# Patient Record
Sex: FEMALE | Race: WHITE | Employment: OTHER | URBAN - NONMETROPOLITAN AREA
[De-identification: names, ages, dates, MRNs, and addresses within clinical notes are randomized per-mention and may not be internally consistent; named-entity substitution may affect disease eponyms.]

---

## 2017-07-19 ENCOUNTER — OFFICE VISIT - GICH (OUTPATIENT)
Dept: FAMILY MEDICINE | Facility: OTHER | Age: 82
End: 2017-07-19

## 2017-07-19 ENCOUNTER — HISTORY (OUTPATIENT)
Dept: FAMILY MEDICINE | Facility: OTHER | Age: 82
End: 2017-07-19

## 2017-07-19 DIAGNOSIS — R10.13 EPIGASTRIC PAIN: ICD-10-CM

## 2017-08-08 ENCOUNTER — OFFICE VISIT - GICH (OUTPATIENT)
Dept: INTERNAL MEDICINE | Facility: OTHER | Age: 82
End: 2017-08-08

## 2017-08-08 ENCOUNTER — HISTORY (OUTPATIENT)
Dept: INTERNAL MEDICINE | Facility: OTHER | Age: 82
End: 2017-08-08

## 2017-08-08 DIAGNOSIS — C44.320 SQUAMOUS CELL CARCINOMA OF SKIN OF FACE: ICD-10-CM

## 2017-11-10 ENCOUNTER — AMBULATORY - GICH (OUTPATIENT)
Dept: FAMILY MEDICINE | Facility: OTHER | Age: 82
End: 2017-11-10

## 2017-11-10 DIAGNOSIS — Z23 ENCOUNTER FOR IMMUNIZATION: ICD-10-CM

## 2017-12-28 NOTE — PATIENT INSTRUCTIONS
1. Have you been to the ER, urgent care clinic since your last visit? Hospitalized since your last visit? No    2. Have you seen or consulted any other health care providers outside of the [address] [place] [address] since your last visit? Include any pap smears or colon screening.  No Patient Information     Patient Name MRN Melissa Harper [id number] Female 1935      Patient Instructions by Esteban Crawford MD at 2017  9:45 AM     Author:  Esteban Crawford MD  Service:  (none) Author Type:  Physician     Filed:  2017 10:56 AM  Encounter Date:  2017 Status:  Addendum     :  Esteban Crawford MD (Physician)        Related Notes: Original Note by Esteban Crawford MD (Physician) filed at 2017 10:52 AM            Take omeprazole 20 mg twice daily for about a week to see if you improve.  Avoid naproxen and ibuprofen for at least a week.     If no better in a week, then try Miralax powder. 1/2 capful or 1/2 packet daily for a week to see if that helps with any constipation    If worse, more frequent or prolonged attacks, then return    Acetaminophen is okay to use

## 2017-12-28 NOTE — PROGRESS NOTES
"Patient Information     Patient Name MRN Melissa Harper [id number] Female 1935      Progress Notes by Esteban Crawford MD at 2017  9:45 AM     Author:  Esteban Crawford MD Service:  (none) Author Type:  Physician     Filed:  2017  7:08 AM Encounter Date:  2017 Status:  Signed     :  Esteban Crawford MD (Physician)            SUBJECTIVE:  82 y.o. female presents for epigastric pain for week. Brief pain for a second and then resolves. Happens a few times a day. No fever, nausea, or diarrhea.   Cannot tell if it relates to eating.   Hx of gallstones in . The pain was much worse and then resolved.  Hx of EGD with dilation years ago  Taking naproxen for knee pain intermittently, but stopped when abdominal pain started. No melena.     REVIEW OF SYSTEMS:    Constitutional: Negative      Current Outpatient Prescriptions       Medication  Sig Dispense Refill     magnesium oxide (MAG-) 400 mg tablet 1 twice daily for 3 days then 1 daily 20 tablet 0     Omeprazole 20 mg tablet Take 1 tablet by mouth once daily.  0     potassium chloride (KLOR-CON 10; K-TAB) 10 mEq Controlled-Release tablet Take 1 tablet by mouth once daily with a meal.  0     triamterene-hydrochlorothiazide, 37.5-25 mg, (DYAZIDE) 37.5-25 mg capsule Take 1 capsule by mouth every morning.  0     Allergies as of 2017 - Mihai as Reviewed 2017      Allergen  Reaction Noted     Lovastatin Arthralgia 2016       OBJECTIVE:  /60  Pulse 82  Ht 1.62 m (5' 3.78\")  Wt 61.7 kg (136 lb)  Breastfeeding? No  BMI 23.51 kg/m2    General Appearance: Alert. No acute distress  Gastrointestinal Exam: Soft, nontender, no abnormal masses or organomegaly.  Extremities: No lower extremity edema.      ASSESSMENT/PLAN:    ICD-10-CM   1. Epigastric abdominal pain R10.13     Benign exam. Does not sound gallbladder related by history, although with previous stone, this is possible. Suspect either gastritis from " NSAID use or constipation, although she reports regular BM. Has some colicky-type pain by history.    Try omeprazole 20 mg BID for a week to see if better. Avoid NSAIDs. If better, then treat for about a month. If not better, then try Miralax to see if this helps as per patient instructions.  If worse, then return for lab and likely gallbladder ultrasound   F/U PRN

## 2017-12-28 NOTE — PROGRESS NOTES
"Patient Information     Patient Name MRN Sex Melissa Eugene [id number] Female 1935      Progress Notes by Vida Rojas NP at 2017  7:40 AM     Author:  Vida Rojas NP Service:  (none) Author Type:  PHYS- Nurse Practitioner     Filed:  2017  8:09 AM Encounter Date:  2017 Status:  Signed     :  Vida Rojas NP (PHYS- Nurse Practitioner)            SUBJECTIVE:    Melissa Quinteros is a 82 y.o. female who presents for suture removal    HPI  patient was seen 8 days ago by a skin specialist for a lesion removal. The lesion was over the left temporal region. She had Mohs procedure and he was diagnosed as squamous cell carcinoma. She denies pain, redness, swelling and purulent drainage.  Allergies     Allergen  Reactions     Lovastatin Arthralgia   ,   Current Outpatient Prescriptions on File Prior to Visit       Medication  Sig Dispense Refill     magnesium oxide (MAG-) 400 mg tablet 1 twice daily for 3 days then 1 daily 20 tablet 0     Omeprazole 20 mg tablet Take 1 tablet by mouth once daily.  0     potassium chloride (KLOR-CON 10; K-TAB) 10 mEq Controlled-Release tablet Take 1 tablet by mouth once daily with a meal.  0     triamterene-hydrochlorothiazide, 37.5-25 mg, (DYAZIDE) 37.5-25 mg capsule Take 1 capsule by mouth every morning.  0     No current facility-administered medications on file prior to visit.     and   Past Medical History:     Diagnosis  Date     Essential hypertension 2016     GERD with stricture 2016     Hyperlipidemia      Shingles     X2        REVIEW OF SYSTEMS:  ROS  see history of present illness  OBJECTIVE:  /58  Temp 96.5  F (35.8  C) (Tympanic)  Ht 1.613 m (5' 3.5\")  Wt 63 kg (139 lb)  Breastfeeding? No  BMI 24.24 kg/m2    EXAM:   Physical Exam  pleasant female without acute distress. Affect normal. Alert and oriented ×4. Left temporal region has a well approximated incision with no scabbing, erythema, " swelling, warmth nor drainage. Sutures were removed without difficulty and well tolerated by patient. Triple antibiotic ointment applied and a thin layer.  ASSESSMENT/PLAN:    ICD-10-CM    1. SCC (squamous cell carcinoma), face C44.320         Plan:  Recommend a watch the area for signs and symptoms of infection which are reviewed and discussed with patient and her . Recommend applying triple antibiotic ointment to the area once daily for the next 5 days. May cleanse with soap and water gently. Follow up with any problems

## 2017-12-30 NOTE — NURSING NOTE
Patient Information     Patient Name MRN Melissa Harper [id number] Female 1935      Nursing Note by Lexi Meeks at 2017  9:45 AM     Author:  Lexi Meeks Service:  (none) Author Type:  (none)     Filed:  2017 10:43 AM Encounter Date:  2017 Status:  Signed     :  Lexi Meeks            Melissa Quinteros is a 82 y.o. female presenting with intermittent upper abdominal pain.  Lexi Meeks LPN 2017 10:14 AM

## 2017-12-30 NOTE — NURSING NOTE
Patient Information     Patient Name MRN Sex Melissa Eugene [id number] Female 1935      Nursing Note by Nan Klein LPN at 2017  7:40 AM     Author:  Nan Klein LPN Service:  (none) Author Type:  NURS- Licensed Practical Nurse     Filed:  2017  7:55 AM Encounter Date:  2017 Status:  Signed     :  Nan Klein LPN (NURS- Licensed Practical Nurse)            Melissa Quinteros is a 82 y.o. female here today for suture removal from her forehead.   Declines mychart.  Nan SCRUGGS. CARA Klein.........2017   7:48 AM

## 2018-01-27 VITALS
DIASTOLIC BLOOD PRESSURE: 58 MMHG | BODY MASS INDEX: 23.73 KG/M2 | HEIGHT: 64 IN | SYSTOLIC BLOOD PRESSURE: 104 MMHG | WEIGHT: 139 LBS | TEMPERATURE: 96.5 F

## 2018-01-27 VITALS
WEIGHT: 136 LBS | SYSTOLIC BLOOD PRESSURE: 118 MMHG | HEIGHT: 64 IN | DIASTOLIC BLOOD PRESSURE: 60 MMHG | HEART RATE: 82 BPM | BODY MASS INDEX: 23.22 KG/M2

## 2018-02-26 ENCOUNTER — DOCUMENTATION ONLY (OUTPATIENT)
Dept: FAMILY MEDICINE | Facility: OTHER | Age: 83
End: 2018-02-26

## 2018-02-26 PROBLEM — C44.320 SCC (SQUAMOUS CELL CARCINOMA), FACE: Status: ACTIVE | Noted: 2017-08-08

## 2018-02-26 PROBLEM — E78.5 HYPERLIPIDEMIA: Status: ACTIVE | Noted: 2018-02-26

## 2018-02-26 RX ORDER — POTASSIUM CHLORIDE 750 MG/1
10 TABLET, EXTENDED RELEASE ORAL
COMMUNITY
Start: 2015-11-24 | End: 2018-08-28

## 2018-02-26 RX ORDER — MAGNESIUM OXIDE 400 MG/1
TABLET ORAL
COMMUNITY
Start: 2015-12-19 | End: 2018-06-19

## 2018-02-26 RX ORDER — TRIAMTERENE AND HYDROCHLOROTHIAZIDE 37.5; 25 MG/1; MG/1
1 CAPSULE ORAL EVERY MORNING
COMMUNITY
Start: 2015-05-11 | End: 2018-08-28

## 2018-02-26 RX ORDER — NICOTINE POLACRILEX 4 MG/1
20 GUM, CHEWING ORAL DAILY
COMMUNITY
Start: 2015-11-24 | End: 2018-06-19 | Stop reason: ALTCHOICE

## 2018-06-19 ENCOUNTER — OFFICE VISIT (OUTPATIENT)
Dept: FAMILY MEDICINE | Facility: OTHER | Age: 83
End: 2018-06-19
Attending: NURSE PRACTITIONER
Payer: MEDICARE

## 2018-06-19 VITALS
BODY MASS INDEX: 24.75 KG/M2 | HEIGHT: 64 IN | SYSTOLIC BLOOD PRESSURE: 132 MMHG | DIASTOLIC BLOOD PRESSURE: 70 MMHG | HEART RATE: 72 BPM | WEIGHT: 145 LBS

## 2018-06-19 DIAGNOSIS — K21.9 GASTROESOPHAGEAL REFLUX DISEASE, ESOPHAGITIS PRESENCE NOT SPECIFIED: ICD-10-CM

## 2018-06-19 DIAGNOSIS — I10 ESSENTIAL HYPERTENSION: ICD-10-CM

## 2018-06-19 DIAGNOSIS — Z79.899 USE OF PROTON PUMP INHIBITOR THERAPY: ICD-10-CM

## 2018-06-19 DIAGNOSIS — E61.1 IRON DEFICIENCY: ICD-10-CM

## 2018-06-19 DIAGNOSIS — R79.89 OTHER SPECIFIED ABNORMAL FINDINGS OF BLOOD CHEMISTRY: ICD-10-CM

## 2018-06-19 DIAGNOSIS — L84 CALLUS OF FOOT: ICD-10-CM

## 2018-06-19 DIAGNOSIS — E78.2 MIXED HYPERLIPIDEMIA: ICD-10-CM

## 2018-06-19 DIAGNOSIS — R53.83 OTHER FATIGUE: Primary | ICD-10-CM

## 2018-06-19 LAB
ANION GAP SERPL CALCULATED.3IONS-SCNC: 9 MMOL/L (ref 3–14)
BASOPHILS # BLD AUTO: 0.1 10E9/L (ref 0–0.2)
BASOPHILS NFR BLD AUTO: 1.2 %
BUN SERPL-MCNC: 22 MG/DL (ref 7–25)
CALCIUM SERPL-MCNC: 9.5 MG/DL (ref 8.6–10.3)
CHLORIDE SERPL-SCNC: 102 MMOL/L (ref 98–107)
CHOLEST SERPL-MCNC: 216 MG/DL
CO2 SERPL-SCNC: 27 MMOL/L (ref 21–31)
CREAT SERPL-MCNC: 0.9 MG/DL (ref 0.6–1.2)
DIFFERENTIAL METHOD BLD: NORMAL
EOSINOPHIL # BLD AUTO: 0.2 10E9/L (ref 0–0.7)
EOSINOPHIL NFR BLD AUTO: 2.8 %
ERYTHROCYTE [DISTWIDTH] IN BLOOD BY AUTOMATED COUNT: 12.9 % (ref 10–15)
FERRITIN SERPL-MCNC: 30 NG/ML (ref 23.9–336.2)
GFR SERPL CREATININE-BSD FRML MDRD: 60 ML/MIN/1.7M2
GLUCOSE SERPL-MCNC: 100 MG/DL (ref 70–105)
HCT VFR BLD AUTO: 40.9 % (ref 35–47)
HDLC SERPL-MCNC: 48 MG/DL (ref 23–92)
HGB BLD-MCNC: 13.6 G/DL (ref 11.7–15.7)
IMM GRANULOCYTES # BLD: 0 10E9/L (ref 0–0.4)
IMM GRANULOCYTES NFR BLD: 0.3 %
LDLC SERPL CALC-MCNC: 134 MG/DL
LYMPHOCYTES # BLD AUTO: 2 10E9/L (ref 0.8–5.3)
LYMPHOCYTES NFR BLD AUTO: 29 %
MAGNESIUM SERPL-MCNC: 1.9 MG/DL (ref 1.9–2.7)
MCH RBC QN AUTO: 30.2 PG (ref 26.5–33)
MCHC RBC AUTO-ENTMCNC: 33.3 G/DL (ref 31.5–36.5)
MCV RBC AUTO: 91 FL (ref 78–100)
MONOCYTES # BLD AUTO: 0.6 10E9/L (ref 0–1.3)
MONOCYTES NFR BLD AUTO: 9.3 %
NEUTROPHILS # BLD AUTO: 3.9 10E9/L (ref 1.6–8.3)
NEUTROPHILS NFR BLD AUTO: 57.4 %
NONHDLC SERPL-MCNC: 168 MG/DL
PLATELET # BLD AUTO: 202 10E9/L (ref 150–450)
POTASSIUM SERPL-SCNC: 4 MMOL/L (ref 3.5–5.1)
RBC # BLD AUTO: 4.51 10E12/L (ref 3.8–5.2)
SODIUM SERPL-SCNC: 138 MMOL/L (ref 134–144)
TRIGL SERPL-MCNC: 168 MG/DL
TSH SERPL DL<=0.05 MIU/L-ACNC: 1.23 IU/ML (ref 0.34–5.6)
VIT B12 SERPL-MCNC: 286 PG/ML (ref 180–914)
WBC # BLD AUTO: 6.8 10E9/L (ref 4–11)

## 2018-06-19 PROCEDURE — 84443 ASSAY THYROID STIM HORMONE: CPT | Performed by: NURSE PRACTITIONER

## 2018-06-19 PROCEDURE — 99214 OFFICE O/P EST MOD 30 MIN: CPT | Performed by: NURSE PRACTITIONER

## 2018-06-19 PROCEDURE — 85025 COMPLETE CBC W/AUTO DIFF WBC: CPT | Performed by: NURSE PRACTITIONER

## 2018-06-19 PROCEDURE — 82728 ASSAY OF FERRITIN: CPT | Performed by: NURSE PRACTITIONER

## 2018-06-19 PROCEDURE — 80048 BASIC METABOLIC PNL TOTAL CA: CPT | Performed by: NURSE PRACTITIONER

## 2018-06-19 PROCEDURE — G0463 HOSPITAL OUTPT CLINIC VISIT: HCPCS

## 2018-06-19 PROCEDURE — 83735 ASSAY OF MAGNESIUM: CPT | Performed by: NURSE PRACTITIONER

## 2018-06-19 PROCEDURE — 80061 LIPID PANEL: CPT | Performed by: NURSE PRACTITIONER

## 2018-06-19 PROCEDURE — 82607 VITAMIN B-12: CPT | Performed by: NURSE PRACTITIONER

## 2018-06-19 PROCEDURE — 36415 COLL VENOUS BLD VENIPUNCTURE: CPT | Performed by: NURSE PRACTITIONER

## 2018-06-19 RX ORDER — FAMOTIDINE 20 MG/1
20 TABLET, FILM COATED ORAL DAILY
Qty: 60 TABLET | Refills: 11 | Status: SHIPPED | OUTPATIENT
Start: 2018-06-19 | End: 2018-08-28

## 2018-06-19 RX ORDER — FERROUS SULFATE 325(65) MG
325 TABLET ORAL 2 TIMES DAILY
Qty: 180 TABLET | Refills: 0 | Status: SHIPPED | OUTPATIENT
Start: 2018-06-19 | End: 2018-08-28

## 2018-06-19 RX ORDER — MAGNESIUM OXIDE 400 MG/1
400 TABLET ORAL DAILY
Qty: 7 TABLET | Status: ON HOLD | COMMUNITY
Start: 2018-06-19 | End: 2021-01-01

## 2018-06-19 ASSESSMENT — PAIN SCALES - GENERAL: PAINLEVEL: NO PAIN (0)

## 2018-06-19 ASSESSMENT — ENCOUNTER SYMPTOMS
WOUND: 1
ARTHRALGIAS: 1
FATIGUE: 1

## 2018-06-19 NOTE — PATIENT INSTRUCTIONS
Keep callous area clean and dry--cover with bandaid dressing until healed    You will get call to see podiatrist in [place] for the callouses and nail care    Stop omeprazole and start pepcid 20 mg daily for your reflux/stomach issues  Please let me know if this is not effective--the dose could be increased to 2  X day if needed    I will call if abnormal lab results--other will send a letter    Let me know if you need refills

## 2018-06-19 NOTE — LETTER
June 19, 2018      Melissa Quinteros  [address] [facility] [address]        Dear ,    Please see enclosed lab results from your recent office visit.  Your magnesium, thyroid level kidney function and electrolytes are normal.  Your CBC or blood count does not show any evidence of anemia or infection, however your ferritin level or iron stores are low  This may be related to your use of proton pump inhibitor such as Protonix and omeprazole  May have affected iron absorption--as we discussed try the Pepcid 1-2 times a day every day for your stomach and would recommend trying an oral ferrous sulfate or iron supplement twice daily for 3 months--would recheck iron at that time and follow-up on your progress.  Your vitamin B12 level is within normal limits but at the lower end of normal range--vitamin B12 is over-the-counter and you could take this in a B complex supplement or vitamin B12 500 mcg daily.  Please call me if you have any questions about this.    Results for orders placed or performed in visit on 06/19/18   Magnesium   Result Value Ref Range    Magnesium 1.9 1.9 - 2.7 mg/dL   TSH   Result Value Ref Range    Thyrotropin 1.23 0.34 - 5.60 IU/mL   CBC and Differential   Result Value Ref Range    WBC 6.8 4.0 - 11.0 10e9/L    RBC Count 4.51 3.8 - 5.2 10e12/L    Hemoglobin 13.6 11.7 - 15.7 g/dL    Hematocrit 40.9 35.0 - 47.0 %    MCV 91 78 - 100 fl    MCH 30.2 26.5 - 33.0 pg    MCHC 33.3 31.5 - 36.5 g/dL    RDW 12.9 10.0 - 15.0 %    Platelet Count 202 150 - 450 10e9/L    Diff Method Automated Method     % Neutrophils 57.4 %    % Lymphocytes 29.0 %    % Monocytes 9.3 %    % Eosinophils 2.8 %    % Basophils 1.2 %    % Immature Granulocytes 0.3 %    Absolute Neutrophil 3.9 1.6 - 8.3 10e9/L    Absolute Lymphocytes 2.0 0.8 - 5.3 10e9/L    Absolute Monocytes 0.6 0.0 - 1.3 10e9/L    Absolute Eosinophils 0.2 0.0 - 0.7 10e9/L    Absolute Basophils 0.1 0.0 - 0.2 10e9/L    Abs Immature Granulocytes 0.0 0 -  0.4 10e9/L   Basic metabolic panel   Result Value Ref Range    Sodium 138 134 - 144 mmol/L    Potassium 4.0 3.5 - 5.1 mmol/L    Chloride 102 98 - 107 mmol/L    Carbon Dioxide 27 21 - 31 mmol/L    Anion Gap 9 3 - 14 mmol/L    Glucose 100 70 - 105 mg/dL    Urea Nitrogen 22 7 - 25 mg/dL    Creatinine 0.90 0.60 - 1.20 mg/dL    GFR Estimate 60 (L) >60 mL/min/1.7m2    GFR Estimate If Black 72 >60 mL/min/1.7m2    Calcium 9.5 8.6 - 10.3 mg/dL   Ferritin   Result Value Ref Range    Ferritin 30 23.9 - 336.2 ng/mL   Vitamin B12   Result Value Ref Range    Vitamin B12 286 180 - 914 pg/mL   Lipid Panel   Result Value Ref Range    Cholesterol 216 (H) <200 mg/dL    Triglycerides 168 (H) <150 mg/dL    HDL Cholesterol 48 23 - 92 mg/dL    LDL Cholesterol Calculated 134 (H) <100 mg/dL    Non HDL Cholesterol 168 (H) <130 mg/dL             If you have any questions or concerns, please call the clinic at the number listed above.       Sincerely,        OUMAR Mathias CNP

## 2018-06-19 NOTE — PROGRESS NOTES
SUBJECTIVE:   Melissa Quinteros is a 83 year old female who presents to clinic today for the following health issues:    Here for med review and follow-up labs--reports has noticed a change in her energy level she likes to go and be active all day long, sometimes has to take a nap in the afternoon and then feels recharged for the rest the day  Feels she is deficient in some nutrient or vitamin  Noticed this about 2 years ago after she had her knee surgery--denies any pain, night sweats, fevers associated  She has been taking over-the-counter magnesium tablet daily for her leg cramps history, she reports improved with stretching--is uncertain the medication makes a difference  Has been on a Dyazide and potassium for her hypertension  Taking omeprazole for GERD    Other issue is she noticed a callus on the back of her right foot toe #2 she tried to remove it with a scissors--    Notices some tingling in her feet only when she goes barefoot    She would like copies of her notes sent to her primary in [facility]--Dr Mario Barajas--[phone] (office #)        HPI    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      Current Outpatient Prescriptions   Medication Sig Dispense Refill  "    famotidine (PEPCID) 20 MG tablet Take 1 tablet (20 mg) by mouth daily 60 tablet 11     magnesium oxide (MAG-OX) 400 MG tablet 1 tab OTC daily 7 tablet      potassium chloride (K-TAB,KLOR-CON) 10 MEQ tablet Take 10 mEq by mouth daily with food       triamterene-hydrochlorothiazide (DYAZIDE) 37.5-25 MG per capsule Take 1 capsule by mouth every morning       Allergies   Allergen Reactions     Lovastatin      Other reaction(s): Arthralgia     BP Readings from Last 3 Encounters:   06/19/18 132/70   08/08/17 104/58   07/19/17 118/60    Wt Readings from Last 3 Encounters:   06/19/18 145 lb (65.8 kg)   08/08/17 139 lb (63 kg)   07/19/17 136 lb (61.7 kg)                  Labs reviewed in EPIC    Review of Systems   Constitutional: Positive for fatigue.   Musculoskeletal: Positive for arthralgias.   Skin: Positive for wound.   All other systems reviewed and are negative.       OBJECTIVE:     /70 (BP Location: Right arm, Patient Position: Sitting, Cuff Size: Adult Large)  Pulse 72  Ht 5' 3.5\" (1.613 m)  Wt 145 lb (65.8 kg)  LMP  (Exact Date)  Breastfeeding? No  BMI 25.28 kg/m2  Body mass index is 25.28 kg/(m^2).  Physical Exam   Constitutional: She is oriented to person, place, and time. She appears well-developed and well-nourished.   Cardiovascular: Normal rate.    Pulmonary/Chest: Effort normal.   Musculoskeletal: Normal range of motion. She exhibits no edema or tenderness.   Hammertoe right #2 crossing over towards great toe, there is no ulcer, erythema or evidence of friction injury   Neurological: She is alert and oriented to person, place, and time.   Skin: Skin is warm and dry. There is erythema.   Right foot metatarsal #2, plantar surface about 1/2 cm area of a callus partially removed--clean and dry no surrounding erythema, drainage or edema  Covered with Band-Aid dressing   Psychiatric: She has a normal mood and affect. Her behavior is normal. Judgment and thought content normal.   Nursing note and " vitals reviewed.      Results for orders placed or performed in visit on 06/19/18   Magnesium   Result Value Ref Range    Magnesium 1.9 1.9 - 2.7 mg/dL   TSH   Result Value Ref Range    Thyrotropin 1.23 0.34 - 5.60 IU/mL   CBC and Differential   Result Value Ref Range    WBC 6.8 4.0 - 11.0 10e9/L    RBC Count 4.51 3.8 - 5.2 10e12/L    Hemoglobin 13.6 11.7 - 15.7 g/dL    Hematocrit 40.9 35.0 - 47.0 %    MCV 91 78 - 100 fl    MCH 30.2 26.5 - 33.0 pg    MCHC 33.3 31.5 - 36.5 g/dL    RDW 12.9 10.0 - 15.0 %    Platelet Count 202 150 - 450 10e9/L    Diff Method Automated Method     % Neutrophils 57.4 %    % Lymphocytes 29.0 %    % Monocytes 9.3 %    % Eosinophils 2.8 %    % Basophils 1.2 %    % Immature Granulocytes 0.3 %    Absolute Neutrophil 3.9 1.6 - 8.3 10e9/L    Absolute Lymphocytes 2.0 0.8 - 5.3 10e9/L    Absolute Monocytes 0.6 0.0 - 1.3 10e9/L    Absolute Eosinophils 0.2 0.0 - 0.7 10e9/L    Absolute Basophils 0.1 0.0 - 0.2 10e9/L    Abs Immature Granulocytes 0.0 0 - 0.4 10e9/L   Basic metabolic panel   Result Value Ref Range    Sodium 138 134 - 144 mmol/L    Potassium 4.0 3.5 - 5.1 mmol/L    Chloride 102 98 - 107 mmol/L    Carbon Dioxide 27 21 - 31 mmol/L    Anion Gap 9 3 - 14 mmol/L    Glucose 100 70 - 105 mg/dL    Urea Nitrogen 22 7 - 25 mg/dL    Creatinine 0.90 0.60 - 1.20 mg/dL    GFR Estimate 60 (L) >60 mL/min/1.7m2    GFR Estimate If Black 72 >60 mL/min/1.7m2    Calcium 9.5 8.6 - 10.3 mg/dL   Ferritin   Result Value Ref Range    Ferritin 30 23.9 - 336.2 ng/mL   Vitamin B12   Result Value Ref Range    Vitamin B12 286 180 - 914 pg/mL   Lipid Panel   Result Value Ref Range    Cholesterol 216 (H) <200 mg/dL    Triglycerides 168 (H) <150 mg/dL    HDL Cholesterol 48 23 - 92 mg/dL    LDL Cholesterol Calculated 134 (H) <100 mg/dL    Non HDL Cholesterol 168 (H) <130 mg/dL         ASSESSMENT/PLAN:     Would recommend changing from omeprazole to a daily Pepcid--if this is not tolerated would probably resume proton pump  inhibitor for risk-benefit /quality of life    Referral for podiatry--dressed area with a Band-Aid dressing and directed to keep the area clean and dry  She should be able to see a provider in [place] within the next couple of weeks--she is not interested in any interventions regarding her hammertoe that is crossing over her great toe on her right foot  She Would like nail care    Lab letter sent--low iron stores may be due to chronic proton pump inhibitor therapy--she is open to trying a daily Pepcid 1-2 times a day  We will start an oral ferrous sulfate twice daily if tolerated for 2-3 months and recheck iron stores.  B12 lower end of normal--could take an over-the-counter B12 or B complex        1. Other fatigue    - Magnesium  - TSH  - CBC and Differential  - Ferritin  - Vitamin B12    2. Essential hypertension    - Basic metabolic panel    3. Mixed hyperlipidemia    - Magnesium  - Lipid Panel    4. Other specified abnormal findings of blood chemistry     - Ferritin    5. Callus of foot    - PODIATRY/FOOT & ANKLE SURGERY REFERRAL    6. Gastroesophageal reflux disease, esophagitis presence not specified    - famotidine (PEPCID) 20 MG tablet; Take 1 tablet (20 mg) by mouth daily  Dispense: 60 tablet; Refill: 11    7. Iron deficiency      8. Use of proton pump inhibitor therapy          OUMAR Mathias [facility]      CC[facility] [place]---office# [phone]

## 2018-06-19 NOTE — NURSING NOTE
Patient presents for yearly medication management would like her right foot 2nd toe   Lucero Pruett LPN........................6/19/2018  9:09 AM

## 2018-06-19 NOTE — MR AVS SNAPSHOT
After Visit Summary   6/19/2018    Melissa Quinteros    MRN: [id number]           Patient Information     Date Of Birth          1935        Visit Information        Provider Department      6/19/2018 9:15 AM Sofie Jay APRN CNP [facility]        Today's Diagnoses     Other fatigue    -  1    Essential hypertension        Mixed hyperlipidemia        Other specified abnormal findings of blood chemistry         Callus of foot        Gastroesophageal reflux disease, esophagitis presence not specified          Care Instructions    Keep callous area clean and dry--cover with bandaid dressing until healed    You will get call to see podiatrist in [place] for the callouses and nail care    Stop omeprazole and start pepcid 20 mg daily for your reflux/stomach issues  Please let me know if this is not effective--the dose could be increased to 2  X day if needed    I will call if abnormal lab results--other will send a letter    Let me know if you need refills          Follow-ups after your visit        Additional Services     PODIATRY/FOOT & ANKLE SURGERY REFERRAL       Podiatry--[place] office Dr Colmenares                  Follow-up notes from your care team     Return if symptoms worsen or fail to improve.      Who to contact     If you have questions or need follow up information about today's clinic visit or your schedule please contact [facility] directly at [phone].  Normal or non-critical lab and imaging results will be communicated to you by MyChart, letter or phone within 4 business days after the clinic has received the results. If you do not hear from us within 7 days, please contact the clinic through MyChart or phone. If you have a critical or abnormal lab result, we will notify you by phone as soon as possible.  Submit refill requests through [employer] or call your pharmacy and they will forward the refill request to us. Please allow 3 business days  "for your refill to be completed.          Additional Information About Your Visit        Care EveryWhere ID     This is your Care EveryWhere ID. This could be used by other organizations to access your [facility] medical records  [id number]        Your Vitals Were     Pulse Height Last Period Breastfeeding? BMI (Body Mass Index)       72 5' 3.5\" (1.613 m) (Exact Date) No 25.28 kg/m2        Blood Pressure from Last 3 Encounters:   06/19/18 132/70   08/08/17 104/58   07/19/17 118/60    Weight from Last 3 Encounters:   06/19/18 145 lb (65.8 kg)   08/08/17 139 lb (63 kg)   07/19/17 136 lb (61.7 kg)              We Performed the Following     Basic metabolic panel     CBC and Differential     Ferritin     Lipid Panel     Magnesium     PODIATRY/FOOT & ANKLE SURGERY REFERRAL     TSH     Vitamin B12          Today's Medication Changes          These changes are accurate as of 6/19/18  9:53 AM.  If you have any questions, ask your nurse or doctor.               Start taking these medicines.        Dose/Directions    famotidine 20 MG tablet   Commonly known as:  PEPCID   Used for:  Gastroesophageal reflux disease, esophagitis presence not specified   Started by:  Sofie Jay APRN CNP        Dose:  20 mg   Take 1 tablet (20 mg) by mouth daily   Quantity:  60 tablet   Refills:  11         These medicines have changed or have updated prescriptions.        Dose/Directions    magnesium oxide 400 MG tablet   Commonly known as:  MAG-OX   This may have changed:  See the new instructions.   Changed by:  Sofie Jay APRN CNP        1 tab OTC daily   Quantity:  7 tablet   Refills:  0         Stop taking these medicines if you haven't already. Please contact your care team if you have questions.     RA OMEPRAZOLE 20 MG tablet   Generic drug:  omeprazole   Stopped by:  Sofie Jay APRN CNP                Where to get your medicines      These medications were sent to [facility] [address] [place] [address] " [address] [address]     Phone:  [phone]     famotidine 20 MG tablet                Primary Care Provider Fax #    Physician No Ref-Primary [phone]       No address on file        Equal Access to Services     ELLI SILVA : Ceasar valentino audra Sotrish, wagalindoda luqadaha, qaybta kaalmada adeashley, [place] perryespinoza coronarosalind brunner eliseo rodriguez. So [facility] [phone].    ATENCIÓN: Si habla español, tiene a gonzales disposición servicios gratuitos de asistencia lingüística. Llame al [phone].    We comply with applicable federal civil rights laws and [place] laws. We do not discriminate on the basis of race, color, national origin, age, disability, sex, sexual orientation, or gender identity.            Thank you!     Thank you for choosing [facility]  for your care. Our goal is always to provide you with excellent care. Hearing back from our patients is one way we can continue to improve our services. Please take a few minutes to complete the written survey that you may receive in the mail after your visit with us. Thank you!             Your Updated Medication List - Protect others around you: Learn how to safely use, store and throw away your medicines at www.disposemymeds.org.          This list is accurate as of 6/19/18  9:53 AM.  Always use your most recent med list.                   Brand Name Dispense Instructions for use Diagnosis    famotidine 20 MG tablet    PEPCID    60 tablet    Take 1 tablet (20 mg) by mouth daily    Gastroesophageal reflux disease, esophagitis presence not specified       magnesium oxide 400 MG tablet    MAG-OX    7 tablet    1 tab OTC daily        potassium chloride 10 MEQ tablet    K-TAB,KLOR-CON     Take 10 mEq by mouth daily with food        triamterene-hydrochlorothiazide 37.5-25 MG per capsule    DYAZIDE     Take 1 capsule by mouth every morning

## 2018-08-07 DIAGNOSIS — M17.0 PRIMARY OSTEOARTHRITIS OF BOTH KNEES: Primary | ICD-10-CM

## 2018-08-21 ENCOUNTER — HOSPITAL ENCOUNTER (OUTPATIENT)
Dept: PHYSICAL THERAPY | Facility: OTHER | Age: 83
Setting detail: THERAPIES SERIES
End: 2018-08-21
Attending: PSYCHIATRY & NEUROLOGY
Payer: MEDICARE

## 2018-08-21 PROCEDURE — 40000185 ZZHC STATISTIC PT OUTPT VISIT

## 2018-08-21 PROCEDURE — 97161 PT EVAL LOW COMPLEX 20 MIN: CPT | Mod: GP

## 2018-08-21 PROCEDURE — G8978 MOBILITY CURRENT STATUS: HCPCS | Mod: GP,CK

## 2018-08-21 PROCEDURE — 97110 THERAPEUTIC EXERCISES: CPT | Mod: GP

## 2018-08-21 PROCEDURE — G8979 MOBILITY GOAL STATUS: HCPCS | Mod: GP,CI

## 2018-08-22 NOTE — PROGRESS NOTES
[facility]          OUTPATIENT PHYSICAL THERAPY ORTHOPEDIC EVALUATION  PLAN OF TREATMENT FOR OUTPATIENT REHABILITATION  (COMPLETE FOR INITIAL CLAIMS ONLY)  Patient's Last Name, First Name, M.I.  YOB: 1935  Melissa Quinteors    Provider s Name:  [facility]   Medical Record No.  [id number]   Start of Care Date:  08/21/18   Onset Date:  08/07/18   Type:     _X__PT   ___OT   ___SLP Medical Diagnosis:  Primary osteoarthritis of both knees M17.0     PT Diagnosis:  Impaired mobility, decreased strength and endurance, knee pain   Visits from SOC:  1      _________________________________________________________________________________  Plan of Treatment/Functional Goals:  balance training, gait training, joint mobilization, manual therapy, neuromuscular re-education, ROM, strengthening, stretching     Cryotherapy, Hot packs, Ultrasound     Goals  Goal Identifier: HEP  Goal Description: Patient will demonstrate compliance and independence with her HEP in order to promote return to prior level of function  Target Date: 09/12/18    Goal Identifier: Strength  Goal Description: Patient will be able to stand up from a chair with minimal to no pain in order to improve mobility around her home  Target Date: 09/12/18    Goal Identifier: Mobility  Goal Description: Patient will be able to ambulate for longer than 10 minutes with minimal to no pain in order to improve efficiency and safety with community ambulation  Target Date: 10/02/18    Goal Identifier: Strength  Goal Description: Patient will be able to squat down to the ground in order to  an item off the floor with minimal to no pain in order to improve efficiency with house related tasks  Target Date: 10/02/18    Therapy Frequency:  other (see comments) (1-2 times per week)  Predicted Duration of Therapy Intervention:  6 weeks    Roni Jimenez PT                 I CERTIFY THE NEED FOR THESE SERVICES FURNISHED  UNDER        THIS PLAN OF TREATMENT AND WHILE UNDER MY CARE .             Physician Signature               Date    X_____________________________________________________                             Certification Date From:  08/21/18   Certification Date To:  10/02/18    Referring Provider:  (P) Dr. Chance Soliz    Initial Assessment        See Epic Evaluation Start of Care Date: 08/21/18

## 2018-08-22 NOTE — PROGRESS NOTES
08/21/18 1000   General Information   Type of Visit Initial OP Ortho PT Evaluation   Start of Care Date 08/21/18   Referring Physician Dr. Chance Soliz   Patient/Family Goals Statement To reduce her pain and improve her mobility and endurance   Orders Evaluate and Treat   Orders Comment Physical Therapy Eval and Treat 3x week/ 3 weeks, bilateral primary osteoarthritis of knee   Date of Order 08/07/18   Insurance Type Medicare;[employer]   Medical Diagnosis Primary osteoarthritis of both knees M17.0   Surgical/Medical history reviewed Yes   Precautions/Limitations no known precautions/limitations   General Information Comments Patient is an 83 year old female referred to physical therapy with bilateral knee pain. She states that both knees have been bothering her for quite some time. Had a TKA on the right knee about 2 years ago. She had an injection in the left knee but doesn't know how long it will last. States that she has one place here in town and another place in [place]. She does a lot of walking while she lives in [place] but doesn't like to walk on her gravel road here. She states when she does exercise in her home she feels improvement in her knees. Pain hasn't been very severe. She has difficulty with standing or walking for longer durations.        Present No   Body Part(s)   Body Part(s) Knee   Presentation and Etiology   Pertinent history of current problem (include personal factors and/or comorbidities that impact the POC) Right TKA, no reported comobidities   Impairments A. Pain   Functional Limitations perform activities of daily living;perform desired leisure / sports activities   Symptom Location Bilateral knees   How/Where did it occur With repetition/overuse   Onset date of current episode/exacerbation 08/07/18   Chronicity New   Pain rating (0-10 point scale) Best (/10);Worst (/10)   Best (/10) 1   Worst (/10) 3   Pain quality C. Aching   Frequency of pain/symptoms B.  Intermittent   Pain/symptoms are: Worse during the day   Pain/symptoms exacerbated by B. Walking   Pain/symptoms eased by A. Sitting;C. Rest   Progression of symptoms since onset: Unchanged   Prior Level of Function   Prior Level of Function-Mobility Independent   Prior Level of Function-ADLs Independent   Current Level of Function   Patient role/employment history F. Retired   [facility]   Current equipment-Gait/Locomotion None   Current equipment-ADL None   Fall Risk Screen   Fall screen completed by PT   Have you fallen 2 or more times in the past year? No   Have you fallen and had an injury in the past year? No   Is patient a fall risk? No   Knee Objective Findings   Side (if bilateral, select both right and left) Right;Left   Observation Slight valgus deformity on left leg when compared to right   Integumentary  No significant findings   Posture Protracted shoulders   Gait/Locomotion Slower gait speed, no major deviation   Lachmans Test N/A   Anterior Drawer Test N/A   Varus Stress Test Negative   Valgus Stress Test Negative   Marsha's Test N/A   Palpation Discomfort noted on medial side of knee on left side    Right Knee Extension AROM 8 degrees short of extension   Right Knee Flexion AROM 109 degrees   Right Knee Flexion PROM 113 degrees   Right Knee Flexion Strength 5/5   Right Knee Extension Strength 5/5   Right Hip Abduction Strength 5/5   Right Gastrocnemius Flexibility Min limited   Right Hamstring Flexibility Min limited   Left Knee Extension AROM 6 degrees short of extension   Left Knee Flexion AROM 100 degrees   Left Knee Flexion PROM 108 degrees   Left Knee Flexion Strength 5/5   Left Knee Extension Strength 5/5   Left Hip Abduction Strength 5/5   Left Gastrocnemius Flexibility Min limited    Left Hamstring Flexibility Min limited   Planned Therapy Interventions   Planned Therapy Interventions balance training;gait training;joint mobilization;manual therapy;neuromuscular  "re-education;ROM;strengthening;stretching   Planned Modality Interventions   Planned Modality Interventions Cryotherapy;Hot packs;Ultrasound   Clinical Impression   Criteria for Skilled Therapeutic Interventions Met yes, treatment indicated   PT Diagnosis Impaired mobility, decreased strength and endurance, knee pain   Influenced by the following impairments pain, stiffness, weakness   Functional limitations due to impairments Prolonged walking and standing, standing up from chair, getting out of bath tub   Clinical Presentation Stable/Uncomplicated   Clinical Presentation Rationale Minimal reported comorbidities   Clinical Decision Making (Complexity) Low complexity   Therapy Frequency other (see comments)  (1-2 times per week)   Predicted Duration of Therapy Intervention (days/wks) 6 weeks   Risk & Benefits of therapy have been explained Yes   Patient, Family & other staff in agreement with plan of care Yes   Clinical Impression Comments Patient is an 83 year old female referred to physical therapy with bilateral knee pain. She had her right knee replaced and states that she is \"bone on bone\" on the left. She would like to be able to walk longer distances and have more strength in her legs. She would benefit from physical therapy services in order to improve her mobility, strength and endurance   Education Assessment   Barriers to Learning No barriers   ORTHO GOALS   PT Ortho Eval Goals 1;2;3;4   Ortho Goal 1   Goal Identifier HEP   Goal Description Patient will demonstrate compliance and independence with her HEP in order to promote return to prior level of function   Target Date 09/12/18   Ortho Goal 2   Goal Identifier Strength   Goal Description Patient will be able to stand up from a chair with minimal to no pain in order to improve mobility around her home   Target Date 09/12/18   Ortho Goal 3   Goal Identifier Mobility   Goal Description Patient will be able to ambulate for longer than 10 minutes with " minimal to no pain in order to improve efficiency and safety with community ambulation   Target Date 10/02/18   Ortho Goal 4   Goal Identifier Strength   Goal Description Patient will be able to squat down to the ground in order to  an item off the floor with minimal to no pain in order to improve efficiency with house related tasks   Target Date 10/02/18   Total Evaluation Time   Total Evaluation Time 40   Therapy Certification   Certification date from 08/21/18   Certification date to 10/02/18   Medical Diagnosis Primary osteoarthritis of both knees M17.0

## 2018-08-23 ENCOUNTER — HOSPITAL ENCOUNTER (OUTPATIENT)
Dept: PHYSICAL THERAPY | Facility: OTHER | Age: 83
Setting detail: THERAPIES SERIES
End: 2018-08-23
Attending: PSYCHIATRY & NEUROLOGY
Payer: MEDICARE

## 2018-08-23 PROCEDURE — 97110 THERAPEUTIC EXERCISES: CPT | Mod: GP

## 2018-08-23 PROCEDURE — 40000185 ZZHC STATISTIC PT OUTPT VISIT

## 2018-08-28 ENCOUNTER — OFFICE VISIT (OUTPATIENT)
Dept: INTERNAL MEDICINE | Facility: OTHER | Age: 83
End: 2018-08-28
Attending: INTERNAL MEDICINE
Payer: MEDICARE

## 2018-08-28 ENCOUNTER — HOSPITAL ENCOUNTER (OUTPATIENT)
Dept: GENERAL RADIOLOGY | Facility: OTHER | Age: 83
Discharge: HOME | End: 2018-08-28
Attending: INTERNAL MEDICINE | Admitting: INTERNAL MEDICINE
Payer: MEDICARE

## 2018-08-28 VITALS
WEIGHT: 146 LBS | BODY MASS INDEX: 24.92 KG/M2 | SYSTOLIC BLOOD PRESSURE: 120 MMHG | HEIGHT: 64 IN | DIASTOLIC BLOOD PRESSURE: 64 MMHG

## 2018-08-28 DIAGNOSIS — R53.83 FATIGUE, UNSPECIFIED TYPE: ICD-10-CM

## 2018-08-28 DIAGNOSIS — K21.9 GERD WITH STRICTURE: ICD-10-CM

## 2018-08-28 DIAGNOSIS — R53.83 FATIGUE, UNSPECIFIED TYPE: Primary | ICD-10-CM

## 2018-08-28 DIAGNOSIS — R06.00 DYSPNEA, UNSPECIFIED TYPE: ICD-10-CM

## 2018-08-28 DIAGNOSIS — K22.2 GERD WITH STRICTURE: ICD-10-CM

## 2018-08-28 DIAGNOSIS — R60.0 PERIPHERAL EDEMA: ICD-10-CM

## 2018-08-28 DIAGNOSIS — E78.5 HYPERLIPIDEMIA, UNSPECIFIED HYPERLIPIDEMIA TYPE: ICD-10-CM

## 2018-08-28 PROCEDURE — G0463 HOSPITAL OUTPT CLINIC VISIT: HCPCS

## 2018-08-28 PROCEDURE — G0463 HOSPITAL OUTPT CLINIC VISIT: HCPCS | Mod: 25

## 2018-08-28 PROCEDURE — 99215 OFFICE O/P EST HI 40 MIN: CPT | Mod: 25 | Performed by: INTERNAL MEDICINE

## 2018-08-28 PROCEDURE — 93010 ELECTROCARDIOGRAM REPORT: CPT | Performed by: INTERNAL MEDICINE

## 2018-08-28 PROCEDURE — 71046 X-RAY EXAM CHEST 2 VIEWS: CPT

## 2018-08-28 ASSESSMENT — ENCOUNTER SYMPTOMS
NECK STIFFNESS: 0
DIAPHORESIS: 0
FATIGUE: 0
NUMBNESS: 0
FLANK PAIN: 0
CHILLS: 0
VOMITING: 0
BACK PAIN: 0
NAUSEA: 0
CONFUSION: 0
EYE PAIN: 0
PALPITATIONS: 0
TREMORS: 0
DIFFICULTY URINATING: 0
HEMATURIA: 0
FEVER: 0
WEAKNESS: 0
SHORTNESS OF BREATH: 0
SEIZURES: 0
WHEEZING: 0
RHINORRHEA: 0
SINUS PRESSURE: 0
ABDOMINAL PAIN: 0
SORE THROAT: 0
EYE REDNESS: 0
BLOOD IN STOOL: 0
SINUS PAIN: 0
SLEEP DISTURBANCE: 0
BRUISES/BLEEDS EASILY: 0
ADENOPATHY: 0
CHEST TIGHTNESS: 0
DIARRHEA: 0
FACIAL SWELLING: 0
FREQUENCY: 0
NECK PAIN: 0
HEADACHES: 0
CONSTIPATION: 0
COUGH: 0
DYSURIA: 0
DIZZINESS: 0
AGITATION: 0
HALLUCINATIONS: 0
JOINT SWELLING: 0
TROUBLE SWALLOWING: 0
ABDOMINAL DISTENTION: 0

## 2018-08-28 ASSESSMENT — PAIN SCALES - GENERAL: PAINLEVEL: NO PAIN (0)

## 2018-08-28 NOTE — NURSING NOTE
"Patient comes in to establish care.  Patient not sure of medications she is on.  Katherine Guy LPN ....................8/28/2018   1:48 PM  Chief Complaint   Patient presents with     Consult     establish care       Initial /64 (BP Location: Right arm, Patient Position: Sitting, Cuff Size: Adult Regular)  Ht 5' 3.78\" (1.62 m)  Wt 146 lb (66.2 kg)  BMI 25.23 kg/m2 Estimated body mass index is 25.23 kg/(m^2) as calculated from the following:    Height as of this encounter: 5' 3.78\" (1.62 m).    Weight as of this encounter: 146 lb (66.2 kg).  Medication Reconciliation:     Katherine Guy LPN    "

## 2018-08-28 NOTE — PROGRESS NOTES
Chief Complaint:  This patient is here for a comprehensive review of their multiple medical problems, renewal of medications and update on necessary health maintenance issues.      HPI: She is in today complaining of not feeling well.  She feels tired and worn out in the middle of the day.  She was in earlier this year and had an evaluation done that was negative.  This included extensive laboratory testing, all of which looked fine other than a minimal elevation in her cholesterol.  Specifically, her thyroid and CBC were normal.  She does not have any chest pain or pressure.  She does not have any real shortness of breath or cough.  She just has overall fatigue that hits her in the mid afternoon and is quite oppressive.    I spent some time today reconciling her medications.  She really is just on her omeprazole and over-the-counter supplements.  She used to be on a low intensity statin as well as Dyazide but she is not taking either of those at the present time.    Medications are reconciled.  Past medical history, past surgical history, family history and social histories are all reviewed and updated today.    Past Medical History:   Diagnosis Date     Esophageal obstruction     8/30/2016     Essential (primary) hypertension     8/30/2016     Hyperlipidemia     No Comments Provided     Zoster without complications     X2       Past Surgical History:   Procedure Laterality Date     APPENDECTOMY       ARTHROPLASTY KNEE Right 2017    [place], [place]     COLONOSCOPY      No Comments Provided     ESOPHAGOGASTRODUODENOSCOPY       HYSTERECTOMY TOTAL ABDOMINAL      1982,incidental appendectomy     TONSILLECTOMY, ADENOIDECTOMY, COMBINED      childhood       Current Outpatient Prescriptions   Medication Sig Dispense Refill     cyanocobalamin ([employer] B-12) 500 MCG TABS Take 1 tablet (500 mcg) by mouth daily 150 tablet      magnesium oxide (MAG-OX) 400 MG tablet 1 tab OTC daily 7 tablet      omeprazole (PRILOSEC) 20 MG CR capsule  Take 1 capsule (20 mg) by mouth daily 90 capsule 3       Allergies   Allergen Reactions     Lovastatin      Other reaction(s): Arthralgia       Family History   Problem Relation Age of Onset     Other - See Comments Father      Old age     Lung Cancer Mother      Brain Cancer Brother        Social History     Social History     Marital status:      Spouse name: N/A     Number of children: N/A     Years of education: N/A     Occupational History     Not on file.     Social History Main Topics     Smoking status: Never Smoker     Smokeless tobacco: Never Used     Alcohol use 0.0 oz/week      Comment: Alcoholic Drinks/day: Seldom     Drug use: No      Comment: Drug use: No     Sexual activity: Yes     Partners: Female     Other Topics Concern     Not on file     Social History Narrative    .  Retired .  Enjoys reading, crossword puzzles and sports.  [place] between [place] area, [place]       Review of Systems   Constitutional: Negative for chills, diaphoresis, fatigue and fever.   HENT: Negative for congestion, ear pain, facial swelling, mouth sores, nosebleeds, rhinorrhea, sinus pain, sinus pressure, sore throat and trouble swallowing.    Eyes: Negative for pain, redness and visual disturbance.   Respiratory: Negative for cough, chest tightness, shortness of breath and wheezing.    Cardiovascular: Negative for chest pain, palpitations and leg swelling.   Gastrointestinal: Negative for abdominal distention, abdominal pain, blood in stool, constipation, diarrhea, nausea and vomiting.   Endocrine: Negative for cold intolerance and heat intolerance.   Genitourinary: Negative for difficulty urinating, dysuria, flank pain, frequency, hematuria, pelvic pain, vaginal bleeding, vaginal discharge and vaginal pain.   Musculoskeletal: Negative for back pain, gait problem, joint swelling, neck pain and neck stiffness.   Skin: Negative for rash.   Neurological: Negative for dizziness,  tremors, seizures, syncope, weakness, numbness and headaches.   Hematological: Negative for adenopathy. Does not bruise/bleed easily.   Psychiatric/Behavioral: Negative for agitation, confusion, hallucinations and sleep disturbance.       Physical Exam   Constitutional: She is oriented to person, place, and time. She appears well-developed and well-nourished. No distress.   HENT:   Head: Normocephalic.   Right Ear: External ear normal.   Left Ear: External ear normal.   Mouth/Throat: Oropharynx is clear and moist. No oropharyngeal exudate.   Eyes: Conjunctivae are normal. Pupils are equal, round, and reactive to light.   Neck: Normal range of motion. Neck supple. Normal carotid pulses and no JVD present. Carotid bruit is not present. No tracheal deviation present. No thyromegaly present.   Cardiovascular: Normal rate, regular rhythm and normal heart sounds.  Exam reveals no gallop and no friction rub.    No murmur heard.  Pulmonary/Chest: Effort normal and breath sounds normal. No respiratory distress. She has no wheezes. She has no rales.   Abdominal: Soft. Bowel sounds are normal. She exhibits no distension and no mass. There is no tenderness. There is no rebound.   Musculoskeletal: Normal range of motion. She exhibits edema.   2+ peripheral edema   Lymphadenopathy:     She has no cervical adenopathy.   Neurological: She is alert and oriented to person, place, and time. She has normal reflexes. No cranial nerve deficit. Coordination normal.   Skin: Skin is warm and dry. No rash noted. She is not diaphoretic.   Psychiatric: She has a normal mood and affect. Her behavior is normal.   Nursing note and vitals reviewed.      Assessment:      ICD-10-CM    1. Fatigue, unspecified type R53.83 XR Chest 2 Views     EKG 12-LEAD CLINIC READ     NM Lexiscan stress test   2. Peripheral edema R60.9    3. GERD with stricture K21.9     K22.2    4. Hyperlipidemia, unspecified hyperlipidemia type E78.5    5. Dyspnea, unspecified  type  R06.00 NM Lexiscan stress test        Plan: Her exam today is normal.  EKG unremarkable other than a PVC.  Chest x-ray is normal.  The etiology for her fatigue is not clear but I do think we need to rule out occult coronary disease as a factor.  This was reviewed with the patient and  and they agree.  Stress testing will be done in the form of a Lexiscan.  If this is negative, I do not think that I would do any further investigation and I would probably just attributed to her fatigue to age.  Because she does have some peripheral edema, I encouraged her to limit her sodium intake.  She admits that she is very liberal with her salt usage.

## 2018-08-28 NOTE — MR AVS SNAPSHOT
After Visit Summary   8/28/2018    Melissa Quinteros    MRN: [id number]           Patient Information     Date Of Birth          1935        Visit Information        Provider Department      8/28/2018 1:40 PM Bret Jimenez MD [facility]        Today's Diagnoses     Fatigue, unspecified type    -  1    Peripheral edema        GERD with stricture        Hyperlipidemia, unspecified hyperlipidemia type        Dyspnea, unspecified type            Follow-ups after your visit        Your next 10 appointments already scheduled     Aug 29, 2018  9:00 AM CDT   [facility] Roni [facility]o, PT   [facility]    [address] [address]   [phone]            Aug 31, 2018  9:00 AM CDT   Treatment with Roni Peratalo, PT   [facility] ([facility])    [address] [address]   [phone]            Sep 04, 2018 10:00 AM CDT   Treatment with Roni Peratalo, PT   [facility]    [address] [address]   [phone]            Sep 06, 2018  9:00 AM CDT   Treatment with Roni Peratalo, PT   [facility]    [address] [address]   [phone]              Future tests that were ordered for you today     Open Future Orders        Priority Expected Expires Ordered    NM Lexiscan stress test Routine  8/28/2019 8/28/2018    XR Chest 2 Views Routine 8/28/2018 8/28/2019 8/28/2018            Who to contact     If you have questions or need follow up information about today's clinic visit or your schedule please contact [facility] directly at [phone].  Normal or non-critical lab and imaging results will be communicated to you by MyChart, letter or phone within 4 business  "days after the clinic has received the results. If you do not hear from us within 7 days, please contact the clinic through "[employer] or phone. If you have a critical or abnormal lab result, we will notify you by phone as soon as possible.  Submit refill requests through "[employer] or call your pharmacy and they will forward the refill request to us. Please allow 3 business days for your refill to be completed.          Additional Information About Your Visit        Care EveryWhere ID     This is your Care EveryWhere ID. This could be used by other organizations to access your [place] medical records  [id number]        Your Vitals Were     Height BMI (Body Mass Index)                5' 3.78\" (1.62 m) 25.23 kg/m2           Blood Pressure from Last 3 Encounters:   08/28/18 120/64   06/19/18 132/70   08/08/17 104/58    Weight from Last 3 Encounters:   08/28/18 146 lb (66.2 kg)   06/19/18 145 lb (65.8 kg)   08/08/17 139 lb (63 kg)              We Performed the Following     EKG 12-LEAD CLINIC READ          Today's Medication Changes          These changes are accurate as of 8/28/18  2:29 PM.  If you have any questions, ask your nurse or doctor.               Stop taking these medicines if you haven't already. Please contact your care team if you have questions.     famotidine 20 MG tablet   Commonly known as:  PEPCID   Stopped by:  Bret Jimenez MD           ferrous sulfate 325 (65 Fe) MG tablet   Commonly known as:  IRON   Stopped by:  Bret Jimenez MD           potassium chloride 10 MEQ tablet   Commonly known as:  K-TAB,KLOR-CON   Stopped by:  Bret Jimenez MD           triamterene-hydrochlorothiazide 37.5-25 MG per capsule   Commonly known as:  DYAZIDE   Stopped by:  Bret Jimenez MD                    Primary Care Provider Office Phone # Fax #    Bret Jimenez [phone] [address] [facility] [address]        Equal Access to Services     ELLI SILVA AH: Hadii aad ku " audra Singh, wagalindoda nidhicamille, qaazaleata kanaz salazar, [place] aleksin hayaan cjrosalind emilianati laloreleiespinoza jennifer. So [facility] [phone].    ATENCIÓN: Si habla español, tiene a gonzales disposición servicios gratuitos de asistencia lingüística. Betito al [phone].    We comply with applicable federal civil rights laws and [place] laws. We do not discriminate on the basis of race, color, national origin, age, disability, sex, sexual orientation, or gender identity.            Thank you!     Thank you for choosing [facility]  for your care. Our goal is always to provide you with excellent care. Hearing back from our patients is one way we can continue to improve our services. Please take a few minutes to complete the written survey that you may receive in the mail after your visit with us. Thank you!             Your Updated Medication List - Protect others around you: Learn how to safely use, store and throw away your medicines at www.disposemymeds.org.          This list is accurate as of 8/28/18  2:29 PM.  Always use your most recent med list.                   Brand Name Dispense Instructions for use Diagnosis    CVS B-12 500 MCG Tabs   Generic drug:  cyanocobalamin     150 tablet    Take 1 tablet (500 mcg) by mouth daily        magnesium oxide 400 MG tablet    MAG-OX    7 tablet    1 tab OTC daily        omeprazole 20 MG CR capsule    priLOSEC    90 capsule    Take 1 capsule (20 mg) by mouth daily

## 2018-08-29 ENCOUNTER — HOSPITAL ENCOUNTER (OUTPATIENT)
Dept: PHYSICAL THERAPY | Facility: OTHER | Age: 83
Setting detail: THERAPIES SERIES
End: 2018-08-29
Attending: PSYCHIATRY & NEUROLOGY
Payer: MEDICARE

## 2018-08-29 PROCEDURE — 97110 THERAPEUTIC EXERCISES: CPT | Mod: GP

## 2018-08-29 PROCEDURE — 40000185 ZZHC STATISTIC PT OUTPT VISIT

## 2018-09-04 ENCOUNTER — HOSPITAL ENCOUNTER (OUTPATIENT)
Dept: PHYSICAL THERAPY | Facility: OTHER | Age: 83
Setting detail: THERAPIES SERIES
End: 2018-09-04
Attending: PSYCHIATRY & NEUROLOGY
Payer: MEDICARE

## 2018-09-04 ENCOUNTER — TELEPHONE (OUTPATIENT)
Dept: CARDIAC REHAB | Facility: OTHER | Age: 83
End: 2018-09-04

## 2018-09-04 PROCEDURE — 97110 THERAPEUTIC EXERCISES: CPT | Mod: GP

## 2018-09-04 PROCEDURE — 40000185 ZZHC STATISTIC PT OUTPT VISIT

## 2018-09-04 NOTE — TELEPHONE ENCOUNTER
Called to remind patient of stress test and review instructions. Patient has page of instruction, and they were reviewed, Aware to check in at diagnostics. Patient verbalized understanding not to have caffeine, eat light breakfast before 7 am and that she can take her morning medications.

## 2018-09-05 ENCOUNTER — TELEPHONE (OUTPATIENT)
Dept: INTERNAL MEDICINE | Facility: OTHER | Age: 83
End: 2018-09-05

## 2018-09-05 ENCOUNTER — TELEPHONE (OUTPATIENT)
Dept: CARDIOLOGY | Facility: OTHER | Age: 83
End: 2018-09-05

## 2018-09-05 ENCOUNTER — HOSPITAL ENCOUNTER (OUTPATIENT)
Dept: NUCLEAR MEDICINE | Facility: OTHER | Age: 83
Discharge: HOME | End: 2018-09-05
Attending: INTERNAL MEDICINE | Admitting: INTERNAL MEDICINE
Payer: MEDICARE

## 2018-09-05 ENCOUNTER — HOSPITAL ENCOUNTER (OUTPATIENT)
Dept: NUCLEAR MEDICINE | Facility: OTHER | Age: 83
End: 2018-09-05
Attending: INTERNAL MEDICINE
Payer: MEDICARE

## 2018-09-05 VITALS — HEIGHT: 63 IN | BODY MASS INDEX: 25.87 KG/M2 | WEIGHT: 146 LBS

## 2018-09-05 DIAGNOSIS — K21.9 GASTROESOPHAGEAL REFLUX DISEASE WITHOUT ESOPHAGITIS: Primary | ICD-10-CM

## 2018-09-05 DIAGNOSIS — R06.00 DYSPNEA, UNSPECIFIED TYPE: ICD-10-CM

## 2018-09-05 DIAGNOSIS — R53.83 FATIGUE, UNSPECIFIED TYPE: ICD-10-CM

## 2018-09-05 PROCEDURE — 78452 HT MUSCLE IMAGE SPECT MULT: CPT

## 2018-09-05 PROCEDURE — 25000128 H RX IP 250 OP 636: Performed by: INTERNAL MEDICINE

## 2018-09-05 PROCEDURE — 34300033 ZZH RX 343: Performed by: NURSE PRACTITIONER

## 2018-09-05 PROCEDURE — 34300033 ZZH RX 343: Performed by: INTERNAL MEDICINE

## 2018-09-05 PROCEDURE — 93018 CV STRESS TEST I&R ONLY: CPT | Performed by: INTERNAL MEDICINE

## 2018-09-05 PROCEDURE — 93016 CV STRESS TEST SUPVJ ONLY: CPT | Performed by: INTERNAL MEDICINE

## 2018-09-05 PROCEDURE — A9500 TC99M SESTAMIBI: HCPCS | Performed by: NURSE PRACTITIONER

## 2018-09-05 PROCEDURE — A9500 TC99M SESTAMIBI: HCPCS | Performed by: INTERNAL MEDICINE

## 2018-09-05 PROCEDURE — 93017 CV STRESS TEST TRACING ONLY: CPT

## 2018-09-05 RX ORDER — REGADENOSON 0.08 MG/ML
0.4 INJECTION, SOLUTION INTRAVENOUS ONCE
Status: COMPLETED | OUTPATIENT
Start: 2018-09-05 | End: 2018-09-05

## 2018-09-05 RX ORDER — AMINOPHYLLINE 25 MG/ML
50 INJECTION, SOLUTION INTRAVENOUS
Status: DISCONTINUED | OUTPATIENT
Start: 2018-09-05 | End: 2018-09-06 | Stop reason: HOSPADM

## 2018-09-05 RX ADMIN — KIT FOR THE PREPARATION OF TECHNETIUM TC99M SESTAMIBI 8.53 MCI.: 1 INJECTION, POWDER, LYOPHILIZED, FOR SOLUTION PARENTERAL at 11:20

## 2018-09-05 RX ADMIN — KIT FOR THE PREPARATION OF TECHNETIUM TC99M SESTAMIBI 32.6 MCI.: 1 INJECTION, POWDER, LYOPHILIZED, FOR SOLUTION PARENTERAL at 13:00

## 2018-09-05 RX ADMIN — REGADENOSON 0.4 MG: 0.08 INJECTION, SOLUTION INTRAVENOUS at 13:02

## 2018-09-05 NOTE — TELEPHONE ENCOUNTER
Patient's  stopped in to see about getting a refill on her omeprazole.  She established care with Dr. Jimenez and has not prescribed her with medication before but is hoping he would help.  Please call to discuss and advise.  Brissa Alexander on 9/5/2018 at 2:04 PM

## 2018-09-05 NOTE — PROGRESS NOTES
1110The patient arrived for a Lexiscan Cardiolite stress test.  The procedure, risks, and benefits were discussed with the patient ,and the consent was signed.  A saline lock was started,and the Cardiolite was injected by x-ray.  The patient was taken to the waiting area, to await resting images at 1120*.  1242The patient returned from x-ray and was prepped for the stress test.   Dr. Jimenez arrived, and the patient was administered the Lexiscan per procedure.  The patient tolerated the procedure well.  She was given a snack and taken to x-ray in stable condition for stress images.  The saline lock will be removed by x-ray for proper disposal.  Please see the chart for the complete test results.   \

## 2018-09-06 ENCOUNTER — HOSPITAL ENCOUNTER (OUTPATIENT)
Dept: PHYSICAL THERAPY | Facility: OTHER | Age: 83
Setting detail: THERAPIES SERIES
End: 2018-09-06
Attending: PSYCHIATRY & NEUROLOGY
Payer: MEDICARE

## 2018-09-06 PROCEDURE — 40000185 ZZHC STATISTIC PT OUTPT VISIT

## 2018-09-06 PROCEDURE — 97110 THERAPEUTIC EXERCISES: CPT | Mod: GP

## 2018-09-10 ENCOUNTER — HOSPITAL ENCOUNTER (OUTPATIENT)
Dept: PHYSICAL THERAPY | Facility: OTHER | Age: 83
Setting detail: THERAPIES SERIES
End: 2018-09-10
Attending: PSYCHIATRY & NEUROLOGY
Payer: MEDICARE

## 2018-09-10 PROCEDURE — 40000185 ZZHC STATISTIC PT OUTPT VISIT

## 2018-09-10 PROCEDURE — 97110 THERAPEUTIC EXERCISES: CPT | Mod: GP

## 2018-09-14 ENCOUNTER — HOSPITAL ENCOUNTER (OUTPATIENT)
Dept: PHYSICAL THERAPY | Facility: OTHER | Age: 83
Setting detail: THERAPIES SERIES
End: 2018-09-14
Attending: PSYCHIATRY & NEUROLOGY
Payer: MEDICARE

## 2018-09-14 PROCEDURE — 40000185 ZZHC STATISTIC PT OUTPT VISIT

## 2018-09-14 PROCEDURE — 97110 THERAPEUTIC EXERCISES: CPT | Mod: GP

## 2018-09-17 ENCOUNTER — HOSPITAL ENCOUNTER (OUTPATIENT)
Dept: PHYSICAL THERAPY | Facility: OTHER | Age: 83
Setting detail: THERAPIES SERIES
End: 2018-09-17
Attending: PSYCHIATRY & NEUROLOGY
Payer: MEDICARE

## 2018-09-17 PROCEDURE — 97110 THERAPEUTIC EXERCISES: CPT | Mod: GP

## 2018-09-17 PROCEDURE — 40000185 ZZHC STATISTIC PT OUTPT VISIT

## 2018-09-21 ENCOUNTER — HOSPITAL ENCOUNTER (OUTPATIENT)
Dept: PHYSICAL THERAPY | Facility: OTHER | Age: 83
Setting detail: THERAPIES SERIES
End: 2018-09-21
Attending: PSYCHIATRY & NEUROLOGY
Payer: MEDICARE

## 2018-09-21 PROCEDURE — 40000185 ZZHC STATISTIC PT OUTPT VISIT

## 2018-09-21 PROCEDURE — G8980 MOBILITY D/C STATUS: HCPCS | Mod: GP,CK

## 2018-09-21 PROCEDURE — G8979 MOBILITY GOAL STATUS: HCPCS | Mod: GP,CI

## 2018-09-21 PROCEDURE — 97110 THERAPEUTIC EXERCISES: CPT | Mod: GP

## 2018-09-21 NOTE — PROGRESS NOTES
"Outpatient Physical Therapy Discharge Note     Patient: Melissa Quinteros  : 1935    Beginning/End Dates of Reporting Period:  2018 to 2018    Referring Provider: Dr. Jimenez    Therapy Diagnosis: Impaired mobility, decreased strength and endurance, knee pain     Client Self Report: Patient states that her knees are \"not super\". Her left knee hurts more than the right. Patient is ready for discharge at this time and will transition into the peak performance after today.     Objective Measurements:  Objective Measure: Subjective pain rating  Details: 5/10    Outcome Measures (most recent score):  LEFS: 41/80    Goals:  Goal Identifier HEP   Goal Description Patient will demonstrate compliance and independence with her HEP in order to promote return to prior level of function   Target Date 18   Date Met  18   Progress:     Goal Identifier Strength   Goal Description Patient will be able to stand up from a chair with minimal to no pain in order to improve mobility around her home   Target Date 18   Date Met      Progress:     Goal Identifier Mobility   Goal Description Patient will be able to ambulate for longer than 10 minutes with minimal to no pain in order to improve efficiency and safety with community ambulation   Target Date 10/02/18   Date Met      Progress:     Goal Identifier Strength   Goal Description Patient will be able to squat down to the ground in order to  an item off the floor with minimal to no pain in order to improve efficiency with house related tasks   Target Date 10/02/18   Date Met      Progress:     Progress Toward Goals:   Pain has been patient's limiting factor when it comes to progress towards her goals. Has more discomfort and pain in her left knee when compared to the right.    Plan:  Discharge from therapy.    Discharge:    Reason for Discharge: Patient has not made significant progress towards her goals or with her pain. She is wanting to continue " with her HEP and transition into Peak [facility] program.     Equipment Issued: none    Discharge Plan: Patient to continue home program.

## 2019-06-12 ENCOUNTER — OFFICE VISIT (OUTPATIENT)
Dept: FAMILY MEDICINE | Facility: OTHER | Age: 84
End: 2019-06-12
Attending: NURSE PRACTITIONER
Payer: MEDICARE

## 2019-06-12 ENCOUNTER — HOSPITAL ENCOUNTER (OUTPATIENT)
Dept: GENERAL RADIOLOGY | Facility: OTHER | Age: 84
Discharge: HOME | End: 2019-06-12
Attending: NURSE PRACTITIONER | Admitting: NURSE PRACTITIONER
Payer: MEDICARE

## 2019-06-12 ENCOUNTER — HOSPITAL ENCOUNTER (OUTPATIENT)
Dept: GENERAL RADIOLOGY | Facility: OTHER | Age: 84
End: 2019-06-12
Attending: NURSE PRACTITIONER
Payer: MEDICARE

## 2019-06-12 VITALS
WEIGHT: 138.4 LBS | DIASTOLIC BLOOD PRESSURE: 70 MMHG | SYSTOLIC BLOOD PRESSURE: 127 MMHG | BODY MASS INDEX: 23.63 KG/M2 | TEMPERATURE: 98.4 F | HEIGHT: 64 IN | HEART RATE: 76 BPM | RESPIRATION RATE: 16 BRPM | OXYGEN SATURATION: 98 %

## 2019-06-12 DIAGNOSIS — M54.50 BILATERAL LOW BACK PAIN WITHOUT SCIATICA, UNSPECIFIED CHRONICITY: ICD-10-CM

## 2019-06-12 DIAGNOSIS — Z91.81 HISTORY OF FALL: ICD-10-CM

## 2019-06-12 DIAGNOSIS — R93.7 ABNORMAL X-RAY OF LUMBAR SPINE: ICD-10-CM

## 2019-06-12 DIAGNOSIS — M54.50 BILATERAL LOW BACK PAIN WITHOUT SCIATICA, UNSPECIFIED CHRONICITY: Primary | ICD-10-CM

## 2019-06-12 PROCEDURE — 72220 X-RAY EXAM SACRUM TAILBONE: CPT

## 2019-06-12 PROCEDURE — 72100 X-RAY EXAM L-S SPINE 2/3 VWS: CPT

## 2019-06-12 PROCEDURE — G0463 HOSPITAL OUTPT CLINIC VISIT: HCPCS | Mod: 25

## 2019-06-12 PROCEDURE — 99214 OFFICE O/P EST MOD 30 MIN: CPT | Performed by: NURSE PRACTITIONER

## 2019-06-12 PROCEDURE — G0463 HOSPITAL OUTPT CLINIC VISIT: HCPCS

## 2019-06-12 ASSESSMENT — ENCOUNTER SYMPTOMS
BACK PAIN: 1
ARTHRALGIAS: 1

## 2019-06-12 ASSESSMENT — PAIN SCALES - GENERAL: PAINLEVEL: SEVERE PAIN (7)

## 2019-06-12 ASSESSMENT — MIFFLIN-ST. JEOR: SCORE: 1054.84

## 2019-06-12 NOTE — NURSING NOTE
Patient presents to clinic today for low back pain. Patient is unable to locate the pain exactly. She fell on the steps and landed on her bottom. No pain at the time of the fall but the next morning she was in pain. It happened 2 weeks ago. Taking 2 tylenol and heating pad.     No LMP recorded. Patient has had a hysterectomy.  Medication Reconciliation: complete    Shanna Mohan LPN  6/12/2019 2:20 PM

## 2019-06-12 NOTE — PROGRESS NOTES
"Nursing Notes:   Shanna Mohan LPN  6/12/2019  2:40 PM  Signed  Patient presents to clinic today for low back pain. Patient is unable to locate the pain exactly. She fell on the steps and landed on her bottom. No pain at the time of the fall but the next morning she was in pain. It happened 2 weeks ago. Taking 2 tylenol and heating pad.     No LMP recorded. Patient has had a hysterectomy.  Medication Reconciliation: complete    Shanna Mohan LPN  6/12/2019 2:20 PM    Nursing note reviewed with patient.  Accurracy and completeness verified.   Ms. Quinteros is a 84 year old female who:  Patient presents with:  Musculoskeletal Problem      ICD-10-CM    1. Bilateral low back pain without sciatica, unspecified chronicity M54.5 XR Lumbar Spine 2/3 Views     XR Sacrum and Coccyx 2 Views     MR Lumbar Spine w/o Contrast   2. History of fall Z91.81 XR Lumbar Spine 2/3 Views     XR Sacrum and Coccyx 2 Views     MR Lumbar Spine w/o Contrast   3. Abnormal x-ray of lumbar spine R93.7 MR Lumbar Spine w/o Contrast     HPI     Presents for 2 weeks of back pain after she slipped down one step and sat down hitting her lower back against the step, she denies falling or any other injury  However she is continued to have bilateral low back pain--- she denies any bruises  She denies any lower extremity weakness, changes in bowel or bladder or paresthesias  She is able to walk but it hurts getting up and sitting down in the center of her back and finds herself \"walking like a penguin\" posturing from the pain  At this time she rates the pain at about a 5  No history of previous back injury or surgery  No DEXA scan available for review      Review of Systems   Musculoskeletal: Positive for arthralgias and back pain.   All other systems reviewed and are negative.     All other systems reviewed and negative.     ROSEMARIE:   No flowsheet data found.  PHQ9:  PHQ-9 SCORE 8/30/2016   PHQ-9 Total Score 0       I have personally reviewed the past medical " history, past surgical history, medications, allergies, family and social history as listed below, on 6/12/2019.    Allergies   Allergen Reactions     Lovastatin      Other reaction(s): Arthralgia       Current Outpatient Medications   Medication Sig Dispense Refill     magnesium oxide (MAG-OX) 400 MG tablet 1 tab OTC daily 7 tablet      omeprazole (PRILOSEC) 20 MG CR capsule Take 1 capsule (20 mg) by mouth daily 90 capsule 3        Patient Active Problem List    Diagnosis Date Noted     Hyperlipidemia 02/26/2018     Priority: Medium     SCC (squamous cell carcinoma), face 08/08/2017     Priority: Medium     Chronic nasal congestion 08/30/2016     Priority: Medium     Essential hypertension 08/30/2016     Priority: Medium     GERD with stricture 08/30/2016     Priority: Medium     Disorder of bursae and tendons in shoulder region 01/12/2011     Priority: Medium     Past Medical History:   Diagnosis Date     Esophageal obstruction     8/30/2016     Essential (primary) hypertension     8/30/2016     Hyperlipidemia     No Comments Provided     Zoster without complications     X2     Past Surgical History:   Procedure Laterality Date     APPENDECTOMY       ARTHROPLASTY KNEE Right 2017    [facility]     COLONOSCOPY      No Comments Provided     ESOPHAGOGASTRODUODENOSCOPY       HYSTERECTOMY TOTAL ABDOMINAL      1982,incidental appendectomy     TONSILLECTOMY, ADENOIDECTOMY, COMBINED      childhood     Social History     Socioeconomic History     Marital status:      Spouse name: None     Number of children: None     Years of education: None     Highest education level: None   Occupational History     None   Social Needs     Financial resource strain: None     Food insecurity:     Worry: None     Inability: None     Transportation needs:     Medical: None     Non-medical: None   Tobacco Use     Smoking status: Never Smoker     Smokeless tobacco: Never Used   Substance and Sexual Activity     Alcohol use: Yes      "Alcohol/week: 0.0 oz     Comment: Alcoholic Drinks/day: Seldom     Drug use: No     Comment: Drug use: No     Sexual activity: Yes     Partners: Female   Lifestyle     Physical activity:     Days per week: None     Minutes per session: None     Stress: None   Relationships     Social connections:     Talks on phone: None     Gets together: None     Attends Alevism service: None     Active member of club or organization: None     Attends meetings of clubs or organizations: None     Relationship status: None     Intimate partner violence:     Fear of current or ex partner: None     Emotionally abused: None     Physically abused: None     Forced sexual activity: None   Other Topics Concern     None   Social History Narrative    .  Retired .  Enjoys reading, crossword puzzles and sports.  [place] time between [place] area, [place]     Family History   Problem Relation Age of Onset     Other - See Comments Father         Old age     Lung Cancer Mother      Brain Cancer Brother        EXAM:   Vitals:    06/12/19 1416   BP: 127/70   BP Location: Right arm   Patient Position: Sitting   Cuff Size: Adult Regular   Pulse: 76   Resp: 16   Temp: 98.4  F (36.9  C)   TempSrc: Tympanic   SpO2: 98%   Weight: 62.8 kg (138 lb 6.4 oz)   Height: 1.613 m (5' 3.5\")       Current Pain Score: Severe Pain (7)     BP Readings from Last 3 Encounters:   06/12/19 127/70   08/28/18 120/64   06/19/18 132/70      Wt Readings from Last 3 Encounters:   06/12/19 62.8 kg (138 lb 6.4 oz)   09/05/18 66.2 kg (146 lb)   08/28/18 66.2 kg (146 lb)      Estimated body mass index is 24.13 kg/m  as calculated from the following:    Height as of this encounter: 1.613 m (5' 3.5\").    Weight as of this encounter: 62.8 kg (138 lb 6.4 oz).     Physical Exam   Constitutional: She is oriented to person, place, and time. She appears well-developed and well-nourished.   Cardiovascular: Normal rate.   Pulmonary/Chest: Effort normal. "   Musculoskeletal: Normal range of motion. She exhibits tenderness. She exhibits no edema or deformity.   Unable to reproduce palpable bilateral lower lumbar pain, no edema, erythema  Normal heel toe gait  Negative leg raise   Neurological: She is alert and oriented to person, place, and time. She displays normal reflexes.   Skin: Skin is warm and dry.   Psychiatric: She has a normal mood and affect. Her behavior is normal. Judgment and thought content normal.   Nursing note and vitals reviewed.       INVESTIGATIONS:      ASSESSMENT AND PLAN:  Problem List Items Addressed This Visit     None      Visit Diagnoses     Bilateral low back pain without sciatica, unspecified chronicity    -  Primary    Relevant Orders    XR Lumbar Spine 2/3 Views (Completed)    XR Sacrum and Coccyx 2 Views (Completed)    MR Lumbar Spine w/o Contrast    History of fall        Relevant Orders    XR Lumbar Spine 2/3 Views (Completed)    XR Sacrum and Coccyx 2 Views (Completed)    MR Lumbar Spine w/o Contrast    Abnormal x-ray of lumbar spine        Relevant Orders    MR Lumbar Spine w/o Contrast        Reviewed x-rays with  Radiologist--retro-and anterior listhesis L3-S1  Severe degenerative arthritis--- concerns for possible fracture uncertain if acute or chronic degenerative changes  Recommend lumbar MRI to completely assess and evaluate--x-ray shows osteopenic changes vulnerable to stress/compression fracture  No DEXA scan available for review    Patient and  agreeable-order sent to [facility]--  Awaiting insurance approval--- hopefully complete within the next 24 to 48 hours    Recommended to continue ice, Tylenol and reinjury prevention    Reassuring no lower extremity weakness, paresthesias        -- Expected clinical course discussed    -- Medications and their side effects discussed    There are no Patient Instructions on file for this visit.  Sofie Jay CNP  [facility]     Portions of this note were dictated  using speech recognition software. The note has been proofread but errors in the text may have been overlooked. Please contact me if there are any concerns regarding the accuracy of the dictation.

## 2019-06-13 ENCOUNTER — HOSPITAL ENCOUNTER (OUTPATIENT)
Dept: MRI IMAGING | Facility: OTHER | Age: 84
Discharge: HOME | End: 2019-06-13
Attending: NURSE PRACTITIONER | Admitting: NURSE PRACTITIONER
Payer: MEDICARE

## 2019-06-13 DIAGNOSIS — M54.50 BILATERAL LOW BACK PAIN WITHOUT SCIATICA, UNSPECIFIED CHRONICITY: ICD-10-CM

## 2019-06-13 DIAGNOSIS — Z91.81 HISTORY OF FALL: ICD-10-CM

## 2019-06-13 DIAGNOSIS — R93.7 ABNORMAL X-RAY OF LUMBAR SPINE: ICD-10-CM

## 2019-06-13 PROCEDURE — 72148 MRI LUMBAR SPINE W/O DYE: CPT

## 2019-10-09 ENCOUNTER — OFFICE VISIT (OUTPATIENT)
Dept: INTERNAL MEDICINE | Facility: OTHER | Age: 84
End: 2019-10-09
Attending: INTERNAL MEDICINE
Payer: MEDICARE

## 2019-10-09 VITALS
TEMPERATURE: 97.1 F | HEART RATE: 76 BPM | DIASTOLIC BLOOD PRESSURE: 76 MMHG | RESPIRATION RATE: 18 BRPM | BODY MASS INDEX: 22.95 KG/M2 | WEIGHT: 131.6 LBS | SYSTOLIC BLOOD PRESSURE: 112 MMHG

## 2019-10-09 DIAGNOSIS — M20.11 HALLUX VALGUS, ACQUIRED, RIGHT: ICD-10-CM

## 2019-10-09 PROCEDURE — 99213 OFFICE O/P EST LOW 20 MIN: CPT | Performed by: INTERNAL MEDICINE

## 2019-10-09 PROCEDURE — G0463 HOSPITAL OUTPT CLINIC VISIT: HCPCS

## 2019-10-09 ASSESSMENT — ENCOUNTER SYMPTOMS
ALLERGIC/IMMUNOLOGIC NEGATIVE: 1
ENDOCRINE NEGATIVE: 1
EYES NEGATIVE: 1
CONSTITUTIONAL NEGATIVE: 1

## 2019-10-09 ASSESSMENT — PATIENT HEALTH QUESTIONNAIRE - PHQ9: SUM OF ALL RESPONSES TO PHQ QUESTIONS 1-9: 0

## 2019-10-09 NOTE — PROGRESS NOTES
Chief Complaint: Foot complaints.    HPI: She is in today to have her foot looked at.  She has noticed that her second toe is now overlapping her great toe on the right foot.  She is willing to have surgery on this but she is wondering what kind of surgery would be done and she is worried that the surgery would include amputation.  She is in today to have this looked at and discussed further.    Past Medical History:   Diagnosis Date     Esophageal obstruction     8/30/2016     Essential (primary) hypertension     8/30/2016     Hyperlipidemia     No Comments Provided     Zoster without complications     X2       Past Surgical History:   Procedure Laterality Date     APPENDECTOMY       ARTHROPLASTY KNEE Right 2017    [place]     COLONOSCOPY      No Comments Provided     ESOPHAGOGASTRODUODENOSCOPY       HYSTERECTOMY TOTAL ABDOMINAL      1982,incidental appendectomy     TONSILLECTOMY, ADENOIDECTOMY, COMBINED      childhood       Allergies   Allergen Reactions     Lovastatin      Other reaction(s): Arthralgia       Current Outpatient Medications   Medication Sig Dispense Refill     magnesium oxide (MAG-OX) 400 MG tablet 1 tab OTC daily 7 tablet      omeprazole (PRILOSEC) 20 MG CR capsule Take 1 capsule (20 mg) by mouth daily 90 capsule 3       Review of Systems   Constitutional: Negative.    Eyes: Negative.    Endocrine: Negative.    Allergic/Immunologic: Negative.        Physical Exam  Vitals signs and nursing note reviewed.   Constitutional:       General: She is not in acute distress.     Appearance: Normal appearance. She is not ill-appearing, toxic-appearing or diaphoretic.   Musculoskeletal:      Comments: Her right second toe was indeed overlapping her great toe.  There was a significant bunion deformity of the right great toe.   Neurological:      Mental Status: She is alert.         Assessment:        ICD-10-CM    1. Hallux valgus, acquired, right M20.11        Plan: The situation was reviewed with the  patient.  She could definitely have surgery on this to corrected the deformity.  Reassured that amputation would not be part of this.  She is going to likely pursue this in [place] where they will be headed for the winter.   is considering having back surgery in [place] this winter, he was in today and we discussed this as well.  Injections have not been helping him so this will be evaluated further.

## 2019-10-09 NOTE — NURSING NOTE
"The patient is here today to be seen for a crooked toe on her right foot,.  Yumi Leon LPN on 10/9/2019 at 10:51 AM  Chief Complaint   Patient presents with     Toe Pain       Initial There were no vitals taken for this visit. Estimated body mass index is 24.13 kg/m  as calculated from the following:    Height as of 6/12/19: 1.613 m (5' 3.5\").    Weight as of 6/12/19: 62.8 kg (138 lb 6.4 oz).  Medication Reconciliation: complete    Yumi Leon LPN    "

## 2020-01-01 ENCOUNTER — TRANSFERRED RECORDS (OUTPATIENT)
Dept: HEALTH INFORMATION MANAGEMENT | Facility: OTHER | Age: 85
End: 2020-01-01

## 2020-01-01 LAB
ALT SERPL-CCNC: 8 U/L (ref 6–29)
AST SERPL-CCNC: 15 U/L (ref 10–35)
CREAT SERPL-MCNC: 0.9 MG/DL (ref 0.6–0.88)
GFR SERPL CREATININE-BSD FRML MDRD: 58 ML/MIN/1.73M2
GLUCOSE SERPL-MCNC: 95 MG/DL (ref 65–99)
HBA1C MFR BLD: 5.6 % (ref 0–5.7)
POTASSIUM SERPL-SCNC: 4.1 MMOL/L (ref 3.5–5.3)
TSH SERPL-ACNC: 0.94 MIU/L (ref 0.4–4.5)

## 2021-01-01 ENCOUNTER — HOSPITAL ENCOUNTER (EMERGENCY)
Facility: OTHER | Age: 86
Discharge: ANOTHER HOSPITAL | End: 2021-07-19
Attending: EMERGENCY MEDICINE | Admitting: EMERGENCY MEDICINE
Payer: MEDICARE

## 2021-01-01 ENCOUNTER — APPOINTMENT (OUTPATIENT)
Dept: GENERAL RADIOLOGY | Facility: OTHER | Age: 86
End: 2021-01-01
Attending: PHYSICIAN ASSISTANT
Payer: MEDICARE

## 2021-01-01 ENCOUNTER — APPOINTMENT (OUTPATIENT)
Dept: OCCUPATIONAL THERAPY | Facility: CLINIC | Age: 86
DRG: 640 | End: 2021-01-01
Attending: INTERNAL MEDICINE
Payer: MEDICARE

## 2021-01-01 ENCOUNTER — APPOINTMENT (OUTPATIENT)
Dept: SPEECH THERAPY | Facility: CLINIC | Age: 86
DRG: 640 | End: 2021-01-01
Attending: INTERNAL MEDICINE
Payer: MEDICARE

## 2021-01-01 ENCOUNTER — APPOINTMENT (OUTPATIENT)
Dept: INTERVENTIONAL RADIOLOGY/VASCULAR | Facility: CLINIC | Age: 86
DRG: 640 | End: 2021-01-01
Attending: PHYSICIAN ASSISTANT
Payer: MEDICARE

## 2021-01-01 ENCOUNTER — HOSPITAL ENCOUNTER (INPATIENT)
Facility: CLINIC | Age: 86
LOS: 9 days | Discharge: HOSPICE | DRG: 640 | End: 2021-07-28
Attending: INTERNAL MEDICINE | Admitting: INTERNAL MEDICINE
Payer: MEDICARE

## 2021-01-01 ENCOUNTER — ANESTHESIA EVENT (OUTPATIENT)
Dept: SURGERY | Facility: OTHER | Age: 86
DRG: 329 | End: 2021-01-01
Payer: MEDICARE

## 2021-01-01 ENCOUNTER — APPOINTMENT (OUTPATIENT)
Dept: SPEECH THERAPY | Facility: OTHER | Age: 86
DRG: 329 | End: 2021-01-01
Attending: SURGERY
Payer: MEDICARE

## 2021-01-01 ENCOUNTER — APPOINTMENT (OUTPATIENT)
Dept: CT IMAGING | Facility: OTHER | Age: 86
End: 2021-01-01
Attending: PHYSICIAN ASSISTANT
Payer: MEDICARE

## 2021-01-01 ENCOUNTER — APPOINTMENT (OUTPATIENT)
Dept: PHYSICAL THERAPY | Facility: OTHER | Age: 86
DRG: 329 | End: 2021-01-01
Attending: SURGERY
Payer: MEDICARE

## 2021-01-01 ENCOUNTER — APPOINTMENT (OUTPATIENT)
Dept: INTERVENTIONAL RADIOLOGY/VASCULAR | Facility: CLINIC | Age: 86
DRG: 640 | End: 2021-01-01
Attending: COLON & RECTAL SURGERY
Payer: MEDICARE

## 2021-01-01 ENCOUNTER — ANESTHESIA (OUTPATIENT)
Dept: SURGERY | Facility: OTHER | Age: 86
DRG: 329 | End: 2021-01-01
Payer: MEDICARE

## 2021-01-01 ENCOUNTER — APPOINTMENT (OUTPATIENT)
Dept: GENERAL RADIOLOGY | Facility: OTHER | Age: 86
DRG: 329 | End: 2021-01-01
Attending: INTERNAL MEDICINE
Payer: MEDICARE

## 2021-01-01 ENCOUNTER — APPOINTMENT (OUTPATIENT)
Dept: ULTRASOUND IMAGING | Facility: OTHER | Age: 86
DRG: 329 | End: 2021-01-01
Attending: SURGERY
Payer: MEDICARE

## 2021-01-01 ENCOUNTER — APPOINTMENT (OUTPATIENT)
Dept: CT IMAGING | Facility: CLINIC | Age: 86
DRG: 640 | End: 2021-01-01
Attending: COLON & RECTAL SURGERY
Payer: MEDICARE

## 2021-01-01 ENCOUNTER — HOSPITAL ENCOUNTER (EMERGENCY)
Facility: OTHER | Age: 86
Discharge: HOME | End: 2021-06-01
Attending: PHYSICIAN ASSISTANT | Admitting: PHYSICIAN ASSISTANT
Payer: MEDICARE

## 2021-01-01 ENCOUNTER — APPOINTMENT (OUTPATIENT)
Dept: OCCUPATIONAL THERAPY | Facility: OTHER | Age: 86
DRG: 329 | End: 2021-01-01
Attending: SURGERY
Payer: MEDICARE

## 2021-01-01 ENCOUNTER — MEDICAL CORRESPONDENCE (OUTPATIENT)
Dept: HEALTH INFORMATION MANAGEMENT | Facility: OTHER | Age: 86
End: 2021-01-01

## 2021-01-01 ENCOUNTER — PATIENT OUTREACH (OUTPATIENT)
Dept: CARE COORDINATION | Facility: CLINIC | Age: 86
End: 2021-01-01

## 2021-01-01 ENCOUNTER — APPOINTMENT (OUTPATIENT)
Dept: CT IMAGING | Facility: OTHER | Age: 86
DRG: 371 | End: 2021-01-01
Attending: STUDENT IN AN ORGANIZED HEALTH CARE EDUCATION/TRAINING PROGRAM
Payer: MEDICARE

## 2021-01-01 ENCOUNTER — APPOINTMENT (OUTPATIENT)
Dept: CARDIOLOGY | Facility: OTHER | Age: 86
DRG: 329 | End: 2021-01-01
Attending: INTERNAL MEDICINE
Payer: MEDICARE

## 2021-01-01 ENCOUNTER — HOSPITAL ENCOUNTER (INPATIENT)
Facility: OTHER | Age: 86
LOS: 8 days | Discharge: HOSPICE | DRG: 371 | End: 2021-07-12
Attending: STUDENT IN AN ORGANIZED HEALTH CARE EDUCATION/TRAINING PROGRAM | Admitting: SURGERY
Payer: MEDICARE

## 2021-01-01 ENCOUNTER — ANESTHESIA (OUTPATIENT)
Dept: SURGERY | Facility: OTHER | Age: 86
End: 2021-01-01
Payer: MEDICARE

## 2021-01-01 ENCOUNTER — TELEPHONE (OUTPATIENT)
Dept: SURGERY | Facility: OTHER | Age: 86
End: 2021-01-01

## 2021-01-01 ENCOUNTER — OFFICE VISIT (OUTPATIENT)
Dept: FAMILY MEDICINE | Facility: OTHER | Age: 86
End: 2021-01-01
Attending: PHYSICIAN ASSISTANT
Payer: MEDICARE

## 2021-01-01 ENCOUNTER — APPOINTMENT (OUTPATIENT)
Dept: GENERAL RADIOLOGY | Facility: OTHER | Age: 86
DRG: 329 | End: 2021-01-01
Attending: SURGERY
Payer: MEDICARE

## 2021-01-01 ENCOUNTER — TELEPHONE (OUTPATIENT)
Dept: INTERNAL MEDICINE | Facility: OTHER | Age: 86
End: 2021-01-01

## 2021-01-01 ENCOUNTER — HOSPITAL ENCOUNTER (OUTPATIENT)
Dept: CT IMAGING | Facility: OTHER | Age: 86
End: 2021-06-08
Attending: SURGERY
Payer: MEDICARE

## 2021-01-01 ENCOUNTER — HOSPITAL ENCOUNTER (OUTPATIENT)
Facility: OTHER | Age: 86
Discharge: HOME | End: 2021-06-03
Attending: SURGERY | Admitting: SURGERY
Payer: MEDICARE

## 2021-01-01 ENCOUNTER — TELEPHONE (OUTPATIENT)
Dept: FAMILY MEDICINE | Facility: OTHER | Age: 86
End: 2021-01-01

## 2021-01-01 ENCOUNTER — IMMUNIZATION (OUTPATIENT)
Dept: FAMILY MEDICINE | Facility: OTHER | Age: 86
End: 2021-01-01
Attending: FAMILY MEDICINE
Payer: MEDICARE

## 2021-01-01 ENCOUNTER — HOSPITAL ENCOUNTER (EMERGENCY)
Facility: OTHER | Age: 86
Discharge: HOME | End: 2021-05-28
Attending: PHYSICIAN ASSISTANT | Admitting: PHYSICIAN ASSISTANT
Payer: MEDICARE

## 2021-01-01 ENCOUNTER — HOSPITAL ENCOUNTER (INPATIENT)
Facility: OTHER | Age: 86
LOS: 18 days | Discharge: NURSING, REHAB OR LONG-TERM CARE | DRG: 329 | End: 2021-07-02
Attending: SURGERY | Admitting: SURGERY
Payer: MEDICARE

## 2021-01-01 ENCOUNTER — DOCUMENTATION ONLY (OUTPATIENT)
Dept: OTHER | Facility: CLINIC | Age: 86
End: 2021-01-01

## 2021-01-01 ENCOUNTER — APPOINTMENT (OUTPATIENT)
Dept: CT IMAGING | Facility: OTHER | Age: 86
DRG: 329 | End: 2021-01-01
Attending: SURGERY
Payer: MEDICARE

## 2021-01-01 ENCOUNTER — APPOINTMENT (OUTPATIENT)
Dept: CT IMAGING | Facility: OTHER | Age: 86
End: 2021-01-01
Attending: SURGERY
Payer: MEDICARE

## 2021-01-01 ENCOUNTER — APPOINTMENT (OUTPATIENT)
Dept: PHYSICAL THERAPY | Facility: CLINIC | Age: 86
DRG: 640 | End: 2021-01-01
Attending: INTERNAL MEDICINE
Payer: MEDICARE

## 2021-01-01 ENCOUNTER — ALLIED HEALTH/NURSE VISIT (OUTPATIENT)
Dept: FAMILY MEDICINE | Facility: OTHER | Age: 86
End: 2021-01-01
Attending: SURGERY
Payer: MEDICARE

## 2021-01-01 ENCOUNTER — OFFICE VISIT (OUTPATIENT)
Dept: FAMILY MEDICINE | Facility: OTHER | Age: 86
End: 2021-01-01
Attending: INTERNAL MEDICINE
Payer: MEDICARE

## 2021-01-01 ENCOUNTER — ANESTHESIA EVENT (OUTPATIENT)
Dept: SURGERY | Facility: OTHER | Age: 86
End: 2021-01-01
Payer: MEDICARE

## 2021-01-01 ENCOUNTER — TRANSFERRED RECORDS (OUTPATIENT)
Dept: HEALTH INFORMATION MANAGEMENT | Facility: OTHER | Age: 86
End: 2021-01-01

## 2021-01-01 ENCOUNTER — APPOINTMENT (OUTPATIENT)
Dept: ULTRASOUND IMAGING | Facility: OTHER | Age: 86
End: 2021-01-01
Attending: PHYSICIAN ASSISTANT
Payer: MEDICARE

## 2021-01-01 VITALS
RESPIRATION RATE: 18 BRPM | TEMPERATURE: 97.6 F | DIASTOLIC BLOOD PRESSURE: 54 MMHG | WEIGHT: 136.1 LBS | SYSTOLIC BLOOD PRESSURE: 115 MMHG | HEART RATE: 81 BPM | OXYGEN SATURATION: 93 % | HEIGHT: 64 IN | BODY MASS INDEX: 23.23 KG/M2

## 2021-01-01 VITALS
BODY MASS INDEX: 20.22 KG/M2 | HEIGHT: 64 IN | SYSTOLIC BLOOD PRESSURE: 106 MMHG | WEIGHT: 118.4 LBS | OXYGEN SATURATION: 96 % | RESPIRATION RATE: 18 BRPM | DIASTOLIC BLOOD PRESSURE: 70 MMHG | HEART RATE: 52 BPM | TEMPERATURE: 98.5 F

## 2021-01-01 VITALS
WEIGHT: 97.66 LBS | BODY MASS INDEX: 16.76 KG/M2 | SYSTOLIC BLOOD PRESSURE: 99 MMHG | TEMPERATURE: 97.4 F | RESPIRATION RATE: 16 BRPM | OXYGEN SATURATION: 97 % | HEART RATE: 97 BPM | DIASTOLIC BLOOD PRESSURE: 57 MMHG

## 2021-01-01 VITALS
BODY MASS INDEX: 21.85 KG/M2 | OXYGEN SATURATION: 99 % | SYSTOLIC BLOOD PRESSURE: 103 MMHG | RESPIRATION RATE: 11 BRPM | TEMPERATURE: 96.9 F | DIASTOLIC BLOOD PRESSURE: 50 MMHG | HEART RATE: 79 BPM | HEIGHT: 64 IN | WEIGHT: 128 LBS

## 2021-01-01 VITALS
OXYGEN SATURATION: 95 % | SYSTOLIC BLOOD PRESSURE: 150 MMHG | RESPIRATION RATE: 16 BRPM | TEMPERATURE: 95.5 F | WEIGHT: 126 LBS | HEIGHT: 64 IN | BODY MASS INDEX: 21.51 KG/M2 | HEART RATE: 66 BPM | DIASTOLIC BLOOD PRESSURE: 71 MMHG

## 2021-01-01 VITALS
TEMPERATURE: 98.8 F | RESPIRATION RATE: 18 BRPM | HEART RATE: 69 BPM | BODY MASS INDEX: 21.63 KG/M2 | WEIGHT: 126 LBS | SYSTOLIC BLOOD PRESSURE: 160 MMHG | OXYGEN SATURATION: 100 % | DIASTOLIC BLOOD PRESSURE: 79 MMHG

## 2021-01-01 VITALS
RESPIRATION RATE: 12 BRPM | WEIGHT: 122.6 LBS | OXYGEN SATURATION: 97 % | DIASTOLIC BLOOD PRESSURE: 54 MMHG | HEIGHT: 64 IN | TEMPERATURE: 97.2 F | HEART RATE: 68 BPM | SYSTOLIC BLOOD PRESSURE: 118 MMHG | BODY MASS INDEX: 20.93 KG/M2

## 2021-01-01 VITALS
DIASTOLIC BLOOD PRESSURE: 73 MMHG | RESPIRATION RATE: 16 BRPM | BODY MASS INDEX: 21.51 KG/M2 | SYSTOLIC BLOOD PRESSURE: 144 MMHG | WEIGHT: 126 LBS | HEART RATE: 72 BPM | OXYGEN SATURATION: 98 % | HEIGHT: 64 IN | TEMPERATURE: 97.2 F

## 2021-01-01 VITALS
DIASTOLIC BLOOD PRESSURE: 50 MMHG | SYSTOLIC BLOOD PRESSURE: 108 MMHG | OXYGEN SATURATION: 96 % | BODY MASS INDEX: 23.18 KG/M2 | TEMPERATURE: 97.9 F | RESPIRATION RATE: 17 BRPM | WEIGHT: 132.94 LBS | HEART RATE: 96 BPM

## 2021-01-01 DIAGNOSIS — C18.4 MALIGNANT NEOPLASM OF TRANSVERSE COLON (H): Primary | ICD-10-CM

## 2021-01-01 DIAGNOSIS — E87.6 HYPOKALEMIA: ICD-10-CM

## 2021-01-01 DIAGNOSIS — Z90.49 ACQUIRED ABSENCE OF LARGE INTESTINE: ICD-10-CM

## 2021-01-01 DIAGNOSIS — Z79.899 ENCOUNTER FOR LONG-TERM (CURRENT) USE OF MEDICATIONS: ICD-10-CM

## 2021-01-01 DIAGNOSIS — L76.82 POSTOPERATIVE COMPLICATION OF SKIN INVOLVING DRAINAGE FROM SURGICAL WOUND: ICD-10-CM

## 2021-01-01 DIAGNOSIS — E78.5 HYPERLIPIDEMIA, UNSPECIFIED HYPERLIPIDEMIA TYPE: ICD-10-CM

## 2021-01-01 DIAGNOSIS — C18.4 MALIGNANT NEOPLASM OF TRANSVERSE COLON (H): ICD-10-CM

## 2021-01-01 DIAGNOSIS — T81.43XA POSTOPERATIVE INTRA-ABDOMINAL ABSCESS (H): Primary | ICD-10-CM

## 2021-01-01 DIAGNOSIS — Z12.11 SPECIAL SCREENING FOR MALIGNANT NEOPLASMS, COLON: ICD-10-CM

## 2021-01-01 DIAGNOSIS — R62.7 FAILURE TO THRIVE IN ADULT: ICD-10-CM

## 2021-01-01 DIAGNOSIS — I10 ESSENTIAL HYPERTENSION, MALIGNANT: ICD-10-CM

## 2021-01-01 DIAGNOSIS — Z20.822 COVID-19 RULED OUT: ICD-10-CM

## 2021-01-01 DIAGNOSIS — R10.9 LEFT SIDED ABDOMINAL PAIN: ICD-10-CM

## 2021-01-01 DIAGNOSIS — T14.8XXA WOUND DRAINAGE: ICD-10-CM

## 2021-01-01 DIAGNOSIS — L20.89 OTHER ATOPIC DERMATITIS: Primary | ICD-10-CM

## 2021-01-01 DIAGNOSIS — R41.0 CONFUSION: ICD-10-CM

## 2021-01-01 DIAGNOSIS — K65.1 POSTOPERATIVE INTRA-ABDOMINAL ABSCESS (H): Primary | ICD-10-CM

## 2021-01-01 DIAGNOSIS — Z11.52 ENCOUNTER FOR SCREENING LABORATORY TESTING FOR COVID-19 VIRUS: ICD-10-CM

## 2021-01-01 DIAGNOSIS — T81.31XA POSTOPERATIVE WOUND DEHISCENCE, INITIAL ENCOUNTER: ICD-10-CM

## 2021-01-01 DIAGNOSIS — R11.2 NAUSEA AND VOMITING: ICD-10-CM

## 2021-01-01 DIAGNOSIS — R10.84 ABDOMINAL PAIN, GENERALIZED: ICD-10-CM

## 2021-01-01 DIAGNOSIS — Z98.890 POST-OPERATIVE STATE: Primary | ICD-10-CM

## 2021-01-01 DIAGNOSIS — Z20.822 COVID-19 RULED OUT: Primary | ICD-10-CM

## 2021-01-01 DIAGNOSIS — L29.9 PRURITUS: Primary | ICD-10-CM

## 2021-01-01 DIAGNOSIS — Z79.891 ADMISSION FOR LONG-TERM OPIATE ANALGESIC USE: ICD-10-CM

## 2021-01-01 LAB
ABO + RH BLD: NORMAL
ABO + RH BLD: NORMAL
ALBUMIN SERPL-MCNC: 1.5 G/DL (ref 3.4–5)
ALBUMIN SERPL-MCNC: 1.9 G/DL (ref 3.4–5)
ALBUMIN SERPL-MCNC: 2 G/DL (ref 3.5–5.7)
ALBUMIN SERPL-MCNC: 2.3 G/DL (ref 3.5–5.7)
ALBUMIN SERPL-MCNC: 2.7 G/DL (ref 3.5–5.7)
ALBUMIN SERPL-MCNC: 3.2 G/DL (ref 3.5–5.7)
ALBUMIN SERPL-MCNC: 3.5 G/DL (ref 3.5–5.7)
ALBUMIN SERPL-MCNC: 3.7 G/DL (ref 3.5–5.7)
ALBUMIN SERPL-MCNC: <1.5 G/DL (ref 3.5–5.7)
ALBUMIN UR-MCNC: 10 MG/DL
ALBUMIN UR-MCNC: 70 MG/DL
ALBUMIN UR-MCNC: NEGATIVE MG/DL
ALBUMIN UR-MCNC: NEGATIVE MG/DL
ALP SERPL-CCNC: 134 U/L (ref 34–104)
ALP SERPL-CCNC: 150 U/L (ref 34–104)
ALP SERPL-CCNC: 183 U/L (ref 40–150)
ALP SERPL-CCNC: 186 U/L (ref 34–104)
ALP SERPL-CCNC: 191 U/L (ref 40–150)
ALP SERPL-CCNC: 197 U/L (ref 34–104)
ALP SERPL-CCNC: 217 U/L (ref 34–104)
ALP SERPL-CCNC: 230 U/L (ref 34–104)
ALP SERPL-CCNC: 70 U/L (ref 34–104)
ALP SERPL-CCNC: 84 U/L (ref 34–104)
ALP SERPL-CCNC: 94 U/L (ref 34–104)
ALT SERPL W P-5'-P-CCNC: 11 U/L (ref 7–52)
ALT SERPL W P-5'-P-CCNC: 12 U/L (ref 7–52)
ALT SERPL W P-5'-P-CCNC: 12 U/L (ref 7–52)
ALT SERPL W P-5'-P-CCNC: 13 U/L (ref 0–50)
ALT SERPL W P-5'-P-CCNC: 13 U/L (ref 7–52)
ALT SERPL W P-5'-P-CCNC: 15 U/L (ref 0–50)
ALT SERPL W P-5'-P-CCNC: 15 U/L (ref 7–52)
ALT SERPL W P-5'-P-CCNC: 16 U/L (ref 7–52)
ALT SERPL W P-5'-P-CCNC: 7 U/L (ref 7–52)
ALT SERPL W P-5'-P-CCNC: 7 U/L (ref 7–52)
ALT SERPL W P-5'-P-CCNC: 8 U/L (ref 7–52)
ANION GAP SERPL CALCULATED.3IONS-SCNC: 1 MMOL/L (ref 3–14)
ANION GAP SERPL CALCULATED.3IONS-SCNC: 10 MMOL/L (ref 3–14)
ANION GAP SERPL CALCULATED.3IONS-SCNC: 10 MMOL/L (ref 3–14)
ANION GAP SERPL CALCULATED.3IONS-SCNC: 11 MMOL/L (ref 3–14)
ANION GAP SERPL CALCULATED.3IONS-SCNC: 12 MMOL/L (ref 3–14)
ANION GAP SERPL CALCULATED.3IONS-SCNC: 13 MMOL/L (ref 3–14)
ANION GAP SERPL CALCULATED.3IONS-SCNC: 3 MMOL/L (ref 3–14)
ANION GAP SERPL CALCULATED.3IONS-SCNC: 6 MMOL/L (ref 3–14)
ANION GAP SERPL CALCULATED.3IONS-SCNC: 8 MMOL/L (ref 3–14)
ANION GAP SERPL CALCULATED.3IONS-SCNC: 9 MMOL/L (ref 3–14)
APPEARANCE UR: ABNORMAL
APPEARANCE UR: ABNORMAL
APPEARANCE UR: CLEAR
APPEARANCE UR: CLEAR
AST SERPL W P-5'-P-CCNC: 14 U/L (ref 13–39)
AST SERPL W P-5'-P-CCNC: 14 U/L (ref 13–39)
AST SERPL W P-5'-P-CCNC: 15 U/L (ref 13–39)
AST SERPL W P-5'-P-CCNC: 17 U/L (ref 13–39)
AST SERPL W P-5'-P-CCNC: 18 U/L (ref 0–45)
AST SERPL W P-5'-P-CCNC: 18 U/L (ref 13–39)
AST SERPL W P-5'-P-CCNC: 19 U/L (ref 0–45)
AST SERPL W P-5'-P-CCNC: 25 U/L (ref 13–39)
AST SERPL W P-5'-P-CCNC: 33 U/L (ref 13–39)
AST SERPL W P-5'-P-CCNC: 37 U/L (ref 13–39)
AST SERPL W P-5'-P-CCNC: 37 U/L (ref 13–39)
ATRIAL RATE - MUSE: 143 BPM
ATRIAL RATE - MUSE: 150 BPM
BACTERIA #/AREA URNS HPF: ABNORMAL /HPF
BACTERIA SPEC CULT: ABNORMAL
BACTERIA SPEC CULT: NORMAL
BACTERIA UR CULT: ABNORMAL
BASOPHILS # BLD AUTO: 0 10E9/L (ref 0–0.2)
BASOPHILS # BLD AUTO: 0 10E9/L (ref 0–0.2)
BASOPHILS # BLD AUTO: 0.1 10E3/UL (ref 0–0.2)
BASOPHILS # BLD AUTO: 0.1 10E9/L (ref 0–0.2)
BASOPHILS NFR BLD AUTO: 0.2 %
BASOPHILS NFR BLD AUTO: 0.3 %
BASOPHILS NFR BLD AUTO: 0.7 %
BASOPHILS NFR BLD AUTO: 1 %
BASOPHILS NFR BLD AUTO: 1 %
BASOPHILS NFR BLD AUTO: 1.2 %
BILIRUB DIRECT SERPL-MCNC: 0.2 MG/DL (ref 0–0.2)
BILIRUB DIRECT SERPL-MCNC: 0.8 MG/DL (ref 0–0.2)
BILIRUB SERPL-MCNC: 0.6 MG/DL (ref 0.2–1.3)
BILIRUB SERPL-MCNC: 0.6 MG/DL (ref 0.3–1)
BILIRUB SERPL-MCNC: 0.7 MG/DL (ref 0.2–1.3)
BILIRUB SERPL-MCNC: 0.7 MG/DL (ref 0.3–1)
BILIRUB SERPL-MCNC: 0.8 MG/DL (ref 0.3–1)
BILIRUB SERPL-MCNC: 0.8 MG/DL (ref 0.3–1)
BILIRUB SERPL-MCNC: 0.9 MG/DL (ref 0.3–1)
BILIRUB SERPL-MCNC: 1.1 MG/DL (ref 0.3–1)
BILIRUB SERPL-MCNC: 2 MG/DL (ref 0.3–1)
BILIRUB UR QL STRIP: NEGATIVE
BLD GP AB SCN SERPL QL: NORMAL
BLD PROD TYP BPU: NORMAL
BLD UNIT ID BPU: 0
BLD UNIT ID BPU: 0
BLOOD BANK CMNT PATIENT-IMP: NORMAL
BLOOD PRODUCT CODE: NORMAL
BLOOD PRODUCT CODE: NORMAL
BPU ID: NORMAL
BPU ID: NORMAL
BUN SERPL-MCNC: 10 MG/DL (ref 7–30)
BUN SERPL-MCNC: 12 MG/DL (ref 7–25)
BUN SERPL-MCNC: 12 MG/DL (ref 7–30)
BUN SERPL-MCNC: 13 MG/DL (ref 7–25)
BUN SERPL-MCNC: 14 MG/DL (ref 7–25)
BUN SERPL-MCNC: 14 MG/DL (ref 7–30)
BUN SERPL-MCNC: 15 MG/DL (ref 7–25)
BUN SERPL-MCNC: 16 MG/DL (ref 7–25)
BUN SERPL-MCNC: 17 MG/DL (ref 7–25)
BUN SERPL-MCNC: 17 MG/DL (ref 7–25)
BUN SERPL-MCNC: 18 MG/DL (ref 7–30)
BUN SERPL-MCNC: 19 MG/DL (ref 7–25)
BUN SERPL-MCNC: 20 MG/DL (ref 7–30)
BUN SERPL-MCNC: 22 MG/DL (ref 7–25)
BUN SERPL-MCNC: 23 MG/DL (ref 7–25)
BUN SERPL-MCNC: 23 MG/DL (ref 7–25)
BUN SERPL-MCNC: 25 MG/DL (ref 7–25)
BUN SERPL-MCNC: 27 MG/DL (ref 7–25)
BUN SERPL-MCNC: 37 MG/DL (ref 7–25)
BUN SERPL-MCNC: 42 MG/DL (ref 7–25)
BUN SERPL-MCNC: 45 MG/DL (ref 7–25)
BUN SERPL-MCNC: 47 MG/DL (ref 7–25)
BUN SERPL-MCNC: 47 MG/DL (ref 7–25)
BUN SERPL-MCNC: 48 MG/DL (ref 7–25)
BUN SERPL-MCNC: 49 MG/DL (ref 7–25)
BUN SERPL-MCNC: 53 MG/DL (ref 7–25)
BUN SERPL-MCNC: 54 MG/DL (ref 7–25)
BUN SERPL-MCNC: 56 MG/DL (ref 7–25)
BUN SERPL-MCNC: 57 MG/DL (ref 7–25)
BUN SERPL-MCNC: 59 MG/DL (ref 7–25)
BUN SERPL-MCNC: 61 MG/DL (ref 7–25)
BUN SERPL-MCNC: 62 MG/DL (ref 7–25)
BUN SERPL-MCNC: 8 MG/DL (ref 7–30)
BUN SERPL-MCNC: 9 MG/DL (ref 7–30)
CALCIUM SERPL-MCNC: 6.7 MG/DL (ref 8.6–10.3)
CALCIUM SERPL-MCNC: 6.9 MG/DL (ref 8.6–10.3)
CALCIUM SERPL-MCNC: 7.3 MG/DL (ref 8.6–10.3)
CALCIUM SERPL-MCNC: 7.4 MG/DL (ref 8.6–10.3)
CALCIUM SERPL-MCNC: 7.5 MG/DL (ref 8.6–10.3)
CALCIUM SERPL-MCNC: 7.6 MG/DL (ref 8.6–10.3)
CALCIUM SERPL-MCNC: 7.7 MG/DL (ref 8.6–10.3)
CALCIUM SERPL-MCNC: 7.8 MG/DL (ref 8.6–10.3)
CALCIUM SERPL-MCNC: 7.9 MG/DL (ref 8.5–10.1)
CALCIUM SERPL-MCNC: 7.9 MG/DL (ref 8.6–10.3)
CALCIUM SERPL-MCNC: 8 MG/DL (ref 8.6–10.3)
CALCIUM SERPL-MCNC: 8 MG/DL (ref 8.6–10.3)
CALCIUM SERPL-MCNC: 8.1 MG/DL (ref 8.6–10.3)
CALCIUM SERPL-MCNC: 8.1 MG/DL (ref 8.6–10.3)
CALCIUM SERPL-MCNC: 8.2 MG/DL (ref 8.6–10.3)
CALCIUM SERPL-MCNC: 8.2 MG/DL (ref 8.6–10.3)
CALCIUM SERPL-MCNC: 8.3 MG/DL (ref 8.5–10.1)
CALCIUM SERPL-MCNC: 8.3 MG/DL (ref 8.5–10.1)
CALCIUM SERPL-MCNC: 8.4 MG/DL (ref 8.5–10.1)
CALCIUM SERPL-MCNC: 8.4 MG/DL (ref 8.6–10.3)
CALCIUM SERPL-MCNC: 8.5 MG/DL (ref 8.5–10.1)
CALCIUM SERPL-MCNC: 8.5 MG/DL (ref 8.6–10.3)
CALCIUM SERPL-MCNC: 8.6 MG/DL (ref 8.6–10.3)
CALCIUM SERPL-MCNC: 8.7 MG/DL (ref 8.5–10.1)
CALCIUM SERPL-MCNC: 8.7 MG/DL (ref 8.6–10.3)
CALCIUM SERPL-MCNC: 8.8 MG/DL (ref 8.5–10.1)
CALCIUM SERPL-MCNC: 8.9 MG/DL (ref 8.6–10.3)
CALCIUM SERPL-MCNC: 9.4 MG/DL (ref 8.6–10.3)
CEA SERPL-MCNC: 241.6 NG/ML
CHLORIDE BLD-SCNC: 103 MMOL/L (ref 94–109)
CHLORIDE BLD-SCNC: 103 MMOL/L (ref 94–109)
CHLORIDE BLD-SCNC: 104 MMOL/L (ref 94–109)
CHLORIDE BLD-SCNC: 105 MMOL/L (ref 94–109)
CHLORIDE BLD-SCNC: 105 MMOL/L (ref 94–109)
CHLORIDE BLD-SCNC: 107 MMOL/L (ref 94–109)
CHLORIDE BLD-SCNC: 95 MMOL/L (ref 98–107)
CHLORIDE BLD-SCNC: 98 MMOL/L (ref 94–109)
CHLORIDE SERPL-SCNC: 101 MMOL/L (ref 98–107)
CHLORIDE SERPL-SCNC: 103 MMOL/L (ref 98–107)
CHLORIDE SERPL-SCNC: 104 MMOL/L (ref 98–107)
CHLORIDE SERPL-SCNC: 106 MMOL/L (ref 98–107)
CHLORIDE SERPL-SCNC: 108 MMOL/L (ref 98–107)
CHLORIDE SERPL-SCNC: 109 MMOL/L (ref 98–107)
CHLORIDE SERPL-SCNC: 110 MMOL/L (ref 98–107)
CHLORIDE SERPL-SCNC: 111 MMOL/L (ref 98–107)
CHLORIDE SERPL-SCNC: 111 MMOL/L (ref 98–107)
CHLORIDE SERPL-SCNC: 112 MMOL/L (ref 98–107)
CHLORIDE SERPL-SCNC: 114 MMOL/L (ref 98–107)
CHLORIDE SERPL-SCNC: 116 MMOL/L (ref 98–107)
CHLORIDE SERPL-SCNC: 116 MMOL/L (ref 98–107)
CHLORIDE SERPL-SCNC: 117 MMOL/L (ref 98–107)
CHLORIDE SERPL-SCNC: 118 MMOL/L (ref 98–107)
CHLORIDE SERPL-SCNC: 119 MMOL/L (ref 98–107)
CHLORIDE SERPL-SCNC: 99 MMOL/L (ref 98–107)
CO2 SERPL-SCNC: 13 MMOL/L (ref 21–31)
CO2 SERPL-SCNC: 14 MMOL/L (ref 21–31)
CO2 SERPL-SCNC: 14 MMOL/L (ref 21–31)
CO2 SERPL-SCNC: 15 MMOL/L (ref 21–31)
CO2 SERPL-SCNC: 16 MMOL/L (ref 21–31)
CO2 SERPL-SCNC: 17 MMOL/L (ref 21–31)
CO2 SERPL-SCNC: 18 MMOL/L (ref 21–31)
CO2 SERPL-SCNC: 19 MMOL/L (ref 21–31)
CO2 SERPL-SCNC: 20 MMOL/L (ref 21–31)
CO2 SERPL-SCNC: 21 MMOL/L (ref 21–31)
CO2 SERPL-SCNC: 22 MMOL/L (ref 21–31)
CO2 SERPL-SCNC: 22 MMOL/L (ref 21–31)
CO2 SERPL-SCNC: 24 MMOL/L (ref 21–31)
CO2 SERPL-SCNC: 26 MMOL/L (ref 21–31)
CO2 SERPL-SCNC: 26 MMOL/L (ref 21–31)
CO2 SERPL-SCNC: 27 MMOL/L (ref 20–32)
CO2 SERPL-SCNC: 29 MMOL/L (ref 20–32)
CO2 SERPL-SCNC: 29 MMOL/L (ref 21–31)
CO2 SERPL-SCNC: 30 MMOL/L (ref 20–32)
CO2 SERPL-SCNC: 30 MMOL/L (ref 20–32)
CO2 SERPL-SCNC: 31 MMOL/L (ref 20–32)
CO2 SERPL-SCNC: 36 MMOL/L (ref 21–31)
COLOR UR AUTO: ABNORMAL
COLOR UR AUTO: YELLOW
CREAT SERPL-MCNC: 0.55 MG/DL (ref 0.6–1.2)
CREAT SERPL-MCNC: 0.55 MG/DL (ref 0.6–1.2)
CREAT SERPL-MCNC: 0.6 MG/DL (ref 0.6–1.2)
CREAT SERPL-MCNC: 0.61 MG/DL (ref 0.52–1.04)
CREAT SERPL-MCNC: 0.61 MG/DL (ref 0.52–1.04)
CREAT SERPL-MCNC: 0.62 MG/DL (ref 0.6–1.2)
CREAT SERPL-MCNC: 0.64 MG/DL (ref 0.52–1.04)
CREAT SERPL-MCNC: 0.64 MG/DL (ref 0.6–1.2)
CREAT SERPL-MCNC: 0.64 MG/DL (ref 0.6–1.2)
CREAT SERPL-MCNC: 0.65 MG/DL (ref 0.6–1.2)
CREAT SERPL-MCNC: 0.66 MG/DL (ref 0.52–1.04)
CREAT SERPL-MCNC: 0.66 MG/DL (ref 0.52–1.04)
CREAT SERPL-MCNC: 0.71 MG/DL (ref 0.52–1.04)
CREAT SERPL-MCNC: 0.71 MG/DL (ref 0.6–1.2)
CREAT SERPL-MCNC: 0.72 MG/DL (ref 0.52–1.04)
CREAT SERPL-MCNC: 0.76 MG/DL (ref 0.6–1.2)
CREAT SERPL-MCNC: 0.8 MG/DL (ref 0.6–1.2)
CREAT SERPL-MCNC: 0.8 MG/DL (ref 0.6–1.2)
CREAT SERPL-MCNC: 0.86 MG/DL (ref 0.6–1.2)
CREAT SERPL-MCNC: 0.88 MG/DL (ref 0.6–1.2)
CREAT SERPL-MCNC: 0.94 MG/DL (ref 0.6–1.2)
CREAT SERPL-MCNC: 0.94 MG/DL (ref 0.6–1.2)
CREAT SERPL-MCNC: 1.01 MG/DL (ref 0.6–1.2)
CREAT SERPL-MCNC: 1.03 MG/DL (ref 0.6–1.2)
CREAT SERPL-MCNC: 1.17 MG/DL (ref 0.6–1.2)
CREAT SERPL-MCNC: 1.25 MG/DL (ref 0.6–1.2)
CREAT SERPL-MCNC: 1.26 MG/DL (ref 0.6–1.2)
CREAT SERPL-MCNC: 1.31 MG/DL (ref 0.6–1.2)
CREAT SERPL-MCNC: 1.37 MG/DL (ref 0.6–1.2)
CREAT SERPL-MCNC: 1.38 MG/DL (ref 0.6–1.2)
CREAT SERPL-MCNC: 1.48 MG/DL (ref 0.6–1.2)
CREAT SERPL-MCNC: 1.52 MG/DL (ref 0.6–1.2)
CREAT SERPL-MCNC: 1.77 MG/DL (ref 0.6–1.2)
CREAT SERPL-MCNC: 2.15 MG/DL (ref 0.6–1.2)
CREAT SERPL-MCNC: 2.54 MG/DL (ref 0.6–1.2)
CREAT SERPL-MCNC: 2.64 MG/DL (ref 0.6–1.2)
CREAT SERPL-MCNC: 2.66 MG/DL (ref 0.6–1.2)
DIASTOLIC BLOOD PRESSURE - MUSE: NORMAL MMHG
DIASTOLIC BLOOD PRESSURE - MUSE: NORMAL MMHG
DIFFERENTIAL METHOD BLD: ABNORMAL
DIFFERENTIAL METHOD BLD: NORMAL
DIFFERENTIAL METHOD BLD: NORMAL
EOSINOPHIL # BLD AUTO: 0 10E3/UL (ref 0–0.7)
EOSINOPHIL # BLD AUTO: 0 10E9/L (ref 0–0.7)
EOSINOPHIL # BLD AUTO: 0.1 10E9/L (ref 0–0.7)
EOSINOPHIL NFR BLD AUTO: 0 %
EOSINOPHIL NFR BLD AUTO: 0.7 %
EOSINOPHIL NFR BLD AUTO: 1 %
EOSINOPHIL NFR BLD AUTO: 1.2 %
EOSINOPHIL NFR BLD AUTO: 1.6 %
EOSINOPHIL NFR BLD AUTO: 1.8 %
ERYTHROCYTE [DISTWIDTH] IN BLOOD BY AUTOMATED COUNT: 13.9 % (ref 10–15)
ERYTHROCYTE [DISTWIDTH] IN BLOOD BY AUTOMATED COUNT: 13.9 % (ref 10–15)
ERYTHROCYTE [DISTWIDTH] IN BLOOD BY AUTOMATED COUNT: 14 % (ref 10–15)
ERYTHROCYTE [DISTWIDTH] IN BLOOD BY AUTOMATED COUNT: 14.1 % (ref 10–15)
ERYTHROCYTE [DISTWIDTH] IN BLOOD BY AUTOMATED COUNT: 14.6 % (ref 10–15)
ERYTHROCYTE [DISTWIDTH] IN BLOOD BY AUTOMATED COUNT: 15.5 % (ref 10–15)
ERYTHROCYTE [DISTWIDTH] IN BLOOD BY AUTOMATED COUNT: 15.6 % (ref 10–15)
ERYTHROCYTE [DISTWIDTH] IN BLOOD BY AUTOMATED COUNT: 15.9 % (ref 10–15)
ERYTHROCYTE [DISTWIDTH] IN BLOOD BY AUTOMATED COUNT: 16 % (ref 10–15)
ERYTHROCYTE [DISTWIDTH] IN BLOOD BY AUTOMATED COUNT: 16.1 % (ref 10–15)
ERYTHROCYTE [DISTWIDTH] IN BLOOD BY AUTOMATED COUNT: 16.3 % (ref 10–15)
ERYTHROCYTE [DISTWIDTH] IN BLOOD BY AUTOMATED COUNT: 16.4 % (ref 10–15)
ERYTHROCYTE [DISTWIDTH] IN BLOOD BY AUTOMATED COUNT: 16.4 % (ref 10–15)
ERYTHROCYTE [DISTWIDTH] IN BLOOD BY AUTOMATED COUNT: 16.6 % (ref 10–15)
ERYTHROCYTE [DISTWIDTH] IN BLOOD BY AUTOMATED COUNT: 17.3 % (ref 10–15)
ERYTHROCYTE [DISTWIDTH] IN BLOOD BY AUTOMATED COUNT: 17.6 % (ref 10–15)
ERYTHROCYTE [DISTWIDTH] IN BLOOD BY AUTOMATED COUNT: 18 % (ref 10–15)
ERYTHROCYTE [DISTWIDTH] IN BLOOD BY AUTOMATED COUNT: 18.3 % (ref 10–15)
ERYTHROCYTE [DISTWIDTH] IN BLOOD BY AUTOMATED COUNT: 18.7 % (ref 10–15)
FASTING STATUS PATIENT QL REPORTED: NORMAL
GFR SERPL CREATININE-BSD FRML MDRD: 17 ML/MIN/{1.73_M2}
GFR SERPL CREATININE-BSD FRML MDRD: 17 ML/MIN/{1.73_M2}
GFR SERPL CREATININE-BSD FRML MDRD: 18 ML/MIN/{1.73_M2}
GFR SERPL CREATININE-BSD FRML MDRD: 22 ML/MIN/{1.73_M2}
GFR SERPL CREATININE-BSD FRML MDRD: 27 ML/MIN/{1.73_M2}
GFR SERPL CREATININE-BSD FRML MDRD: 32 ML/MIN/{1.73_M2}
GFR SERPL CREATININE-BSD FRML MDRD: 33 ML/MIN/{1.73_M2}
GFR SERPL CREATININE-BSD FRML MDRD: 36 ML/MIN/{1.73_M2}
GFR SERPL CREATININE-BSD FRML MDRD: 37 ML/MIN/{1.73_M2}
GFR SERPL CREATININE-BSD FRML MDRD: 39 ML/MIN/{1.73_M2}
GFR SERPL CREATININE-BSD FRML MDRD: 40 ML/MIN/{1.73_M2}
GFR SERPL CREATININE-BSD FRML MDRD: 41 ML/MIN/{1.73_M2}
GFR SERPL CREATININE-BSD FRML MDRD: 44 ML/MIN/{1.73_M2}
GFR SERPL CREATININE-BSD FRML MDRD: 51 ML/MIN/{1.73_M2}
GFR SERPL CREATININE-BSD FRML MDRD: 52 ML/MIN/{1.73_M2}
GFR SERPL CREATININE-BSD FRML MDRD: 56 ML/MIN/{1.73_M2}
GFR SERPL CREATININE-BSD FRML MDRD: 56 ML/MIN/{1.73_M2}
GFR SERPL CREATININE-BSD FRML MDRD: 61 ML/MIN/{1.73_M2}
GFR SERPL CREATININE-BSD FRML MDRD: 63 ML/MIN/{1.73_M2}
GFR SERPL CREATININE-BSD FRML MDRD: 68 ML/MIN/{1.73_M2}
GFR SERPL CREATININE-BSD FRML MDRD: 68 ML/MIN/{1.73_M2}
GFR SERPL CREATININE-BSD FRML MDRD: 72 ML/MIN/{1.73_M2}
GFR SERPL CREATININE-BSD FRML MDRD: 76 ML/MIN/1.73M2
GFR SERPL CREATININE-BSD FRML MDRD: 77 ML/MIN/1.73M2
GFR SERPL CREATININE-BSD FRML MDRD: 78 ML/MIN/{1.73_M2}
GFR SERPL CREATININE-BSD FRML MDRD: 80 ML/MIN/1.73M2
GFR SERPL CREATININE-BSD FRML MDRD: 80 ML/MIN/1.73M2
GFR SERPL CREATININE-BSD FRML MDRD: 81 ML/MIN/1.73M2
GFR SERPL CREATININE-BSD FRML MDRD: 82 ML/MIN/1.73M2
GFR SERPL CREATININE-BSD FRML MDRD: 82 ML/MIN/1.73M2
GFR SERPL CREATININE-BSD FRML MDRD: 83 ML/MIN/1.73M2
GFR SERPL CREATININE-BSD FRML MDRD: 86 ML/MIN/{1.73_M2}
GFR SERPL CREATININE-BSD FRML MDRD: 88 ML/MIN/{1.73_M2}
GFR SERPL CREATININE-BSD FRML MDRD: 88 ML/MIN/{1.73_M2}
GFR SERPL CREATININE-BSD FRML MDRD: >90 ML/MIN/{1.73_M2}
GLUCOSE BLD-MCNC: 101 MG/DL (ref 70–99)
GLUCOSE BLD-MCNC: 102 MG/DL (ref 70–99)
GLUCOSE BLD-MCNC: 107 MG/DL (ref 70–99)
GLUCOSE BLD-MCNC: 132 MG/DL (ref 70–99)
GLUCOSE BLD-MCNC: 140 MG/DL (ref 70–99)
GLUCOSE BLD-MCNC: 88 MG/DL (ref 70–99)
GLUCOSE BLD-MCNC: 88 MG/DL (ref 70–99)
GLUCOSE BLD-MCNC: 89 MG/DL (ref 70–105)
GLUCOSE BLDC GLUCOMTR-MCNC: 133 MG/DL (ref 70–99)
GLUCOSE BLDC GLUCOMTR-MCNC: 67 MG/DL (ref 70–99)
GLUCOSE BLDC GLUCOMTR-MCNC: 81 MG/DL (ref 70–99)
GLUCOSE BLDC GLUCOMTR-MCNC: 88 MG/DL (ref 70–99)
GLUCOSE BLDC GLUCOMTR-MCNC: 99 MG/DL (ref 70–99)
GLUCOSE SERPL-MCNC: 103 MG/DL (ref 70–105)
GLUCOSE SERPL-MCNC: 107 MG/DL (ref 70–105)
GLUCOSE SERPL-MCNC: 110 MG/DL (ref 70–105)
GLUCOSE SERPL-MCNC: 110 MG/DL (ref 70–105)
GLUCOSE SERPL-MCNC: 111 MG/DL (ref 70–105)
GLUCOSE SERPL-MCNC: 113 MG/DL (ref 70–105)
GLUCOSE SERPL-MCNC: 114 MG/DL (ref 70–105)
GLUCOSE SERPL-MCNC: 115 MG/DL (ref 70–105)
GLUCOSE SERPL-MCNC: 125 MG/DL (ref 70–105)
GLUCOSE SERPL-MCNC: 137 MG/DL (ref 70–105)
GLUCOSE SERPL-MCNC: 156 MG/DL (ref 70–105)
GLUCOSE SERPL-MCNC: 176 MG/DL (ref 70–105)
GLUCOSE SERPL-MCNC: 201 MG/DL (ref 70–105)
GLUCOSE SERPL-MCNC: 206 MG/DL (ref 70–105)
GLUCOSE SERPL-MCNC: 50 MG/DL (ref 70–105)
GLUCOSE SERPL-MCNC: 60 MG/DL (ref 70–105)
GLUCOSE SERPL-MCNC: 64 MG/DL (ref 70–105)
GLUCOSE SERPL-MCNC: 68 MG/DL (ref 70–105)
GLUCOSE SERPL-MCNC: 68 MG/DL (ref 70–105)
GLUCOSE SERPL-MCNC: 73 MG/DL (ref 70–105)
GLUCOSE SERPL-MCNC: 77 MG/DL (ref 70–105)
GLUCOSE SERPL-MCNC: 78 MG/DL (ref 70–105)
GLUCOSE SERPL-MCNC: 82 MG/DL (ref 70–105)
GLUCOSE SERPL-MCNC: 87 MG/DL (ref 70–105)
GLUCOSE SERPL-MCNC: 89 MG/DL (ref 70–105)
GLUCOSE SERPL-MCNC: 92 MG/DL (ref 70–105)
GLUCOSE SERPL-MCNC: 98 MG/DL (ref 70–105)
GLUCOSE SERPL-MCNC: 99 MG/DL (ref 70–105)
GLUCOSE UR STRIP-MCNC: NEGATIVE MG/DL
GRAM STN SPEC: ABNORMAL
HBA1C MFR BLD: 4.9 % (ref 0–5.6)
HCO3 BLD-SCNC: 15 MMOL/L (ref 21–28)
HCO3 BLD-SCNC: 15 MMOL/L (ref 21–28)
HCO3 BLD-SCNC: 17 MMOL/L (ref 21–28)
HCT VFR BLD AUTO: 21.7 % (ref 35–47)
HCT VFR BLD AUTO: 23.1 % (ref 35–47)
HCT VFR BLD AUTO: 23.6 % (ref 35–47)
HCT VFR BLD AUTO: 23.8 % (ref 35–47)
HCT VFR BLD AUTO: 24.3 % (ref 35–47)
HCT VFR BLD AUTO: 25.1 % (ref 35–47)
HCT VFR BLD AUTO: 25.1 % (ref 35–47)
HCT VFR BLD AUTO: 25.4 % (ref 35–47)
HCT VFR BLD AUTO: 26.5 % (ref 35–47)
HCT VFR BLD AUTO: 26.5 % (ref 35–47)
HCT VFR BLD AUTO: 27 % (ref 35–47)
HCT VFR BLD AUTO: 27.2 % (ref 35–47)
HCT VFR BLD AUTO: 27.2 % (ref 35–47)
HCT VFR BLD AUTO: 27.3 % (ref 35–47)
HCT VFR BLD AUTO: 27.6 % (ref 35–47)
HCT VFR BLD AUTO: 27.9 % (ref 35–47)
HCT VFR BLD AUTO: 28.2 % (ref 35–47)
HCT VFR BLD AUTO: 28.6 % (ref 35–47)
HCT VFR BLD AUTO: 29.4 % (ref 35–47)
HCT VFR BLD AUTO: 29.9 % (ref 35–47)
HCT VFR BLD AUTO: 32.4 % (ref 35–47)
HCT VFR BLD AUTO: 32.7 % (ref 35–47)
HCT VFR BLD AUTO: 34.2 % (ref 35–47)
HCT VFR BLD AUTO: 35.6 % (ref 35–47)
HCT VFR BLD AUTO: 36.3 % (ref 35–47)
HCT VFR BLD AUTO: 37.8 % (ref 35–47)
HCT VFR BLD AUTO: 38.2 % (ref 35–47)
HCT VFR BLD AUTO: 41 % (ref 35–47)
HGB BLD-MCNC: 10.7 G/DL (ref 11.7–15.7)
HGB BLD-MCNC: 10.9 G/DL (ref 11.7–15.7)
HGB BLD-MCNC: 11 G/DL (ref 11.7–15.7)
HGB BLD-MCNC: 11.7 G/DL (ref 11.7–15.7)
HGB BLD-MCNC: 12.1 G/DL (ref 11.7–15.7)
HGB BLD-MCNC: 12.5 G/DL (ref 11.7–15.7)
HGB BLD-MCNC: 12.7 G/DL (ref 11.7–15.7)
HGB BLD-MCNC: 13.5 G/DL (ref 11.7–15.7)
HGB BLD-MCNC: 6.9 G/DL (ref 11.7–15.7)
HGB BLD-MCNC: 7.7 G/DL (ref 11.7–15.7)
HGB BLD-MCNC: 7.8 G/DL (ref 11.7–15.7)
HGB BLD-MCNC: 7.9 G/DL (ref 11.7–15.7)
HGB BLD-MCNC: 8.1 G/DL (ref 11.7–15.7)
HGB BLD-MCNC: 8.2 G/DL (ref 11.7–15.7)
HGB BLD-MCNC: 8.2 G/DL (ref 11.7–15.7)
HGB BLD-MCNC: 8.3 G/DL (ref 11.7–15.7)
HGB BLD-MCNC: 8.5 G/DL (ref 11.7–15.7)
HGB BLD-MCNC: 8.5 G/DL (ref 11.7–15.7)
HGB BLD-MCNC: 8.7 G/DL (ref 11.7–15.7)
HGB BLD-MCNC: 8.8 G/DL (ref 11.7–15.7)
HGB BLD-MCNC: 8.9 G/DL (ref 11.7–15.7)
HGB BLD-MCNC: 8.9 G/DL (ref 11.7–15.7)
HGB BLD-MCNC: 9.3 G/DL (ref 11.7–15.7)
HGB BLD-MCNC: 9.4 G/DL (ref 11.7–15.7)
HGB BLD-MCNC: 9.9 G/DL (ref 11.7–15.7)
HGB UR QL STRIP: ABNORMAL
HGB UR QL STRIP: ABNORMAL
HGB UR QL STRIP: NEGATIVE
HGB UR QL STRIP: NEGATIVE
HOLD SPECIMEN: NORMAL
HYALINE CASTS: 1 /LPF
IMM GRANULOCYTES # BLD: 0 10E3/UL
IMM GRANULOCYTES # BLD: 0 10E9/L (ref 0–0.4)
IMM GRANULOCYTES # BLD: 0.1 10E9/L (ref 0–0.4)
IMM GRANULOCYTES # BLD: 0.1 10E9/L (ref 0–0.4)
IMM GRANULOCYTES NFR BLD: 0.2 %
IMM GRANULOCYTES NFR BLD: 0.3 %
IMM GRANULOCYTES NFR BLD: 0.6 %
IMM GRANULOCYTES NFR BLD: 0.7 %
IMM GRANULOCYTES NFR BLD: 0.8 %
IMM GRANULOCYTES NFR BLD: 1 %
INR PPP: 1.06 (ref 0.86–1.14)
INR PPP: 1.25 (ref 0.86–1.14)
INR PPP: 1.29 (ref 0.86–1.14)
INR PPP: 1.33 (ref 0.85–1.15)
INR PPP: 1.61 (ref 0.86–1.14)
INR PPP: 2.02 (ref 0.86–1.14)
INR PPP: 2.39 (ref 0.86–1.14)
INR PPP: 2.53 (ref 0.86–1.14)
INR PPP: 2.77 (ref 0.86–1.14)
INTERPRETATION ECG - MUSE: NORMAL
IRON SATN MFR SERPL: ABNORMAL % (ref 20–55)
IRON SERPL-MCNC: <10 UG/DL (ref 50–212)
KETONES UR STRIP-MCNC: 10 MG/DL
KETONES UR STRIP-MCNC: 5 MG/DL
KETONES UR STRIP-MCNC: NEGATIVE MG/DL
KETONES UR STRIP-MCNC: NEGATIVE MG/DL
LABORATORY COMMENT REPORT: NORMAL
LACTATE BLD-SCNC: 0.7 MMOL/L (ref 0.7–2)
LACTATE BLD-SCNC: 0.9 MMOL/L (ref 0.7–2)
LACTATE BLD-SCNC: 0.9 MMOL/L (ref 0.7–2)
LACTATE BLD-SCNC: 1.3 MMOL/L (ref 0.7–2)
LACTATE SERPL-SCNC: 1.5 MMOL/L (ref 0.7–2)
LEUKOCYTE ESTERASE UR QL STRIP: ABNORMAL
LEUKOCYTE ESTERASE UR QL STRIP: ABNORMAL
LEUKOCYTE ESTERASE UR QL STRIP: NEGATIVE
LEUKOCYTE ESTERASE UR QL STRIP: NEGATIVE
LIPASE SERPL-CCNC: 6 U/L (ref 11–82)
LIPASE SERPL-CCNC: 7 U/L (ref 11–82)
LYMPHOCYTES # BLD AUTO: 0.4 10E9/L (ref 0.8–5.3)
LYMPHOCYTES # BLD AUTO: 1.2 10E9/L (ref 0.8–5.3)
LYMPHOCYTES # BLD AUTO: 1.3 10E9/L (ref 0.8–5.3)
LYMPHOCYTES # BLD AUTO: 2.2 10E3/UL (ref 0.8–5.3)
LYMPHOCYTES NFR BLD AUTO: 16.6 %
LYMPHOCYTES NFR BLD AUTO: 17.4 %
LYMPHOCYTES NFR BLD AUTO: 19.4 %
LYMPHOCYTES NFR BLD AUTO: 22.9 %
LYMPHOCYTES NFR BLD AUTO: 25 %
LYMPHOCYTES NFR BLD AUTO: 3 %
MAGNESIUM SERPL-MCNC: 1.4 MG/DL (ref 1.9–2.7)
MAGNESIUM SERPL-MCNC: 1.4 MG/DL (ref 1.9–2.7)
MAGNESIUM SERPL-MCNC: 1.5 MG/DL (ref 1.6–2.3)
MAGNESIUM SERPL-MCNC: 1.5 MG/DL (ref 1.6–2.3)
MAGNESIUM SERPL-MCNC: 1.5 MG/DL (ref 1.9–2.7)
MAGNESIUM SERPL-MCNC: 1.6 MG/DL (ref 1.9–2.7)
MAGNESIUM SERPL-MCNC: 1.6 MG/DL (ref 1.9–2.7)
MAGNESIUM SERPL-MCNC: 1.7 MG/DL (ref 1.6–2.3)
MAGNESIUM SERPL-MCNC: 1.7 MG/DL (ref 1.6–2.3)
MAGNESIUM SERPL-MCNC: 1.7 MG/DL (ref 1.9–2.7)
MAGNESIUM SERPL-MCNC: 1.7 MG/DL (ref 1.9–2.7)
MAGNESIUM SERPL-MCNC: 1.8 MG/DL (ref 1.6–2.3)
MAGNESIUM SERPL-MCNC: 1.8 MG/DL (ref 1.6–2.3)
MAGNESIUM SERPL-MCNC: 1.8 MG/DL (ref 1.9–2.7)
MAGNESIUM SERPL-MCNC: 2 MG/DL (ref 1.6–2.3)
MAGNESIUM SERPL-MCNC: 2.1 MG/DL (ref 1.9–2.7)
MAGNESIUM SERPL-MCNC: 2.2 MG/DL (ref 1.6–2.3)
MAGNESIUM SERPL-MCNC: 2.2 MG/DL (ref 1.9–2.7)
MAGNESIUM SERPL-MCNC: 2.3 MG/DL (ref 1.9–2.7)
MAGNESIUM SERPL-MCNC: 2.3 MG/DL (ref 1.9–2.7)
MAGNESIUM SERPL-MCNC: 2.4 MG/DL (ref 1.9–2.7)
MAGNESIUM SERPL-MCNC: 2.5 MG/DL (ref 1.9–2.7)
MAGNESIUM SERPL-MCNC: 3.1 MG/DL (ref 1.9–2.7)
MCH RBC QN AUTO: 27.3 PG (ref 26.5–33)
MCH RBC QN AUTO: 27.4 PG (ref 26.5–33)
MCH RBC QN AUTO: 27.5 PG (ref 26.5–33)
MCH RBC QN AUTO: 27.5 PG (ref 26.5–33)
MCH RBC QN AUTO: 27.7 PG (ref 26.5–33)
MCH RBC QN AUTO: 27.8 PG (ref 26.5–33)
MCH RBC QN AUTO: 27.9 PG (ref 26.5–33)
MCH RBC QN AUTO: 27.9 PG (ref 26.5–33)
MCH RBC QN AUTO: 28 PG (ref 26.5–33)
MCH RBC QN AUTO: 28.1 PG (ref 26.5–33)
MCH RBC QN AUTO: 28.1 PG (ref 26.5–33)
MCH RBC QN AUTO: 28.2 PG (ref 26.5–33)
MCH RBC QN AUTO: 28.2 PG (ref 26.5–33)
MCH RBC QN AUTO: 28.3 PG (ref 26.5–33)
MCH RBC QN AUTO: 28.4 PG (ref 26.5–33)
MCH RBC QN AUTO: 28.5 PG (ref 26.5–33)
MCH RBC QN AUTO: 28.5 PG (ref 26.5–33)
MCH RBC QN AUTO: 28.7 PG (ref 26.5–33)
MCH RBC QN AUTO: 28.8 PG (ref 26.5–33)
MCHC RBC AUTO-ENTMCNC: 31.2 G/DL (ref 31.5–36.5)
MCHC RBC AUTO-ENTMCNC: 31.3 G/DL (ref 31.5–36.5)
MCHC RBC AUTO-ENTMCNC: 31.3 G/DL (ref 31.5–36.5)
MCHC RBC AUTO-ENTMCNC: 31.5 G/DL (ref 31.5–36.5)
MCHC RBC AUTO-ENTMCNC: 31.7 G/DL (ref 31.5–36.5)
MCHC RBC AUTO-ENTMCNC: 31.8 G/DL (ref 31.5–36.5)
MCHC RBC AUTO-ENTMCNC: 31.9 G/DL (ref 31.5–36.5)
MCHC RBC AUTO-ENTMCNC: 31.9 G/DL (ref 31.5–36.5)
MCHC RBC AUTO-ENTMCNC: 32 G/DL (ref 31.5–36.5)
MCHC RBC AUTO-ENTMCNC: 32.2 G/DL (ref 31.5–36.5)
MCHC RBC AUTO-ENTMCNC: 32.2 G/DL (ref 31.5–36.5)
MCHC RBC AUTO-ENTMCNC: 32.3 G/DL (ref 31.5–36.5)
MCHC RBC AUTO-ENTMCNC: 32.3 G/DL (ref 31.5–36.5)
MCHC RBC AUTO-ENTMCNC: 32.4 G/DL (ref 31.5–36.5)
MCHC RBC AUTO-ENTMCNC: 32.5 G/DL (ref 31.5–36.5)
MCHC RBC AUTO-ENTMCNC: 32.7 G/DL (ref 31.5–36.5)
MCHC RBC AUTO-ENTMCNC: 32.8 G/DL (ref 31.5–36.5)
MCHC RBC AUTO-ENTMCNC: 32.9 G/DL (ref 31.5–36.5)
MCHC RBC AUTO-ENTMCNC: 32.9 G/DL (ref 31.5–36.5)
MCHC RBC AUTO-ENTMCNC: 33 G/DL (ref 31.5–36.5)
MCHC RBC AUTO-ENTMCNC: 33.1 G/DL (ref 31.5–36.5)
MCHC RBC AUTO-ENTMCNC: 33.1 G/DL (ref 31.5–36.5)
MCHC RBC AUTO-ENTMCNC: 33.2 G/DL (ref 31.5–36.5)
MCHC RBC AUTO-ENTMCNC: 33.2 G/DL (ref 31.5–36.5)
MCHC RBC AUTO-ENTMCNC: 33.3 G/DL (ref 31.5–36.5)
MCHC RBC AUTO-ENTMCNC: 33.3 G/DL (ref 31.5–36.5)
MCHC RBC AUTO-ENTMCNC: 33.5 G/DL (ref 31.5–36.5)
MCHC RBC AUTO-ENTMCNC: 34.2 G/DL (ref 31.5–36.5)
MCV RBC AUTO: 83 FL (ref 78–100)
MCV RBC AUTO: 84 FL (ref 78–100)
MCV RBC AUTO: 84 FL (ref 78–100)
MCV RBC AUTO: 85 FL (ref 78–100)
MCV RBC AUTO: 85 FL (ref 78–100)
MCV RBC AUTO: 86 FL (ref 78–100)
MCV RBC AUTO: 87 FL (ref 78–100)
MCV RBC AUTO: 88 FL (ref 78–100)
MCV RBC AUTO: 89 FL (ref 78–100)
MCV RBC AUTO: 89 FL (ref 78–100)
MONOCYTES # BLD AUTO: 0.5 10E9/L (ref 0–1.3)
MONOCYTES # BLD AUTO: 0.6 10E9/L (ref 0–1.3)
MONOCYTES # BLD AUTO: 0.7 10E3/UL (ref 0–1.3)
MONOCYTES # BLD AUTO: 0.8 10E9/L (ref 0–1.3)
MONOCYTES # BLD AUTO: 0.9 10E9/L (ref 0–1.3)
MONOCYTES # BLD AUTO: 0.9 10E9/L (ref 0–1.3)
MONOCYTES NFR BLD AUTO: 12.4 %
MONOCYTES NFR BLD AUTO: 13.4 %
MONOCYTES NFR BLD AUTO: 5.8 %
MONOCYTES NFR BLD AUTO: 8 %
MONOCYTES NFR BLD AUTO: 8.7 %
MONOCYTES NFR BLD AUTO: 9 %
MUCOUS THREADS #/AREA URNS LPF: PRESENT /LPF
MUCOUS THREADS #/AREA URNS LPF: PRESENT /LPF
NEUTROPHILS # BLD AUTO: 11.6 10E9/L (ref 1.6–8.3)
NEUTROPHILS # BLD AUTO: 3.8 10E9/L (ref 1.6–8.3)
NEUTROPHILS # BLD AUTO: 4.5 10E9/L (ref 1.6–8.3)
NEUTROPHILS # BLD AUTO: 5 10E9/L (ref 1.6–8.3)
NEUTROPHILS # BLD AUTO: 5.2 10E9/L (ref 1.6–8.3)
NEUTROPHILS # BLD AUTO: 5.7 10E3/UL (ref 1.6–8.3)
NEUTROPHILS NFR BLD AUTO: 64 %
NEUTROPHILS NFR BLD AUTO: 65.5 %
NEUTROPHILS NFR BLD AUTO: 65.6 %
NEUTROPHILS NFR BLD AUTO: 67.8 %
NEUTROPHILS NFR BLD AUTO: 71 %
NEUTROPHILS NFR BLD AUTO: 90.2 %
NITRATE UR QL: NEGATIVE
NITRATE UR QL: POSITIVE
NON-SQ EPI CELLS #/AREA URNS LPF: ABNORMAL /LPF
NRBC # BLD AUTO: 0 10E3/UL
NRBC BLD AUTO-RTO: 0 /100
NT-PROBNP SERPL-MCNC: 403 PG/ML (ref 0–100)
NUM BPU REQUESTED: 2
O2/TOTAL GAS SETTING VFR VENT: 0 %
O2/TOTAL GAS SETTING VFR VENT: 0 %
O2/TOTAL GAS SETTING VFR VENT: 24 %
OXYHGB MFR BLD: 91 % (ref 92–100)
OXYHGB MFR BLD: 93 % (ref 92–100)
OXYHGB MFR BLD: 94 % (ref 92–100)
P AXIS - MUSE: 253 DEGREES
P AXIS - MUSE: NORMAL DEGREES
PCO2 BLD: 26 MM HG (ref 35–45)
PCO2 BLD: 30 MM HG (ref 35–45)
PCO2 BLD: 32 MM HG (ref 35–45)
PH BLD: 7.29 PH (ref 7.35–7.45)
PH BLD: 7.35 PH (ref 7.35–7.45)
PH BLD: 7.36 PH (ref 7.35–7.45)
PH UR STRIP: 5 PH (ref 5–7)
PH UR STRIP: 5.5 PH (ref 5–9)
PH UR STRIP: 6 PH (ref 5–7)
PH UR STRIP: 6.5 [PH] (ref 5–9)
PHOSPHATE SERPL-MCNC: 1.7 MG/DL (ref 2.5–4.5)
PHOSPHATE SERPL-MCNC: 1.8 MG/DL (ref 2.5–4.5)
PHOSPHATE SERPL-MCNC: 1.8 MG/DL (ref 2.5–4.5)
PHOSPHATE SERPL-MCNC: 1.9 MG/DL (ref 2.5–4.5)
PHOSPHATE SERPL-MCNC: 2.2 MG/DL (ref 2.5–4.5)
PHOSPHATE SERPL-MCNC: 2.4 MG/DL (ref 2.5–4.5)
PHOSPHATE SERPL-MCNC: 2.7 MG/DL (ref 2.5–5)
PHOSPHATE SERPL-MCNC: 2.9 MG/DL (ref 2.5–5)
PHOSPHATE SERPL-MCNC: 3.3 MG/DL (ref 2.5–5)
PHOSPHATE SERPL-MCNC: 3.6 MG/DL (ref 2.5–5)
PLATELET # BLD AUTO: 156 10E9/L (ref 150–450)
PLATELET # BLD AUTO: 169 10E9/L (ref 150–450)
PLATELET # BLD AUTO: 175 10E9/L (ref 150–450)
PLATELET # BLD AUTO: 182 10E9/L (ref 150–450)
PLATELET # BLD AUTO: 186 10E9/L (ref 150–450)
PLATELET # BLD AUTO: 187 10E9/L (ref 150–450)
PLATELET # BLD AUTO: 191 10E9/L (ref 150–450)
PLATELET # BLD AUTO: 193 10E9/L (ref 150–450)
PLATELET # BLD AUTO: 196 10E9/L (ref 150–450)
PLATELET # BLD AUTO: 200 10E9/L (ref 150–450)
PLATELET # BLD AUTO: 204 10E9/L (ref 150–450)
PLATELET # BLD AUTO: 213 10E9/L (ref 150–450)
PLATELET # BLD AUTO: 213 10E9/L (ref 150–450)
PLATELET # BLD AUTO: 217 10E9/L (ref 150–450)
PLATELET # BLD AUTO: 218 10E9/L (ref 150–450)
PLATELET # BLD AUTO: 221 10E9/L (ref 150–450)
PLATELET # BLD AUTO: 232 10E9/L (ref 150–450)
PLATELET # BLD AUTO: 254 10E9/L (ref 150–450)
PLATELET # BLD AUTO: 259 10E3/UL (ref 150–450)
PLATELET # BLD AUTO: 265 10E3/UL (ref 150–450)
PLATELET # BLD AUTO: 289 10E9/L (ref 150–450)
PLATELET # BLD AUTO: 294 10E3/UL (ref 150–450)
PLATELET # BLD AUTO: 299 10E3/UL (ref 150–450)
PLATELET # BLD AUTO: 318 10E9/L (ref 150–450)
PLATELET # BLD AUTO: 322 10E9/L (ref 150–450)
PLATELET # BLD AUTO: 328 10E9/L (ref 150–450)
PLATELET # BLD AUTO: 328 10E9/L (ref 150–450)
PLATELET # BLD AUTO: 335 10E9/L (ref 150–450)
PLATELET # BLD AUTO: 346 10E3/UL (ref 150–450)
PLATELET # BLD AUTO: 375 10E9/L (ref 150–450)
PLATELET # BLD AUTO: 414 10E3/UL (ref 150–450)
PO2 BLD: 63 MM HG (ref 80–105)
PO2 BLD: 69 MM HG (ref 80–105)
PO2 BLD: 72 MM HG (ref 80–105)
POTASSIUM BLD-SCNC: 2.8 MMOL/L (ref 3.5–5.1)
POTASSIUM BLD-SCNC: 3 MMOL/L (ref 3.4–5.3)
POTASSIUM BLD-SCNC: 3.2 MMOL/L (ref 3.4–5.3)
POTASSIUM BLD-SCNC: 3.2 MMOL/L (ref 3.4–5.3)
POTASSIUM BLD-SCNC: 3.3 MMOL/L (ref 3.4–5.3)
POTASSIUM BLD-SCNC: 3.4 MMOL/L (ref 3.4–5.3)
POTASSIUM BLD-SCNC: 3.5 MMOL/L (ref 3.4–5.3)
POTASSIUM BLD-SCNC: 3.5 MMOL/L (ref 3.5–5.1)
POTASSIUM BLD-SCNC: 3.7 MMOL/L (ref 3.4–5.3)
POTASSIUM BLD-SCNC: 3.8 MMOL/L (ref 3.4–5.3)
POTASSIUM SERPL-SCNC: 2.9 MMOL/L (ref 3.5–5.1)
POTASSIUM SERPL-SCNC: 3.1 MMOL/L (ref 3.5–5.1)
POTASSIUM SERPL-SCNC: 3.2 MMOL/L (ref 3.5–5.1)
POTASSIUM SERPL-SCNC: 3.3 MMOL/L (ref 3.5–5.1)
POTASSIUM SERPL-SCNC: 3.3 MMOL/L (ref 3.5–5.1)
POTASSIUM SERPL-SCNC: 3.4 MMOL/L (ref 3.5–5.1)
POTASSIUM SERPL-SCNC: 3.5 MMOL/L (ref 3.5–5.1)
POTASSIUM SERPL-SCNC: 3.6 MMOL/L (ref 3.5–5.1)
POTASSIUM SERPL-SCNC: 3.6 MMOL/L (ref 3.5–5.1)
POTASSIUM SERPL-SCNC: 3.7 MMOL/L (ref 3.5–5.1)
POTASSIUM SERPL-SCNC: 3.8 MMOL/L (ref 3.5–5.1)
POTASSIUM SERPL-SCNC: 4 MMOL/L (ref 3.5–5.1)
POTASSIUM SERPL-SCNC: 4.1 MMOL/L (ref 3.5–5.1)
POTASSIUM SERPL-SCNC: 4.2 MMOL/L (ref 3.5–5.1)
POTASSIUM SERPL-SCNC: 4.2 MMOL/L (ref 3.5–5.1)
POTASSIUM SERPL-SCNC: 4.3 MMOL/L (ref 3.5–5.1)
POTASSIUM SERPL-SCNC: 4.4 MMOL/L (ref 3.5–5.1)
POTASSIUM SERPL-SCNC: 4.5 MMOL/L (ref 3.5–5.1)
POTASSIUM SERPL-SCNC: 4.5 MMOL/L (ref 3.5–5.1)
POTASSIUM SERPL-SCNC: 4.6 MMOL/L (ref 3.5–5.1)
POTASSIUM SERPL-SCNC: 4.9 MMOL/L (ref 3.5–5.1)
POTASSIUM SERPL-SCNC: NORMAL MMOL/L (ref 3.4–5.3)
PR INTERVAL - MUSE: 86 MS
PR INTERVAL - MUSE: NORMAL MS
PREALB SERPL IA-MCNC: 11 MG/DL (ref 17–34)
PREALB SERPL IA-MCNC: 5 MG/DL (ref 17–34)
PROT SERPL-MCNC: 3.6 G/DL (ref 6.4–8.9)
PROT SERPL-MCNC: 3.6 G/DL (ref 6.4–8.9)
PROT SERPL-MCNC: 3.8 G/DL (ref 6.4–8.9)
PROT SERPL-MCNC: 3.9 G/DL (ref 6.4–8.9)
PROT SERPL-MCNC: 4 G/DL (ref 6.8–8.8)
PROT SERPL-MCNC: 4.6 G/DL (ref 6.8–8.8)
PROT SERPL-MCNC: 4.8 G/DL (ref 6.4–8.9)
PROT SERPL-MCNC: 5.1 G/DL (ref 6.4–8.9)
PROT SERPL-MCNC: 5.4 G/DL (ref 6.4–8.9)
PROT SERPL-MCNC: 5.4 G/DL (ref 6.4–8.9)
PROT SERPL-MCNC: 5.9 G/DL (ref 6.4–8.9)
QRS DURATION - MUSE: 74 MS
QRS DURATION - MUSE: 88 MS
QT - MUSE: 282 MS
QT - MUSE: 312 MS
QTC - MUSE: 445 MS
QTC - MUSE: 481 MS
R AXIS - MUSE: 42 DEGREES
R AXIS - MUSE: 68 DEGREES
RBC # BLD AUTO: 2.45 10E12/L (ref 3.8–5.2)
RBC # BLD AUTO: 2.75 10E12/L (ref 3.8–5.2)
RBC # BLD AUTO: 2.77 10E12/L (ref 3.8–5.2)
RBC # BLD AUTO: 2.79 10E12/L (ref 3.8–5.2)
RBC # BLD AUTO: 2.79 10E12/L (ref 3.8–5.2)
RBC # BLD AUTO: 2.88 10E12/L (ref 3.8–5.2)
RBC # BLD AUTO: 2.92 10E12/L (ref 3.8–5.2)
RBC # BLD AUTO: 2.93 10E12/L (ref 3.8–5.2)
RBC # BLD AUTO: 3.02 10E6/UL (ref 3.8–5.2)
RBC # BLD AUTO: 3.1 10E12/L (ref 3.8–5.2)
RBC # BLD AUTO: 3.11 10E12/L (ref 3.8–5.2)
RBC # BLD AUTO: 3.11 10E6/UL (ref 3.8–5.2)
RBC # BLD AUTO: 3.12 10E12/L (ref 3.8–5.2)
RBC # BLD AUTO: 3.15 10E6/UL (ref 3.8–5.2)
RBC # BLD AUTO: 3.17 10E12/L (ref 3.8–5.2)
RBC # BLD AUTO: 3.19 10E12/L (ref 3.8–5.2)
RBC # BLD AUTO: 3.2 10E6/UL (ref 3.8–5.2)
RBC # BLD AUTO: 3.35 10E12/L (ref 3.8–5.2)
RBC # BLD AUTO: 3.39 10E6/UL (ref 3.8–5.2)
RBC # BLD AUTO: 3.45 10E12/L (ref 3.8–5.2)
RBC # BLD AUTO: 3.79 10E12/L (ref 3.8–5.2)
RBC # BLD AUTO: 3.82 10E12/L (ref 3.8–5.2)
RBC # BLD AUTO: 3.93 10E6/UL (ref 3.8–5.2)
RBC # BLD AUTO: 4.1 10E12/L (ref 3.8–5.2)
RBC # BLD AUTO: 4.22 10E12/L (ref 3.8–5.2)
RBC # BLD AUTO: 4.34 10E12/L (ref 3.8–5.2)
RBC # BLD AUTO: 4.54 10E12/L (ref 3.8–5.2)
RBC # BLD AUTO: 4.71 10E12/L (ref 3.8–5.2)
RBC #/AREA URNS AUTO: 1 /HPF (ref 0–2)
RBC #/AREA URNS AUTO: 1 /HPF (ref 0–2)
RBC #/AREA URNS AUTO: ABNORMAL /HPF
RBC URINE: >182 /HPF
SARS-COV-2 RNA RESP QL NAA+PROBE: NEGATIVE
SARS-COV-2 RNA RESP QL NAA+PROBE: NORMAL
SODIUM SERPL-SCNC: 128 MMOL/L (ref 134–144)
SODIUM SERPL-SCNC: 131 MMOL/L (ref 134–144)
SODIUM SERPL-SCNC: 132 MMOL/L (ref 134–144)
SODIUM SERPL-SCNC: 135 MMOL/L (ref 133–144)
SODIUM SERPL-SCNC: 135 MMOL/L (ref 134–144)
SODIUM SERPL-SCNC: 136 MMOL/L (ref 133–144)
SODIUM SERPL-SCNC: 136 MMOL/L (ref 134–144)
SODIUM SERPL-SCNC: 137 MMOL/L (ref 133–144)
SODIUM SERPL-SCNC: 137 MMOL/L (ref 134–144)
SODIUM SERPL-SCNC: 138 MMOL/L (ref 134–144)
SODIUM SERPL-SCNC: 139 MMOL/L (ref 134–144)
SODIUM SERPL-SCNC: 140 MMOL/L (ref 133–144)
SODIUM SERPL-SCNC: 140 MMOL/L (ref 133–144)
SODIUM SERPL-SCNC: 140 MMOL/L (ref 134–144)
SODIUM SERPL-SCNC: 141 MMOL/L (ref 134–144)
SODIUM SERPL-SCNC: 142 MMOL/L (ref 134–144)
SODIUM SERPL-SCNC: 142 MMOL/L (ref 134–144)
SODIUM SERPL-SCNC: 143 MMOL/L (ref 134–144)
SODIUM SERPL-SCNC: 144 MMOL/L (ref 134–144)
SODIUM SERPL-SCNC: 144 MMOL/L (ref 134–144)
SODIUM SERPL-SCNC: 145 MMOL/L (ref 134–144)
SODIUM SERPL-SCNC: 146 MMOL/L (ref 134–144)
SODIUM SERPL-SCNC: 147 MMOL/L (ref 134–144)
SODIUM SERPL-SCNC: 147 MMOL/L (ref 134–144)
SODIUM SERPL-SCNC: 148 MMOL/L (ref 134–144)
SODIUM SERPL-SCNC: 148 MMOL/L (ref 134–144)
SOURCE: ABNORMAL
SP GR UR STRIP: 1.01 (ref 1–1.03)
SP GR UR STRIP: 1.02 (ref 1–1.03)
SPECIMEN EXP DATE BLD: NORMAL
SPECIMEN SOURCE: ABNORMAL
SPECIMEN SOURCE: NORMAL
SQUAMOUS #/AREA URNS AUTO: 17 /HPF (ref 0–1)
SYSTOLIC BLOOD PRESSURE - MUSE: NORMAL MMHG
SYSTOLIC BLOOD PRESSURE - MUSE: NORMAL MMHG
T AXIS - MUSE: -80 DEGREES
T AXIS - MUSE: 238 DEGREES
TIBC SERPL-MCNC: 189 UG/DL (ref 245–400)
TRANSFUSION STATUS PATIENT QL: NORMAL
TRIGL SERPL-MCNC: 70 MG/DL
TROPONIN I SERPL-MCNC: 33.1 PG/ML
TROPONIN I SERPL-MCNC: 4.4 PG/ML
TROPONIN I SERPL-MCNC: 4.4 PG/ML
TROPONIN I SERPL-MCNC: 42.5 PG/ML
UIBC (UNSATURATED): ABNORMAL MG/DL
UROBILINOGEN UR STRIP-ACNC: 0.2 EU/DL (ref 0.2–1)
UROBILINOGEN UR STRIP-MCNC: NORMAL MG/DL
UROBILINOGEN UR STRIP-MCNC: NORMAL MG/DL (ref 0–2)
UROBILINOGEN UR STRIP-MCNC: NORMAL MG/DL (ref 0–2)
VENTRICULAR RATE- MUSE: 143 BPM
VENTRICULAR RATE- MUSE: 150 BPM
WBC # BLD AUTO: 10.4 10E9/L (ref 4–11)
WBC # BLD AUTO: 10.5 10E9/L (ref 4–11)
WBC # BLD AUTO: 10.5 10E9/L (ref 4–11)
WBC # BLD AUTO: 11.5 10E9/L (ref 4–11)
WBC # BLD AUTO: 11.9 10E9/L (ref 4–11)
WBC # BLD AUTO: 12.9 10E9/L (ref 4–11)
WBC # BLD AUTO: 5.8 10E9/L (ref 4–11)
WBC # BLD AUTO: 6.1 10E3/UL (ref 4–11)
WBC # BLD AUTO: 6.2 10E9/L (ref 4–11)
WBC # BLD AUTO: 6.5 10E9/L (ref 4–11)
WBC # BLD AUTO: 6.9 10E9/L (ref 4–11)
WBC # BLD AUTO: 7.1 10E9/L (ref 4–11)
WBC # BLD AUTO: 7.3 10E9/L (ref 4–11)
WBC # BLD AUTO: 7.5 10E3/UL (ref 4–11)
WBC # BLD AUTO: 7.5 10E9/L (ref 4–11)
WBC # BLD AUTO: 7.6 10E9/L (ref 4–11)
WBC # BLD AUTO: 7.7 10E9/L (ref 4–11)
WBC # BLD AUTO: 7.9 10E3/UL (ref 4–11)
WBC # BLD AUTO: 8.1 10E9/L (ref 4–11)
WBC # BLD AUTO: 8.3 10E3/UL (ref 4–11)
WBC # BLD AUTO: 8.7 10E3/UL (ref 4–11)
WBC # BLD AUTO: 8.8 10E9/L (ref 4–11)
WBC # BLD AUTO: 8.9 10E9/L (ref 4–11)
WBC # BLD AUTO: 9.2 10E9/L (ref 4–11)
WBC # BLD AUTO: 9.3 10E3/UL (ref 4–11)
WBC # BLD AUTO: 9.3 10E9/L (ref 4–11)
WBC #/AREA URNS AUTO: 1 /HPF (ref 0–5)
WBC #/AREA URNS AUTO: 7 /HPF (ref 0–5)
WBC #/AREA URNS AUTO: ABNORMAL /HPF
WBC URINE: 14 /HPF

## 2021-01-01 PROCEDURE — 36415 COLL VENOUS BLD VENIPUNCTURE: CPT | Performed by: INTERNAL MEDICINE

## 2021-01-01 PROCEDURE — 120N000001 HC R&B MED SURG/OB

## 2021-01-01 PROCEDURE — 80048 BASIC METABOLIC PNL TOTAL CA: CPT | Performed by: FAMILY MEDICINE

## 2021-01-01 PROCEDURE — 258N000003 HC RX IP 258 OP 636: Performed by: INTERNAL MEDICINE

## 2021-01-01 PROCEDURE — 85610 PROTHROMBIN TIME: CPT | Performed by: FAMILY MEDICINE

## 2021-01-01 PROCEDURE — 99231 SBSQ HOSP IP/OBS SF/LOW 25: CPT | Performed by: INTERNAL MEDICINE

## 2021-01-01 PROCEDURE — 94640 AIRWAY INHALATION TREATMENT: CPT

## 2021-01-01 PROCEDURE — 92610 EVALUATE SWALLOWING FUNCTION: CPT | Mod: GN | Performed by: SPEECH-LANGUAGE PATHOLOGIST

## 2021-01-01 PROCEDURE — 250N000009 HC RX 250: Performed by: SURGERY

## 2021-01-01 PROCEDURE — 92610 EVALUATE SWALLOWING FUNCTION: CPT | Mod: GN

## 2021-01-01 PROCEDURE — 250N000013 HC RX MED GY IP 250 OP 250 PS 637: Performed by: SURGERY

## 2021-01-01 PROCEDURE — 250N000009 HC RX 250: Performed by: INTERNAL MEDICINE

## 2021-01-01 PROCEDURE — 258N000003 HC RX IP 258 OP 636: Performed by: FAMILY MEDICINE

## 2021-01-01 PROCEDURE — 250N000011 HC RX IP 250 OP 636: Performed by: INTERNAL MEDICINE

## 2021-01-01 PROCEDURE — 258N000003 HC RX IP 258 OP 636: Performed by: SURGERY

## 2021-01-01 PROCEDURE — 99231 SBSQ HOSP IP/OBS SF/LOW 25: CPT | Performed by: SURGERY

## 2021-01-01 PROCEDURE — U0003 INFECTIOUS AGENT DETECTION BY NUCLEIC ACID (DNA OR RNA); SEVERE ACUTE RESPIRATORY SYNDROME CORONAVIRUS 2 (SARS-COV-2) (CORONAVIRUS DISEASE [COVID-19]), AMPLIFIED PROBE TECHNIQUE, MAKING USE OF HIGH THROUGHPUT TECHNOLOGIES AS DESCRIBED BY CMS-2020-01-R: HCPCS | Performed by: SURGERY

## 2021-01-01 PROCEDURE — C1751 CATH, INF, PER/CENT/MIDLINE: HCPCS

## 2021-01-01 PROCEDURE — 0D2DX0Z CHANGE DRAINAGE DEVICE IN LOWER INTESTINAL TRACT, EXTERNAL APPROACH: ICD-10-PCS | Performed by: SURGERY

## 2021-01-01 PROCEDURE — 93306 TTE W/DOPPLER COMPLETE: CPT

## 2021-01-01 PROCEDURE — 85610 PROTHROMBIN TIME: CPT | Performed by: INTERNAL MEDICINE

## 2021-01-01 PROCEDURE — 250N000009 HC RX 250: Performed by: NURSE ANESTHETIST, CERTIFIED REGISTERED

## 2021-01-01 PROCEDURE — 87086 URINE CULTURE/COLONY COUNT: CPT | Performed by: EMERGENCY MEDICINE

## 2021-01-01 PROCEDURE — 258N000003 HC RX IP 258 OP 636: Performed by: NURSE ANESTHETIST, CERTIFIED REGISTERED

## 2021-01-01 PROCEDURE — 84134 ASSAY OF PREALBUMIN: CPT | Performed by: FAMILY MEDICINE

## 2021-01-01 PROCEDURE — 250N000011 HC RX IP 250 OP 636: Performed by: SURGERY

## 2021-01-01 PROCEDURE — 250N000009 HC RX 250: Performed by: FAMILY MEDICINE

## 2021-01-01 PROCEDURE — 36415 COLL VENOUS BLD VENIPUNCTURE: CPT | Performed by: FAMILY MEDICINE

## 2021-01-01 PROCEDURE — 99223 1ST HOSP IP/OBS HIGH 75: CPT | Performed by: INTERNAL MEDICINE

## 2021-01-01 PROCEDURE — 83735 ASSAY OF MAGNESIUM: CPT | Performed by: FAMILY MEDICINE

## 2021-01-01 PROCEDURE — 999N000157 HC STATISTIC RCP TIME EA 10 MIN

## 2021-01-01 PROCEDURE — 258N000003 HC RX IP 258 OP 636: Performed by: STUDENT IN AN ORGANIZED HEALTH CARE EDUCATION/TRAINING PROGRAM

## 2021-01-01 PROCEDURE — 83735 ASSAY OF MAGNESIUM: CPT | Performed by: INTERNAL MEDICINE

## 2021-01-01 PROCEDURE — 36415 COLL VENOUS BLD VENIPUNCTURE: CPT | Performed by: STUDENT IN AN ORGANIZED HEALTH CARE EDUCATION/TRAINING PROGRAM

## 2021-01-01 PROCEDURE — 250N000013 HC RX MED GY IP 250 OP 250 PS 637: Performed by: INTERNAL MEDICINE

## 2021-01-01 PROCEDURE — G0463 HOSPITAL OUTPT CLINIC VISIT: HCPCS

## 2021-01-01 PROCEDURE — 91300 PR COVID VAC PFIZER DIL RECON 30 MCG/0.3 ML IM: CPT

## 2021-01-01 PROCEDURE — 99233 SBSQ HOSP IP/OBS HIGH 50: CPT | Performed by: INTERNAL MEDICINE

## 2021-01-01 PROCEDURE — 83550 IRON BINDING TEST: CPT | Performed by: INTERNAL MEDICINE

## 2021-01-01 PROCEDURE — 94640 AIRWAY INHALATION TREATMENT: CPT | Mod: 76

## 2021-01-01 PROCEDURE — 96367 TX/PROPH/DG ADDL SEQ IV INF: CPT | Performed by: EMERGENCY MEDICINE

## 2021-01-01 PROCEDURE — 99223 1ST HOSP IP/OBS HIGH 75: CPT | Performed by: REGISTERED NURSE

## 2021-01-01 PROCEDURE — 81001 URINALYSIS AUTO W/SCOPE: CPT | Performed by: EMERGENCY MEDICINE

## 2021-01-01 PROCEDURE — 99285 EMERGENCY DEPT VISIT HI MDM: CPT | Mod: 25 | Performed by: PHYSICIAN ASSISTANT

## 2021-01-01 PROCEDURE — 83735 ASSAY OF MAGNESIUM: CPT | Performed by: SURGERY

## 2021-01-01 PROCEDURE — 85027 COMPLETE CBC AUTOMATED: CPT | Performed by: FAMILY MEDICINE

## 2021-01-01 PROCEDURE — 84450 TRANSFERASE (AST) (SGOT): CPT | Performed by: INTERNAL MEDICINE

## 2021-01-01 PROCEDURE — 44140 PARTIAL REMOVAL OF COLON: CPT | Mod: 78 | Performed by: SURGERY

## 2021-01-01 PROCEDURE — 86900 BLOOD TYPING SEROLOGIC ABO: CPT | Performed by: SURGERY

## 2021-01-01 PROCEDURE — 96366 THER/PROPH/DIAG IV INF ADDON: CPT | Performed by: STUDENT IN AN ORGANIZED HEALTH CARE EDUCATION/TRAINING PROGRAM

## 2021-01-01 PROCEDURE — 74177 CT ABD & PELVIS W/CONTRAST: CPT | Mod: TC

## 2021-01-01 PROCEDURE — 36415 COLL VENOUS BLD VENIPUNCTURE: CPT | Performed by: SURGERY

## 2021-01-01 PROCEDURE — 87076 CULTURE ANAEROBE IDENT EACH: CPT | Performed by: STUDENT IN AN ORGANIZED HEALTH CARE EDUCATION/TRAINING PROGRAM

## 2021-01-01 PROCEDURE — 84132 ASSAY OF SERUM POTASSIUM: CPT | Performed by: SURGERY

## 2021-01-01 PROCEDURE — 99100 ANES PT EXTEME AGE<1 YR&>70: CPT | Performed by: NURSE ANESTHETIST, CERTIFIED REGISTERED

## 2021-01-01 PROCEDURE — 85027 COMPLETE CBC AUTOMATED: CPT | Performed by: INTERNAL MEDICINE

## 2021-01-01 PROCEDURE — 96367 TX/PROPH/DG ADDL SEQ IV INF: CPT | Performed by: STUDENT IN AN ORGANIZED HEALTH CARE EDUCATION/TRAINING PROGRAM

## 2021-01-01 PROCEDURE — 81001 URINALYSIS AUTO W/SCOPE: CPT | Performed by: INTERNAL MEDICINE

## 2021-01-01 PROCEDURE — 99222 1ST HOSP IP/OBS MODERATE 55: CPT | Mod: 24 | Performed by: SURGERY

## 2021-01-01 PROCEDURE — 85027 COMPLETE CBC AUTOMATED: CPT | Performed by: SURGERY

## 2021-01-01 PROCEDURE — 99024 POSTOP FOLLOW-UP VISIT: CPT | Performed by: SURGERY

## 2021-01-01 PROCEDURE — 84100 ASSAY OF PHOSPHORUS: CPT | Performed by: INTERNAL MEDICINE

## 2021-01-01 PROCEDURE — 250N000009 HC RX 250: Performed by: RADIOLOGY

## 2021-01-01 PROCEDURE — 99203 OFFICE O/P NEW LOW 30 MIN: CPT | Performed by: PHYSICIAN ASSISTANT

## 2021-01-01 PROCEDURE — 85049 AUTOMATED PLATELET COUNT: CPT | Performed by: SURGERY

## 2021-01-01 PROCEDURE — 99232 SBSQ HOSP IP/OBS MODERATE 35: CPT | Performed by: FAMILY MEDICINE

## 2021-01-01 PROCEDURE — 85025 COMPLETE CBC W/AUTO DIFF WBC: CPT | Performed by: FAMILY MEDICINE

## 2021-01-01 PROCEDURE — 45380 COLONOSCOPY AND BIOPSY: CPT | Performed by: SURGERY

## 2021-01-01 PROCEDURE — 83605 ASSAY OF LACTIC ACID: CPT | Performed by: EMERGENCY MEDICINE

## 2021-01-01 PROCEDURE — 250N000011 HC RX IP 250 OP 636: Performed by: NURSE ANESTHETIST, CERTIFIED REGISTERED

## 2021-01-01 PROCEDURE — 97530 THERAPEUTIC ACTIVITIES: CPT | Mod: GO | Performed by: OCCUPATIONAL THERAPIST

## 2021-01-01 PROCEDURE — 710N000010 HC RECOVERY PHASE 1, LEVEL 2, PER MIN: Performed by: SURGERY

## 2021-01-01 PROCEDURE — 0D1B0Z4 BYPASS ILEUM TO CUTANEOUS, OPEN APPROACH: ICD-10-PCS | Performed by: SURGERY

## 2021-01-01 PROCEDURE — 88342 IMHCHEM/IMCYTCHM 1ST ANTB: CPT

## 2021-01-01 PROCEDURE — 80048 BASIC METABOLIC PNL TOTAL CA: CPT | Performed by: INTERNAL MEDICINE

## 2021-01-01 PROCEDURE — 999N000128 HC STATISTIC PERIPHERAL IV START W/O US GUIDANCE

## 2021-01-01 PROCEDURE — 0JQ83ZZ REPAIR ABDOMEN SUBCUTANEOUS TISSUE AND FASCIA, PERCUTANEOUS APPROACH: ICD-10-PCS | Performed by: SURGERY

## 2021-01-01 PROCEDURE — 85025 COMPLETE CBC W/AUTO DIFF WBC: CPT | Performed by: EMERGENCY MEDICINE

## 2021-01-01 PROCEDURE — U0005 INFEC AGEN DETEC AMPLI PROBE: HCPCS | Performed by: STUDENT IN AN ORGANIZED HEALTH CARE EDUCATION/TRAINING PROGRAM

## 2021-01-01 PROCEDURE — 250N000013 HC RX MED GY IP 250 OP 250 PS 637: Performed by: FAMILY MEDICINE

## 2021-01-01 PROCEDURE — 97602 WOUND(S) CARE NON-SELECTIVE: CPT

## 2021-01-01 PROCEDURE — C9803 HOPD COVID-19 SPEC COLLECT: HCPCS | Performed by: STUDENT IN AN ORGANIZED HEALTH CARE EDUCATION/TRAINING PROGRAM

## 2021-01-01 PROCEDURE — 96365 THER/PROPH/DIAG IV INF INIT: CPT | Performed by: EMERGENCY MEDICINE

## 2021-01-01 PROCEDURE — 80053 COMPREHEN METABOLIC PANEL: CPT | Performed by: INTERNAL MEDICINE

## 2021-01-01 PROCEDURE — 96368 THER/DIAG CONCURRENT INF: CPT | Performed by: EMERGENCY MEDICINE

## 2021-01-01 PROCEDURE — 44203 LAP RESECT S/INTESTINE ADDL: CPT | Performed by: SURGERY

## 2021-01-01 PROCEDURE — 85025 COMPLETE CBC W/AUTO DIFF WBC: CPT | Performed by: PHYSICIAN ASSISTANT

## 2021-01-01 PROCEDURE — 30233N1 TRANSFUSION OF NONAUTOLOGOUS RED BLOOD CELLS INTO PERIPHERAL VEIN, PERCUTANEOUS APPROACH: ICD-10-PCS | Performed by: INTERNAL MEDICINE

## 2021-01-01 PROCEDURE — 80048 BASIC METABOLIC PNL TOTAL CA: CPT | Performed by: SURGERY

## 2021-01-01 PROCEDURE — 0DB84ZZ EXCISION OF SMALL INTESTINE, PERCUTANEOUS ENDOSCOPIC APPROACH: ICD-10-PCS | Performed by: SURGERY

## 2021-01-01 PROCEDURE — 36415 COLL VENOUS BLD VENIPUNCTURE: CPT | Performed by: EMERGENCY MEDICINE

## 2021-01-01 PROCEDURE — 88307 TISSUE EXAM BY PATHOLOGIST: CPT

## 2021-01-01 PROCEDURE — 99285 EMERGENCY DEPT VISIT HI MDM: CPT | Mod: 25 | Performed by: STUDENT IN AN ORGANIZED HEALTH CARE EDUCATION/TRAINING PROGRAM

## 2021-01-01 PROCEDURE — 36600 WITHDRAWAL OF ARTERIAL BLOOD: CPT | Performed by: INTERNAL MEDICINE

## 2021-01-01 PROCEDURE — U0003 INFECTIOUS AGENT DETECTION BY NUCLEIC ACID (DNA OR RNA); SEVERE ACUTE RESPIRATORY SYNDROME CORONAVIRUS 2 (SARS-COV-2) (CORONAVIRUS DISEASE [COVID-19]), AMPLIFIED PROBE TECHNIQUE, MAKING USE OF HIGH THROUGHPUT TECHNOLOGIES AS DESCRIBED BY CMS-2020-01-R: HCPCS | Mod: ZL | Performed by: SURGERY

## 2021-01-01 PROCEDURE — 99232 SBSQ HOSP IP/OBS MODERATE 35: CPT | Performed by: INTERNAL MEDICINE

## 2021-01-01 PROCEDURE — 83605 ASSAY OF LACTIC ACID: CPT | Performed by: SURGERY

## 2021-01-01 PROCEDURE — 74176 CT ABD & PELVIS W/O CONTRAST: CPT | Mod: MG

## 2021-01-01 PROCEDURE — 250N000011 HC RX IP 250 OP 636: Performed by: PHYSICIAN ASSISTANT

## 2021-01-01 PROCEDURE — 93005 ELECTROCARDIOGRAM TRACING: CPT

## 2021-01-01 PROCEDURE — 45385 COLONOSCOPY W/LESION REMOVAL: CPT | Performed by: NURSE ANESTHETIST, CERTIFIED REGISTERED

## 2021-01-01 PROCEDURE — 200N000001 HC R&B ICU

## 2021-01-01 PROCEDURE — 85018 HEMOGLOBIN: CPT | Performed by: FAMILY MEDICINE

## 2021-01-01 PROCEDURE — 84100 ASSAY OF PHOSPHORUS: CPT | Performed by: SURGERY

## 2021-01-01 PROCEDURE — 80053 COMPREHEN METABOLIC PANEL: CPT | Performed by: PHYSICIAN ASSISTANT

## 2021-01-01 PROCEDURE — 84132 ASSAY OF SERUM POTASSIUM: CPT | Performed by: INTERNAL MEDICINE

## 2021-01-01 PROCEDURE — 84100 ASSAY OF PHOSPHORUS: CPT | Performed by: FAMILY MEDICINE

## 2021-01-01 PROCEDURE — U0005 INFEC AGEN DETEC AMPLI PROBE: HCPCS | Performed by: SURGERY

## 2021-01-01 PROCEDURE — 0002A PR COVID VAC PFIZER DIL RECON 30 MCG/0.3 ML IM: CPT

## 2021-01-01 PROCEDURE — 258N000003 HC RX IP 258 OP 636: Performed by: PHYSICIAN ASSISTANT

## 2021-01-01 PROCEDURE — 44204 LAPARO PARTIAL COLECTOMY: CPT | Performed by: NURSE ANESTHETIST, CERTIFIED REGISTERED

## 2021-01-01 PROCEDURE — 97163 PT EVAL HIGH COMPLEX 45 MIN: CPT | Mod: GP | Performed by: PHYSICAL THERAPIST

## 2021-01-01 PROCEDURE — 84484 ASSAY OF TROPONIN QUANT: CPT | Performed by: PHYSICIAN ASSISTANT

## 2021-01-01 PROCEDURE — C9113 INJ PANTOPRAZOLE SODIUM, VIA: HCPCS | Performed by: INTERNAL MEDICINE

## 2021-01-01 PROCEDURE — 83880 ASSAY OF NATRIURETIC PEPTIDE: CPT | Performed by: INTERNAL MEDICINE

## 2021-01-01 PROCEDURE — 99213 OFFICE O/P EST LOW 20 MIN: CPT | Performed by: NURSE PRACTITIONER

## 2021-01-01 PROCEDURE — 87070 CULTURE OTHR SPECIMN AEROBIC: CPT | Performed by: INTERNAL MEDICINE

## 2021-01-01 PROCEDURE — 81288 MLH1 GENE: CPT

## 2021-01-01 PROCEDURE — 88309 TISSUE EXAM BY PATHOLOGIST: CPT

## 2021-01-01 PROCEDURE — 97166 OT EVAL MOD COMPLEX 45 MIN: CPT | Mod: GO | Performed by: OCCUPATIONAL THERAPIST

## 2021-01-01 PROCEDURE — 250N000011 HC RX IP 250 OP 636: Performed by: FAMILY MEDICINE

## 2021-01-01 PROCEDURE — 84155 ASSAY OF PROTEIN SERUM: CPT | Performed by: INTERNAL MEDICINE

## 2021-01-01 PROCEDURE — 85610 PROTHROMBIN TIME: CPT | Performed by: STUDENT IN AN ORGANIZED HEALTH CARE EDUCATION/TRAINING PROGRAM

## 2021-01-01 PROCEDURE — 370N000017 HC ANESTHESIA TECHNICAL FEE, PER MIN: Performed by: SURGERY

## 2021-01-01 PROCEDURE — 250N000026 HC DESFLURANE, PER MIN: Performed by: SURGERY

## 2021-01-01 PROCEDURE — C1729 CATH, DRAINAGE: HCPCS

## 2021-01-01 PROCEDURE — 83690 ASSAY OF LIPASE: CPT | Performed by: PHYSICIAN ASSISTANT

## 2021-01-01 PROCEDURE — 97530 THERAPEUTIC ACTIVITIES: CPT | Mod: GO

## 2021-01-01 PROCEDURE — 360N000077 HC SURGERY LEVEL 4, PER MIN: Performed by: SURGERY

## 2021-01-01 PROCEDURE — 272N000001 HC OR GENERAL SUPPLY STERILE: Performed by: SURGERY

## 2021-01-01 PROCEDURE — 84132 ASSAY OF SERUM POTASSIUM: CPT | Performed by: FAMILY MEDICINE

## 2021-01-01 PROCEDURE — 97530 THERAPEUTIC ACTIVITIES: CPT | Mod: GP

## 2021-01-01 PROCEDURE — C9803 HOPD COVID-19 SPEC COLLECT: HCPCS | Performed by: PHYSICIAN ASSISTANT

## 2021-01-01 PROCEDURE — U0003 INFECTIOUS AGENT DETECTION BY NUCLEIC ACID (DNA OR RNA); SEVERE ACUTE RESPIRATORY SYNDROME CORONAVIRUS 2 (SARS-COV-2) (CORONAVIRUS DISEASE [COVID-19]), AMPLIFIED PROBE TECHNIQUE, MAKING USE OF HIGH THROUGHPUT TECHNOLOGIES AS DESCRIBED BY CMS-2020-01-R: HCPCS | Performed by: STUDENT IN AN ORGANIZED HEALTH CARE EDUCATION/TRAINING PROGRAM

## 2021-01-01 PROCEDURE — 44140 PARTIAL REMOVAL OF COLON: CPT | Performed by: NURSE ANESTHETIST, CERTIFIED REGISTERED

## 2021-01-01 PROCEDURE — 250N000011 HC RX IP 250 OP 636: Performed by: STUDENT IN AN ORGANIZED HEALTH CARE EDUCATION/TRAINING PROGRAM

## 2021-01-01 PROCEDURE — 0W9G30Z DRAINAGE OF PERITONEAL CAVITY WITH DRAINAGE DEVICE, PERCUTANEOUS APPROACH: ICD-10-PCS | Performed by: RADIOLOGY

## 2021-01-01 PROCEDURE — 45385 COLONOSCOPY W/LESION REMOVAL: CPT | Performed by: SURGERY

## 2021-01-01 PROCEDURE — 0DBL8ZZ EXCISION OF TRANSVERSE COLON, VIA NATURAL OR ARTIFICIAL OPENING ENDOSCOPIC: ICD-10-PCS | Performed by: SURGERY

## 2021-01-01 PROCEDURE — 97116 GAIT TRAINING THERAPY: CPT | Mod: GP

## 2021-01-01 PROCEDURE — 83036 HEMOGLOBIN GLYCOSYLATED A1C: CPT | Performed by: INTERNAL MEDICINE

## 2021-01-01 PROCEDURE — 87070 CULTURE OTHR SPECIMN AEROBIC: CPT | Performed by: RADIOLOGY

## 2021-01-01 PROCEDURE — 86920 COMPATIBILITY TEST SPIN: CPT | Performed by: SURGERY

## 2021-01-01 PROCEDURE — 0DBA4ZZ EXCISION OF JEJUNUM, PERCUTANEOUS ENDOSCOPIC APPROACH: ICD-10-PCS | Performed by: SURGERY

## 2021-01-01 PROCEDURE — 258N000003 HC RX IP 258 OP 636: Performed by: COLON & RECTAL SURGERY

## 2021-01-01 PROCEDURE — 99285 EMERGENCY DEPT VISIT HI MDM: CPT | Performed by: EMERGENCY MEDICINE

## 2021-01-01 PROCEDURE — 93005 ELECTROCARDIOGRAM TRACING: CPT | Performed by: PHYSICIAN ASSISTANT

## 2021-01-01 PROCEDURE — 84295 ASSAY OF SERUM SODIUM: CPT | Performed by: FAMILY MEDICINE

## 2021-01-01 PROCEDURE — 360N000076 HC SURGERY LEVEL 3, PER MIN: Performed by: SURGERY

## 2021-01-01 PROCEDURE — 87186 SC STD MICRODIL/AGAR DIL: CPT | Performed by: EMERGENCY MEDICINE

## 2021-01-01 PROCEDURE — 97535 SELF CARE MNGMENT TRAINING: CPT | Mod: GO

## 2021-01-01 PROCEDURE — 99207 PR CDG-MDM COMPONENT: MEETS MODERATE - DOWN CODED: CPT | Performed by: INTERNAL MEDICINE

## 2021-01-01 PROCEDURE — 85049 AUTOMATED PLATELET COUNT: CPT | Performed by: FAMILY MEDICINE

## 2021-01-01 PROCEDURE — 83605 ASSAY OF LACTIC ACID: CPT | Performed by: PHYSICIAN ASSISTANT

## 2021-01-01 PROCEDURE — C9113 INJ PANTOPRAZOLE SODIUM, VIA: HCPCS | Performed by: SURGERY

## 2021-01-01 PROCEDURE — 93005 ELECTROCARDIOGRAM TRACING: CPT | Performed by: EMERGENCY MEDICINE

## 2021-01-01 PROCEDURE — 80053 COMPREHEN METABOLIC PANEL: CPT | Performed by: SURGERY

## 2021-01-01 PROCEDURE — U0003 INFECTIOUS AGENT DETECTION BY NUCLEIC ACID (DNA OR RNA); SEVERE ACUTE RESPIRATORY SYNDROME CORONAVIRUS 2 (SARS-COV-2) (CORONAVIRUS DISEASE [COVID-19]), AMPLIFIED PROBE TECHNIQUE, MAKING USE OF HIGH THROUGHPUT TECHNOLOGIES AS DESCRIBED BY CMS-2020-01-R: HCPCS | Performed by: EMERGENCY MEDICINE

## 2021-01-01 PROCEDURE — 44204 LAPARO PARTIAL COLECTOMY: CPT | Performed by: SURGERY

## 2021-01-01 PROCEDURE — 92526 ORAL FUNCTION THERAPY: CPT | Mod: GN

## 2021-01-01 PROCEDURE — 99231 SBSQ HOSP IP/OBS SF/LOW 25: CPT | Performed by: FAMILY MEDICINE

## 2021-01-01 PROCEDURE — 97535 SELF CARE MNGMENT TRAINING: CPT | Mod: GO | Performed by: OCCUPATIONAL THERAPIST

## 2021-01-01 PROCEDURE — 44202 LAP ENTERECTOMY: CPT | Performed by: SURGERY

## 2021-01-01 PROCEDURE — 82805 BLOOD GASES W/O2 SATURATION: CPT | Performed by: INTERNAL MEDICINE

## 2021-01-01 PROCEDURE — 258N000001 HC RX 258: Performed by: INTERNAL MEDICINE

## 2021-01-01 PROCEDURE — 49440 PLACE GASTROSTOMY TUBE PERC: CPT

## 2021-01-01 PROCEDURE — 99207 PR NO CHARGE FOLLOW UP PS: CPT | Performed by: SURGERY

## 2021-01-01 PROCEDURE — U0003 INFECTIOUS AGENT DETECTION BY NUCLEIC ACID (DNA OR RNA); SEVERE ACUTE RESPIRATORY SYNDROME CORONAVIRUS 2 (SARS-COV-2) (CORONAVIRUS DISEASE [COVID-19]), AMPLIFIED PROBE TECHNIQUE, MAKING USE OF HIGH THROUGHPUT TECHNOLOGIES AS DESCRIBED BY CMS-2020-01-R: HCPCS | Performed by: PHYSICIAN ASSISTANT

## 2021-01-01 PROCEDURE — 74019 RADEX ABDOMEN 2 VIEWS: CPT

## 2021-01-01 PROCEDURE — 87086 URINE CULTURE/COLONY COUNT: CPT | Performed by: PHYSICIAN ASSISTANT

## 2021-01-01 PROCEDURE — 36573 INSJ PICC RS&I 5 YR+: CPT

## 2021-01-01 PROCEDURE — 71260 CT THORAX DX C+: CPT | Mod: MG

## 2021-01-01 PROCEDURE — 36569 INSJ PICC 5 YR+ W/O IMAGING: CPT | Mod: 52

## 2021-01-01 PROCEDURE — 92526 ORAL FUNCTION THERAPY: CPT | Mod: GN | Performed by: SPEECH-LANGUAGE PATHOLOGIST

## 2021-01-01 PROCEDURE — 85025 COMPLETE CBC W/AUTO DIFF WBC: CPT | Performed by: STUDENT IN AN ORGANIZED HEALTH CARE EDUCATION/TRAINING PROGRAM

## 2021-01-01 PROCEDURE — 99207 PR MOONLIGHTING INDICATOR: CPT | Performed by: INTERNAL MEDICINE

## 2021-01-01 PROCEDURE — 999N000141 HC STATISTIC PRE-PROCEDURE NURSING ASSESSMENT: Performed by: SURGERY

## 2021-01-01 PROCEDURE — 272N000002 HC OR SUPPLY OTHER OPNP: Performed by: SURGERY

## 2021-01-01 PROCEDURE — 258N000003 HC RX IP 258 OP 636: Performed by: EMERGENCY MEDICINE

## 2021-01-01 PROCEDURE — P9016 RBC LEUKOCYTES REDUCED: HCPCS | Performed by: SURGERY

## 2021-01-01 PROCEDURE — 83605 ASSAY OF LACTIC ACID: CPT | Performed by: INTERNAL MEDICINE

## 2021-01-01 PROCEDURE — 13160 SEC CLSR SURG WND/DEHSN XTN: CPT | Mod: 58 | Performed by: SURGERY

## 2021-01-01 PROCEDURE — 97165 OT EVAL LOW COMPLEX 30 MIN: CPT | Mod: GO

## 2021-01-01 PROCEDURE — U0005 INFEC AGEN DETEC AMPLI PROBE: HCPCS | Mod: ZL | Performed by: SURGERY

## 2021-01-01 PROCEDURE — 97161 PT EVAL LOW COMPLEX 20 MIN: CPT | Mod: GP

## 2021-01-01 PROCEDURE — 81001 URINALYSIS AUTO W/SCOPE: CPT | Performed by: SURGERY

## 2021-01-01 PROCEDURE — G0463 HOSPITAL OUTPT CLINIC VISIT: HCPCS | Mod: 25

## 2021-01-01 PROCEDURE — 88305 TISSUE EXAM BY PATHOLOGIST: CPT

## 2021-01-01 PROCEDURE — 80053 COMPREHEN METABOLIC PANEL: CPT | Performed by: EMERGENCY MEDICINE

## 2021-01-01 PROCEDURE — 99239 HOSP IP/OBS DSCHRG MGMT >30: CPT | Performed by: INTERNAL MEDICINE

## 2021-01-01 PROCEDURE — 86901 BLOOD TYPING SEROLOGIC RH(D): CPT | Performed by: SURGERY

## 2021-01-01 PROCEDURE — 82378 CARCINOEMBRYONIC ANTIGEN: CPT | Performed by: SURGERY

## 2021-01-01 PROCEDURE — 80053 COMPREHEN METABOLIC PANEL: CPT | Performed by: STUDENT IN AN ORGANIZED HEALTH CARE EDUCATION/TRAINING PROGRAM

## 2021-01-01 PROCEDURE — 80053 COMPREHEN METABOLIC PANEL: CPT | Performed by: FAMILY MEDICINE

## 2021-01-01 PROCEDURE — 93010 ELECTROCARDIOGRAM REPORT: CPT | Performed by: INTERNAL MEDICINE

## 2021-01-01 PROCEDURE — 71045 X-RAY EXAM CHEST 1 VIEW: CPT

## 2021-01-01 PROCEDURE — 250N000025 HC SEVOFLURANE, PER MIN: Performed by: SURGERY

## 2021-01-01 PROCEDURE — 250N000013 HC RX MED GY IP 250 OP 250 PS 637: Performed by: EMERGENCY MEDICINE

## 2021-01-01 PROCEDURE — 96375 TX/PRO/DX INJ NEW DRUG ADDON: CPT | Performed by: STUDENT IN AN ORGANIZED HEALTH CARE EDUCATION/TRAINING PROGRAM

## 2021-01-01 PROCEDURE — U0005 INFEC AGEN DETEC AMPLI PROBE: HCPCS | Performed by: PHYSICIAN ASSISTANT

## 2021-01-01 PROCEDURE — 44213 LAP MOBIL SPLENIC FL ADD-ON: CPT | Performed by: SURGERY

## 2021-01-01 PROCEDURE — 88341 IMHCHEM/IMCYTCHM EA ADD ANTB: CPT

## 2021-01-01 PROCEDURE — 84484 ASSAY OF TROPONIN QUANT: CPT | Performed by: INTERNAL MEDICINE

## 2021-01-01 PROCEDURE — 999N000063 XR ABDOMEN PORT 1 VIEWS

## 2021-01-01 PROCEDURE — 87040 BLOOD CULTURE FOR BACTERIA: CPT | Performed by: STUDENT IN AN ORGANIZED HEALTH CARE EDUCATION/TRAINING PROGRAM

## 2021-01-01 PROCEDURE — 76705 ECHO EXAM OF ABDOMEN: CPT

## 2021-01-01 PROCEDURE — 13160 SEC CLSR SURG WND/DEHSN XTN: CPT | Performed by: NURSE ANESTHETIST, CERTIFIED REGISTERED

## 2021-01-01 PROCEDURE — 83735 ASSAY OF MAGNESIUM: CPT | Performed by: EMERGENCY MEDICINE

## 2021-01-01 PROCEDURE — 250N000011 HC RX IP 250 OP 636: Performed by: EMERGENCY MEDICINE

## 2021-01-01 PROCEDURE — 81001 URINALYSIS AUTO W/SCOPE: CPT | Performed by: PHYSICIAN ASSISTANT

## 2021-01-01 PROCEDURE — 258N000001 HC RX 258: Performed by: FAMILY MEDICINE

## 2021-01-01 PROCEDURE — C9803 HOPD COVID-19 SPEC COLLECT: HCPCS | Performed by: EMERGENCY MEDICINE

## 2021-01-01 PROCEDURE — 99239 HOSP IP/OBS DSCHRG MGMT >30: CPT | Performed by: FAMILY MEDICINE

## 2021-01-01 PROCEDURE — 80076 HEPATIC FUNCTION PANEL: CPT | Performed by: INTERNAL MEDICINE

## 2021-01-01 PROCEDURE — 0DTK4ZZ RESECTION OF ASCENDING COLON, PERCUTANEOUS ENDOSCOPIC APPROACH: ICD-10-PCS | Performed by: SURGERY

## 2021-01-01 PROCEDURE — 82248 BILIRUBIN DIRECT: CPT | Performed by: INTERNAL MEDICINE

## 2021-01-01 PROCEDURE — 0DB80ZZ EXCISION OF SMALL INTESTINE, OPEN APPROACH: ICD-10-PCS | Performed by: SURGERY

## 2021-01-01 PROCEDURE — 74177 CT ABD & PELVIS W/CONTRAST: CPT

## 2021-01-01 PROCEDURE — 99207 PR CDG-CUT & PASTE-POTENTIAL IMPACT ON LEVEL: CPT | Performed by: INTERNAL MEDICINE

## 2021-01-01 PROCEDURE — 96365 THER/PROPH/DIAG IV INF INIT: CPT | Mod: XU | Performed by: STUDENT IN AN ORGANIZED HEALTH CARE EDUCATION/TRAINING PROGRAM

## 2021-01-01 PROCEDURE — G1004 CDSM NDSC: HCPCS

## 2021-01-01 PROCEDURE — 99140 ANES COMP EMERGENCY COND: CPT | Performed by: NURSE ANESTHETIST, CERTIFIED REGISTERED

## 2021-01-01 PROCEDURE — 83540 ASSAY OF IRON: CPT | Performed by: INTERNAL MEDICINE

## 2021-01-01 PROCEDURE — 97165 OT EVAL LOW COMPLEX 30 MIN: CPT | Mod: GO | Performed by: OCCUPATIONAL THERAPIST

## 2021-01-01 PROCEDURE — 0DHA7UZ INSERTION OF FEEDING DEVICE INTO JEJUNUM, VIA NATURAL OR ARTIFICIAL OPENING: ICD-10-PCS | Performed by: RADIOLOGY

## 2021-01-01 PROCEDURE — 96374 THER/PROPH/DIAG INJ IV PUSH: CPT | Performed by: PHYSICIAN ASSISTANT

## 2021-01-01 PROCEDURE — 250N000011 HC RX IP 250 OP 636: Performed by: RADIOLOGY

## 2021-01-01 PROCEDURE — 250N000011 HC RX IP 250 OP 636

## 2021-01-01 PROCEDURE — 99285 EMERGENCY DEPT VISIT HI MDM: CPT | Mod: 25 | Performed by: EMERGENCY MEDICINE

## 2021-01-01 PROCEDURE — 97530 THERAPEUTIC ACTIVITIES: CPT | Mod: GP | Performed by: PHYSICAL THERAPIST

## 2021-01-01 PROCEDURE — 36415 COLL VENOUS BLD VENIPUNCTURE: CPT | Performed by: PHYSICIAN ASSISTANT

## 2021-01-01 PROCEDURE — 999N000040 HC STATISTIC CONSULT NO CHARGE VASC ACCESS

## 2021-01-01 PROCEDURE — 99222 1ST HOSP IP/OBS MODERATE 55: CPT | Performed by: FAMILY MEDICINE

## 2021-01-01 PROCEDURE — 99283 EMERGENCY DEPT VISIT LOW MDM: CPT | Performed by: PHYSICIAN ASSISTANT

## 2021-01-01 PROCEDURE — 87205 SMEAR GRAM STAIN: CPT | Performed by: RADIOLOGY

## 2021-01-01 PROCEDURE — 76770 US EXAM ABDO BACK WALL COMP: CPT

## 2021-01-01 PROCEDURE — 81210 BRAF GENE: CPT

## 2021-01-01 PROCEDURE — 93306 TTE W/DOPPLER COMPLETE: CPT | Mod: 26 | Performed by: INTERNAL MEDICINE

## 2021-01-01 PROCEDURE — 272N000452 HC KIT SHRLOCK 5FR POWER PICC TRIPLE LUMEN

## 2021-01-01 PROCEDURE — 85610 PROTHROMBIN TIME: CPT | Performed by: SURGERY

## 2021-01-01 PROCEDURE — 272N000192 HC ACCESSORY CR2

## 2021-01-01 PROCEDURE — 82565 ASSAY OF CREATININE: CPT | Performed by: SURGERY

## 2021-01-01 PROCEDURE — 0DN84ZZ RELEASE SMALL INTESTINE, PERCUTANEOUS ENDOSCOPIC APPROACH: ICD-10-PCS | Performed by: SURGERY

## 2021-01-01 PROCEDURE — 0DBL4ZZ EXCISION OF TRANSVERSE COLON, PERCUTANEOUS ENDOSCOPIC APPROACH: ICD-10-PCS | Performed by: SURGERY

## 2021-01-01 PROCEDURE — 84478 ASSAY OF TRIGLYCERIDES: CPT | Performed by: INTERNAL MEDICINE

## 2021-01-01 PROCEDURE — 99284 EMERGENCY DEPT VISIT MOD MDM: CPT | Performed by: PHYSICIAN ASSISTANT

## 2021-01-01 PROCEDURE — 87077 CULTURE AEROBIC IDENTIFY: CPT | Performed by: INTERNAL MEDICINE

## 2021-01-01 PROCEDURE — 74018 RADEX ABDOMEN 1 VIEW: CPT

## 2021-01-01 PROCEDURE — C9803 HOPD COVID-19 SPEC COLLECT: HCPCS

## 2021-01-01 PROCEDURE — 86850 RBC ANTIBODY SCREEN: CPT | Performed by: SURGERY

## 2021-01-01 PROCEDURE — 99285 EMERGENCY DEPT VISIT HI MDM: CPT | Performed by: STUDENT IN AN ORGANIZED HEALTH CARE EDUCATION/TRAINING PROGRAM

## 2021-01-01 PROCEDURE — 02H633Z INSERTION OF INFUSION DEVICE INTO RIGHT ATRIUM, PERCUTANEOUS APPROACH: ICD-10-PCS | Performed by: RADIOLOGY

## 2021-01-01 PROCEDURE — 99223 1ST HOSP IP/OBS HIGH 75: CPT | Mod: AI | Performed by: INTERNAL MEDICINE

## 2021-01-01 RX ORDER — POTASSIUM CHLORIDE 7.45 MG/ML
10 INJECTION INTRAVENOUS
Status: COMPLETED | OUTPATIENT
Start: 2021-01-01 | End: 2021-01-01

## 2021-01-01 RX ORDER — GABAPENTIN 100 MG/1
100 CAPSULE ORAL AT BEDTIME
Status: DISCONTINUED | OUTPATIENT
Start: 2021-01-01 | End: 2021-01-01 | Stop reason: HOSPADM

## 2021-01-01 RX ORDER — IBUPROFEN 600 MG/1
600 TABLET, FILM COATED ORAL EVERY 6 HOURS PRN
Status: DISCONTINUED | OUTPATIENT
Start: 2021-01-01 | End: 2021-01-01 | Stop reason: HOSPADM

## 2021-01-01 RX ORDER — ALBUTEROL SULFATE 0.83 MG/ML
2.5 SOLUTION RESPIRATORY (INHALATION)
Status: DISCONTINUED | OUTPATIENT
Start: 2021-01-01 | End: 2021-01-01

## 2021-01-01 RX ORDER — PROCHLORPERAZINE 25 MG
12.5 SUPPOSITORY, RECTAL RECTAL EVERY 12 HOURS PRN
Status: DISCONTINUED | OUTPATIENT
Start: 2021-01-01 | End: 2021-01-01 | Stop reason: HOSPADM

## 2021-01-01 RX ORDER — METRONIDAZOLE 500 MG/1
TABLET ORAL
Qty: 3 TABLET | Refills: 0 | Status: SHIPPED | OUTPATIENT
Start: 2021-01-01 | End: 2021-01-01

## 2021-01-01 RX ORDER — PROCHLORPERAZINE MALEATE 5 MG
5 TABLET ORAL EVERY 6 HOURS PRN
Status: DISCONTINUED | OUTPATIENT
Start: 2021-01-01 | End: 2021-01-01 | Stop reason: HOSPADM

## 2021-01-01 RX ORDER — OXYCODONE HYDROCHLORIDE 5 MG/1
5 TABLET ORAL EVERY 4 HOURS PRN
Status: DISCONTINUED | OUTPATIENT
Start: 2021-01-01 | End: 2021-01-01

## 2021-01-01 RX ORDER — CEFAZOLIN SODIUM 2 G/100ML
2 INJECTION, SOLUTION INTRAVENOUS SEE ADMIN INSTRUCTIONS
Status: CANCELLED | OUTPATIENT
Start: 2021-01-01

## 2021-01-01 RX ORDER — NALOXONE HYDROCHLORIDE 0.4 MG/ML
0.4 INJECTION, SOLUTION INTRAMUSCULAR; INTRAVENOUS; SUBCUTANEOUS
Status: DISCONTINUED | OUTPATIENT
Start: 2021-01-01 | End: 2021-01-01

## 2021-01-01 RX ORDER — ACETAMINOPHEN 325 MG/1
650 TABLET ORAL EVERY 4 HOURS PRN
Qty: 90 TABLET | Refills: 3 | Status: SHIPPED | OUTPATIENT
Start: 2021-01-01 | End: 2021-01-01

## 2021-01-01 RX ORDER — POTASSIUM CHLORIDE 1.5 G/1.58G
20 POWDER, FOR SOLUTION ORAL ONCE
Status: COMPLETED | OUTPATIENT
Start: 2021-01-01 | End: 2021-01-01

## 2021-01-01 RX ORDER — DOXEPIN HYDROCHLORIDE 50 MG/G
CREAM TOPICAL 3 TIMES DAILY
Status: DISCONTINUED | OUTPATIENT
Start: 2021-01-01 | End: 2021-01-01 | Stop reason: CLARIF

## 2021-01-01 RX ORDER — ONDANSETRON 4 MG/1
4 TABLET, ORALLY DISINTEGRATING ORAL EVERY 6 HOURS PRN
Status: DISCONTINUED | OUTPATIENT
Start: 2021-01-01 | End: 2021-01-01 | Stop reason: HOSPADM

## 2021-01-01 RX ORDER — ONDANSETRON 2 MG/ML
4 INJECTION INTRAMUSCULAR; INTRAVENOUS EVERY 6 HOURS PRN
Status: DISCONTINUED | OUTPATIENT
Start: 2021-01-01 | End: 2021-01-01

## 2021-01-01 RX ORDER — ONDANSETRON 2 MG/ML
4 INJECTION INTRAMUSCULAR; INTRAVENOUS EVERY 6 HOURS PRN
Status: CANCELLED | OUTPATIENT
Start: 2021-01-01

## 2021-01-01 RX ORDER — ONDANSETRON 2 MG/ML
4 INJECTION INTRAMUSCULAR; INTRAVENOUS EVERY 6 HOURS PRN
Status: DISCONTINUED | OUTPATIENT
Start: 2021-01-01 | End: 2021-01-01 | Stop reason: HOSPADM

## 2021-01-01 RX ORDER — LIDOCAINE 40 MG/G
CREAM TOPICAL
Status: DISCONTINUED | OUTPATIENT
Start: 2021-01-01 | End: 2021-01-01 | Stop reason: HOSPADM

## 2021-01-01 RX ORDER — METOPROLOL TARTRATE 1 MG/ML
5 INJECTION, SOLUTION INTRAVENOUS EVERY 5 MIN PRN
Status: DISCONTINUED | OUTPATIENT
Start: 2021-01-01 | End: 2021-01-01 | Stop reason: HOSPADM

## 2021-01-01 RX ORDER — SODIUM CHLORIDE 9 MG/ML
INJECTION, SOLUTION INTRAVENOUS CONTINUOUS
Status: DISCONTINUED | OUTPATIENT
Start: 2021-01-01 | End: 2021-01-01 | Stop reason: HOSPADM

## 2021-01-01 RX ORDER — FENTANYL CITRATE 50 UG/ML
25-50 INJECTION, SOLUTION INTRAMUSCULAR; INTRAVENOUS
Status: DISCONTINUED | OUTPATIENT
Start: 2021-01-01 | End: 2021-01-01 | Stop reason: ALTCHOICE

## 2021-01-01 RX ORDER — CEFAZOLIN SODIUM 2 G/100ML
2 INJECTION, SOLUTION INTRAVENOUS
Status: CANCELLED | OUTPATIENT
Start: 2021-01-01

## 2021-01-01 RX ORDER — ALBUTEROL SULFATE 0.83 MG/ML
2.5 SOLUTION RESPIRATORY (INHALATION) EVERY 4 HOURS PRN
Status: DISCONTINUED | OUTPATIENT
Start: 2021-01-01 | End: 2021-01-01 | Stop reason: HOSPADM

## 2021-01-01 RX ORDER — LORAZEPAM 0.5 MG/1
0.5 TABLET ORAL EVERY 4 HOURS PRN
Status: DISCONTINUED | OUTPATIENT
Start: 2021-01-01 | End: 2021-01-01

## 2021-01-01 RX ORDER — POTASSIUM CHLORIDE 1500 MG/1
20 TABLET, EXTENDED RELEASE ORAL ONCE
Status: COMPLETED | OUTPATIENT
Start: 2021-01-01 | End: 2021-01-01

## 2021-01-01 RX ORDER — HALOPERIDOL 2 MG/ML
.5-1 SOLUTION ORAL EVERY 6 HOURS PRN
Qty: 10 ML | Refills: 11 | Status: SHIPPED | OUTPATIENT
Start: 2021-01-01

## 2021-01-01 RX ORDER — HEPARIN SODIUM 5000 [USP'U]/.5ML
5000 INJECTION, SOLUTION INTRAVENOUS; SUBCUTANEOUS EVERY 12 HOURS
Status: DISCONTINUED | OUTPATIENT
Start: 2021-01-01 | End: 2021-01-01 | Stop reason: HOSPADM

## 2021-01-01 RX ORDER — NALOXONE HYDROCHLORIDE 0.4 MG/ML
0.4 INJECTION, SOLUTION INTRAMUSCULAR; INTRAVENOUS; SUBCUTANEOUS
Status: DISCONTINUED | OUTPATIENT
Start: 2021-01-01 | End: 2021-01-01 | Stop reason: CLARIF

## 2021-01-01 RX ORDER — HYDROMORPHONE HYDROCHLORIDE 1 MG/ML
0.2 INJECTION, SOLUTION INTRAMUSCULAR; INTRAVENOUS; SUBCUTANEOUS
Status: DISCONTINUED | OUTPATIENT
Start: 2021-01-01 | End: 2021-01-01

## 2021-01-01 RX ORDER — SODIUM CHLORIDE, SODIUM LACTATE, POTASSIUM CHLORIDE, CALCIUM CHLORIDE 600; 310; 30; 20 MG/100ML; MG/100ML; MG/100ML; MG/100ML
INJECTION, SOLUTION INTRAVENOUS CONTINUOUS
Status: DISCONTINUED | OUTPATIENT
Start: 2021-01-01 | End: 2021-01-01 | Stop reason: ALTCHOICE

## 2021-01-01 RX ORDER — CIPROFLOXACIN 2 MG/ML
400 INJECTION, SOLUTION INTRAVENOUS EVERY 12 HOURS
Status: DISCONTINUED | OUTPATIENT
Start: 2021-01-01 | End: 2021-01-01 | Stop reason: HOSPADM

## 2021-01-01 RX ORDER — BUPIVACAINE HYDROCHLORIDE 2.5 MG/ML
INJECTION, SOLUTION INFILTRATION; PERINEURAL PRN
Status: DISCONTINUED | OUTPATIENT
Start: 2021-01-01 | End: 2021-01-01 | Stop reason: HOSPADM

## 2021-01-01 RX ORDER — ACETAMINOPHEN 325 MG/1
650 TABLET ORAL EVERY 4 HOURS PRN
Status: DISCONTINUED | OUTPATIENT
Start: 2021-01-01 | End: 2021-01-01

## 2021-01-01 RX ORDER — ONDANSETRON 2 MG/ML
4 INJECTION INTRAMUSCULAR; INTRAVENOUS EVERY 30 MIN PRN
Status: DISCONTINUED | OUTPATIENT
Start: 2021-01-01 | End: 2021-01-01 | Stop reason: HOSPADM

## 2021-01-01 RX ORDER — DOXEPIN HYDROCHLORIDE 50 MG/G
CREAM TOPICAL 3 TIMES DAILY
Qty: 45 G | Refills: 1 | Status: SHIPPED | OUTPATIENT
Start: 2021-01-01

## 2021-01-01 RX ORDER — SODIUM CHLORIDE 9 MG/ML
INJECTION, SOLUTION INTRAVENOUS CONTINUOUS PRN
Status: DISCONTINUED | OUTPATIENT
Start: 2021-01-01 | End: 2021-01-01

## 2021-01-01 RX ORDER — IOPAMIDOL 755 MG/ML
100 INJECTION, SOLUTION INTRAVASCULAR ONCE
Status: COMPLETED | OUTPATIENT
Start: 2021-01-01 | End: 2021-01-01

## 2021-01-01 RX ORDER — FENTANYL CITRATE 50 UG/ML
INJECTION, SOLUTION INTRAMUSCULAR; INTRAVENOUS PRN
Status: DISCONTINUED | OUTPATIENT
Start: 2021-01-01 | End: 2021-01-01

## 2021-01-01 RX ORDER — DEXTROSE MONOHYDRATE 25 G/50ML
25-50 INJECTION, SOLUTION INTRAVENOUS
Status: DISCONTINUED | OUTPATIENT
Start: 2021-01-01 | End: 2021-01-01

## 2021-01-01 RX ORDER — TRIAMCINOLONE ACETONIDE 1 MG/G
OINTMENT TOPICAL 2 TIMES DAILY PRN
COMMUNITY

## 2021-01-01 RX ORDER — NALOXONE HYDROCHLORIDE 0.4 MG/ML
0.4 INJECTION, SOLUTION INTRAMUSCULAR; INTRAVENOUS; SUBCUTANEOUS
Status: DISCONTINUED | OUTPATIENT
Start: 2021-01-01 | End: 2021-01-01 | Stop reason: HOSPADM

## 2021-01-01 RX ORDER — CIPROFLOXACIN 500 MG/5ML
500 KIT ORAL EVERY 12 HOURS
Qty: 40 ML | Refills: 0 | Status: SHIPPED | OUTPATIENT
Start: 2021-01-01 | End: 2021-01-01

## 2021-01-01 RX ORDER — IOPAMIDOL 755 MG/ML
72 INJECTION, SOLUTION INTRAVASCULAR ONCE
Status: COMPLETED | OUTPATIENT
Start: 2021-01-01 | End: 2021-01-01

## 2021-01-01 RX ORDER — NALOXONE HYDROCHLORIDE 0.4 MG/ML
0.2 INJECTION, SOLUTION INTRAMUSCULAR; INTRAVENOUS; SUBCUTANEOUS
Status: DISCONTINUED | OUTPATIENT
Start: 2021-01-01 | End: 2021-01-01 | Stop reason: HOSPADM

## 2021-01-01 RX ORDER — FLUMAZENIL 0.1 MG/ML
0.2 INJECTION, SOLUTION INTRAVENOUS
Status: DISCONTINUED | OUTPATIENT
Start: 2021-01-01 | End: 2021-01-01 | Stop reason: HOSPADM

## 2021-01-01 RX ORDER — ONDANSETRON 2 MG/ML
INJECTION INTRAMUSCULAR; INTRAVENOUS PRN
Status: DISCONTINUED | OUTPATIENT
Start: 2021-01-01 | End: 2021-01-01

## 2021-01-01 RX ORDER — ONDANSETRON 4 MG/1
4 TABLET, ORALLY DISINTEGRATING ORAL EVERY 6 HOURS PRN
Status: DISCONTINUED | OUTPATIENT
Start: 2021-01-01 | End: 2021-01-01

## 2021-01-01 RX ORDER — DEXTROSE MONOHYDRATE 50 MG/ML
INJECTION, SOLUTION INTRAVENOUS CONTINUOUS
Status: DISCONTINUED | OUTPATIENT
Start: 2021-01-01 | End: 2021-01-01

## 2021-01-01 RX ORDER — IOPAMIDOL 755 MG/ML
62 INJECTION, SOLUTION INTRAVASCULAR ONCE
Status: COMPLETED | OUTPATIENT
Start: 2021-01-01 | End: 2021-01-01

## 2021-01-01 RX ORDER — LANOLIN ALCOHOL/MO/W.PET/CERES
3 CREAM (GRAM) TOPICAL
Status: DISCONTINUED | OUTPATIENT
Start: 2021-01-01 | End: 2021-01-01 | Stop reason: HOSPADM

## 2021-01-01 RX ORDER — CEFAZOLIN SODIUM 2 G/100ML
2 INJECTION, SOLUTION INTRAVENOUS SEE ADMIN INSTRUCTIONS
Status: DISCONTINUED | OUTPATIENT
Start: 2021-01-01 | End: 2021-01-01 | Stop reason: HOSPADM

## 2021-01-01 RX ORDER — LIDOCAINE HYDROCHLORIDE 20 MG/ML
INJECTION, SOLUTION INFILTRATION; PERINEURAL PRN
Status: DISCONTINUED | OUTPATIENT
Start: 2021-01-01 | End: 2021-01-01

## 2021-01-01 RX ORDER — ACETAMINOPHEN 650 MG/1
650 SUPPOSITORY RECTAL EVERY 6 HOURS PRN
Status: CANCELLED | OUTPATIENT
Start: 2021-01-01

## 2021-01-01 RX ORDER — PROPOFOL 10 MG/ML
INJECTION, EMULSION INTRAVENOUS PRN
Status: DISCONTINUED | OUTPATIENT
Start: 2021-01-01 | End: 2021-01-01

## 2021-01-01 RX ORDER — LORAZEPAM 1 MG/1
1 TABLET ORAL
Status: DISCONTINUED | OUTPATIENT
Start: 2021-01-01 | End: 2021-01-01 | Stop reason: HOSPADM

## 2021-01-01 RX ORDER — ONDANSETRON 2 MG/ML
4 INJECTION INTRAMUSCULAR; INTRAVENOUS ONCE
Status: COMPLETED | OUTPATIENT
Start: 2021-01-01 | End: 2021-01-01

## 2021-01-01 RX ORDER — LORAZEPAM 2 MG/ML
1 INJECTION INTRAMUSCULAR EVERY 6 HOURS PRN
Status: DISCONTINUED | OUTPATIENT
Start: 2021-01-01 | End: 2021-01-01 | Stop reason: DRUGHIGH

## 2021-01-01 RX ORDER — MAGNESIUM SULFATE HEPTAHYDRATE 40 MG/ML
2 INJECTION, SOLUTION INTRAVENOUS ONCE
Status: COMPLETED | OUTPATIENT
Start: 2021-01-01 | End: 2021-01-01

## 2021-01-01 RX ORDER — HYDROMORPHONE HCL IN WATER/PF 6 MG/30 ML
0.2 PATIENT CONTROLLED ANALGESIA SYRINGE INTRAVENOUS
Status: DISCONTINUED | OUTPATIENT
Start: 2021-01-01 | End: 2021-01-01 | Stop reason: HOSPADM

## 2021-01-01 RX ORDER — PANTOPRAZOLE SODIUM 40 MG/1
40 TABLET, DELAYED RELEASE ORAL
Status: DISCONTINUED | OUTPATIENT
Start: 2021-01-01 | End: 2021-01-01 | Stop reason: HOSPADM

## 2021-01-01 RX ORDER — HYDROMORPHONE HYDROCHLORIDE 1 MG/ML
0.2 INJECTION, SOLUTION INTRAMUSCULAR; INTRAVENOUS; SUBCUTANEOUS
Status: CANCELLED | OUTPATIENT
Start: 2021-01-01

## 2021-01-01 RX ORDER — IBUPROFEN 600 MG/1
600 TABLET, FILM COATED ORAL EVERY 6 HOURS PRN
Qty: 30 TABLET | Refills: 1 | Status: ON HOLD | OUTPATIENT
Start: 2021-01-01 | End: 2021-01-01

## 2021-01-01 RX ORDER — ACETAMINOPHEN 650 MG/1
650 SUPPOSITORY RECTAL EVERY 4 HOURS PRN
Qty: 4 SUPPOSITORY | Refills: 11 | Status: SHIPPED | OUTPATIENT
Start: 2021-01-01

## 2021-01-01 RX ORDER — FENTANYL CITRATE 50 UG/ML
25-50 INJECTION, SOLUTION INTRAMUSCULAR; INTRAVENOUS
Status: DISCONTINUED | OUTPATIENT
Start: 2021-01-01 | End: 2021-01-01 | Stop reason: HOSPADM

## 2021-01-01 RX ORDER — LIDOCAINE 50 MG/G
1 PATCH TOPICAL EVERY 24 HOURS
Status: DISCONTINUED | OUTPATIENT
Start: 2021-01-01 | End: 2021-01-01 | Stop reason: HOSPADM

## 2021-01-01 RX ORDER — ACETAMINOPHEN 325 MG/1
975 TABLET ORAL ONCE
Status: CANCELLED | OUTPATIENT
Start: 2021-01-01 | End: 2021-01-01

## 2021-01-01 RX ORDER — OLANZAPINE 5 MG/1
5 TABLET, ORALLY DISINTEGRATING ORAL
Status: DISCONTINUED | OUTPATIENT
Start: 2021-01-01 | End: 2021-01-01 | Stop reason: HOSPADM

## 2021-01-01 RX ORDER — ONDANSETRON 4 MG/1
4 TABLET, ORALLY DISINTEGRATING ORAL EVERY 8 HOURS PRN
Qty: 10 TABLET | Refills: 0 | Status: SHIPPED | OUTPATIENT
Start: 2021-01-01 | End: 2021-01-01

## 2021-01-01 RX ORDER — POLYETHYLENE GLYCOL 3350 17 G/17G
17 POWDER, FOR SOLUTION ORAL DAILY PRN
Status: CANCELLED | OUTPATIENT
Start: 2021-01-01

## 2021-01-01 RX ORDER — LORAZEPAM 2 MG/ML
1 INJECTION INTRAMUSCULAR
Status: DISCONTINUED | OUTPATIENT
Start: 2021-01-01 | End: 2021-01-01 | Stop reason: HOSPADM

## 2021-01-01 RX ORDER — POTASSIUM CHLORIDE 1.5 G/1.58G
40 POWDER, FOR SOLUTION ORAL 2 TIMES DAILY
Status: DISCONTINUED | OUTPATIENT
Start: 2021-01-01 | End: 2021-01-01 | Stop reason: CLARIF

## 2021-01-01 RX ORDER — OXYCODONE AND ACETAMINOPHEN 5; 325 MG/1; MG/1
1-2 TABLET ORAL EVERY 4 HOURS PRN
Status: DISCONTINUED | OUTPATIENT
Start: 2021-01-01 | End: 2021-01-01 | Stop reason: ALTCHOICE

## 2021-01-01 RX ORDER — METRONIDAZOLE 500 MG/1
TABLET ORAL
Qty: 3 TABLET | Refills: 0 | Status: ON HOLD | OUTPATIENT
Start: 2021-01-01 | End: 2021-01-01

## 2021-01-01 RX ORDER — PROCHLORPERAZINE MALEATE 5 MG
5 TABLET ORAL EVERY 6 HOURS PRN
Status: DISCONTINUED | OUTPATIENT
Start: 2021-01-01 | End: 2021-01-01

## 2021-01-01 RX ORDER — ONDANSETRON 4 MG/1
4 TABLET, ORALLY DISINTEGRATING ORAL EVERY 6 HOURS PRN
Status: CANCELLED | OUTPATIENT
Start: 2021-01-01

## 2021-01-01 RX ORDER — DEXTROSE MONOHYDRATE 100 MG/ML
INJECTION, SOLUTION INTRAVENOUS CONTINUOUS PRN
Status: DISCONTINUED | OUTPATIENT
Start: 2021-01-01 | End: 2021-01-01

## 2021-01-01 RX ORDER — MAGNESIUM HYDROXIDE 1200 MG/15ML
LIQUID ORAL PRN
Status: DISCONTINUED | OUTPATIENT
Start: 2021-01-01 | End: 2021-01-01 | Stop reason: HOSPADM

## 2021-01-01 RX ORDER — SODIUM CHLORIDE, SODIUM LACTATE, POTASSIUM CHLORIDE, CALCIUM CHLORIDE 600; 310; 30; 20 MG/100ML; MG/100ML; MG/100ML; MG/100ML
INJECTION, SOLUTION INTRAVENOUS CONTINUOUS
Status: DISCONTINUED | OUTPATIENT
Start: 2021-01-01 | End: 2021-01-01 | Stop reason: HOSPADM

## 2021-01-01 RX ORDER — MORPHINE SULFATE 2 MG/ML
1-2 INJECTION, SOLUTION INTRAMUSCULAR; INTRAVENOUS
Status: DISCONTINUED | OUTPATIENT
Start: 2021-01-01 | End: 2021-01-01 | Stop reason: HOSPADM

## 2021-01-01 RX ORDER — FENTANYL CITRATE 50 UG/ML
25-50 INJECTION, SOLUTION INTRAMUSCULAR; INTRAVENOUS EVERY 5 MIN PRN
Status: DISCONTINUED | OUTPATIENT
Start: 2021-01-01 | End: 2021-01-01 | Stop reason: CLARIF

## 2021-01-01 RX ORDER — SODIUM CHLORIDE 9 MG/ML
INJECTION, SOLUTION INTRAVENOUS CONTINUOUS
Status: DISCONTINUED | OUTPATIENT
Start: 2021-01-01 | End: 2021-01-01

## 2021-01-01 RX ORDER — CIPROFLOXACIN 2 MG/ML
400 INJECTION, SOLUTION INTRAVENOUS EVERY 12 HOURS
Status: DISCONTINUED | OUTPATIENT
Start: 2021-01-01 | End: 2021-01-01

## 2021-01-01 RX ORDER — KETAMINE HYDROCHLORIDE 10 MG/ML
INJECTION INTRAMUSCULAR; INTRAVENOUS PRN
Status: DISCONTINUED | OUTPATIENT
Start: 2021-01-01 | End: 2021-01-01

## 2021-01-01 RX ORDER — LIDOCAINE 40 MG/G
CREAM TOPICAL
Status: DISCONTINUED | OUTPATIENT
Start: 2021-01-01 | End: 2021-01-01

## 2021-01-01 RX ORDER — MAGNESIUM OXIDE 400 MG/1
400 TABLET ORAL 2 TIMES DAILY
Status: DISCONTINUED | OUTPATIENT
Start: 2021-01-01 | End: 2021-01-01

## 2021-01-01 RX ORDER — ONDANSETRON 2 MG/ML
4 INJECTION INTRAMUSCULAR; INTRAVENOUS
Status: DISCONTINUED | OUTPATIENT
Start: 2021-01-01 | End: 2021-01-01 | Stop reason: HOSPADM

## 2021-01-01 RX ORDER — POTASSIUM CHLORIDE 1.5 G/1.58G
20 POWDER, FOR SOLUTION ORAL ONCE
Status: DISCONTINUED | OUTPATIENT
Start: 2021-01-01 | End: 2021-01-01 | Stop reason: DRUGHIGH

## 2021-01-01 RX ORDER — POTASSIUM CHLORIDE 7.45 MG/ML
10 INJECTION INTRAVENOUS ONCE
Status: COMPLETED | OUTPATIENT
Start: 2021-01-01 | End: 2021-01-01

## 2021-01-01 RX ORDER — HYDROMORPHONE HYDROCHLORIDE 1 MG/ML
0.5 INJECTION, SOLUTION INTRAMUSCULAR; INTRAVENOUS; SUBCUTANEOUS
Status: DISCONTINUED | OUTPATIENT
Start: 2021-01-01 | End: 2021-01-01 | Stop reason: HOSPADM

## 2021-01-01 RX ORDER — ACETAMINOPHEN 650 MG/1
650 SUPPOSITORY RECTAL EVERY 6 HOURS PRN
Status: DISCONTINUED | OUTPATIENT
Start: 2021-01-01 | End: 2021-01-01 | Stop reason: HOSPADM

## 2021-01-01 RX ORDER — GLIPIZIDE 10 MG/1
2 TABLET ORAL EVERY 8 HOURS PRN
Status: DISCONTINUED | OUTPATIENT
Start: 2021-01-01 | End: 2021-01-01 | Stop reason: HOSPADM

## 2021-01-01 RX ORDER — POTASSIUM CHLORIDE 7.45 MG/ML
10 INJECTION INTRAVENOUS
Status: DISCONTINUED | OUTPATIENT
Start: 2021-01-01 | End: 2021-01-01

## 2021-01-01 RX ORDER — MEPERIDINE HYDROCHLORIDE 50 MG/ML
12.5 INJECTION INTRAMUSCULAR; INTRAVENOUS; SUBCUTANEOUS
Status: DISCONTINUED | OUTPATIENT
Start: 2021-01-01 | End: 2021-01-01 | Stop reason: HOSPADM

## 2021-01-01 RX ORDER — PROPOFOL 10 MG/ML
INJECTION, EMULSION INTRAVENOUS CONTINUOUS PRN
Status: DISCONTINUED | OUTPATIENT
Start: 2021-01-01 | End: 2021-01-01

## 2021-01-01 RX ORDER — ACETAMINOPHEN 325 MG/1
650 TABLET ORAL EVERY 8 HOURS
Status: DISCONTINUED | OUTPATIENT
Start: 2021-01-01 | End: 2021-01-01 | Stop reason: HOSPADM

## 2021-01-01 RX ORDER — METRONIDAZOLE 500 MG/1
500 TABLET ORAL 2 TIMES DAILY
Status: DISCONTINUED | OUTPATIENT
Start: 2021-01-01 | End: 2021-01-01 | Stop reason: ALTCHOICE

## 2021-01-01 RX ORDER — POTASSIUM CHLORIDE 20MEQ/15ML
40 LIQUID (ML) ORAL ONCE
Status: COMPLETED | OUTPATIENT
Start: 2021-01-01 | End: 2021-01-01

## 2021-01-01 RX ORDER — FUROSEMIDE 10 MG/ML
20 INJECTION INTRAMUSCULAR; INTRAVENOUS ONCE
Status: COMPLETED | OUTPATIENT
Start: 2021-01-01 | End: 2021-01-01

## 2021-01-01 RX ORDER — POTASSIUM CHLORIDE 1500 MG/1
40 TABLET, EXTENDED RELEASE ORAL ONCE
Status: COMPLETED | OUTPATIENT
Start: 2021-01-01 | End: 2021-01-01

## 2021-01-01 RX ORDER — HEPARIN SODIUM 5000 [USP'U]/.5ML
5000 INJECTION, SOLUTION INTRAVENOUS; SUBCUTANEOUS EVERY 12 HOURS
Status: DISCONTINUED | OUTPATIENT
Start: 2021-01-01 | End: 2021-01-01

## 2021-01-01 RX ORDER — HYDROMORPHONE HYDROCHLORIDE 1 MG/ML
0.5 INJECTION, SOLUTION INTRAMUSCULAR; INTRAVENOUS; SUBCUTANEOUS ONCE
Status: DISCONTINUED | OUTPATIENT
Start: 2021-01-01 | End: 2021-01-01 | Stop reason: ALTCHOICE

## 2021-01-01 RX ORDER — POTASSIUM CHLORIDE 20MEQ/15ML
20 LIQUID (ML) ORAL ONCE
Status: COMPLETED | OUTPATIENT
Start: 2021-01-01 | End: 2021-01-01

## 2021-01-01 RX ORDER — HYDROMORPHONE HYDROCHLORIDE 1 MG/ML
1-2 SOLUTION ORAL
Qty: 90 ML | Refills: 0 | Status: SHIPPED | OUTPATIENT
Start: 2021-01-01

## 2021-01-01 RX ORDER — PIPERACILLIN SODIUM, TAZOBACTAM SODIUM 2; .25 G/10ML; G/10ML
2.25 INJECTION, POWDER, LYOPHILIZED, FOR SOLUTION INTRAVENOUS EVERY 8 HOURS
Status: DISCONTINUED | OUTPATIENT
Start: 2021-01-01 | End: 2021-01-01

## 2021-01-01 RX ORDER — HALOPERIDOL 5 MG/ML
.5-1 INJECTION INTRAMUSCULAR
Status: DISCONTINUED | OUTPATIENT
Start: 2021-01-01 | End: 2021-01-01 | Stop reason: HOSPADM

## 2021-01-01 RX ORDER — HYDROMORPHONE HYDROCHLORIDE 1 MG/ML
.3-.5 INJECTION, SOLUTION INTRAMUSCULAR; INTRAVENOUS; SUBCUTANEOUS EVERY 10 MIN PRN
Status: DISCONTINUED | OUTPATIENT
Start: 2021-01-01 | End: 2021-01-01

## 2021-01-01 RX ORDER — MAGNESIUM OXIDE 400 MG/1
400 TABLET ORAL 2 TIMES DAILY
Status: DISCONTINUED | OUTPATIENT
Start: 2021-01-01 | End: 2021-01-01 | Stop reason: CLARIF

## 2021-01-01 RX ORDER — PHENYLEPHRINE HYDROCHLORIDE 10 MG/ML
INJECTION INTRAVENOUS PRN
Status: DISCONTINUED | OUTPATIENT
Start: 2021-01-01 | End: 2021-01-01

## 2021-01-01 RX ORDER — DEXTROSE MONOHYDRATE, SODIUM CHLORIDE, AND POTASSIUM CHLORIDE 50; 1.49; 4.5 G/1000ML; G/1000ML; G/1000ML
INJECTION, SOLUTION INTRAVENOUS CONTINUOUS
Status: DISCONTINUED | OUTPATIENT
Start: 2021-01-01 | End: 2021-01-01 | Stop reason: HOSPADM

## 2021-01-01 RX ORDER — MORPHINE SULFATE 20 MG/ML
5-10 SOLUTION ORAL
Status: DISCONTINUED | OUTPATIENT
Start: 2021-01-01 | End: 2021-01-01 | Stop reason: HOSPADM

## 2021-01-01 RX ORDER — QUETIAPINE FUMARATE 25 MG/1
12.5 TABLET, FILM COATED ORAL AT BEDTIME
Status: ON HOLD | DISCHARGE
Start: 2021-01-01 | End: 2021-01-01

## 2021-01-01 RX ORDER — OXYCODONE HYDROCHLORIDE 5 MG/1
5 TABLET ORAL EVERY 4 HOURS PRN
Status: ON HOLD | COMMUNITY
End: 2021-01-01

## 2021-01-01 RX ORDER — IBUPROFEN 600 MG/1
600 TABLET, FILM COATED ORAL EVERY 6 HOURS PRN
Qty: 90 TABLET | Refills: 3 | Status: ON HOLD | OUTPATIENT
Start: 2021-01-01 | End: 2021-01-01

## 2021-01-01 RX ORDER — MINERAL OIL/HYDROPHIL PETROLAT
OINTMENT (GRAM) TOPICAL EVERY 8 HOURS PRN
Status: DISCONTINUED | OUTPATIENT
Start: 2021-01-01 | End: 2021-01-01 | Stop reason: HOSPADM

## 2021-01-01 RX ORDER — NALOXONE HYDROCHLORIDE 0.4 MG/ML
0.2 INJECTION, SOLUTION INTRAMUSCULAR; INTRAVENOUS; SUBCUTANEOUS
Status: ACTIVE | OUTPATIENT
Start: 2021-01-01 | End: 2021-01-01

## 2021-01-01 RX ORDER — POTASSIUM CHLORIDE 1.5 G/1.58G
40 POWDER, FOR SOLUTION ORAL ONCE
Status: COMPLETED | OUTPATIENT
Start: 2021-01-01 | End: 2021-01-01

## 2021-01-01 RX ORDER — PROCHLORPERAZINE 25 MG
12.5 SUPPOSITORY, RECTAL RECTAL EVERY 12 HOURS PRN
Status: CANCELLED | OUTPATIENT
Start: 2021-01-01

## 2021-01-01 RX ORDER — MAGNESIUM OXIDE 400 MG/1
400 TABLET ORAL 3 TIMES DAILY
Status: DISPENSED | OUTPATIENT
Start: 2021-01-01 | End: 2021-01-01

## 2021-01-01 RX ORDER — OLANZAPINE 10 MG/2ML
2.5 INJECTION, POWDER, FOR SOLUTION INTRAMUSCULAR DAILY PRN
Status: DISCONTINUED | OUTPATIENT
Start: 2021-01-01 | End: 2021-01-01 | Stop reason: HOSPADM

## 2021-01-01 RX ORDER — OXYCODONE HYDROCHLORIDE 5 MG/1
5 TABLET ORAL EVERY 4 HOURS PRN
Qty: 15 TABLET | Refills: 0 | Status: SHIPPED | OUTPATIENT
Start: 2021-01-01 | End: 2021-01-01

## 2021-01-01 RX ORDER — AMOXICILLIN 250 MG
2 CAPSULE ORAL 2 TIMES DAILY PRN
Status: DISCONTINUED | OUTPATIENT
Start: 2021-01-01 | End: 2021-01-01 | Stop reason: HOSPADM

## 2021-01-01 RX ORDER — OXYCODONE HYDROCHLORIDE 5 MG/1
5 TABLET ORAL EVERY 4 HOURS PRN
Status: DISCONTINUED | OUTPATIENT
Start: 2021-01-01 | End: 2021-01-01 | Stop reason: HOSPADM

## 2021-01-01 RX ORDER — NALOXONE HYDROCHLORIDE 0.4 MG/ML
0.4 INJECTION, SOLUTION INTRAMUSCULAR; INTRAVENOUS; SUBCUTANEOUS
Status: ACTIVE | OUTPATIENT
Start: 2021-01-01 | End: 2021-01-01

## 2021-01-01 RX ORDER — ACETAMINOPHEN 10 MG/ML
INJECTION, SOLUTION INTRAVENOUS PRN
Status: DISCONTINUED | OUTPATIENT
Start: 2021-01-01 | End: 2021-01-01

## 2021-01-01 RX ORDER — MEPERIDINE HYDROCHLORIDE 50 MG/ML
12.5 INJECTION INTRAMUSCULAR; INTRAVENOUS; SUBCUTANEOUS
Status: DISCONTINUED | OUTPATIENT
Start: 2021-01-01 | End: 2021-01-01 | Stop reason: CLARIF

## 2021-01-01 RX ORDER — SODIUM CHLORIDE, SODIUM LACTATE, POTASSIUM CHLORIDE, CALCIUM CHLORIDE 600; 310; 30; 20 MG/100ML; MG/100ML; MG/100ML; MG/100ML
INJECTION, SOLUTION INTRAVENOUS CONTINUOUS
Status: DISCONTINUED | OUTPATIENT
Start: 2021-01-01 | End: 2021-01-01

## 2021-01-01 RX ORDER — DEXAMETHASONE SODIUM PHOSPHATE 4 MG/ML
INJECTION, SOLUTION INTRA-ARTICULAR; INTRALESIONAL; INTRAMUSCULAR; INTRAVENOUS; SOFT TISSUE PRN
Status: DISCONTINUED | OUTPATIENT
Start: 2021-01-01 | End: 2021-01-01

## 2021-01-01 RX ORDER — IBUPROFEN 600 MG/1
600 TABLET, FILM COATED ORAL EVERY 6 HOURS
Status: DISCONTINUED | OUTPATIENT
Start: 2021-01-01 | End: 2021-01-01

## 2021-01-01 RX ORDER — PANTOPRAZOLE SODIUM 40 MG/1
40 TABLET, DELAYED RELEASE ORAL
Status: DISCONTINUED | OUTPATIENT
Start: 2021-01-01 | End: 2021-01-01

## 2021-01-01 RX ORDER — SODIUM CHLORIDE AND POTASSIUM CHLORIDE 150; 900 MG/100ML; MG/100ML
INJECTION, SOLUTION INTRAVENOUS CONTINUOUS
Status: DISCONTINUED | OUTPATIENT
Start: 2021-01-01 | End: 2021-01-01

## 2021-01-01 RX ORDER — POTASSIUM CHLORIDE 7.45 MG/ML
10 INJECTION INTRAVENOUS
Status: DISPENSED | OUTPATIENT
Start: 2021-01-01 | End: 2021-01-01

## 2021-01-01 RX ORDER — ACETAMINOPHEN 650 MG/1
650 SUPPOSITORY RECTAL EVERY 8 HOURS
Status: DISCONTINUED | OUTPATIENT
Start: 2021-01-01 | End: 2021-01-01

## 2021-01-01 RX ORDER — CIPROFLOXACIN 500 MG/5ML
500 KIT ORAL EVERY 12 HOURS SCHEDULED
Status: DISCONTINUED | OUTPATIENT
Start: 2021-01-01 | End: 2021-01-01 | Stop reason: HOSPADM

## 2021-01-01 RX ORDER — FUROSEMIDE 10 MG/ML
20 INJECTION INTRAMUSCULAR; INTRAVENOUS EVERY 8 HOURS
Status: DISCONTINUED | OUTPATIENT
Start: 2021-01-01 | End: 2021-01-01

## 2021-01-01 RX ORDER — NEOMYCIN SULFATE 500 MG/1
TABLET ORAL
Qty: 6 TABLET | Refills: 0 | Status: SHIPPED | OUTPATIENT
Start: 2021-01-01 | End: 2021-01-01

## 2021-01-01 RX ORDER — CLOBETASOL PROPIONATE 0.5 MG/G
CREAM TOPICAL 2 TIMES DAILY
Qty: 45 G | Refills: 1 | Status: SHIPPED | OUTPATIENT
Start: 2021-01-01

## 2021-01-01 RX ORDER — NEOMYCIN SULFATE 500 MG/1
TABLET ORAL
Qty: 6 TABLET | Refills: 0 | Status: ON HOLD | OUTPATIENT
Start: 2021-01-01 | End: 2021-01-01

## 2021-01-01 RX ORDER — ONDANSETRON 4 MG/1
4 TABLET, ORALLY DISINTEGRATING ORAL EVERY 30 MIN PRN
Status: DISCONTINUED | OUTPATIENT
Start: 2021-01-01 | End: 2021-01-01

## 2021-01-01 RX ORDER — ACETAMINOPHEN 325 MG/1
650 TABLET ORAL EVERY 6 HOURS PRN
Status: DISCONTINUED | OUTPATIENT
Start: 2021-01-01 | End: 2021-01-01 | Stop reason: HOSPADM

## 2021-01-01 RX ORDER — CEFAZOLIN SODIUM 2 G/100ML
2 INJECTION, SOLUTION INTRAVENOUS
Status: COMPLETED | OUTPATIENT
Start: 2021-01-01 | End: 2021-01-01

## 2021-01-01 RX ORDER — POLYETHYLENE GLYCOL 3350, SODIUM CHLORIDE, SODIUM BICARBONATE, POTASSIUM CHLORIDE 420; 11.2; 5.72; 1.48 G/4L; G/4L; G/4L; G/4L
4000 POWDER, FOR SOLUTION ORAL ONCE
Qty: 4000 ML | Refills: 0 | Status: SHIPPED | OUTPATIENT
Start: 2021-01-01 | End: 2021-01-01

## 2021-01-01 RX ORDER — ACETAMINOPHEN 325 MG/1
975 TABLET ORAL EVERY 8 HOURS
Status: COMPLETED | OUTPATIENT
Start: 2021-01-01 | End: 2021-01-01

## 2021-01-01 RX ORDER — GLYCOPYRROLATE 0.2 MG/ML
.2-.4 INJECTION, SOLUTION INTRAMUSCULAR; INTRAVENOUS EVERY 4 HOURS PRN
Status: DISCONTINUED | OUTPATIENT
Start: 2021-01-01 | End: 2021-01-01 | Stop reason: HOSPADM

## 2021-01-01 RX ORDER — KETOROLAC TROMETHAMINE 30 MG/ML
INJECTION, SOLUTION INTRAMUSCULAR; INTRAVENOUS PRN
Status: DISCONTINUED | OUTPATIENT
Start: 2021-01-01 | End: 2021-01-01

## 2021-01-01 RX ORDER — DIPHENOXYLATE HCL/ATROPINE 2.5-.025MG
1 TABLET ORAL 4 TIMES DAILY PRN
Status: DISCONTINUED | OUTPATIENT
Start: 2021-01-01 | End: 2021-01-01 | Stop reason: HOSPADM

## 2021-01-01 RX ORDER — DEXTROSE MONOHYDRATE, SODIUM CHLORIDE, AND POTASSIUM CHLORIDE 50; 1.49; 4.5 G/1000ML; G/1000ML; G/1000ML
INJECTION, SOLUTION INTRAVENOUS CONTINUOUS
Status: DISCONTINUED | OUTPATIENT
Start: 2021-01-01 | End: 2021-01-01

## 2021-01-01 RX ORDER — LORAZEPAM 2 MG/ML
.25-.5 CONCENTRATE ORAL EVERY 4 HOURS PRN
Qty: 30 ML | Refills: 5 | Status: SHIPPED | OUTPATIENT
Start: 2021-01-01

## 2021-01-01 RX ORDER — PIPERACILLIN SODIUM, TAZOBACTAM SODIUM 2; .25 G/10ML; G/10ML
2.25 INJECTION, POWDER, LYOPHILIZED, FOR SOLUTION INTRAVENOUS EVERY 6 HOURS
Status: DISCONTINUED | OUTPATIENT
Start: 2021-01-01 | End: 2021-01-01

## 2021-01-01 RX ORDER — FLUMAZENIL 0.1 MG/ML
0.2 INJECTION, SOLUTION INTRAVENOUS
Status: DISCONTINUED | OUTPATIENT
Start: 2021-01-01 | End: 2021-01-01 | Stop reason: CLARIF

## 2021-01-01 RX ORDER — NALOXONE HYDROCHLORIDE 0.4 MG/ML
0.2 INJECTION, SOLUTION INTRAMUSCULAR; INTRAVENOUS; SUBCUTANEOUS
Status: DISCONTINUED | OUTPATIENT
Start: 2021-01-01 | End: 2021-01-01

## 2021-01-01 RX ORDER — HYDROMORPHONE HYDROCHLORIDE 1 MG/ML
.3-.5 INJECTION, SOLUTION INTRAMUSCULAR; INTRAVENOUS; SUBCUTANEOUS EVERY 5 MIN PRN
Status: DISCONTINUED | OUTPATIENT
Start: 2021-01-01 | End: 2021-01-01 | Stop reason: HOSPADM

## 2021-01-01 RX ORDER — GLYCINE 1.5 G/100ML
SOLUTION IRRIGATION PRN
Status: DISCONTINUED | OUTPATIENT
Start: 2021-01-01 | End: 2021-01-01 | Stop reason: HOSPADM

## 2021-01-01 RX ORDER — PROCHLORPERAZINE MALEATE 5 MG
5 TABLET ORAL EVERY 6 HOURS PRN
Status: CANCELLED | OUTPATIENT
Start: 2021-01-01

## 2021-01-01 RX ORDER — KETOROLAC TROMETHAMINE 15 MG/ML
15 INJECTION, SOLUTION INTRAMUSCULAR; INTRAVENOUS EVERY 6 HOURS
Status: COMPLETED | OUTPATIENT
Start: 2021-01-01 | End: 2021-01-01

## 2021-01-01 RX ORDER — HYDROCODONE BITARTRATE AND ACETAMINOPHEN 5; 325 MG/1; MG/1
1-2 TABLET ORAL EVERY 4 HOURS PRN
Status: DISCONTINUED | OUTPATIENT
Start: 2021-01-01 | End: 2021-01-01 | Stop reason: ALTCHOICE

## 2021-01-01 RX ORDER — AMOXICILLIN 250 MG
1 CAPSULE ORAL 2 TIMES DAILY PRN
Status: CANCELLED | OUTPATIENT
Start: 2021-01-01

## 2021-01-01 RX ORDER — NALOXONE HYDROCHLORIDE 0.4 MG/ML
.1-.4 INJECTION, SOLUTION INTRAMUSCULAR; INTRAVENOUS; SUBCUTANEOUS
Status: DISCONTINUED | OUTPATIENT
Start: 2021-01-01 | End: 2021-01-01 | Stop reason: HOSPADM

## 2021-01-01 RX ORDER — FENTANYL CITRATE 50 UG/ML
50 INJECTION, SOLUTION INTRAMUSCULAR; INTRAVENOUS ONCE
Status: COMPLETED | OUTPATIENT
Start: 2021-01-01 | End: 2021-01-01

## 2021-01-01 RX ORDER — ONDANSETRON 2 MG/ML
4 INJECTION INTRAMUSCULAR; INTRAVENOUS EVERY 30 MIN PRN
Status: DISCONTINUED | OUTPATIENT
Start: 2021-01-01 | End: 2021-01-01

## 2021-01-01 RX ORDER — NALOXONE HYDROCHLORIDE 0.4 MG/ML
0.2 INJECTION, SOLUTION INTRAMUSCULAR; INTRAVENOUS; SUBCUTANEOUS
Status: DISCONTINUED | OUTPATIENT
Start: 2021-01-01 | End: 2021-01-01 | Stop reason: CLARIF

## 2021-01-01 RX ORDER — AMOXICILLIN 250 MG
2 CAPSULE ORAL 2 TIMES DAILY PRN
Status: CANCELLED | OUTPATIENT
Start: 2021-01-01

## 2021-01-01 RX ORDER — ACETAMINOPHEN 325 MG/1
975 TABLET ORAL ONCE
Status: COMPLETED | OUTPATIENT
Start: 2021-01-01 | End: 2021-01-01

## 2021-01-01 RX ORDER — HYDROMORPHONE HYDROCHLORIDE 1 MG/ML
.5-1 INJECTION, SOLUTION INTRAMUSCULAR; INTRAVENOUS; SUBCUTANEOUS
Status: DISCONTINUED | OUTPATIENT
Start: 2021-01-01 | End: 2021-01-01

## 2021-01-01 RX ORDER — ONDANSETRON 4 MG/1
4 TABLET, ORALLY DISINTEGRATING ORAL EVERY 30 MIN PRN
Status: DISCONTINUED | OUTPATIENT
Start: 2021-01-01 | End: 2021-01-01 | Stop reason: HOSPADM

## 2021-01-01 RX ORDER — BISACODYL 5 MG/1
TABLET, DELAYED RELEASE ORAL
Qty: 2 TABLET | Refills: 0 | Status: SHIPPED | OUTPATIENT
Start: 2021-01-01 | End: 2021-01-01

## 2021-01-01 RX ORDER — MORPHINE SULFATE 2 MG/ML
2 INJECTION, SOLUTION INTRAMUSCULAR; INTRAVENOUS ONCE
Status: COMPLETED | OUTPATIENT
Start: 2021-01-01 | End: 2021-01-01

## 2021-01-01 RX ORDER — ACETAMINOPHEN 325 MG/1
650 TABLET ORAL EVERY 6 HOURS PRN
Status: CANCELLED | OUTPATIENT
Start: 2021-01-01

## 2021-01-01 RX ORDER — QUETIAPINE FUMARATE 25 MG/1
25 TABLET, FILM COATED ORAL AT BEDTIME
Status: DISCONTINUED | OUTPATIENT
Start: 2021-01-01 | End: 2021-01-01

## 2021-01-01 RX ORDER — AMOXICILLIN 250 MG
1 CAPSULE ORAL 2 TIMES DAILY PRN
Status: DISCONTINUED | OUTPATIENT
Start: 2021-01-01 | End: 2021-01-01 | Stop reason: HOSPADM

## 2021-01-01 RX ORDER — FENTANYL CITRATE 50 UG/ML
25-50 INJECTION, SOLUTION INTRAMUSCULAR; INTRAVENOUS EVERY 5 MIN PRN
Status: DISCONTINUED | OUTPATIENT
Start: 2021-01-01 | End: 2021-01-01 | Stop reason: HOSPADM

## 2021-01-01 RX ORDER — ATROPINE SULFATE 10 MG/ML
1-2 SOLUTION/ DROPS OPHTHALMIC
Status: DISCONTINUED | OUTPATIENT
Start: 2021-01-01 | End: 2021-01-01 | Stop reason: HOSPADM

## 2021-01-01 RX ORDER — ACETAMINOPHEN 325 MG/1
650 TABLET ORAL EVERY 4 HOURS PRN
Qty: 90 TABLET | Refills: 3 | Status: SHIPPED | OUTPATIENT
Start: 2021-01-01

## 2021-01-01 RX ORDER — BUPIVACAINE HYDROCHLORIDE 5 MG/ML
INJECTION, SOLUTION PERINEURAL PRN
Status: DISCONTINUED | OUTPATIENT
Start: 2021-01-01 | End: 2021-01-01 | Stop reason: HOSPADM

## 2021-01-01 RX ORDER — POTASSIUM CHLORIDE 20MEQ/15ML
20 LIQUID (ML) ORAL ONCE
Status: DISCONTINUED | OUTPATIENT
Start: 2021-01-01 | End: 2021-01-01

## 2021-01-01 RX ORDER — CIPROFLOXACIN 500 MG/1
500 TABLET, FILM COATED ORAL EVERY 12 HOURS SCHEDULED
Status: DISCONTINUED | OUTPATIENT
Start: 2021-01-01 | End: 2021-01-01 | Stop reason: ALTCHOICE

## 2021-01-01 RX ORDER — BISACODYL 5 MG/1
TABLET, DELAYED RELEASE ORAL
Qty: 2 TABLET | Refills: 0 | Status: ON HOLD | OUTPATIENT
Start: 2021-01-01 | End: 2021-01-01

## 2021-01-01 RX ORDER — LIDOCAINE HYDROCHLORIDE 10 MG/ML
1-30 INJECTION, SOLUTION INFILTRATION; PERINEURAL
Status: COMPLETED | OUTPATIENT
Start: 2021-01-01 | End: 2021-01-01

## 2021-01-01 RX ORDER — BISACODYL 10 MG
10 SUPPOSITORY, RECTAL RECTAL
Status: DISCONTINUED | OUTPATIENT
Start: 2021-01-01 | End: 2021-01-01 | Stop reason: HOSPADM

## 2021-01-01 RX ORDER — IOPAMIDOL 755 MG/ML
77 INJECTION, SOLUTION INTRAVASCULAR ONCE
Status: COMPLETED | OUTPATIENT
Start: 2021-01-01 | End: 2021-01-01

## 2021-01-01 RX ORDER — HYDROMORPHONE HYDROCHLORIDE 1 MG/ML
0.2 INJECTION, SOLUTION INTRAMUSCULAR; INTRAVENOUS; SUBCUTANEOUS
Status: DISCONTINUED | OUTPATIENT
Start: 2021-01-01 | End: 2021-01-01 | Stop reason: ALTCHOICE

## 2021-01-01 RX ORDER — OLANZAPINE 10 MG/2ML
5 INJECTION, POWDER, FOR SOLUTION INTRAMUSCULAR ONCE
Status: COMPLETED | OUTPATIENT
Start: 2021-01-01 | End: 2021-01-01

## 2021-01-01 RX ORDER — NICOTINE POLACRILEX 4 MG
15-30 LOZENGE BUCCAL
Status: DISCONTINUED | OUTPATIENT
Start: 2021-01-01 | End: 2021-01-01

## 2021-01-01 RX ORDER — CLOBETASOL PROPIONATE 0.5 MG/G
CREAM TOPICAL 2 TIMES DAILY
Status: DISCONTINUED | OUTPATIENT
Start: 2021-01-01 | End: 2021-01-01 | Stop reason: HOSPADM

## 2021-01-01 RX ORDER — PROCHLORPERAZINE 25 MG
12.5 SUPPOSITORY, RECTAL RECTAL EVERY 12 HOURS PRN
Status: DISCONTINUED | OUTPATIENT
Start: 2021-01-01 | End: 2021-01-01

## 2021-01-01 RX ORDER — PANTOPRAZOLE SODIUM 40 MG/1
40 TABLET, DELAYED RELEASE ORAL DAILY
Status: DISCONTINUED | OUTPATIENT
Start: 2021-01-01 | End: 2021-01-01 | Stop reason: HOSPADM

## 2021-01-01 RX ORDER — ACETAMINOPHEN 325 MG/1
650 TABLET ORAL EVERY 4 HOURS PRN
Status: CANCELLED | OUTPATIENT
Start: 2021-01-01

## 2021-01-01 RX ORDER — ONDANSETRON 2 MG/ML
4 INJECTION INTRAMUSCULAR; INTRAVENOUS ONCE
Status: CANCELLED | OUTPATIENT
Start: 2021-01-01 | End: 2021-01-01

## 2021-01-01 RX ORDER — ACETAMINOPHEN 325 MG/1
650 TABLET ORAL EVERY 4 HOURS PRN
Status: DISCONTINUED | OUTPATIENT
Start: 2021-01-01 | End: 2021-01-01 | Stop reason: HOSPADM

## 2021-01-01 RX ORDER — OXYCODONE HYDROCHLORIDE 5 MG/1
2.5-5 TABLET ORAL EVERY 4 HOURS PRN
Qty: 15 TABLET | Refills: 0 | Status: ON HOLD | OUTPATIENT
Start: 2021-01-01 | End: 2021-01-01

## 2021-01-01 RX ADMIN — SODIUM CHLORIDE 500 ML: 9 INJECTION, SOLUTION INTRAVENOUS at 11:21

## 2021-01-01 RX ADMIN — ACETAMINOPHEN 650 MG: 325 TABLET, FILM COATED ORAL at 13:42

## 2021-01-01 RX ADMIN — MAGNESIUM OXIDE TAB 400 MG (241.3 MG ELEMENTAL MG) 400 MG: 400 (241.3 MG) TAB at 06:34

## 2021-01-01 RX ADMIN — CLOBETASOL PROPIONATE: 0.5 CREAM TOPICAL at 14:20

## 2021-01-01 RX ADMIN — ACETAMINOPHEN 650 MG: 325 TABLET, FILM COATED ORAL at 21:18

## 2021-01-01 RX ADMIN — SODIUM CHLORIDE 500 ML: 9 INJECTION, SOLUTION INTRAVENOUS at 08:20

## 2021-01-01 RX ADMIN — PANTOPRAZOLE SODIUM 40 MG: 40 TABLET, DELAYED RELEASE ORAL at 07:54

## 2021-01-01 RX ADMIN — POTASSIUM CHLORIDE 10 MEQ: 7.46 INJECTION, SOLUTION INTRAVENOUS at 14:01

## 2021-01-01 RX ADMIN — HYDROMORPHONE HYDROCHLORIDE 0.5 MG: 1 INJECTION, SOLUTION INTRAMUSCULAR; INTRAVENOUS; SUBCUTANEOUS at 15:21

## 2021-01-01 RX ADMIN — MAGNESIUM SULFATE HEPTAHYDRATE 2 G: 40 INJECTION, SOLUTION INTRAVENOUS at 10:51

## 2021-01-01 RX ADMIN — OXYCODONE HYDROCHLORIDE 2.5 MG: 5 TABLET ORAL at 21:43

## 2021-01-01 RX ADMIN — IBUPROFEN 600 MG: 600 TABLET ORAL at 21:27

## 2021-01-01 RX ADMIN — HYDROMORPHONE HYDROCHLORIDE 0.5 MG: 1 INJECTION, SOLUTION INTRAMUSCULAR; INTRAVENOUS; SUBCUTANEOUS at 08:57

## 2021-01-01 RX ADMIN — HYDROMORPHONE HYDROCHLORIDE 0.5 MG: 1 INJECTION, SOLUTION INTRAMUSCULAR; INTRAVENOUS; SUBCUTANEOUS at 08:38

## 2021-01-01 RX ADMIN — ACETAMINOPHEN 975 MG: 325 TABLET, FILM COATED ORAL at 11:27

## 2021-01-01 RX ADMIN — HEPARIN SODIUM 5000 UNITS: 10000 INJECTION, SOLUTION INTRAVENOUS; SUBCUTANEOUS at 22:44

## 2021-01-01 RX ADMIN — SODIUM CHLORIDE: 9 INJECTION, SOLUTION INTRAVENOUS at 19:59

## 2021-01-01 RX ADMIN — MELATONIN TAB 3 MG 3 MG: 3 TAB at 21:22

## 2021-01-01 RX ADMIN — FUROSEMIDE 20 MG: 10 INJECTION, SOLUTION INTRAMUSCULAR; INTRAVENOUS at 00:49

## 2021-01-01 RX ADMIN — IBUPROFEN 600 MG: 600 TABLET ORAL at 04:35

## 2021-01-01 RX ADMIN — CIPROFLOXACIN 400 MG: 2 INJECTION, SOLUTION INTRAVENOUS at 16:44

## 2021-01-01 RX ADMIN — LIDOCAINE HYDROCHLORIDE 10 ML: 20 SOLUTION ORAL; TOPICAL at 12:51

## 2021-01-01 RX ADMIN — SODIUM CHLORIDE, POTASSIUM CHLORIDE, SODIUM LACTATE AND CALCIUM CHLORIDE: 600; 310; 30; 20 INJECTION, SOLUTION INTRAVENOUS at 01:19

## 2021-01-01 RX ADMIN — PANTOPRAZOLE SODIUM 40 MG: 40 TABLET, DELAYED RELEASE ORAL at 07:41

## 2021-01-01 RX ADMIN — METRONIDAZOLE 500 MG: 500 INJECTION, SOLUTION INTRAVENOUS at 22:30

## 2021-01-01 RX ADMIN — ACETAMINOPHEN 650 MG: 325 TABLET, FILM COATED ORAL at 15:48

## 2021-01-01 RX ADMIN — TRAMADOL HYDROCHLORIDE 75 MG: 50 TABLET ORAL at 22:52

## 2021-01-01 RX ADMIN — POTASSIUM CHLORIDE 10 MEQ: 7.46 INJECTION, SOLUTION INTRAVENOUS at 09:44

## 2021-01-01 RX ADMIN — METRONIDAZOLE 500 MG: 500 INJECTION, SOLUTION INTRAVENOUS at 04:11

## 2021-01-01 RX ADMIN — ACETAMINOPHEN 650 MG: 325 TABLET, FILM COATED ORAL at 05:53

## 2021-01-01 RX ADMIN — IBUPROFEN 600 MG: 600 TABLET ORAL at 22:26

## 2021-01-01 RX ADMIN — POTASSIUM CHLORIDE 20 MEQ: 1500 TABLET, EXTENDED RELEASE ORAL at 05:53

## 2021-01-01 RX ADMIN — ONDANSETRON 4 MG: 2 INJECTION INTRAMUSCULAR; INTRAVENOUS at 08:39

## 2021-01-01 RX ADMIN — OXYCODONE HYDROCHLORIDE 5 MG: 5 TABLET ORAL at 07:28

## 2021-01-01 RX ADMIN — SODIUM CHLORIDE 1000 ML: 9 INJECTION, SOLUTION INTRAVENOUS at 01:15

## 2021-01-01 RX ADMIN — ACETAMINOPHEN 650 MG: 325 TABLET, FILM COATED ORAL at 08:33

## 2021-01-01 RX ADMIN — HYDROMORPHONE HYDROCHLORIDE 0.5 MG: 1 INJECTION, SOLUTION INTRAMUSCULAR; INTRAVENOUS; SUBCUTANEOUS at 01:25

## 2021-01-01 RX ADMIN — METRONIDAZOLE 500 MG: 500 TABLET ORAL at 08:48

## 2021-01-01 RX ADMIN — PIPERACILLIN SODIUM AND TAZOBACTAM SODIUM 2.25 G: 2; .25 INJECTION, POWDER, LYOPHILIZED, FOR SOLUTION INTRAVENOUS at 10:33

## 2021-01-01 RX ADMIN — ALBUTEROL SULFATE 2.5 MG: 2.5 SOLUTION RESPIRATORY (INHALATION) at 18:32

## 2021-01-01 RX ADMIN — CIPROFLOXACIN 400 MG: 2 INJECTION, SOLUTION INTRAVENOUS at 05:38

## 2021-01-01 RX ADMIN — LIDOCAINE HYDROCHLORIDE 10 ML: 20 SOLUTION ORAL; TOPICAL at 21:50

## 2021-01-01 RX ADMIN — IBUPROFEN 600 MG: 600 TABLET ORAL at 09:21

## 2021-01-01 RX ADMIN — ACETAMINOPHEN 650 MG: 325 TABLET, FILM COATED ORAL at 18:22

## 2021-01-01 RX ADMIN — GLUCAGON HYDROCHLORIDE 1 MG: KIT at 10:20

## 2021-01-01 RX ADMIN — OXYCODONE HYDROCHLORIDE 5 MG: 5 TABLET ORAL at 12:31

## 2021-01-01 RX ADMIN — HYDROMORPHONE HYDROCHLORIDE 0.5 MG: 1 INJECTION, SOLUTION INTRAMUSCULAR; INTRAVENOUS; SUBCUTANEOUS at 18:54

## 2021-01-01 RX ADMIN — PANTOPRAZOLE SODIUM 40 MG: 40 TABLET, DELAYED RELEASE ORAL at 08:20

## 2021-01-01 RX ADMIN — OXYCODONE HYDROCHLORIDE 5 MG: 5 TABLET ORAL at 17:10

## 2021-01-01 RX ADMIN — SODIUM CHLORIDE: 0.9 INJECTION, SOLUTION INTRAVENOUS at 13:21

## 2021-01-01 RX ADMIN — HYDROCODONE BITARTRATE AND ACETAMINOPHEN 1 TABLET: 5; 325 TABLET ORAL at 23:41

## 2021-01-01 RX ADMIN — FUROSEMIDE 20 MG: 10 INJECTION, SOLUTION INTRAMUSCULAR; INTRAVENOUS at 03:26

## 2021-01-01 RX ADMIN — HYDROMORPHONE HYDROCHLORIDE 0.5 MG: 1 INJECTION, SOLUTION INTRAMUSCULAR; INTRAVENOUS; SUBCUTANEOUS at 23:46

## 2021-01-01 RX ADMIN — OLANZAPINE 5 MG: 10 INJECTION, POWDER, LYOPHILIZED, FOR SOLUTION INTRAMUSCULAR at 13:12

## 2021-01-01 RX ADMIN — METRONIDAZOLE 500 MG: 500 INJECTION, SOLUTION INTRAVENOUS at 17:43

## 2021-01-01 RX ADMIN — SODIUM CHLORIDE 58 ML: 9 INJECTION, SOLUTION INTRAVENOUS at 15:13

## 2021-01-01 RX ADMIN — METRONIDAZOLE 500 MG: 500 INJECTION, SOLUTION INTRAVENOUS at 11:21

## 2021-01-01 RX ADMIN — ONDANSETRON 4 MG: 4 TABLET, ORALLY DISINTEGRATING ORAL at 03:57

## 2021-01-01 RX ADMIN — MAGNESIUM SULFATE HEPTAHYDRATE 2 G: 40 INJECTION, SOLUTION INTRAVENOUS at 07:26

## 2021-01-01 RX ADMIN — OXYCODONE HYDROCHLORIDE 2.5 MG: 5 TABLET ORAL at 11:27

## 2021-01-01 RX ADMIN — IBUPROFEN 600 MG: 600 TABLET ORAL at 21:33

## 2021-01-01 RX ADMIN — METRONIDAZOLE 500 MG: 500 INJECTION, SOLUTION INTRAVENOUS at 04:25

## 2021-01-01 RX ADMIN — POTASSIUM CHLORIDE, DEXTROSE MONOHYDRATE AND SODIUM CHLORIDE: 150; 5; 450 INJECTION, SOLUTION INTRAVENOUS at 09:55

## 2021-01-01 RX ADMIN — ONDANSETRON 4 MG: 4 TABLET, ORALLY DISINTEGRATING ORAL at 17:01

## 2021-01-01 RX ADMIN — ENOXAPARIN SODIUM 30 MG: 30 INJECTION SUBCUTANEOUS at 11:09

## 2021-01-01 RX ADMIN — ALBUTEROL SULFATE 2.5 MG: 2.5 SOLUTION RESPIRATORY (INHALATION) at 10:18

## 2021-01-01 RX ADMIN — SODIUM CHLORIDE, POTASSIUM CHLORIDE, SODIUM LACTATE AND CALCIUM CHLORIDE: 600; 310; 30; 20 INJECTION, SOLUTION INTRAVENOUS at 05:27

## 2021-01-01 RX ADMIN — HYDROMORPHONE HYDROCHLORIDE 0.5 MG: 1 INJECTION, SOLUTION INTRAMUSCULAR; INTRAVENOUS; SUBCUTANEOUS at 05:07

## 2021-01-01 RX ADMIN — POTASSIUM CHLORIDE 20 MEQ: 1.5 POWDER, FOR SOLUTION ORAL at 21:22

## 2021-01-01 RX ADMIN — LIDOCAINE 5% 1 PATCH: 700 PATCH TOPICAL at 21:42

## 2021-01-01 RX ADMIN — MELATONIN TAB 3 MG 3 MG: 3 TAB at 21:18

## 2021-01-01 RX ADMIN — CIPROFLOXACIN 400 MG: 2 INJECTION, SOLUTION INTRAVENOUS at 05:52

## 2021-01-01 RX ADMIN — MAGNESIUM OXIDE TAB 400 MG (241.3 MG ELEMENTAL MG) 400 MG: 400 (241.3 MG) TAB at 13:42

## 2021-01-01 RX ADMIN — CIPROFLOXACIN 400 MG: 2 INJECTION, SOLUTION INTRAVENOUS at 11:43

## 2021-01-01 RX ADMIN — CIPROFLOXACIN 400 MG: 2 INJECTION, SOLUTION INTRAVENOUS at 01:25

## 2021-01-01 RX ADMIN — CIPROFLOXACIN 400 MG: 2 INJECTION, SOLUTION INTRAVENOUS at 00:03

## 2021-01-01 RX ADMIN — LIDOCAINE 5% 1 PATCH: 700 PATCH TOPICAL at 21:18

## 2021-01-01 RX ADMIN — FENTANYL CITRATE 25 MCG: 50 INJECTION, SOLUTION INTRAMUSCULAR; INTRAVENOUS at 12:31

## 2021-01-01 RX ADMIN — MAGNESIUM SULFATE HEPTAHYDRATE 2 G: 40 INJECTION, SOLUTION INTRAVENOUS at 09:46

## 2021-01-01 RX ADMIN — POTASSIUM CHLORIDE AND SODIUM CHLORIDE: 900; 150 INJECTION, SOLUTION INTRAVENOUS at 23:01

## 2021-01-01 RX ADMIN — ACETAMINOPHEN 650 MG: 325 TABLET, FILM COATED ORAL at 09:54

## 2021-01-01 RX ADMIN — HYDROMORPHONE HYDROCHLORIDE 0.5 MG: 1 INJECTION, SOLUTION INTRAMUSCULAR; INTRAVENOUS; SUBCUTANEOUS at 08:22

## 2021-01-01 RX ADMIN — OXYCODONE HYDROCHLORIDE 5 MG: 5 TABLET ORAL at 21:03

## 2021-01-01 RX ADMIN — IBUPROFEN 600 MG: 600 TABLET ORAL at 03:05

## 2021-01-01 RX ADMIN — ACETAMINOPHEN 650 MG: 325 TABLET, FILM COATED ORAL at 09:50

## 2021-01-01 RX ADMIN — PANTOPRAZOLE SODIUM 40 MG: 40 INJECTION, POWDER, FOR SOLUTION INTRAVENOUS at 08:58

## 2021-01-01 RX ADMIN — IBUPROFEN 600 MG: 600 TABLET ORAL at 22:08

## 2021-01-01 RX ADMIN — ACETAMINOPHEN 650 MG: 325 TABLET, FILM COATED ORAL at 19:26

## 2021-01-01 RX ADMIN — FUROSEMIDE 20 MG: 10 INJECTION, SOLUTION INTRAMUSCULAR; INTRAVENOUS at 08:40

## 2021-01-01 RX ADMIN — METRONIDAZOLE 500 MG: 500 TABLET ORAL at 20:34

## 2021-01-01 RX ADMIN — ACETAMINOPHEN 975 MG: 325 TABLET, FILM COATED ORAL at 20:03

## 2021-01-01 RX ADMIN — HYDROMORPHONE HYDROCHLORIDE 0.5 MG: 1 INJECTION, SOLUTION INTRAMUSCULAR; INTRAVENOUS; SUBCUTANEOUS at 13:12

## 2021-01-01 RX ADMIN — CIPROFLOXACIN 400 MG: 2 INJECTION, SOLUTION INTRAVENOUS at 10:15

## 2021-01-01 RX ADMIN — SODIUM CHLORIDE: 9 INJECTION, SOLUTION INTRAVENOUS at 06:17

## 2021-01-01 RX ADMIN — ACETAMINOPHEN 650 MG: 325 TABLET, FILM COATED ORAL at 21:14

## 2021-01-01 RX ADMIN — ALBUTEROL SULFATE 2.5 MG: 2.5 SOLUTION RESPIRATORY (INHALATION) at 14:39

## 2021-01-01 RX ADMIN — PANTOPRAZOLE SODIUM 40 MG: 40 INJECTION, POWDER, FOR SOLUTION INTRAVENOUS at 16:48

## 2021-01-01 RX ADMIN — CIPROFLOXACIN 400 MG: 2 INJECTION, SOLUTION INTRAVENOUS at 21:32

## 2021-01-01 RX ADMIN — SODIUM CHLORIDE, POTASSIUM CHLORIDE, SODIUM LACTATE AND CALCIUM CHLORIDE: 600; 310; 30; 20 INJECTION, SOLUTION INTRAVENOUS at 02:08

## 2021-01-01 RX ADMIN — PANTOPRAZOLE SODIUM 40 MG: 40 TABLET, DELAYED RELEASE ORAL at 07:28

## 2021-01-01 RX ADMIN — POTASSIUM CHLORIDE 10 MEQ: 7.46 INJECTION, SOLUTION INTRAVENOUS at 11:23

## 2021-01-01 RX ADMIN — SODIUM CHLORIDE 500 ML: 9 INJECTION, SOLUTION INTRAVENOUS at 17:57

## 2021-01-01 RX ADMIN — GABAPENTIN 100 MG: 100 CAPSULE ORAL at 21:26

## 2021-01-01 RX ADMIN — HYDROMORPHONE HYDROCHLORIDE 0.5 MG: 1 INJECTION, SOLUTION INTRAMUSCULAR; INTRAVENOUS; SUBCUTANEOUS at 14:01

## 2021-01-01 RX ADMIN — PANTOPRAZOLE SODIUM 40 MG: 40 INJECTION, POWDER, FOR SOLUTION INTRAVENOUS at 08:13

## 2021-01-01 RX ADMIN — HEPARIN SODIUM 5000 UNITS: 10000 INJECTION, SOLUTION INTRAVENOUS; SUBCUTANEOUS at 22:39

## 2021-01-01 RX ADMIN — PANTOPRAZOLE SODIUM 40 MG: 40 INJECTION, POWDER, FOR SOLUTION INTRAVENOUS at 08:32

## 2021-01-01 RX ADMIN — HEPARIN SODIUM 5000 UNITS: 10000 INJECTION, SOLUTION INTRAVENOUS; SUBCUTANEOUS at 11:05

## 2021-01-01 RX ADMIN — LIDOCAINE 5% 1 PATCH: 700 PATCH TOPICAL at 21:23

## 2021-01-01 RX ADMIN — CIPROFLOXACIN 400 MG: 2 INJECTION, SOLUTION INTRAVENOUS at 22:57

## 2021-01-01 RX ADMIN — PROPOFOL 50 MG: 10 INJECTION, EMULSION INTRAVENOUS at 15:39

## 2021-01-01 RX ADMIN — AMOXICILLIN AND CLAVULANATE POTASSIUM 1 TABLET: 875; 125 TABLET, FILM COATED ORAL at 07:54

## 2021-01-01 RX ADMIN — KETOROLAC TROMETHAMINE 15 MG: 30 INJECTION, SOLUTION INTRAMUSCULAR at 14:50

## 2021-01-01 RX ADMIN — CIPROFLOXACIN 400 MG: 2 INJECTION, SOLUTION INTRAVENOUS at 18:54

## 2021-01-01 RX ADMIN — CIPROFLOXACIN 400 MG: 2 INJECTION, SOLUTION INTRAVENOUS at 22:33

## 2021-01-01 RX ADMIN — SODIUM CHLORIDE, POTASSIUM CHLORIDE, SODIUM LACTATE AND CALCIUM CHLORIDE: 600; 310; 30; 20 INJECTION, SOLUTION INTRAVENOUS at 12:17

## 2021-01-01 RX ADMIN — PIPERACILLIN AND TAZOBACTAM 2.25 G: 2; .25 INJECTION, POWDER, LYOPHILIZED, FOR SOLUTION INTRAVENOUS at 19:23

## 2021-01-01 RX ADMIN — IBUPROFEN 600 MG: 600 TABLET ORAL at 04:49

## 2021-01-01 RX ADMIN — FENTANYL CITRATE 25 MCG: 50 INJECTION, SOLUTION INTRAMUSCULAR; INTRAVENOUS at 11:28

## 2021-01-01 RX ADMIN — ENOXAPARIN SODIUM 30 MG: 30 INJECTION SUBCUTANEOUS at 09:21

## 2021-01-01 RX ADMIN — POTASSIUM CHLORIDE 10 MEQ: 7.46 INJECTION, SOLUTION INTRAVENOUS at 15:21

## 2021-01-01 RX ADMIN — PANTOPRAZOLE SODIUM 40 MG: 40 TABLET, DELAYED RELEASE ORAL at 09:33

## 2021-01-01 RX ADMIN — OXYCODONE HYDROCHLORIDE 2.5 MG: 5 TABLET ORAL at 01:20

## 2021-01-01 RX ADMIN — SODIUM CHLORIDE: 9 INJECTION, SOLUTION INTRAVENOUS at 16:31

## 2021-01-01 RX ADMIN — CIPROFLOXACIN 500 MG: KIT at 08:48

## 2021-01-01 RX ADMIN — LIDOCAINE HYDROCHLORIDE 10 ML: 20 SOLUTION ORAL; TOPICAL at 08:58

## 2021-01-01 RX ADMIN — ACETAMINOPHEN 650 MG: 325 TABLET, FILM COATED ORAL at 23:27

## 2021-01-01 RX ADMIN — HEPARIN SODIUM 5000 UNITS: 10000 INJECTION, SOLUTION INTRAVENOUS; SUBCUTANEOUS at 10:22

## 2021-01-01 RX ADMIN — CIPROFLOXACIN 400 MG: 2 INJECTION, SOLUTION INTRAVENOUS at 17:51

## 2021-01-01 RX ADMIN — DEXTROSE MONOHYDRATE: 50 INJECTION, SOLUTION INTRAVENOUS at 02:53

## 2021-01-01 RX ADMIN — ALBUTEROL SULFATE 2.5 MG: 2.5 SOLUTION RESPIRATORY (INHALATION) at 06:14

## 2021-01-01 RX ADMIN — IOPAMIDOL 77 ML: 755 INJECTION, SOLUTION INTRAVENOUS at 10:37

## 2021-01-01 RX ADMIN — ACETAMINOPHEN 650 MG: 325 TABLET, FILM COATED ORAL at 21:43

## 2021-01-01 RX ADMIN — CLOBETASOL PROPIONATE: 0.5 CREAM TOPICAL at 21:17

## 2021-01-01 RX ADMIN — HYDROMORPHONE HYDROCHLORIDE 0.5 MG: 1 INJECTION, SOLUTION INTRAMUSCULAR; INTRAVENOUS; SUBCUTANEOUS at 17:13

## 2021-01-01 RX ADMIN — MAGNESIUM OXIDE TAB 400 MG (241.3 MG ELEMENTAL MG) 400 MG: 400 (241.3 MG) TAB at 21:26

## 2021-01-01 RX ADMIN — Medication 25 MG: at 14:21

## 2021-01-01 RX ADMIN — PROCHLORPERAZINE EDISYLATE 5 MG: 5 INJECTION INTRAMUSCULAR; INTRAVENOUS at 20:51

## 2021-01-01 RX ADMIN — METRONIDAZOLE 500 MG: 500 INJECTION, SOLUTION INTRAVENOUS at 11:40

## 2021-01-01 RX ADMIN — LORAZEPAM 1 MG: 2 INJECTION, SOLUTION INTRAMUSCULAR; INTRAVENOUS at 22:06

## 2021-01-01 RX ADMIN — ENOXAPARIN SODIUM 30 MG: 30 INJECTION SUBCUTANEOUS at 10:10

## 2021-01-01 RX ADMIN — METRONIDAZOLE 500 MG: 500 INJECTION, SOLUTION INTRAVENOUS at 11:01

## 2021-01-01 RX ADMIN — HYDROMORPHONE HYDROCHLORIDE 0.5 MG: 1 INJECTION, SOLUTION INTRAMUSCULAR; INTRAVENOUS; SUBCUTANEOUS at 10:08

## 2021-01-01 RX ADMIN — POTASSIUM CHLORIDE 40 MEQ: 1500 TABLET, EXTENDED RELEASE ORAL at 01:56

## 2021-01-01 RX ADMIN — QUETIAPINE FUMARATE 25 MG: 25 TABLET ORAL at 22:44

## 2021-01-01 RX ADMIN — HYDROMORPHONE HYDROCHLORIDE 0.5 MG: 1 INJECTION, SOLUTION INTRAMUSCULAR; INTRAVENOUS; SUBCUTANEOUS at 16:29

## 2021-01-01 RX ADMIN — LIDOCAINE HYDROCHLORIDE 40 MG: 20 INJECTION, SOLUTION INFILTRATION; PERINEURAL at 09:14

## 2021-01-01 RX ADMIN — SODIUM CHLORIDE: 9 INJECTION, SOLUTION INTRAVENOUS at 21:55

## 2021-01-01 RX ADMIN — DIATRIZOATE MEGLUMINE AND DIATRIZOATE SODIUM 75 ML: 660; 100 SOLUTION ORAL; RECTAL at 19:00

## 2021-01-01 RX ADMIN — HYDROMORPHONE HYDROCHLORIDE 0.5 MG: 1 INJECTION, SOLUTION INTRAMUSCULAR; INTRAVENOUS; SUBCUTANEOUS at 15:55

## 2021-01-01 RX ADMIN — SODIUM CHLORIDE: 9 INJECTION, SOLUTION INTRAVENOUS at 04:36

## 2021-01-01 RX ADMIN — ACETAMINOPHEN 975 MG: 325 TABLET, FILM COATED ORAL at 11:52

## 2021-01-01 RX ADMIN — PIPERACILLIN AND TAZOBACTAM 2.25 G: 2; .25 INJECTION, POWDER, LYOPHILIZED, FOR SOLUTION INTRAVENOUS at 09:53

## 2021-01-01 RX ADMIN — IBUPROFEN 600 MG: 600 TABLET ORAL at 04:02

## 2021-01-01 RX ADMIN — METRONIDAZOLE 500 MG: 500 INJECTION, SOLUTION INTRAVENOUS at 16:23

## 2021-01-01 RX ADMIN — METRONIDAZOLE 500 MG: 500 TABLET ORAL at 20:16

## 2021-01-01 RX ADMIN — HYDROMORPHONE HYDROCHLORIDE 0.2 MG: 1 INJECTION, SOLUTION INTRAMUSCULAR; INTRAVENOUS; SUBCUTANEOUS at 06:29

## 2021-01-01 RX ADMIN — ACETAMINOPHEN 650 MG: 325 TABLET, FILM COATED ORAL at 16:05

## 2021-01-01 RX ADMIN — HYDROMORPHONE HYDROCHLORIDE 0.5 MG: 1 INJECTION, SOLUTION INTRAMUSCULAR; INTRAVENOUS; SUBCUTANEOUS at 17:58

## 2021-01-01 RX ADMIN — SODIUM CHLORIDE, POTASSIUM CHLORIDE, SODIUM LACTATE AND CALCIUM CHLORIDE: 600; 310; 30; 20 INJECTION, SOLUTION INTRAVENOUS at 14:56

## 2021-01-01 RX ADMIN — ONDANSETRON 4 MG: 2 INJECTION INTRAMUSCULAR; INTRAVENOUS at 11:43

## 2021-01-01 RX ADMIN — HYDROMORPHONE HYDROCHLORIDE 0.5 MG: 1 INJECTION, SOLUTION INTRAMUSCULAR; INTRAVENOUS; SUBCUTANEOUS at 03:48

## 2021-01-01 RX ADMIN — ROCURONIUM BROMIDE 30 MG: 10 INJECTION INTRAVENOUS at 14:29

## 2021-01-01 RX ADMIN — GABAPENTIN 100 MG: 100 CAPSULE ORAL at 21:17

## 2021-01-01 RX ADMIN — CIPROFLOXACIN 400 MG: 2 INJECTION, SOLUTION INTRAVENOUS at 05:09

## 2021-01-01 RX ADMIN — GABAPENTIN 100 MG: 100 CAPSULE ORAL at 21:22

## 2021-01-01 RX ADMIN — METRONIDAZOLE 500 MG: 500 INJECTION, SOLUTION INTRAVENOUS at 04:39

## 2021-01-01 RX ADMIN — ONDANSETRON 4 MG: 2 INJECTION INTRAMUSCULAR; INTRAVENOUS at 00:50

## 2021-01-01 RX ADMIN — ENOXAPARIN SODIUM 30 MG: 30 INJECTION SUBCUTANEOUS at 21:26

## 2021-01-01 RX ADMIN — ONDANSETRON 4 MG: 2 INJECTION INTRAMUSCULAR; INTRAVENOUS at 12:11

## 2021-01-01 RX ADMIN — METRONIDAZOLE 500 MG: 500 INJECTION, SOLUTION INTRAVENOUS at 14:38

## 2021-01-01 RX ADMIN — FENTANYL CITRATE 25 MCG: 50 INJECTION, SOLUTION INTRAMUSCULAR; INTRAVENOUS at 12:02

## 2021-01-01 RX ADMIN — LIDOCAINE 5% 1 PATCH: 700 PATCH TOPICAL at 22:05

## 2021-01-01 RX ADMIN — DEXTROSE MONOHYDRATE: 50 INJECTION, SOLUTION INTRAVENOUS at 13:40

## 2021-01-01 RX ADMIN — ONDANSETRON 4 MG: 2 INJECTION INTRAMUSCULAR; INTRAVENOUS at 09:43

## 2021-01-01 RX ADMIN — SODIUM CHLORIDE, POTASSIUM CHLORIDE, SODIUM LACTATE AND CALCIUM CHLORIDE: 600; 310; 30; 20 INJECTION, SOLUTION INTRAVENOUS at 12:57

## 2021-01-01 RX ADMIN — POTASSIUM CHLORIDE 10 MEQ: 7.46 INJECTION, SOLUTION INTRAVENOUS at 00:50

## 2021-01-01 RX ADMIN — HYDROMORPHONE HYDROCHLORIDE 0.5 MG: 1 INJECTION, SOLUTION INTRAMUSCULAR; INTRAVENOUS; SUBCUTANEOUS at 04:30

## 2021-01-01 RX ADMIN — ACETAMINOPHEN 975 MG: 325 TABLET, FILM COATED ORAL at 04:19

## 2021-01-01 RX ADMIN — ACETAMINOPHEN 650 MG: 325 TABLET, FILM COATED ORAL at 10:15

## 2021-01-01 RX ADMIN — CLOBETASOL PROPIONATE: 0.5 CREAM TOPICAL at 09:09

## 2021-01-01 RX ADMIN — SODIUM CHLORIDE: 9 INJECTION, SOLUTION INTRAVENOUS at 10:44

## 2021-01-01 RX ADMIN — ONDANSETRON 4 MG: 2 INJECTION INTRAMUSCULAR; INTRAVENOUS at 09:19

## 2021-01-01 RX ADMIN — LIDOCAINE 5% 1 PATCH: 700 PATCH TOPICAL at 22:19

## 2021-01-01 RX ADMIN — AMOXICILLIN AND CLAVULANATE POTASSIUM 1 TABLET: 875; 125 TABLET, FILM COATED ORAL at 19:30

## 2021-01-01 RX ADMIN — METRONIDAZOLE 500 MG: 500 INJECTION, SOLUTION INTRAVENOUS at 22:53

## 2021-01-01 RX ADMIN — HYDROCODONE BITARTRATE AND ACETAMINOPHEN 1 TABLET: 5; 325 TABLET ORAL at 18:32

## 2021-01-01 RX ADMIN — POTASSIUM CHLORIDE 20 MEQ: 1500 TABLET, EXTENDED RELEASE ORAL at 23:41

## 2021-01-01 RX ADMIN — HEPARIN SODIUM 5000 UNITS: 10000 INJECTION, SOLUTION INTRAVENOUS; SUBCUTANEOUS at 21:55

## 2021-01-01 RX ADMIN — PANTOPRAZOLE SODIUM 40 MG: 40 TABLET, DELAYED RELEASE ORAL at 08:53

## 2021-01-01 RX ADMIN — IBUPROFEN 600 MG: 600 TABLET ORAL at 16:00

## 2021-01-01 RX ADMIN — Medication 5 MG: at 12:06

## 2021-01-01 RX ADMIN — OLANZAPINE 5 MG: 5 TABLET, ORALLY DISINTEGRATING ORAL at 02:20

## 2021-01-01 RX ADMIN — ACETAMINOPHEN 650 MG: 325 TABLET, FILM COATED ORAL at 01:40

## 2021-01-01 RX ADMIN — METRONIDAZOLE 500 MG: 500 INJECTION, SOLUTION INTRAVENOUS at 04:02

## 2021-01-01 RX ADMIN — SODIUM CHLORIDE: 9 INJECTION, SOLUTION INTRAVENOUS at 17:15

## 2021-01-01 RX ADMIN — PIPERACILLIN SODIUM AND TAZOBACTAM SODIUM 2.25 G: 2; .25 INJECTION, POWDER, LYOPHILIZED, FOR SOLUTION INTRAVENOUS at 02:07

## 2021-01-01 RX ADMIN — HYDROMORPHONE HYDROCHLORIDE 0.2 MG: 1 INJECTION, SOLUTION INTRAMUSCULAR; INTRAVENOUS; SUBCUTANEOUS at 12:41

## 2021-01-01 RX ADMIN — MAGNESIUM SULFATE HEPTAHYDRATE 2 G: 40 INJECTION, SOLUTION INTRAVENOUS at 09:55

## 2021-01-01 RX ADMIN — POTASSIUM CHLORIDE 20 MEQ: 1500 TABLET, EXTENDED RELEASE ORAL at 20:03

## 2021-01-01 RX ADMIN — LIDOCAINE 5% 1 PATCH: 700 PATCH TOPICAL at 21:55

## 2021-01-01 RX ADMIN — SODIUM CHLORIDE: 9 INJECTION, SOLUTION INTRAVENOUS at 19:56

## 2021-01-01 RX ADMIN — ACETAMINOPHEN 650 MG: 325 TABLET, FILM COATED ORAL at 05:02

## 2021-01-01 RX ADMIN — SODIUM CHLORIDE: 0.9 INJECTION, SOLUTION INTRAVENOUS at 11:28

## 2021-01-01 RX ADMIN — POTASSIUM CHLORIDE AND SODIUM CHLORIDE: 900; 150 INJECTION, SOLUTION INTRAVENOUS at 21:45

## 2021-01-01 RX ADMIN — POTASSIUM CHLORIDE AND SODIUM CHLORIDE: 900; 150 INJECTION, SOLUTION INTRAVENOUS at 13:02

## 2021-01-01 RX ADMIN — OXYCODONE HYDROCHLORIDE 5 MG: 5 TABLET ORAL at 10:47

## 2021-01-01 RX ADMIN — OXYCODONE HYDROCHLORIDE 5 MG: 5 TABLET ORAL at 08:04

## 2021-01-01 RX ADMIN — OXYCODONE HYDROCHLORIDE 5 MG: 5 TABLET ORAL at 05:26

## 2021-01-01 RX ADMIN — PIPERACILLIN AND TAZOBACTAM 2.25 G: 2; .25 INJECTION, POWDER, LYOPHILIZED, FOR SOLUTION INTRAVENOUS at 14:00

## 2021-01-01 RX ADMIN — PANTOPRAZOLE SODIUM 40 MG: 40 TABLET, DELAYED RELEASE ORAL at 12:10

## 2021-01-01 RX ADMIN — POTASSIUM CHLORIDE 20 MEQ: 1.5 POWDER, FOR SOLUTION ORAL at 17:33

## 2021-01-01 RX ADMIN — ACETAMINOPHEN 1000 MG: 10 INJECTION, SOLUTION INTRAVENOUS at 11:45

## 2021-01-01 RX ADMIN — ACETAMINOPHEN 650 MG: 325 TABLET, FILM COATED ORAL at 22:23

## 2021-01-01 RX ADMIN — SODIUM CHLORIDE, POTASSIUM CHLORIDE, SODIUM LACTATE AND CALCIUM CHLORIDE: 600; 310; 30; 20 INJECTION, SOLUTION INTRAVENOUS at 18:00

## 2021-01-01 RX ADMIN — GABAPENTIN 100 MG: 100 CAPSULE ORAL at 21:18

## 2021-01-01 RX ADMIN — ACETAMINOPHEN 650 MG: 325 TABLET, FILM COATED ORAL at 21:20

## 2021-01-01 RX ADMIN — HYDROMORPHONE HYDROCHLORIDE 0.5 MG: 1 INJECTION, SOLUTION INTRAMUSCULAR; INTRAVENOUS; SUBCUTANEOUS at 10:44

## 2021-01-01 RX ADMIN — PANTOPRAZOLE SODIUM 40 MG: 40 TABLET, DELAYED RELEASE ORAL at 06:45

## 2021-01-01 RX ADMIN — LORAZEPAM 0.5 MG: 0.5 TABLET ORAL at 18:22

## 2021-01-01 RX ADMIN — ENOXAPARIN SODIUM 40 MG: 100 INJECTION SUBCUTANEOUS at 10:50

## 2021-01-01 RX ADMIN — PIPERACILLIN AND TAZOBACTAM 2.25 G: 2; .25 INJECTION, POWDER, LYOPHILIZED, FOR SOLUTION INTRAVENOUS at 19:56

## 2021-01-01 RX ADMIN — GABAPENTIN 100 MG: 100 CAPSULE ORAL at 21:34

## 2021-01-01 RX ADMIN — HEPARIN SODIUM 5000 UNITS: 10000 INJECTION, SOLUTION INTRAVENOUS; SUBCUTANEOUS at 09:50

## 2021-01-01 RX ADMIN — HEPARIN SODIUM 5000 UNITS: 10000 INJECTION, SOLUTION INTRAVENOUS; SUBCUTANEOUS at 09:18

## 2021-01-01 RX ADMIN — MELATONIN TAB 3 MG 3 MG: 3 TAB at 21:34

## 2021-01-01 RX ADMIN — METRONIDAZOLE 500 MG: 500 INJECTION, SOLUTION INTRAVENOUS at 12:18

## 2021-01-01 RX ADMIN — SODIUM CHLORIDE: 9 INJECTION, SOLUTION INTRAVENOUS at 20:47

## 2021-01-01 RX ADMIN — FENTANYL CITRATE 50 MCG: 50 INJECTION, SOLUTION INTRAMUSCULAR; INTRAVENOUS at 20:10

## 2021-01-01 RX ADMIN — POTASSIUM CHLORIDE 20 MEQ: 1500 TABLET, EXTENDED RELEASE ORAL at 11:02

## 2021-01-01 RX ADMIN — OXYCODONE HYDROCHLORIDE 5 MG: 5 TABLET ORAL at 22:23

## 2021-01-01 RX ADMIN — PROPOFOL 60 MG: 10 INJECTION, EMULSION INTRAVENOUS at 11:28

## 2021-01-01 RX ADMIN — LIDOCAINE 5% 1 PATCH: 700 PATCH TOPICAL at 22:39

## 2021-01-01 RX ADMIN — POTASSIUM CHLORIDE 10 MEQ: 7.46 INJECTION, SOLUTION INTRAVENOUS at 10:52

## 2021-01-01 RX ADMIN — OXYCODONE HYDROCHLORIDE 5 MG: 5 TABLET ORAL at 13:15

## 2021-01-01 RX ADMIN — POTASSIUM CHLORIDE 10 MEQ: 7.46 INJECTION, SOLUTION INTRAVENOUS at 12:44

## 2021-01-01 RX ADMIN — QUETIAPINE 12.5 MG: 25 TABLET, FILM COATED ORAL at 02:45

## 2021-01-01 RX ADMIN — SODIUM CHLORIDE 500 ML: 9 INJECTION, SOLUTION INTRAVENOUS at 11:59

## 2021-01-01 RX ADMIN — METRONIDAZOLE 500 MG: 500 INJECTION, SOLUTION INTRAVENOUS at 23:11

## 2021-01-01 RX ADMIN — IBUPROFEN 600 MG: 600 TABLET ORAL at 10:54

## 2021-01-01 RX ADMIN — HEPARIN SODIUM 5000 UNITS: 10000 INJECTION, SOLUTION INTRAVENOUS; SUBCUTANEOUS at 22:42

## 2021-01-01 RX ADMIN — CEFAZOLIN SODIUM 2 G: 2 INJECTION, SOLUTION INTRAVENOUS at 12:18

## 2021-01-01 RX ADMIN — IBUPROFEN 600 MG: 600 TABLET ORAL at 09:57

## 2021-01-01 RX ADMIN — SODIUM CHLORIDE, POTASSIUM CHLORIDE, SODIUM LACTATE AND CALCIUM CHLORIDE: 600; 310; 30; 20 INJECTION, SOLUTION INTRAVENOUS at 14:00

## 2021-01-01 RX ADMIN — OXYCODONE HYDROCHLORIDE 5 MG: 5 TABLET ORAL at 03:14

## 2021-01-01 RX ADMIN — SODIUM CHLORIDE: 9 INJECTION, SOLUTION INTRAVENOUS at 13:11

## 2021-01-01 RX ADMIN — SODIUM CHLORIDE: 9 INJECTION, SOLUTION INTRAVENOUS at 08:18

## 2021-01-01 RX ADMIN — SODIUM CHLORIDE: 9 INJECTION, SOLUTION INTRAVENOUS at 12:51

## 2021-01-01 RX ADMIN — LIDOCAINE 5% 1 PATCH: 700 PATCH TOPICAL at 21:27

## 2021-01-01 RX ADMIN — ALBUTEROL SULFATE 2.5 MG: 2.5 SOLUTION RESPIRATORY (INHALATION) at 19:31

## 2021-01-01 RX ADMIN — OLANZAPINE 5 MG: 5 TABLET, ORALLY DISINTEGRATING ORAL at 03:01

## 2021-01-01 RX ADMIN — METRONIDAZOLE 500 MG: 500 INJECTION, SOLUTION INTRAVENOUS at 10:33

## 2021-01-01 RX ADMIN — ONDANSETRON 4 MG: 4 TABLET, ORALLY DISINTEGRATING ORAL at 07:47

## 2021-01-01 RX ADMIN — SODIUM CHLORIDE, POTASSIUM CHLORIDE, SODIUM LACTATE AND CALCIUM CHLORIDE: 600; 310; 30; 20 INJECTION, SOLUTION INTRAVENOUS at 16:08

## 2021-01-01 RX ADMIN — ACETAMINOPHEN 975 MG: 325 TABLET, FILM COATED ORAL at 02:28

## 2021-01-01 RX ADMIN — LORAZEPAM 1 MG: 1 TABLET ORAL at 04:15

## 2021-01-01 RX ADMIN — CIPROFLOXACIN 500 MG: 500 TABLET, FILM COATED ORAL at 20:56

## 2021-01-01 RX ADMIN — QUETIAPINE FUMARATE 12.5 MG: 25 TABLET ORAL at 21:54

## 2021-01-01 RX ADMIN — PANTOPRAZOLE SODIUM 40 MG: 40 TABLET, DELAYED RELEASE ORAL at 07:42

## 2021-01-01 RX ADMIN — POTASSIUM CHLORIDE 10 MEQ: 20 SOLUTION ORAL at 16:00

## 2021-01-01 RX ADMIN — PANTOPRAZOLE SODIUM 40 MG: 40 TABLET, DELAYED RELEASE ORAL at 10:58

## 2021-01-01 RX ADMIN — TRAMADOL HYDROCHLORIDE 75 MG: 50 TABLET ORAL at 15:40

## 2021-01-01 RX ADMIN — OXYCODONE HYDROCHLORIDE 5 MG: 5 TABLET ORAL at 19:41

## 2021-01-01 RX ADMIN — AMOXICILLIN AND CLAVULANATE POTASSIUM 1 TABLET: 875; 125 TABLET, FILM COATED ORAL at 21:26

## 2021-01-01 RX ADMIN — SODIUM CHLORIDE: 9 INJECTION, SOLUTION INTRAVENOUS at 23:48

## 2021-01-01 RX ADMIN — DEXTROSE MONOHYDRATE 25 ML: 500 INJECTION PARENTERAL at 03:57

## 2021-01-01 RX ADMIN — POTASSIUM CHLORIDE 20 MEQ: 1.5 SOLUTION ORAL at 05:52

## 2021-01-01 RX ADMIN — GABAPENTIN 100 MG: 100 CAPSULE ORAL at 22:44

## 2021-01-01 RX ADMIN — SUGAMMADEX 70 MG: 100 INJECTION, SOLUTION INTRAVENOUS at 13:40

## 2021-01-01 RX ADMIN — ACETAMINOPHEN 975 MG: 325 TABLET, FILM COATED ORAL at 20:09

## 2021-01-01 RX ADMIN — AMOXICILLIN AND CLAVULANATE POTASSIUM 1 TABLET: 875; 125 TABLET, FILM COATED ORAL at 20:24

## 2021-01-01 RX ADMIN — PANTOPRAZOLE SODIUM 40 MG: 40 TABLET, DELAYED RELEASE ORAL at 10:18

## 2021-01-01 RX ADMIN — ACETAMINOPHEN 650 MG: 650 SUPPOSITORY RECTAL at 13:42

## 2021-01-01 RX ADMIN — MAGNESIUM SULFATE HEPTAHYDRATE 2 G: 40 INJECTION, SOLUTION INTRAVENOUS at 16:12

## 2021-01-01 RX ADMIN — Medication 10 MG: at 11:55

## 2021-01-01 RX ADMIN — CIPROFLOXACIN 400 MG: 2 INJECTION, SOLUTION INTRAVENOUS at 22:06

## 2021-01-01 RX ADMIN — QUETIAPINE FUMARATE 12.5 MG: 25 TABLET ORAL at 21:14

## 2021-01-01 RX ADMIN — METRONIDAZOLE 500 MG: 500 INJECTION, SOLUTION INTRAVENOUS at 21:51

## 2021-01-01 RX ADMIN — ALBUTEROL SULFATE 2.5 MG: 2.5 SOLUTION RESPIRATORY (INHALATION) at 06:34

## 2021-01-01 RX ADMIN — SODIUM CHLORIDE, POTASSIUM CHLORIDE, SODIUM LACTATE AND CALCIUM CHLORIDE: 600; 310; 30; 20 INJECTION, SOLUTION INTRAVENOUS at 20:10

## 2021-01-01 RX ADMIN — PROPOFOL 40 MG: 10 INJECTION, EMULSION INTRAVENOUS at 09:19

## 2021-01-01 RX ADMIN — PIPERACILLIN AND TAZOBACTAM 2.25 G: 2; .25 INJECTION, POWDER, LYOPHILIZED, FOR SOLUTION INTRAVENOUS at 01:03

## 2021-01-01 RX ADMIN — HYDROMORPHONE HYDROCHLORIDE 0.2 MG: 1 INJECTION, SOLUTION INTRAMUSCULAR; INTRAVENOUS; SUBCUTANEOUS at 08:37

## 2021-01-01 RX ADMIN — POTASSIUM CHLORIDE AND SODIUM CHLORIDE: 900; 150 INJECTION, SOLUTION INTRAVENOUS at 04:05

## 2021-01-01 RX ADMIN — SODIUM PHOSPHATE, MONOBASIC, MONOHYDRATE 9 MMOL: 276; 142 INJECTION, SOLUTION INTRAVENOUS at 16:35

## 2021-01-01 RX ADMIN — MIDAZOLAM HYDROCHLORIDE 0.5 MG: 1 INJECTION, SOLUTION INTRAMUSCULAR; INTRAVENOUS at 10:05

## 2021-01-01 RX ADMIN — PANTOPRAZOLE SODIUM 40 MG: 40 TABLET, DELAYED RELEASE ORAL at 11:45

## 2021-01-01 RX ADMIN — AMOXICILLIN AND CLAVULANATE POTASSIUM 1 TABLET: 875; 125 TABLET, FILM COATED ORAL at 12:09

## 2021-01-01 RX ADMIN — SODIUM CHLORIDE: 9 INJECTION, SOLUTION INTRAVENOUS at 09:33

## 2021-01-01 RX ADMIN — IBUPROFEN 600 MG: 600 TABLET ORAL at 17:40

## 2021-01-01 RX ADMIN — ACETAMINOPHEN 650 MG: 325 TABLET, FILM COATED ORAL at 21:26

## 2021-01-01 RX ADMIN — ENOXAPARIN SODIUM 30 MG: 30 INJECTION SUBCUTANEOUS at 09:50

## 2021-01-01 RX ADMIN — SODIUM CHLORIDE: 9 INJECTION, SOLUTION INTRAVENOUS at 19:15

## 2021-01-01 RX ADMIN — METRONIDAZOLE 500 MG: 500 INJECTION, SOLUTION INTRAVENOUS at 16:45

## 2021-01-01 RX ADMIN — SODIUM PHOSPHATE, MONOBASIC, MONOHYDRATE 9 MMOL: 276; 142 INJECTION, SOLUTION INTRAVENOUS at 11:10

## 2021-01-01 RX ADMIN — ACETAMINOPHEN 650 MG: 325 TABLET, FILM COATED ORAL at 11:27

## 2021-01-01 RX ADMIN — SODIUM CHLORIDE: 9 INJECTION, SOLUTION INTRAVENOUS at 23:23

## 2021-01-01 RX ADMIN — OXYCODONE HYDROCHLORIDE 5 MG: 5 TABLET ORAL at 01:28

## 2021-01-01 RX ADMIN — ONDANSETRON 4 MG: 2 INJECTION INTRAMUSCULAR; INTRAVENOUS at 18:21

## 2021-01-01 RX ADMIN — ROCURONIUM BROMIDE 30 MG: 10 INJECTION INTRAVENOUS at 11:28

## 2021-01-01 RX ADMIN — Medication 5 MG: at 08:51

## 2021-01-01 RX ADMIN — ACETAMINOPHEN 975 MG: 325 TABLET, FILM COATED ORAL at 20:05

## 2021-01-01 RX ADMIN — METRONIDAZOLE 500 MG: 500 INJECTION, SOLUTION INTRAVENOUS at 10:15

## 2021-01-01 RX ADMIN — ROCURONIUM BROMIDE 5 MG: 10 INJECTION INTRAVENOUS at 15:24

## 2021-01-01 RX ADMIN — ALBUTEROL SULFATE 2.5 MG: 2.5 SOLUTION RESPIRATORY (INHALATION) at 12:09

## 2021-01-01 RX ADMIN — PANTOPRAZOLE SODIUM 40 MG: 40 TABLET, DELAYED RELEASE ORAL at 08:34

## 2021-01-01 RX ADMIN — CIPROFLOXACIN 500 MG: 500 TABLET, FILM COATED ORAL at 08:54

## 2021-01-01 RX ADMIN — OLANZAPINE 5 MG: 5 TABLET, ORALLY DISINTEGRATING ORAL at 23:08

## 2021-01-01 RX ADMIN — PROPOFOL 125 MCG/KG/MIN: 10 INJECTION, EMULSION INTRAVENOUS at 15:39

## 2021-01-01 RX ADMIN — PIPERACILLIN SODIUM AND TAZOBACTAM SODIUM 2.25 G: 2; .25 INJECTION, POWDER, LYOPHILIZED, FOR SOLUTION INTRAVENOUS at 18:43

## 2021-01-01 RX ADMIN — FENTANYL CITRATE 50 MCG: 50 INJECTION, SOLUTION INTRAMUSCULAR; INTRAVENOUS at 15:31

## 2021-01-01 RX ADMIN — METRONIDAZOLE 500 MG: 500 INJECTION, SOLUTION INTRAVENOUS at 16:17

## 2021-01-01 RX ADMIN — CIPROFLOXACIN 400 MG: 2 INJECTION, SOLUTION INTRAVENOUS at 12:25

## 2021-01-01 RX ADMIN — ACETAMINOPHEN 975 MG: 325 TABLET, FILM COATED ORAL at 12:01

## 2021-01-01 RX ADMIN — METRONIDAZOLE 500 MG: 500 INJECTION, SOLUTION INTRAVENOUS at 21:34

## 2021-01-01 RX ADMIN — ACETAMINOPHEN 975 MG: 325 TABLET, FILM COATED ORAL at 04:49

## 2021-01-01 RX ADMIN — CIPROFLOXACIN 400 MG: 2 INJECTION, SOLUTION INTRAVENOUS at 05:35

## 2021-01-01 RX ADMIN — MAGNESIUM OXIDE TAB 400 MG (241.3 MG ELEMENTAL MG) 400 MG: 400 (241.3 MG) TAB at 10:47

## 2021-01-01 RX ADMIN — ACETAMINOPHEN 650 MG: 325 TABLET, FILM COATED ORAL at 20:46

## 2021-01-01 RX ADMIN — LORAZEPAM 1 MG: 2 INJECTION, SOLUTION INTRAMUSCULAR; INTRAVENOUS at 21:54

## 2021-01-01 RX ADMIN — SODIUM CHLORIDE 1000 ML: 9 INJECTION, SOLUTION INTRAVENOUS at 19:43

## 2021-01-01 RX ADMIN — CIPROFLOXACIN 400 MG: 2 INJECTION, SOLUTION INTRAVENOUS at 23:20

## 2021-01-01 RX ADMIN — LIDOCAINE HYDROCHLORIDE 40 MG: 20 INJECTION, SOLUTION INFILTRATION; PERINEURAL at 14:29

## 2021-01-01 RX ADMIN — PIPERACILLIN SODIUM AND TAZOBACTAM SODIUM 2.25 G: 2; .25 INJECTION, POWDER, LYOPHILIZED, FOR SOLUTION INTRAVENOUS at 02:02

## 2021-01-01 RX ADMIN — PANTOPRAZOLE SODIUM 40 MG: 40 TABLET, DELAYED RELEASE ORAL at 10:15

## 2021-01-01 RX ADMIN — POTASSIUM CHLORIDE, DEXTROSE MONOHYDRATE AND SODIUM CHLORIDE: 150; 5; 450 INJECTION, SOLUTION INTRAVENOUS at 01:58

## 2021-01-01 RX ADMIN — LIDOCAINE 5% 1 PATCH: 700 PATCH TOPICAL at 15:51

## 2021-01-01 RX ADMIN — IOPAMIDOL 72 ML: 755 INJECTION, SOLUTION INTRAVENOUS at 12:14

## 2021-01-01 RX ADMIN — METRONIDAZOLE 500 MG: 500 INJECTION, SOLUTION INTRAVENOUS at 22:52

## 2021-01-01 RX ADMIN — MAGNESIUM SULFATE HEPTAHYDRATE 2 G: 40 INJECTION, SOLUTION INTRAVENOUS at 08:45

## 2021-01-01 RX ADMIN — PANTOPRAZOLE SODIUM 40 MG: 40 TABLET, DELAYED RELEASE ORAL at 07:47

## 2021-01-01 RX ADMIN — MIDAZOLAM 1 MG: 1 INJECTION INTRAMUSCULAR; INTRAVENOUS at 14:21

## 2021-01-01 RX ADMIN — ACETAMINOPHEN 650 MG: 325 TABLET, FILM COATED ORAL at 21:31

## 2021-01-01 RX ADMIN — ACETAMINOPHEN 975 MG: 325 TABLET, FILM COATED ORAL at 11:56

## 2021-01-01 RX ADMIN — PANTOPRAZOLE SODIUM 40 MG: 40 TABLET, DELAYED RELEASE ORAL at 08:33

## 2021-01-01 RX ADMIN — CIPROFLOXACIN 500 MG: 500 TABLET, FILM COATED ORAL at 20:15

## 2021-01-01 RX ADMIN — DEXTROSE MONOHYDRATE: 50 INJECTION, SOLUTION INTRAVENOUS at 00:14

## 2021-01-01 RX ADMIN — HYDROMORPHONE HYDROCHLORIDE 0.2 MG: 1 INJECTION, SOLUTION INTRAMUSCULAR; INTRAVENOUS; SUBCUTANEOUS at 16:25

## 2021-01-01 RX ADMIN — POTASSIUM CHLORIDE 10 MEQ: 7.46 INJECTION, SOLUTION INTRAVENOUS at 18:01

## 2021-01-01 RX ADMIN — PROPOFOL 100 MG: 10 INJECTION, EMULSION INTRAVENOUS at 14:29

## 2021-01-01 RX ADMIN — SODIUM CHLORIDE, POTASSIUM CHLORIDE, SODIUM LACTATE AND CALCIUM CHLORIDE: 600; 310; 30; 20 INJECTION, SOLUTION INTRAVENOUS at 19:16

## 2021-01-01 RX ADMIN — METRONIDAZOLE 500 MG: 500 TABLET ORAL at 20:56

## 2021-01-01 RX ADMIN — PIPERACILLIN AND TAZOBACTAM 2.25 G: 2; .25 INJECTION, POWDER, LYOPHILIZED, FOR SOLUTION INTRAVENOUS at 14:39

## 2021-01-01 RX ADMIN — CLOBETASOL PROPIONATE: 0.5 CREAM TOPICAL at 11:22

## 2021-01-01 RX ADMIN — OXYCODONE HYDROCHLORIDE 2.5 MG: 5 TABLET ORAL at 22:18

## 2021-01-01 RX ADMIN — LIDOCAINE 5% 1 PATCH: 700 PATCH TOPICAL at 22:45

## 2021-01-01 RX ADMIN — POTASSIUM CHLORIDE AND SODIUM CHLORIDE: 900; 150 INJECTION, SOLUTION INTRAVENOUS at 19:44

## 2021-01-01 RX ADMIN — SODIUM CHLORIDE, POTASSIUM CHLORIDE, SODIUM LACTATE AND CALCIUM CHLORIDE: 600; 310; 30; 20 INJECTION, SOLUTION INTRAVENOUS at 18:32

## 2021-01-01 RX ADMIN — CIPROFLOXACIN 500 MG: KIT at 20:34

## 2021-01-01 RX ADMIN — PANTOPRAZOLE SODIUM 40 MG: 40 TABLET, DELAYED RELEASE ORAL at 08:03

## 2021-01-01 RX ADMIN — POTASSIUM CHLORIDE 40 MEQ: 1.5 POWDER, FOR SOLUTION ORAL at 13:24

## 2021-01-01 RX ADMIN — FENTANYL CITRATE 50 MCG: 50 INJECTION, SOLUTION INTRAMUSCULAR; INTRAVENOUS at 13:30

## 2021-01-01 RX ADMIN — ACETAMINOPHEN 650 MG: 325 TABLET, FILM COATED ORAL at 15:04

## 2021-01-01 RX ADMIN — Medication 10 MG: at 11:28

## 2021-01-01 RX ADMIN — DEXTROSE MONOHYDRATE 250 ML: 100 INJECTION, SOLUTION INTRAVENOUS at 05:53

## 2021-01-01 RX ADMIN — LIDOCAINE 5% 1 PATCH: 700 PATCH TOPICAL at 22:23

## 2021-01-01 RX ADMIN — PANTOPRAZOLE SODIUM 40 MG: 40 TABLET, DELAYED RELEASE ORAL at 06:47

## 2021-01-01 RX ADMIN — ACETAMINOPHEN 650 MG: 325 TABLET, FILM COATED ORAL at 06:35

## 2021-01-01 RX ADMIN — PANTOPRAZOLE SODIUM 40 MG: 40 TABLET, DELAYED RELEASE ORAL at 08:21

## 2021-01-01 RX ADMIN — SODIUM CHLORIDE: 9 INJECTION, SOLUTION INTRAVENOUS at 03:26

## 2021-01-01 RX ADMIN — IBUPROFEN 600 MG: 600 TABLET ORAL at 16:15

## 2021-01-01 RX ADMIN — HEPARIN SODIUM 5000 UNITS: 10000 INJECTION, SOLUTION INTRAVENOUS; SUBCUTANEOUS at 21:44

## 2021-01-01 RX ADMIN — IOPAMIDOL 62 ML: 755 INJECTION, SOLUTION INTRAVENOUS at 15:12

## 2021-01-01 RX ADMIN — IBUPROFEN 600 MG: 600 TABLET ORAL at 16:11

## 2021-01-01 RX ADMIN — ALBUTEROL SULFATE 2.5 MG: 2.5 SOLUTION RESPIRATORY (INHALATION) at 15:20

## 2021-01-01 RX ADMIN — GABAPENTIN 100 MG: 100 CAPSULE ORAL at 22:26

## 2021-01-01 RX ADMIN — PANTOPRAZOLE SODIUM 40 MG: 40 INJECTION, POWDER, FOR SOLUTION INTRAVENOUS at 08:55

## 2021-01-01 RX ADMIN — SODIUM CHLORIDE, POTASSIUM CHLORIDE, SODIUM LACTATE AND CALCIUM CHLORIDE: 600; 310; 30; 20 INJECTION, SOLUTION INTRAVENOUS at 15:43

## 2021-01-01 RX ADMIN — PIPERACILLIN AND TAZOBACTAM 2.25 G: 2; .25 INJECTION, POWDER, LYOPHILIZED, FOR SOLUTION INTRAVENOUS at 13:51

## 2021-01-01 RX ADMIN — GABAPENTIN 100 MG: 100 CAPSULE ORAL at 21:27

## 2021-01-01 RX ADMIN — POTASSIUM CHLORIDE AND SODIUM CHLORIDE: 900; 150 INJECTION, SOLUTION INTRAVENOUS at 20:38

## 2021-01-01 RX ADMIN — LIDOCAINE HYDROCHLORIDE 10 ML: 20 SOLUTION ORAL; TOPICAL at 07:51

## 2021-01-01 RX ADMIN — AMOXICILLIN AND CLAVULANATE POTASSIUM 1 TABLET: 875; 125 TABLET, FILM COATED ORAL at 08:33

## 2021-01-01 RX ADMIN — SODIUM PHOSPHATE, MONOBASIC, MONOHYDRATE 9 MMOL: 276; 142 INJECTION, SOLUTION INTRAVENOUS at 15:09

## 2021-01-01 RX ADMIN — PIPERACILLIN AND TAZOBACTAM 2.25 G: 2; .25 INJECTION, POWDER, LYOPHILIZED, FOR SOLUTION INTRAVENOUS at 08:14

## 2021-01-01 RX ADMIN — METRONIDAZOLE 500 MG: 500 INJECTION, SOLUTION INTRAVENOUS at 21:19

## 2021-01-01 RX ADMIN — HYDROMORPHONE HYDROCHLORIDE 0.5 MG: 1 INJECTION, SOLUTION INTRAMUSCULAR; INTRAVENOUS; SUBCUTANEOUS at 08:52

## 2021-01-01 RX ADMIN — IOPAMIDOL 75 ML: 755 INJECTION, SOLUTION INTRAVENOUS at 17:39

## 2021-01-01 RX ADMIN — CIPROFLOXACIN 400 MG: 2 INJECTION, SOLUTION INTRAVENOUS at 05:33

## 2021-01-01 RX ADMIN — OLANZAPINE 2.5 MG: 10 INJECTION, POWDER, LYOPHILIZED, FOR SOLUTION INTRAMUSCULAR at 09:44

## 2021-01-01 RX ADMIN — ACETAMINOPHEN 650 MG: 325 TABLET, FILM COATED ORAL at 14:06

## 2021-01-01 RX ADMIN — SODIUM CHLORIDE: 9 INJECTION, SOLUTION INTRAVENOUS at 11:32

## 2021-01-01 RX ADMIN — SODIUM CHLORIDE 1000 ML: 9 INJECTION, SOLUTION INTRAVENOUS at 11:43

## 2021-01-01 RX ADMIN — PANTOPRAZOLE SODIUM 40 MG: 40 TABLET, DELAYED RELEASE ORAL at 11:01

## 2021-01-01 RX ADMIN — POTASSIUM CHLORIDE 10 MEQ: 7.46 INJECTION, SOLUTION INTRAVENOUS at 12:58

## 2021-01-01 RX ADMIN — PROPOFOL 100 MCG/KG/MIN: 10 INJECTION, EMULSION INTRAVENOUS at 09:14

## 2021-01-01 RX ADMIN — IBUPROFEN 600 MG: 600 TABLET ORAL at 10:10

## 2021-01-01 RX ADMIN — POTASSIUM CHLORIDE 10 MEQ: 7.46 INJECTION, SOLUTION INTRAVENOUS at 20:16

## 2021-01-01 RX ADMIN — FENTANYL CITRATE 25 MCG: 50 INJECTION, SOLUTION INTRAMUSCULAR; INTRAVENOUS at 10:04

## 2021-01-01 RX ADMIN — HYDROMORPHONE HYDROCHLORIDE 0.5 MG: 1 INJECTION, SOLUTION INTRAMUSCULAR; INTRAVENOUS; SUBCUTANEOUS at 20:22

## 2021-01-01 RX ADMIN — CIPROFLOXACIN 400 MG: 2 INJECTION, SOLUTION INTRAVENOUS at 18:50

## 2021-01-01 RX ADMIN — METRONIDAZOLE 500 MG: 500 INJECTION, SOLUTION INTRAVENOUS at 21:39

## 2021-01-01 RX ADMIN — PROCHLORPERAZINE MALEATE 5 MG: 5 TABLET ORAL at 08:28

## 2021-01-01 RX ADMIN — TAZOBACTAM SODIUM AND PIPERACILLIN SODIUM 4.5 G: 500; 4 INJECTION, SOLUTION INTRAVENOUS at 02:14

## 2021-01-01 RX ADMIN — DEXTROSE MONOHYDRATE 25 ML: 500 INJECTION PARENTERAL at 05:03

## 2021-01-01 RX ADMIN — CLOBETASOL PROPIONATE: 0.5 CREAM TOPICAL at 11:01

## 2021-01-01 RX ADMIN — PIPERACILLIN AND TAZOBACTAM 2.25 G: 2; .25 INJECTION, POWDER, LYOPHILIZED, FOR SOLUTION INTRAVENOUS at 20:22

## 2021-01-01 RX ADMIN — CLOBETASOL PROPIONATE: 0.5 CREAM TOPICAL at 10:28

## 2021-01-01 RX ADMIN — GABAPENTIN 100 MG: 100 CAPSULE ORAL at 22:39

## 2021-01-01 RX ADMIN — PROPOFOL 50 MG: 10 INJECTION, EMULSION INTRAVENOUS at 09:14

## 2021-01-01 RX ADMIN — PANTOPRAZOLE SODIUM 40 MG: 40 INJECTION, POWDER, FOR SOLUTION INTRAVENOUS at 08:49

## 2021-01-01 RX ADMIN — POTASSIUM CHLORIDE 10 MEQ: 7.46 INJECTION, SOLUTION INTRAVENOUS at 16:18

## 2021-01-01 RX ADMIN — PHENYLEPHRINE HYDROCHLORIDE 100 MCG: 10 INJECTION INTRAVENOUS at 11:36

## 2021-01-01 RX ADMIN — MAGNESIUM SULFATE HEPTAHYDRATE 2 G: 40 INJECTION, SOLUTION INTRAVENOUS at 15:40

## 2021-01-01 RX ADMIN — HYDROMORPHONE HYDROCHLORIDE 1 MG: 1 INJECTION, SOLUTION INTRAMUSCULAR; INTRAVENOUS; SUBCUTANEOUS at 15:17

## 2021-01-01 RX ADMIN — MORPHINE SULFATE 2 MG: 2 INJECTION, SOLUTION INTRAMUSCULAR; INTRAVENOUS at 18:15

## 2021-01-01 RX ADMIN — GABAPENTIN 100 MG: 100 CAPSULE ORAL at 22:08

## 2021-01-01 RX ADMIN — POTASSIUM CHLORIDE 10 MEQ: 7.46 INJECTION, SOLUTION INTRAVENOUS at 21:25

## 2021-01-01 RX ADMIN — CLOBETASOL PROPIONATE: 0.5 CREAM TOPICAL at 11:43

## 2021-01-01 RX ADMIN — OXYCODONE HYDROCHLORIDE 5 MG: 5 TABLET ORAL at 15:58

## 2021-01-01 RX ADMIN — PANTOPRAZOLE SODIUM 40 MG: 40 INJECTION, POWDER, FOR SOLUTION INTRAVENOUS at 09:44

## 2021-01-01 RX ADMIN — SODIUM CHLORIDE 8 MCG: 900 INJECTION INTRAVENOUS at 15:32

## 2021-01-01 RX ADMIN — TAZOBACTAM SODIUM AND PIPERACILLIN SODIUM 4.5 G: 500; 4 INJECTION, SOLUTION INTRAVENOUS at 20:11

## 2021-01-01 RX ADMIN — METRONIDAZOLE 500 MG: 500 INJECTION, SOLUTION INTRAVENOUS at 16:00

## 2021-01-01 RX ADMIN — METRONIDAZOLE 500 MG: 500 INJECTION, SOLUTION INTRAVENOUS at 09:56

## 2021-01-01 RX ADMIN — POTASSIUM CHLORIDE, DEXTROSE MONOHYDRATE AND SODIUM CHLORIDE: 150; 5; 450 INJECTION, SOLUTION INTRAVENOUS at 13:27

## 2021-01-01 RX ADMIN — FENTANYL CITRATE 25 MCG: 50 INJECTION, SOLUTION INTRAMUSCULAR; INTRAVENOUS at 09:19

## 2021-01-01 RX ADMIN — LORAZEPAM 0.5 MG: 0.5 TABLET ORAL at 04:39

## 2021-01-01 RX ADMIN — OXYCODONE HYDROCHLORIDE 5 MG: 5 TABLET ORAL at 02:19

## 2021-01-01 RX ADMIN — HYDROMORPHONE HYDROCHLORIDE 1 MG: 1 INJECTION, SOLUTION INTRAMUSCULAR; INTRAVENOUS; SUBCUTANEOUS at 13:38

## 2021-01-01 RX ADMIN — HEPARIN SODIUM 5000 UNITS: 10000 INJECTION, SOLUTION INTRAVENOUS; SUBCUTANEOUS at 09:34

## 2021-01-01 RX ADMIN — LIDOCAINE HYDROCHLORIDE 10 ML: 10 INJECTION, SOLUTION INFILTRATION; PERINEURAL at 10:44

## 2021-01-01 RX ADMIN — DEXAMETHASONE SODIUM PHOSPHATE 4 MG: 4 INJECTION, SOLUTION INTRA-ARTICULAR; INTRALESIONAL; INTRAMUSCULAR; INTRAVENOUS; SOFT TISSUE at 11:28

## 2021-01-01 RX ADMIN — ACETAMINOPHEN 650 MG: 325 TABLET, FILM COATED ORAL at 20:47

## 2021-01-01 RX ADMIN — SODIUM CHLORIDE: 9 INJECTION, SOLUTION INTRAVENOUS at 16:01

## 2021-01-01 RX ADMIN — HYDROMORPHONE HYDROCHLORIDE 0.2 MG: 1 INJECTION, SOLUTION INTRAMUSCULAR; INTRAVENOUS; SUBCUTANEOUS at 12:49

## 2021-01-01 RX ADMIN — AMOXICILLIN AND CLAVULANATE POTASSIUM 1 TABLET: 875; 125 TABLET, FILM COATED ORAL at 07:48

## 2021-01-01 RX ADMIN — LORAZEPAM 1 MG: 2 INJECTION, SOLUTION INTRAMUSCULAR; INTRAVENOUS at 12:54

## 2021-01-01 RX ADMIN — OXYCODONE HYDROCHLORIDE 2.5 MG: 5 TABLET ORAL at 10:20

## 2021-01-01 RX ADMIN — DEXTROSE MONOHYDRATE: 50 INJECTION, SOLUTION INTRAVENOUS at 11:33

## 2021-01-01 RX ADMIN — LIDOCAINE 5% 1 PATCH: 700 PATCH TOPICAL at 21:49

## 2021-01-01 RX ADMIN — CIPROFLOXACIN 400 MG: 2 INJECTION, SOLUTION INTRAVENOUS at 14:23

## 2021-01-01 RX ADMIN — HYDROMORPHONE HYDROCHLORIDE 0.5 MG: 1 INJECTION, SOLUTION INTRAMUSCULAR; INTRAVENOUS; SUBCUTANEOUS at 04:06

## 2021-01-01 RX ADMIN — CIPROFLOXACIN 400 MG: 2 INJECTION, SOLUTION INTRAVENOUS at 11:03

## 2021-01-01 RX ADMIN — MAGNESIUM SULFATE HEPTAHYDRATE 2 G: 40 INJECTION, SOLUTION INTRAVENOUS at 00:50

## 2021-01-01 RX ADMIN — HYDROMORPHONE HYDROCHLORIDE 0.5 MG: 1 INJECTION, SOLUTION INTRAMUSCULAR; INTRAVENOUS; SUBCUTANEOUS at 21:26

## 2021-01-01 RX ADMIN — OXYCODONE HYDROCHLORIDE 5 MG: 5 TABLET ORAL at 22:39

## 2021-01-01 RX ADMIN — PIPERACILLIN AND TAZOBACTAM 2.25 G: 2; .25 INJECTION, POWDER, LYOPHILIZED, FOR SOLUTION INTRAVENOUS at 02:07

## 2021-01-01 RX ADMIN — PANTOPRAZOLE SODIUM 40 MG: 40 INJECTION, POWDER, FOR SOLUTION INTRAVENOUS at 08:15

## 2021-01-01 RX ADMIN — ACETAMINOPHEN 650 MG: 325 TABLET, FILM COATED ORAL at 14:40

## 2021-01-01 RX ADMIN — QUETIAPINE FUMARATE 12.5 MG: 25 TABLET ORAL at 22:05

## 2021-01-01 RX ADMIN — ACETAMINOPHEN 650 MG: 325 TABLET, FILM COATED ORAL at 22:39

## 2021-01-01 RX ADMIN — METRONIDAZOLE 500 MG: 500 INJECTION, SOLUTION INTRAVENOUS at 17:48

## 2021-01-01 RX ADMIN — LIDOCAINE HYDROCHLORIDE 5 ML: 10 INJECTION, SOLUTION INFILTRATION; PERINEURAL at 18:55

## 2021-01-01 RX ADMIN — CIPROFLOXACIN 500 MG: 500 TABLET, FILM COATED ORAL at 08:53

## 2021-01-01 RX ADMIN — LIDOCAINE HYDROCHLORIDE 10 ML: 20 SOLUTION ORAL; TOPICAL at 17:16

## 2021-01-01 RX ADMIN — CIPROFLOXACIN 400 MG: 2 INJECTION, SOLUTION INTRAVENOUS at 05:14

## 2021-01-01 RX ADMIN — FENTANYL CITRATE 25 MCG: 50 INJECTION, SOLUTION INTRAMUSCULAR; INTRAVENOUS at 11:45

## 2021-01-01 RX ADMIN — POTASSIUM CHLORIDE 40 MEQ: 1.5 SOLUTION ORAL at 22:34

## 2021-01-01 RX ADMIN — DEXTROSE MONOHYDRATE: 50 INJECTION, SOLUTION INTRAVENOUS at 13:20

## 2021-01-01 RX ADMIN — KETOROLAC TROMETHAMINE 15 MG: 15 INJECTION, SOLUTION INTRAMUSCULAR; INTRAVENOUS at 10:51

## 2021-01-01 RX ADMIN — ACETAMINOPHEN 650 MG: 325 TABLET, FILM COATED ORAL at 03:20

## 2021-01-01 RX ADMIN — METRONIDAZOLE 500 MG: 500 TABLET ORAL at 08:53

## 2021-01-01 RX ADMIN — CIPROFLOXACIN 400 MG: 2 INJECTION, SOLUTION INTRAVENOUS at 12:22

## 2021-01-01 RX ADMIN — HEPARIN SODIUM 5000 UNITS: 10000 INJECTION, SOLUTION INTRAVENOUS; SUBCUTANEOUS at 10:33

## 2021-01-01 RX ADMIN — PANTOPRAZOLE SODIUM 40 MG: 40 TABLET, DELAYED RELEASE ORAL at 07:09

## 2021-01-01 RX ADMIN — QUETIAPINE FUMARATE 12.5 MG: 25 TABLET ORAL at 21:37

## 2021-01-01 RX ADMIN — PANTOPRAZOLE SODIUM 40 MG: 40 TABLET, DELAYED RELEASE ORAL at 10:00

## 2021-01-01 RX ADMIN — CIPROFLOXACIN 400 MG: 2 INJECTION, SOLUTION INTRAVENOUS at 16:49

## 2021-01-01 RX ADMIN — SODIUM CHLORIDE: 9 INJECTION, SOLUTION INTRAVENOUS at 00:52

## 2021-01-01 RX ADMIN — CIPROFLOXACIN 400 MG: 2 INJECTION, SOLUTION INTRAVENOUS at 17:30

## 2021-01-01 RX ADMIN — PIPERACILLIN SODIUM AND TAZOBACTAM SODIUM 2.25 G: 2; .25 INJECTION, POWDER, LYOPHILIZED, FOR SOLUTION INTRAVENOUS at 10:52

## 2021-01-01 RX ADMIN — IOPAMIDOL 100 ML: 755 INJECTION, SOLUTION INTRAVENOUS at 14:46

## 2021-01-01 RX ADMIN — MAGNESIUM SULFATE HEPTAHYDRATE 2 G: 40 INJECTION, SOLUTION INTRAVENOUS at 06:22

## 2021-01-01 RX ADMIN — DEXAMETHASONE SODIUM PHOSPHATE 4 MG: 4 INJECTION, SOLUTION INTRA-ARTICULAR; INTRALESIONAL; INTRAMUSCULAR; INTRAVENOUS; SOFT TISSUE at 14:50

## 2021-01-01 RX ADMIN — GABAPENTIN 100 MG: 100 CAPSULE ORAL at 21:03

## 2021-01-01 RX ADMIN — SODIUM CHLORIDE 500 ML: 9 INJECTION, SOLUTION INTRAVENOUS at 09:25

## 2021-01-01 RX ADMIN — ALBUTEROL SULFATE 2.5 MG: 2.5 SOLUTION RESPIRATORY (INHALATION) at 22:23

## 2021-01-01 RX ADMIN — SODIUM CHLORIDE, POTASSIUM CHLORIDE, SODIUM LACTATE AND CALCIUM CHLORIDE 500 ML: 600; 310; 30; 20 INJECTION, SOLUTION INTRAVENOUS at 20:39

## 2021-01-01 RX ADMIN — PIPERACILLIN AND TAZOBACTAM 2.25 G: 2; .25 INJECTION, POWDER, LYOPHILIZED, FOR SOLUTION INTRAVENOUS at 08:22

## 2021-01-01 RX ADMIN — LIDOCAINE HYDROCHLORIDE ANHYDROUS 3 ML: 10 INJECTION, SOLUTION INFILTRATION at 10:38

## 2021-01-01 RX ADMIN — HYDROMORPHONE HYDROCHLORIDE 0.5 MG: 1 INJECTION, SOLUTION INTRAMUSCULAR; INTRAVENOUS; SUBCUTANEOUS at 07:51

## 2021-01-01 RX ADMIN — ONDANSETRON 4 MG: 2 INJECTION INTRAMUSCULAR; INTRAVENOUS at 09:39

## 2021-01-01 RX ADMIN — KETOROLAC TROMETHAMINE 15 MG: 15 INJECTION, SOLUTION INTRAMUSCULAR; INTRAVENOUS at 02:30

## 2021-01-01 RX ADMIN — CLOBETASOL PROPIONATE: 0.5 CREAM TOPICAL at 11:45

## 2021-01-01 RX ADMIN — METRONIDAZOLE 500 MG: 500 INJECTION, SOLUTION INTRAVENOUS at 09:09

## 2021-01-01 RX ADMIN — ONDANSETRON 4 MG: 2 INJECTION INTRAMUSCULAR; INTRAVENOUS at 06:14

## 2021-01-01 RX ADMIN — LIDOCAINE HYDROCHLORIDE 20 MG: 20 INJECTION, SOLUTION INFILTRATION; PERINEURAL at 11:28

## 2021-01-01 RX ADMIN — PIPERACILLIN SODIUM AND TAZOBACTAM SODIUM 2.25 G: 2; .25 INJECTION, POWDER, LYOPHILIZED, FOR SOLUTION INTRAVENOUS at 18:18

## 2021-01-01 RX ADMIN — METRONIDAZOLE 500 MG: 500 TABLET ORAL at 08:54

## 2021-01-01 RX ADMIN — FENTANYL CITRATE 25 MCG: 50 INJECTION, SOLUTION INTRAMUSCULAR; INTRAVENOUS at 12:27

## 2021-01-01 RX ADMIN — IBUPROFEN 600 MG: 600 TABLET ORAL at 04:19

## 2021-01-01 RX ADMIN — ENOXAPARIN SODIUM 30 MG: 30 INJECTION SUBCUTANEOUS at 10:54

## 2021-01-01 RX ADMIN — CIPROFLOXACIN 400 MG: 2 INJECTION, SOLUTION INTRAVENOUS at 23:41

## 2021-01-01 RX ADMIN — ENOXAPARIN SODIUM 30 MG: 30 INJECTION SUBCUTANEOUS at 09:57

## 2021-01-01 RX ADMIN — LIDOCAINE HYDROCHLORIDE 0.5 ML: 10 INJECTION, SOLUTION INFILTRATION; PERINEURAL at 16:11

## 2021-01-01 RX ADMIN — FUROSEMIDE 20 MG: 10 INJECTION, SOLUTION INTRAMUSCULAR; INTRAVENOUS at 17:06

## 2021-01-01 RX ADMIN — POTASSIUM CHLORIDE 20 MEQ: 1.5 POWDER, FOR SOLUTION ORAL at 15:53

## 2021-01-01 RX ADMIN — ROCURONIUM BROMIDE 10 MG: 10 INJECTION INTRAVENOUS at 16:38

## 2021-01-01 RX ADMIN — HYDROMORPHONE HYDROCHLORIDE 0.5 MG: 1 INJECTION, SOLUTION INTRAMUSCULAR; INTRAVENOUS; SUBCUTANEOUS at 12:25

## 2021-01-01 RX ADMIN — CIPROFLOXACIN 400 MG: 2 INJECTION, SOLUTION INTRAVENOUS at 14:13

## 2021-01-01 RX ADMIN — LORAZEPAM 1 MG: 2 INJECTION, SOLUTION INTRAMUSCULAR; INTRAVENOUS at 10:18

## 2021-01-01 RX ADMIN — ONDANSETRON 4 MG: 2 INJECTION INTRAMUSCULAR; INTRAVENOUS at 12:12

## 2021-01-01 RX ADMIN — SUGAMMADEX 200 MG: 100 INJECTION, SOLUTION INTRAVENOUS at 17:21

## 2021-01-01 RX ADMIN — SODIUM PHOSPHATE, MONOBASIC, MONOHYDRATE 9 MMOL: 276; 142 INJECTION, SOLUTION INTRAVENOUS at 14:41

## 2021-01-01 RX ADMIN — ALBUTEROL SULFATE 2.5 MG: 2.5 SOLUTION RESPIRATORY (INHALATION) at 23:12

## 2021-01-01 RX ADMIN — AMOXICILLIN AND CLAVULANATE POTASSIUM 1 TABLET: 875; 125 TABLET, FILM COATED ORAL at 20:51

## 2021-01-01 RX ADMIN — HYDROMORPHONE HYDROCHLORIDE 0.5 MG: 1 INJECTION, SOLUTION INTRAMUSCULAR; INTRAVENOUS; SUBCUTANEOUS at 05:56

## 2021-01-01 RX ADMIN — HYDROMORPHONE HYDROCHLORIDE 0.5 MG: 1 INJECTION, SOLUTION INTRAMUSCULAR; INTRAVENOUS; SUBCUTANEOUS at 21:55

## 2021-01-01 RX ADMIN — HYDROMORPHONE HYDROCHLORIDE 0.5 MG: 1 INJECTION, SOLUTION INTRAMUSCULAR; INTRAVENOUS; SUBCUTANEOUS at 13:39

## 2021-01-01 RX ADMIN — GABAPENTIN 100 MG: 100 CAPSULE ORAL at 21:43

## 2021-01-01 RX ADMIN — PANTOPRAZOLE SODIUM 40 MG: 40 TABLET, DELAYED RELEASE ORAL at 09:09

## 2021-01-01 RX ADMIN — LIDOCAINE HYDROCHLORIDE 40 MG: 20 INJECTION, SOLUTION INFILTRATION; PERINEURAL at 15:39

## 2021-01-01 RX ADMIN — ROCURONIUM BROMIDE 5 MG: 10 INJECTION INTRAVENOUS at 15:56

## 2021-01-01 RX ADMIN — OXYCODONE HYDROCHLORIDE 2.5 MG: 5 TABLET ORAL at 23:51

## 2021-01-01 RX ADMIN — GABAPENTIN 100 MG: 100 CAPSULE ORAL at 22:22

## 2021-01-01 RX ADMIN — PANTOPRAZOLE SODIUM 40 MG: 40 INJECTION, POWDER, FOR SOLUTION INTRAVENOUS at 09:50

## 2021-01-01 RX ADMIN — ACETAMINOPHEN 650 MG: 325 TABLET, FILM COATED ORAL at 08:17

## 2021-01-01 RX ADMIN — SODIUM CHLORIDE, POTASSIUM CHLORIDE, SODIUM LACTATE AND CALCIUM CHLORIDE: 600; 310; 30; 20 INJECTION, SOLUTION INTRAVENOUS at 18:08

## 2021-01-01 RX ADMIN — OXYCODONE HYDROCHLORIDE 2.5 MG: 5 TABLET ORAL at 07:42

## 2021-01-01 RX ADMIN — OXYCODONE HYDROCHLORIDE 5 MG: 5 TABLET ORAL at 12:49

## 2021-01-01 RX ADMIN — FENTANYL CITRATE 25 MCG: 50 INJECTION, SOLUTION INTRAMUSCULAR; INTRAVENOUS at 13:19

## 2021-01-01 RX ADMIN — ONDANSETRON 4 MG: 4 TABLET, ORALLY DISINTEGRATING ORAL at 10:21

## 2021-01-01 RX ADMIN — QUETIAPINE 12.5 MG: 25 TABLET, FILM COATED ORAL at 20:29

## 2021-01-01 RX ADMIN — AMOXICILLIN AND CLAVULANATE POTASSIUM 1 TABLET: 875; 125 TABLET, FILM COATED ORAL at 19:55

## 2021-01-01 RX ADMIN — METRONIDAZOLE 500 MG: 500 INJECTION, SOLUTION INTRAVENOUS at 04:10

## 2021-01-01 RX ADMIN — ACETAMINOPHEN 650 MG: 325 TABLET, FILM COATED ORAL at 05:26

## 2021-01-01 RX ADMIN — KETOROLAC TROMETHAMINE 15 MG: 15 INJECTION, SOLUTION INTRAMUSCULAR; INTRAVENOUS at 15:17

## 2021-01-01 RX ADMIN — SODIUM CHLORIDE, POTASSIUM CHLORIDE, SODIUM LACTATE AND CALCIUM CHLORIDE: 600; 310; 30; 20 INJECTION, SOLUTION INTRAVENOUS at 00:31

## 2021-01-01 RX ADMIN — MAGNESIUM OXIDE TAB 400 MG (241.3 MG ELEMENTAL MG) 400 MG: 400 (241.3 MG) TAB at 15:52

## 2021-01-01 RX ADMIN — DEXTROSE AND SODIUM CHLORIDE: 5; 450 INJECTION, SOLUTION INTRAVENOUS at 02:43

## 2021-01-01 RX ADMIN — OXYCODONE HYDROCHLORIDE 5 MG: 5 TABLET ORAL at 06:47

## 2021-01-01 RX ADMIN — SODIUM CHLORIDE 8 MCG: 900 INJECTION INTRAVENOUS at 16:40

## 2021-01-01 RX ADMIN — CLOBETASOL PROPIONATE: 0.5 CREAM TOPICAL at 22:05

## 2021-01-01 RX ADMIN — IBUPROFEN 600 MG: 600 TABLET ORAL at 22:22

## 2021-01-01 RX ADMIN — POTASSIUM CHLORIDE 10 MEQ: 7.46 INJECTION, SOLUTION INTRAVENOUS at 14:58

## 2021-01-01 RX ADMIN — LORAZEPAM 1 MG: 1 TABLET ORAL at 04:14

## 2021-01-01 RX ADMIN — DEXTROSE AND SODIUM CHLORIDE: 5; 450 INJECTION, SOLUTION INTRAVENOUS at 14:00

## 2021-01-01 RX ADMIN — IBUPROFEN 600 MG: 600 TABLET ORAL at 09:50

## 2021-01-01 RX ADMIN — SODIUM CHLORIDE, POTASSIUM CHLORIDE, SODIUM LACTATE AND CALCIUM CHLORIDE 500 ML: 600; 310; 30; 20 INJECTION, SOLUTION INTRAVENOUS at 01:54

## 2021-01-01 RX ADMIN — ONDANSETRON 4 MG: 2 INJECTION INTRAMUSCULAR; INTRAVENOUS at 11:28

## 2021-01-01 RX ADMIN — POTASSIUM CHLORIDE 20 MEQ: 1500 TABLET, EXTENDED RELEASE ORAL at 14:41

## 2021-01-01 RX ADMIN — KETOROLAC TROMETHAMINE 15 MG: 15 INJECTION, SOLUTION INTRAMUSCULAR; INTRAVENOUS at 21:17

## 2021-01-01 RX ADMIN — PANTOPRAZOLE SODIUM 40 MG: 40 INJECTION, POWDER, FOR SOLUTION INTRAVENOUS at 08:21

## 2021-01-01 RX ADMIN — PROPOFOL 100 MCG/KG/MIN: 10 INJECTION, EMULSION INTRAVENOUS at 14:33

## 2021-01-01 RX ADMIN — MAGNESIUM SULFATE HEPTAHYDRATE 2 G: 40 INJECTION, SOLUTION INTRAVENOUS at 10:11

## 2021-01-01 RX ADMIN — PIPERACILLIN AND TAZOBACTAM 2.25 G: 2; .25 INJECTION, POWDER, LYOPHILIZED, FOR SOLUTION INTRAVENOUS at 02:43

## 2021-01-01 RX ADMIN — HYDROMORPHONE HYDROCHLORIDE 0.2 MG: 1 INJECTION, SOLUTION INTRAMUSCULAR; INTRAVENOUS; SUBCUTANEOUS at 18:54

## 2021-01-01 RX ADMIN — HEPARIN SODIUM 5000 UNITS: 10000 INJECTION, SOLUTION INTRAVENOUS; SUBCUTANEOUS at 22:36

## 2021-01-01 RX ADMIN — METRONIDAZOLE 500 MG: 500 INJECTION, SOLUTION INTRAVENOUS at 03:54

## 2021-01-01 SDOH — HEALTH STABILITY: MENTAL HEALTH: HOW OFTEN DO YOU HAVE A DRINK CONTAINING ALCOHOL?: NOT ASKED

## 2021-01-01 SDOH — HEALTH STABILITY: MENTAL HEALTH: HOW MANY STANDARD DRINKS CONTAINING ALCOHOL DO YOU HAVE ON A TYPICAL DAY?: NOT ASKED

## 2021-01-01 SDOH — HEALTH STABILITY: MENTAL HEALTH: HOW OFTEN DO YOU HAVE 6 OR MORE DRINKS ON ONE OCCASION?: NOT ASKED

## 2021-01-01 ASSESSMENT — ENCOUNTER SYMPTOMS
CONFUSION: 0
CONSTIPATION: 0
CHEST TIGHTNESS: 0
ABDOMINAL PAIN: 0
DIARRHEA: 0
ADENOPATHY: 0
SHORTNESS OF BREATH: 0
NAUSEA: 1
BRUISES/BLEEDS EASILY: 0
BACK PAIN: 0
CONFUSION: 0
SHORTNESS OF BREATH: 0
HEMATURIA: 0
NAUSEA: 1
CHILLS: 0
ADENOPATHY: 0
FEVER: 0
ABDOMINAL PAIN: 1
WOUND: 0
WOUND: 0
FEVER: 0
ARTHRALGIAS: 0
CHEST TIGHTNESS: 0
CHILLS: 0
SHORTNESS OF BREATH: 0
HEMATURIA: 0
VOMITING: 1
FEVER: 0
ABDOMINAL PAIN: 1
BRUISES/BLEEDS EASILY: 0
CHEST TIGHTNESS: 0
HEADACHES: 0
BACK PAIN: 0
NAUSEA: 0
VOMITING: 0
AGITATION: 0
DIARRHEA: 0
CHILLS: 0

## 2021-01-01 ASSESSMENT — ACTIVITIES OF DAILY LIVING (ADL)
ADLS_ACUITY_SCORE: 20
ADLS_ACUITY_SCORE: 22
ADLS_ACUITY_SCORE: 20
ADLS_ACUITY_SCORE: 28
ADLS_ACUITY_SCORE: 18
ADLS_ACUITY_SCORE: 19
PATIENT_/_FAMILY_COMMUNICATION_STYLE: SPOKEN LANGUAGE (ENGLISH OR BILINGUAL)
ADLS_ACUITY_SCORE: 19
ADLS_ACUITY_SCORE: 20
ADLS_ACUITY_SCORE: 24
ADLS_ACUITY_SCORE: 20
ADLS_ACUITY_SCORE: 22
DRESSING/BATHING: BATHING DIFFICULTY, DEPENDENT;DRESSING DIFFICULTY, DEPENDENT
ADLS_ACUITY_SCORE: 20
FALL_HISTORY_WITHIN_LAST_SIX_MONTHS: NO
ADLS_ACUITY_SCORE: 28
ADLS_ACUITY_SCORE: 20
ADLS_ACUITY_SCORE: 20
ADLS_ACUITY_SCORE: 21
ADLS_ACUITY_SCORE: 20
ADLS_ACUITY_SCORE: 27
ADLS_ACUITY_SCORE: 28
ADLS_ACUITY_SCORE: 26
ADLS_ACUITY_SCORE: 24
ADLS_ACUITY_SCORE: 20
ADLS_ACUITY_SCORE: 24
ADLS_ACUITY_SCORE: 19
ADLS_ACUITY_SCORE: 24
ADLS_ACUITY_SCORE: 24
ADLS_ACUITY_SCORE: 20
ADLS_ACUITY_SCORE: 22
ADLS_ACUITY_SCORE: 26
ADLS_ACUITY_SCORE: 28
ADLS_ACUITY_SCORE: 24
ADLS_ACUITY_SCORE: 24
ADLS_ACUITY_SCORE: 25
ADLS_ACUITY_SCORE: 28
DESCRIBE_HEARING_LOSS: HEARING LOSS ON LEFT SIDE
ADLS_ACUITY_SCORE: 27
ADLS_ACUITY_SCORE: 24
ADLS_ACUITY_SCORE: 20
ADLS_ACUITY_SCORE: 26
ADLS_ACUITY_SCORE: 22
ADLS_ACUITY_SCORE: 25
ADLS_ACUITY_SCORE: 24
ADLS_ACUITY_SCORE: 24
ADLS_ACUITY_SCORE: 23
ADLS_ACUITY_SCORE: 20
ADLS_ACUITY_SCORE: 20
ADLS_ACUITY_SCORE: 24
ADLS_ACUITY_SCORE: 25
ADLS_ACUITY_SCORE: 25
ADLS_ACUITY_SCORE: 20
ADLS_ACUITY_SCORE: 23
ADLS_ACUITY_SCORE: 20
ADLS_ACUITY_SCORE: 23
ADLS_ACUITY_SCORE: 24
ADLS_ACUITY_SCORE: 20
ADLS_ACUITY_SCORE: 22
ADLS_ACUITY_SCORE: 20
ADLS_ACUITY_SCORE: 24
ADLS_ACUITY_SCORE: 20
ADLS_ACUITY_SCORE: 19
ADLS_ACUITY_SCORE: 20
ADLS_ACUITY_SCORE: 24
ADLS_ACUITY_SCORE: 24
ADLS_ACUITY_SCORE: 26
ADLS_ACUITY_SCORE: 24
ADLS_ACUITY_SCORE: 24
ADLS_ACUITY_SCORE: 21
ADLS_ACUITY_SCORE: 20
ADLS_ACUITY_SCORE: 25
ADLS_ACUITY_SCORE: 20
DRESSING/BATHING_DIFFICULTY: YES
ADLS_ACUITY_SCORE: 20
ADLS_ACUITY_SCORE: 20
ADLS_ACUITY_SCORE: 27
ADLS_ACUITY_SCORE: 26
ADLS_ACUITY_SCORE: 26
ADLS_ACUITY_SCORE: 20
ADLS_ACUITY_SCORE: 22
ADLS_ACUITY_SCORE: 22
ADLS_ACUITY_SCORE: 20
WHICH_OF_THE_ABOVE_FUNCTIONAL_RISKS_HAD_A_RECENT_ONSET_OR_CHANGE?: AMBULATION;TRANSFERRING
ADLS_ACUITY_SCORE: 20
ADLS_ACUITY_SCORE: 28
ADLS_ACUITY_SCORE: 22
ADLS_ACUITY_SCORE: 20
ADLS_ACUITY_SCORE: 24
ADLS_ACUITY_SCORE: 20
ADLS_ACUITY_SCORE: 23
ADLS_ACUITY_SCORE: 19
ADLS_ACUITY_SCORE: 20
ADLS_ACUITY_SCORE: 20
DOING_ERRANDS_INDEPENDENTLY_DIFFICULTY: YES
ADLS_ACUITY_SCORE: 22
ADLS_ACUITY_SCORE: 23
ADLS_ACUITY_SCORE: 24
ADLS_ACUITY_SCORE: 20
ADLS_ACUITY_SCORE: 23
ADLS_ACUITY_SCORE: 20
ADLS_ACUITY_SCORE: 26
ADLS_ACUITY_SCORE: 20
EATING/SWALLOWING: SWALLOWING LIQUIDS
ADLS_ACUITY_SCORE: 20
ADLS_ACUITY_SCORE: 24
WEAR_GLASSES_OR_BLIND: YES
TOILETING_ASSISTANCE: TOILETING DIFFICULTY, ASSISTANCE 1 PERSON
ADLS_ACUITY_SCORE: 25
ADLS_ACUITY_SCORE: 19
ADLS_ACUITY_SCORE: 25
ADLS_ACUITY_SCORE: 28
ADLS_ACUITY_SCORE: 24
ADLS_ACUITY_SCORE: 24
ADLS_ACUITY_SCORE: 23
ADLS_ACUITY_SCORE: 22
ADLS_ACUITY_SCORE: 28
ADLS_ACUITY_SCORE: 21
ADLS_ACUITY_SCORE: 20
ADLS_ACUITY_SCORE: 21
ADLS_ACUITY_SCORE: 20
ADLS_ACUITY_SCORE: 20
ADLS_ACUITY_SCORE: 24
ADLS_ACUITY_SCORE: 20
ADLS_ACUITY_SCORE: 20
ADLS_ACUITY_SCORE: 28
ADLS_ACUITY_SCORE: 23
ADLS_ACUITY_SCORE: 26
ADLS_ACUITY_SCORE: 24
ADLS_ACUITY_SCORE: 20
ADLS_ACUITY_SCORE: 20
ADLS_ACUITY_SCORE: 28
VISION_MANAGEMENT: GLASSES
CONCENTRATING,_REMEMBERING_OR_MAKING_DECISIONS_DIFFICULTY: YES
ADLS_ACUITY_SCORE: 20
ADLS_ACUITY_SCORE: 21
ADLS_ACUITY_SCORE: 20
ADLS_ACUITY_SCORE: 21
ADLS_ACUITY_SCORE: 20
ADLS_ACUITY_SCORE: 24
ADLS_ACUITY_SCORE: 20
ADLS_ACUITY_SCORE: 20
ADLS_ACUITY_SCORE: 23
ADLS_ACUITY_SCORE: 21
ADLS_ACUITY_SCORE: 28
ADLS_ACUITY_SCORE: 28
ADLS_ACUITY_SCORE: 22
ADLS_ACUITY_SCORE: 20
ADLS_ACUITY_SCORE: 26
ADLS_ACUITY_SCORE: 28
ADLS_ACUITY_SCORE: 20
ADLS_ACUITY_SCORE: 21
HEARING_DIFFICULTY_OR_DEAF: YES
ADLS_ACUITY_SCORE: 20
WERE_AUXILIARY_AIDS_OFFERED?: YES
ADLS_ACUITY_SCORE: 25
ADLS_ACUITY_SCORE: 26
ADLS_ACUITY_SCORE: 28
ADLS_ACUITY_SCORE: 26
ADLS_ACUITY_SCORE: 24
ADLS_ACUITY_SCORE: 24
ADLS_ACUITY_SCORE: 19
ADLS_ACUITY_SCORE: 22
ADLS_ACUITY_SCORE: 28
ADLS_ACUITY_SCORE: 20
ADLS_ACUITY_SCORE: 24
ADLS_ACUITY_SCORE: 20
ADLS_ACUITY_SCORE: 25
ADLS_ACUITY_SCORE: 19
ADLS_ACUITY_SCORE: 20
THE_FOLLOWING_AIDS_WERE_PROVIDED;: PATIENT DECLINED OFFER OF AUXILIARY AIDS
ADLS_ACUITY_SCORE: 21
PATIENT'S_PREFERRED_MEANS_OF_COMMUNICATION: VERBAL
ADLS_ACUITY_SCORE: 26
TOILETING_ISSUES: NO
ADLS_ACUITY_SCORE: 28
ADLS_ACUITY_SCORE: 26
ADLS_ACUITY_SCORE: 20
DIFFICULTY_COMMUNICATING: NO
ADLS_ACUITY_SCORE: 24
ADLS_ACUITY_SCORE: 21
ADLS_ACUITY_SCORE: 24
DIFFICULTY_EATING/SWALLOWING: YES
ADLS_ACUITY_SCORE: 20
ADLS_ACUITY_SCORE: 24
ADLS_ACUITY_SCORE: 20
ADLS_ACUITY_SCORE: 21
ADLS_ACUITY_SCORE: 20
ADLS_ACUITY_SCORE: 24
ADLS_ACUITY_SCORE: 28
ADLS_ACUITY_SCORE: 25
ADLS_ACUITY_SCORE: 20
ADLS_ACUITY_SCORE: 20
ADLS_ACUITY_SCORE: 21
ADLS_ACUITY_SCORE: 24
ADLS_ACUITY_SCORE: 26
ADLS_ACUITY_SCORE: 20
ADLS_ACUITY_SCORE: 27
ADLS_ACUITY_SCORE: 23
WALKING_OR_CLIMBING_STAIRS_DIFFICULTY: YES
WALKING_OR_CLIMBING_STAIRS: AMBULATION DIFFICULTY, DEPENDENT;STAIR CLIMBING DIFFICULTY, DEPENDENT;TRANSFERRING DIFFICULTY, DEPENDENT
ADLS_ACUITY_SCORE: 20

## 2021-01-01 ASSESSMENT — MIFFLIN-ST. JEOR
SCORE: 1002.2
SCORE: 993.06
SCORE: 977.14
SCORE: 954.12
SCORE: 1101.99
SCORE: 1111.06
SCORE: 1145.06
SCORE: 1154.06
SCORE: 1067.52
SCORE: 1005.6
SCORE: 1034.41
SCORE: 952.3
SCORE: 996.53
SCORE: 1074.78
SCORE: 993.04
SCORE: 974.06
SCORE: 1065.25
SCORE: 1130.06
SCORE: 1115.06

## 2021-01-01 ASSESSMENT — PAIN SCALES - GENERAL
PAINLEVEL: NO PAIN (0)
PAINLEVEL: NO PAIN (0)

## 2021-05-14 NOTE — PATIENT INSTRUCTIONS
Instructions:    Doxepin: Apply a thin film 4 times/day with at least 3- to 4-hour interval between applications; not recommended for use >8 days.    JACE Dang  May 14, 2021  10:56 AM

## 2021-05-14 NOTE — PROGRESS NOTES
"SUBJECTIVE:   HPI  Melissa Quinteros is a 86 year old female here for a small cut on the left side of her lower left leg.  Patient states she ran into the corner of a cupboard door about 10 days ago.  She reports area  to palpation and there is slight erythema around the scab.  She has not noted any ongoing bleeding or mucopurulence.  No lymphatic streaking, fevers, chills or night sweats.  At rest the wound is not painful, only with palpation.    She also has concerns for ongoing pruritus of both feet bilaterally.  She has been applying triamcinolone cream for the past few months but the pruritus continues.  She is concerned about a rash or infection but has not noted any significant erythema.  In fact today, no erythema noted but bilateral feet are fairly dry.  She has been soaking them in warm soapy water to help soften the toenails before cutting.  She occasionally applies lotion but has not used any other over-the-counter products for the dryness or itchiness.    GAD7  No flowsheet data found.    PHQ9  PHQ 8/30/2016 10/9/2019   PHQ-9 Total Score 0 0   Q9: Thoughts of better off dead/self-harm past 2 weeks Not at all Not at all       Allergies:  Allergies   Allergen Reactions     Lovastatin      Other reaction(s): Arthralgia       ROS:  As above otherwise unremarkable.     OBJECTIVE:   /54 (BP Location: Right arm, Patient Position: Sitting, Cuff Size: Adult Regular)   Pulse 68   Temp 97.2  F (36.2  C) (Temporal)   Resp 12   Ht 1.619 m (5' 3.75\")   Wt 55.6 kg (122 lb 9.6 oz)   SpO2 97%   BMI 21.21 kg/m    Physical Exam  General Appearance: Pleasant, no acute distress  Skin: Moderate dryness of the lower extremities bilaterally.  No erythema, rash, or infection noted.  There is a slight scab approximately 1 cm located in the left lateral lower leg with slight surrounding erythema.  Slightly tender to palpation of this appears to be healing well.  Otherwise, no other concerning or new " rashes.    ASSESSMENT/PLAN:       ICD-10-CM    1. Other atopic dermatitis  L20.89 doxepin (ZONALON) 5 % external cream       Recommend doxepin 5% external cream for the pruritus.  Follow-up if symptoms persist or worsen.  Apply a thin film 4 times daily as needed up to 8 days and not beyond.    I do not feel like her small wound on the left lower leg is infected.  This seems to be healing well.  I did recommend soaking in Epsom salts and applying Vaseline or other nonscented lotions to the area and some of these products may be causing some of the pruritus she is experienced as well.    Follow-up if symptoms persist or worsen.    Total time spent with this patient was 20 minutes which included chart review, visualization and interpretation of images, time spent with the patient, and documentation.    JACE Dang  May 14, 2021  10:50 AM

## 2021-05-14 NOTE — NURSING NOTE
"Chief Complaint   Patient presents with     Musculoskeletal Problem     left leg     Patient presented with sore on left leg and black and blue on left arm.    It happened 3 weeks ago.  Initial /54 (BP Location: Right arm, Patient Position: Sitting, Cuff Size: Adult Regular)   Pulse 68   Temp 97.2  F (36.2  C) (Temporal)   Resp 12   Ht 1.619 m (5' 3.75\")   Wt 55.6 kg (122 lb 9.6 oz)   SpO2 97%   BMI 21.21 kg/m   Estimated body mass index is 21.21 kg/m  as calculated from the following:    Height as of this encounter: 1.619 m (5' 3.75\").    Weight as of this encounter: 55.6 kg (122 lb 9.6 oz).  Medication Reconciliation: complete    Liss Jones LPN  "

## 2021-05-17 NOTE — TELEPHONE ENCOUNTER
Left message for patient that new medication was sent to pharmacy and she can pick that up today.  Norma J. Gosselin, LPN .......  5/17/2021  2:46 PM

## 2021-05-17 NOTE — TELEPHONE ENCOUNTER
We received a fax from [employer] on Behalf of [facility] denying the RX PA Request that was submitted for Doxepin HCL 5% Cream stating that this patient must try and fail one additional drug for her condition that is covered by her prescription drug plan.  The patient may have to try these drugs if the prescriber states she cannot take these drugs for health reasons including allergies or side effects.  The covered drugs may include:  - alclometasone 0.05% (cream, ointment)  - augmented betamethasone 0.05% (cream, gel, lotion, Ointment)  - betamethasone dipropionate 0.05% (cream, lotion, Ointment)  - betamethasone valerate 0.1% (cream, lotion, ointment)  - clobetasol 0.05% (cream, emolient cream, foam, gel, ointment,  shampoo, solution), [place] 0.05% shampoo  - desonide 0.05% (cream, lotion, ointment)  - desoximetasone [(0.05% (cream, gel), 0.25% (cream, ointment)]  - diflorasone 0.05% ointment  - fluocinolone 0.01% (cream, solution, scalp oil)  - fluocinonide 0.05% (cream, emulsified base cream, gel, ointment,  Solution)  - fluticasone (0.05% ointment, 0.05% cream)  - halobetasol 0.05% (cream, ointment)  - hydrocortisone [1% (cream, ointment), 2.5% (cream, lotion,  Ointment)]  - hydrocortisone valerate 0.2% (cream, ointment)  - hydrocortisone butyrate 0.1% (cream, lipophilic cream, ointment,  Solution)  - mometasone 0.1% (cream, ointment, solution)  - prednicarbate 0.1% (cream, ointment)    For an Expedited Appeal,   Call [phone]  OR   Fax to [phone]    For a Standard Appeal,   Call [phone]  OR  Fax to [phone]  OR  Plan Website: [email or web address]  OR  Mail to:  [place] [address]    I will also fax the denial information to the nurses at [phone].    Lucia Seals on 5/17/2021 at 10:35 AM

## 2021-05-17 NOTE — TELEPHONE ENCOUNTER
She has already tried triamcinolone but is this is not on her proper prescriptions plans we will also try clobetasol.    Clobetasol sent to her [facility].    JACE Dang  May 17, 2021  1:01 PM

## 2021-05-28 NOTE — ED TRIAGE NOTES
Pt reports intermittent lower abdominal pain since yesterday at 9pm w/o relief. States she was in pain all night and had chills.     Arrived in private car w/ her .     Traveled from [facility] in [place] last week, staying in [place] for season.

## 2021-05-28 NOTE — ED PROVIDER NOTES
History     Chief Complaint   Patient presents with     Abdominal Pain     abdominal pain intermittent since yesterday     HPI  Melissa Quinteros is a 86 year old female who presents to the ED for evaluation of abdominal pain. She reports that during the day yesterday she felt slightly ill but then last night she began to develop intermittent left sided abdominal discomfort. She denies n/v, chest pain, sob, dysuria, diarrhea, fever, or cough.  Her  who accompanies her does say that few days ago he had some abdominal pain along with diarrhea this morning's assessment do with some spaghetti that they ate at a restaurant.    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 01/12/2011     Priority: Medium        Past Medical History:    Past Medical History:   Diagnosis Date     Esophageal obstruction      Essential (primary) hypertension      Hyperlipidemia      Zoster without complications        Past Surgical History:    Past Surgical History:   Procedure Laterality Date     APPENDECTOMY       ARTHROPLASTY KNEE Right 2017    HANG Bowles     COLONOSCOPY      No Comments Provided     ESOPHAGOGASTRODUODENOSCOPY       HYSTERECTOMY TOTAL ABDOMINAL      1982,incidental appendectomy     TONSILLECTOMY, ADENOIDECTOMY, COMBINED      childhood       Family History:    Family History   Problem Relation Age of Onset     Other - See Comments Father         Old age     Lung Cancer Mother       "Brain Cancer Brother        Social History:  Marital Status:   [2]  Social History     Tobacco Use     Smoking status: Never Smoker     Smokeless tobacco: Never Used   Substance Use Topics     Alcohol use: Yes     Alcohol/week: 0.0 standard drinks     Comment: Alcoholic Drinks/day: Seldom     Drug use: No     Comment: Drug use: No        Medications:    clobetasol (TEMOVATE) 0.05 % external cream  doxepin (ZONALON) 5 % external cream  magnesium oxide (MAG-OX) 400 MG tablet  omeprazole (PRILOSEC) 20 MG CR capsule          Review of Systems   Constitutional: Negative for chills and fever.   HENT: Negative for congestion.    Eyes: Negative for visual disturbance.   Respiratory: Negative for chest tightness and shortness of breath.    Cardiovascular: Negative for chest pain.   Gastrointestinal: Positive for abdominal pain. Negative for diarrhea, nausea and vomiting.   Genitourinary: Negative for hematuria.   Musculoskeletal: Negative for back pain.   Skin: Negative for rash and wound.   Neurological: Negative for syncope.   Hematological: Negative for adenopathy. Does not bruise/bleed easily.   Psychiatric/Behavioral: Negative for confusion.       Physical Exam   BP: (!) 152/91  Pulse: 68  Temp: 95.5  F (35.3  C)  Resp: 20  Height: 162.6 cm (5' 4\")  Weight: 57.2 kg (126 lb)  SpO2: 94 %      Physical Exam  Constitutional:       General: She is not in acute distress.     Appearance: She is well-developed. She is not diaphoretic.   HENT:      Head: Normocephalic and atraumatic.   Eyes:      General: No scleral icterus.     Conjunctiva/sclera: Conjunctivae normal.   Neck:      Musculoskeletal: Neck supple.   Cardiovascular:      Rate and Rhythm: Normal rate and regular rhythm.   Pulmonary:      Effort: Pulmonary effort is normal.      Breath sounds: Normal breath sounds.   Abdominal:      Palpations: Abdomen is soft.      Tenderness: There is abdominal tenderness in the left upper quadrant and left lower quadrant. " There is no right CVA tenderness, left CVA tenderness, guarding or rebound. Negative signs include Vale's sign.   Musculoskeletal:         General: No deformity.   Lymphadenopathy:      Cervical: No cervical adenopathy.   Skin:     General: Skin is warm and dry.      Findings: No rash.   Neurological:      Mental Status: She is alert and oriented to person, place, and time. Mental status is at baseline.   Psychiatric:         Mood and Affect: Mood normal.         Behavior: Behavior normal.         ED Course        Procedures          EKG read at 1136. HR 60, NSR, no ST changes.     Critical Care time:  none               Results for orders placed or performed during the hospital encounter of 05/28/21 (from the past 24 hour(s))   CBC with platelets differential   Result Value Ref Range    WBC 7.3 4.0 - 11.0 10e9/L    RBC Count 4.54 3.8 - 5.2 10e12/L    Hemoglobin 12.7 11.7 - 15.7 g/dL    Hematocrit 38.2 35.0 - 47.0 %    MCV 84 78 - 100 fl    MCH 28.0 26.5 - 33.0 pg    MCHC 33.2 31.5 - 36.5 g/dL    RDW 14.0 10.0 - 15.0 %    Platelet Count 186 150 - 450 10e9/L    Diff Method Automated Method     % Neutrophils 71.0 %    % Lymphocytes 17.4 %    % Monocytes 8.7 %    % Eosinophils 1.6 %    % Basophils 1.0 %    % Immature Granulocytes 0.3 %    Absolute Neutrophil 5.2 1.6 - 8.3 10e9/L    Absolute Lymphocytes 1.3 0.8 - 5.3 10e9/L    Absolute Monocytes 0.6 0.0 - 1.3 10e9/L    Absolute Eosinophils 0.1 0.0 - 0.7 10e9/L    Absolute Basophils 0.1 0.0 - 0.2 10e9/L    Abs Immature Granulocytes 0.0 0 - 0.4 10e9/L   Comprehensive metabolic panel   Result Value Ref Range    Sodium 136 134 - 144 mmol/L    Potassium 3.7 3.5 - 5.1 mmol/L    Chloride 101 98 - 107 mmol/L    Carbon Dioxide 26 21 - 31 mmol/L    Anion Gap 9 3 - 14 mmol/L    Glucose 98 70 - 105 mg/dL    Urea Nitrogen 16 7 - 25 mg/dL    Creatinine 0.86 0.60 - 1.20 mg/dL    GFR Estimate 63 >60 mL/min/[1.73_m2]    GFR Estimate If Black 76 >60 mL/min/[1.73_m2]    Calcium 8.9 8.6  - 10.3 mg/dL    Bilirubin Total 0.9 0.3 - 1.0 mg/dL    Albumin 3.7 3.5 - 5.7 g/dL    Protein Total 5.9 (L) 6.4 - 8.9 g/dL    Alkaline Phosphatase 94 34 - 104 U/L    ALT 7 7 - 52 U/L    AST 14 13 - 39 U/L   Lipase   Result Value Ref Range    Lipase 6 (L) 11 - 82 U/L   Lactic acid whole blood   Result Value Ref Range    Lactic Acid 0.9 0.7 - 2.0 mmol/L   Troponin GH (now)   Result Value Ref Range    Troponin 4.4 <34.0 pg/mL   UA reflex to Microscopic   Result Value Ref Range    Color Urine Light Yellow     Appearance Urine Slightly Cloudy     Glucose Urine Negative NEG^Negative mg/dL    Bilirubin Urine Negative NEG^Negative    Ketones Urine Negative NEG^Negative mg/dL    Specific Gravity Urine 1.010 1.003 - 1.035    Blood Urine Negative NEG^Negative    pH Urine 6.0 5.0 - 7.0 pH    Protein Albumin Urine Negative NEG^Negative mg/dL    Urobilinogen mg/dL Normal 0.0 - 2.0 mg/dL    Nitrite Urine Negative NEG^Negative    Leukocyte Esterase Urine Trace (A) NEG^Negative    Source Unspecified Urine     RBC Urine 1 0 - 2 /HPF    WBC Urine 7 (H) 0 - 5 /HPF    Bacteria Urine Many (A) NEG^Negative /HPF    Squamous Epithelial /HPF Urine 17 (H) 0 - 1 /HPF    Mucous Urine Present (A) NEG^Negative /LPF   CT Abdomen Pelvis w Contrast    Narrative    PROCEDURE: CT ABDOMEN PELVIS W CONTRAST 5/28/2021 12:20 PM    HISTORY: Left-sided abdominal pain. Possible diverticulitis.    COMPARISONS: None.    Meds/Dose Given: 72 ml isovue 370    TECHNIQUE: Axial postcontrast enhanced images with coronal and  sagittal reformatted images.    FINDINGS: Lung bases are relatively clear.    Is seen in the liver, spleen, adrenal glands or in the pancreas.  Gallbladder is present.    No solid renal mass is seen. There is no hydronephrosis.    There is focal thickening of the wall of the distal transverse colon.  Maximum wall thickness is approximately 1.6 cm. This is fairly  circumferential. No significant inflammation is seen associated with  this. There  are multiple gas and fluid-filled loops of small bowel  which are not significantly distended.    There is sigmoid diverticulosis without significant surrounding  inflammation. There is some free fluid in the cul-de-sac.    No lymph node enlargement is seen. There is no aneurysm. Degenerative  changes seen in the spine with grade 1 anterolisthesis of L4 on L5  secondary to facet degenerative change.         Impression    IMPRESSION:   1. Focal thickening of a short segment of the wall of the distal  transverse colon. This is not a typical location for diverticulitis no  definite diverticula are seen. This is worrisome for a colon  carcinoma. Length of involvement would be atypical for colitis.  2. Nonspecific gas and fluid-filled loops of small bowel which are not  significantly dilated. This may be due to enteritis.  3. Sigmoid diverticulosis.    PAULO LOPEZ MD       Medications   iopamidol (ISOVUE-370) solution 72 mL (72 mLs Intravenous Given 5/28/21 1214)       Assessments & Plan (with Medical Decision Making)   She is nontoxic in no acute distress.  Heart, lung, bowel sounds are normal.  Abdomen appears soft with some slight tenderness to palpation in the left side, mostly left upper quadrant.  There is no guarding or rebound.  Vital signs are stable and she is afebrile.    Lab work all appears very well, she does have some leukocyte esterase with bacteria but some large squamous cells, we will culture as she has not had any urinary symptoms.    CT scan read as some focal thickening of the distal transverse colon.  Does not seem to be a typical location for diverticulitis there is concern for possible carcinoma.    I did discuss the case with Dr. Sergey Pang, surgeon.  We will send him the patient's chart and the patient we contacted to follow-up for colonoscopy.  She will continue with conservative treatment at home till then.    Strict return precautions are given to the pt, they will return if symptoms  are worsening or concerning. The pt understands and agrees with the plan and they are discharged.     Venkat Ching PA-C    I have reviewed the nursing notes.    I have reviewed the findings, diagnosis, plan and need for follow up with the patient.       New Prescriptions    No medications on file       Final diagnoses:   Left sided abdominal pain       5/28/2021   [facility]     Venkat Ching PA  05/28/21 1329

## 2021-05-28 NOTE — TELEPHONE ENCOUNTER
Spoke with Ilya and informed that surgery will call with date and time of appointment for colonoscopy. No further concerns noted. Martha Cisse LPN .............5/28/2021  4:04 PM

## 2021-05-28 NOTE — DISCHARGE INSTRUCTIONS
Get plenty of fluids and rest.  As we discussed they do see some thickening of your transverse colon.  I did discuss with Dr. Sergey Pang, surgeon and he is sent your information.  You should be contacted early next week to discuss follow-up as well as possible colonoscopy.  Continue with Tylenol throughout the weekend.  If have greatly worsening or concerning symptoms please return for further evaluation.

## 2021-05-28 NOTE — TELEPHONE ENCOUNTER
called and patient was in the ER on Friday 05/28 and they told them to get an appointment right away and there is nothing open till 06/08.  They also recommended a colonoscopy. Please call      Marisela Blue on 5/28/2021 at 2:58 PM

## 2021-06-01 NOTE — TELEPHONE ENCOUNTER
Screening Questions for the Scheduling of Screening Colonoscopies   (If Colonoscopy is diagnostic, Provider should review the chart before scheduling.)  Are you younger than 50 or older than 80?   YES   Do you take aspirin or fish oil?  NO  (if yes, tell patient to stop 1 week prior to Colonoscopy)  Do you take warfarin (Coumadin), clopidogrel (Plavix), apixaban (Eliquis), dabigatram (Pradaxa), rivaroxaban (Xarelto) or any blood thinner? NO  Do you use oxygen at home?  NO   Do you have kidney disease? NO   Are you on dialysis? NO   Have you had a stroke or heart attack in the last year? NO   Have you had a stent in your heart or any blood vessel in the last year? NO   Have you had a transplant of any organ? NO   Have you had a colonoscopy or upper endoscopy (EGD) before? YES          When?  ?  Date of scheduled Colonoscopy. 06/03/2021  Provider JOSEFA   Pharmacy [facility]

## 2021-06-01 NOTE — H&P (VIEW-ONLY)
"  History     Chief Complaint   Patient presents with     Nausea     Abdominal Pain     HPI  Melissa Quinteros is a 86 year old female who presents to the ED for evaluation of nausea and vomiting and some abdominal discomfort.  She was seen in the ED a few days prior for left-sided abdominal pain and found to have some thickening of the distal transverse colon with concern for possible carcinoma.  She is set to have a colonoscopy in 2 days.  Last night she was having some soup and not long after she had 2 episodes of vomiting. This morning she has had some \"dry heaves\". She reports some RUQ discomfort. Decreased bowel movements but she did have one yesterday. She denies chest pain, sob, dysuria, fevers.     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 01/12/2011     Priority: Medium        Past Medical History:    Past Medical History:   Diagnosis Date     Esophageal obstruction      Essential (primary) hypertension      Hyperlipidemia      Zoster without complications        Past Surgical History:    Past Surgical History:   Procedure Laterality Date     APPENDECTOMY       ARTHROPLASTY KNEE Right 2017    HANG Bowles     COLONOSCOPY      No Comments Provided     ESOPHAGOGASTRODUODENOSCOPY       HYSTERECTOMY TOTAL ABDOMINAL      1982,incidental appendectomy     TONSILLECTOMY, ADENOIDECTOMY, COMBINED      childhood       Family History:    Family History   Problem Relation Age of Onset "     Other - See Comments Father         Old age     Lung Cancer Mother      Brain Cancer Brother        Social History:  Marital Status:   [2]  Social History     Tobacco Use     Smoking status: Never Smoker     Smokeless tobacco: Never Used   Substance Use Topics     Alcohol use: Yes     Alcohol/week: 0.0 standard drinks     Comment: Alcoholic Drinks/day: Seldom     Drug use: No     Comment: Drug use: No        Medications:    ondansetron (ZOFRAN ODT) 4 MG ODT tab  bisacodyl (DULCOLAX) 5 MG EC tablet  clobetasol (TEMOVATE) 0.05 % external cream  doxepin (ZONALON) 5 % external cream  magnesium oxide (MAG-OX) 400 MG tablet  omeprazole (PRILOSEC) 20 MG CR capsule  polyethylene glycol-electrolytes (NULYTELY) 420 g solution          Review of Systems   Constitutional: Negative for chills and fever.   HENT: Negative for congestion.    Eyes: Negative for visual disturbance.   Respiratory: Negative for chest tightness and shortness of breath.    Cardiovascular: Negative for chest pain.   Gastrointestinal: Positive for abdominal pain, nausea and vomiting.   Genitourinary: Negative for hematuria.   Musculoskeletal: Negative for back pain.   Skin: Negative for rash and wound.   Neurological: Negative for syncope.   Hematological: Negative for adenopathy. Does not bruise/bleed easily.   Psychiatric/Behavioral: Negative for confusion.       Physical Exam   BP: (!) 140/61  Pulse: 72  Temp: 97.8  F (36.6  C)  Resp: 18  Weight: 57.2 kg (126 lb)  SpO2: 97 %      Physical Exam  Constitutional:       General: She is not in acute distress.     Appearance: She is well-developed. She is not diaphoretic.   HENT:      Head: Normocephalic and atraumatic.   Eyes:      General: No scleral icterus.     Conjunctiva/sclera: Conjunctivae normal.   Neck:      Musculoskeletal: Neck supple.   Cardiovascular:      Rate and Rhythm: Normal rate and regular rhythm.   Pulmonary:      Effort: Pulmonary effort is normal.      Breath sounds: Normal  breath sounds.   Abdominal:      Palpations: Abdomen is soft.      Tenderness: There is abdominal tenderness in the right upper quadrant. There is no guarding or rebound.   Musculoskeletal:         General: No deformity.   Lymphadenopathy:      Cervical: No cervical adenopathy.   Skin:     General: Skin is warm and dry.      Findings: No rash.   Neurological:      Mental Status: She is alert and oriented to person, place, and time. Mental status is at baseline.   Psychiatric:         Mood and Affect: Mood normal.         Behavior: Behavior normal.         ED Course        Procedures               Critical Care time:  none               Results for orders placed or performed during the hospital encounter of 06/01/21 (from the past 24 hour(s))   Symptomatic SARS-CoV-2 COVID-19 Virus (Coronavirus) by PCR    Specimen: Nasopharyngeal   Result Value Ref Range    SARS-CoV-2 Virus Specimen Source Nasopharyngeal     SARS-CoV-2 PCR Result NEGATIVE     SARS-CoV-2 PCR Comment       Testing was performed using the Xpert Xpress SARS-CoV-2 Assay on the Cepheid Gene-Xpert   Instrument Systems. Additional information about this Emergency Use Authorization (EUA)   assay can be found via the Lab Guide.     XR Abdomen 2 Views    Narrative    PROCEDURE: XR ABDOMEN 2VIEWS 6/1/2021 11:41 AM    HISTORY: n/v. CT 3 days ago showed thickening of distal transverse  colon, concern for carcinoma, set to have colonoscopy. obstruction?    COMPARISONS: None.    TECHNIQUE: Flat and upright    FINDINGS: There are distended loops of small bowel with air-fluid  levels. Gas is seen in the nondilated right colonic loops. Gas is seen  in nondilated descending colon loops and rectum. There is no  extraluminal gas. There are is no pathologic intra-abdominal  calcifications. Thoracolumbar scoliosis is noted.         Impression    IMPRESSION: Mildly distended small bowel loops with air-fluid levels    CATHERINE CAMACHO MD   CBC with platelets differential    Result Value Ref Range    WBC 5.8 4.0 - 11.0 10e9/L    RBC Count 4.22 3.8 - 5.2 10e12/L    Hemoglobin 12.1 11.7 - 15.7 g/dL    Hematocrit 36.3 35.0 - 47.0 %    MCV 86 78 - 100 fl    MCH 28.7 26.5 - 33.0 pg    MCHC 33.3 31.5 - 36.5 g/dL    RDW 14.1 10.0 - 15.0 %    Platelet Count 193 150 - 450 10e9/L    Diff Method Automated Method     % Neutrophils 65.5 %    % Lymphocytes 22.9 %    % Monocytes 9.0 %    % Eosinophils 1.2 %    % Basophils 1.2 %    % Immature Granulocytes 0.2 %    Absolute Neutrophil 3.8 1.6 - 8.3 10e9/L    Absolute Lymphocytes 1.3 0.8 - 5.3 10e9/L    Absolute Monocytes 0.5 0.0 - 1.3 10e9/L    Absolute Eosinophils 0.1 0.0 - 0.7 10e9/L    Absolute Basophils 0.1 0.0 - 0.2 10e9/L    Abs Immature Granulocytes 0.0 0 - 0.4 10e9/L   Comprehensive metabolic panel   Result Value Ref Range    Sodium 139 134 - 144 mmol/L    Potassium 4.0 3.5 - 5.1 mmol/L    Chloride 104 98 - 107 mmol/L    Carbon Dioxide 29 21 - 31 mmol/L    Anion Gap 6 3 - 14 mmol/L    Glucose 99 70 - 105 mg/dL    Urea Nitrogen 19 7 - 25 mg/dL    Creatinine 0.94 0.60 - 1.20 mg/dL    GFR Estimate 56 (L) >60 mL/min/[1.73_m2]    GFR Estimate If Black 68 >60 mL/min/[1.73_m2]    Calcium 8.7 8.6 - 10.3 mg/dL    Bilirubin Total 0.7 0.3 - 1.0 mg/dL    Albumin 3.5 3.5 - 5.7 g/dL    Protein Total 5.4 (L) 6.4 - 8.9 g/dL    Alkaline Phosphatase 84 34 - 104 U/L    ALT 7 7 - 52 U/L    AST 15 13 - 39 U/L   Lipase   Result Value Ref Range    Lipase 7 (L) 11 - 82 U/L   Lactic acid whole blood   Result Value Ref Range    Lactic Acid 0.7 0.7 - 2.0 mmol/L   Troponin GH   Result Value Ref Range    Troponin 4.4 <34.0 pg/mL   EKG 12 lead   Result Value Ref Range    Interpretation ECG Click View Image link to view waveform and result    US Abdomen Limited    Narrative    PROCEDURES: US ABDOMEN LIMITED    HISTORY: n/v. RUQ pain. Gallbladder?    TECHNIQUE: Grayscale ultrasound of the upper abdomen was performed.    COMPARISON: none     FINDINGS:    MEASUREMENTS:   Liver  length:  15 cm.   Common duct diameter:  0.6 cm.    LIVER: The liver is free of masses or biliary ductal enlargement.    GALLBLADDER: No gallstones are seen.    ABDOMINAL AORTA AND IVC: The abdominal aorta is normally tapering. The  inferior vena cava is patent.    PANCREAS: The pancreas appears normal.    Right KIDNEY: The right kidney is free of masses or hydronephrosis.        Impression    IMPRESSION:     No gallstones seen    CATHERINE CAMACHO MD       Medications   0.9% sodium chloride BOLUS (0 mLs Intravenous Stopped 6/1/21 1309)   ondansetron (ZOFRAN) injection 4 mg (4 mg Intravenous Given 6/1/21 1143)       Assessments & Plan (with Medical Decision Making)   Nontoxic in NAD. She has some RUQ pain without guarding or rebound. VSS, afebrile.     No leukocytosis, normal hgb, CMP unremarkable, neg trop, normal lactate, neg COVID.     US read as:  No gallstones seen    abd xray read as:  Mildly distended small bowel loops with air-fluid levels    Patient is p.o. challenged and drinking fluids just fine, she has had no episodes of vomiting while in emergency department.    I discussed case with Dr. Lakhani, surgeon.  Is felt that if she is otherwise doing well there is less concern for an obstruction at this time and that hospitalization is not needed.  CT scan from the other day did show concern for possible enteritis.  I did prescribe her some OsmoPrep pills for her colonoscopy coming up in 2 days, there were able to grab their packet that they needed to get for that procedure.  She is told to her very soft diet including liquids and Ensure.  We will also send her home with some Zofran.    Very strict return precautions are given including intractable vomiting or pain.  She and her  understand and agree with the plan the patient is discharged.    Venkat Ching PA-C        I have reviewed the nursing notes.    I have reviewed the findings, diagnosis, plan and need for follow up with the patient.        Discharge Medication List as of 6/1/2021  1:27 PM      START taking these medications    Details   ondansetron (ZOFRAN ODT) 4 MG ODT tab Take 1 tablet (4 mg) by mouth every 8 hours as needed, Disp-10 tablet, R-0, E-Prescribe             Final diagnoses:   Nausea and vomiting       6/1/2021   [facility] AND [facility]     Venkat Ching PA  06/01/21 [id number]

## 2021-06-01 NOTE — DISCHARGE INSTRUCTIONS
Get plenty of fluids and rest.  Take your Zofran medication as directed.  As we discussed your symptoms seems most consistent with an enteritis/ stomach bug. Continue to drink clear fluids, you can drink some ensure over the next day. We did prescribe pills for you to begin taking tomorrow night for colonoscopy. Go retrieve your packet for that procedure. Return if there is intractable pain, intractable vomiting.

## 2021-06-01 NOTE — ED PROVIDER NOTES
"  History     Chief Complaint   Patient presents with     Nausea     Abdominal Pain     HPI  Melissa Quinteros is a 86 year old female who presents to the ED for evaluation of nausea and vomiting and some abdominal discomfort.  She was seen in the ED a few days prior for left-sided abdominal pain and found to have some thickening of the distal transverse colon with concern for possible carcinoma.  She is set to have a colonoscopy in 2 days.  Last night she was having some soup and not long after she had 2 episodes of vomiting. This morning she has had some \"dry heaves\". She reports some RUQ discomfort. Decreased bowel movements but she did have one yesterday. She denies chest pain, sob, dysuria, fevers.     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 01/12/2011     Priority: Medium        Past Medical History:    Past Medical History:   Diagnosis Date     Esophageal obstruction      Essential (primary) hypertension      Hyperlipidemia      Zoster without complications        Past Surgical History:    Past Surgical History:   Procedure Laterality Date     APPENDECTOMY       ARTHROPLASTY KNEE Right 2017    [facility]     COLONOSCOPY      No Comments Provided     ESOPHAGOGASTRODUODENOSCOPY       HYSTERECTOMY TOTAL ABDOMINAL      1982,incidental appendectomy     TONSILLECTOMY, ADENOIDECTOMY, COMBINED      childhood       Family History:    Family History   Problem Relation Age of Onset "     Other - See Comments Father         Old age     Lung Cancer Mother      Brain Cancer Brother        Social History:  Marital Status:   [2]  Social History     Tobacco Use     Smoking status: Never Smoker     Smokeless tobacco: Never Used   Substance Use Topics     Alcohol use: Yes     Alcohol/week: 0.0 standard drinks     Comment: Alcoholic Drinks/day: Seldom     Drug use: No     Comment: Drug use: No        Medications:    ondansetron (ZOFRAN ODT) 4 MG ODT tab  bisacodyl (DULCOLAX) 5 MG EC tablet  clobetasol (TEMOVATE) 0.05 % external cream  doxepin (ZONALON) 5 % external cream  magnesium oxide (MAG-OX) 400 MG tablet  omeprazole (PRILOSEC) 20 MG CR capsule  polyethylene glycol-electrolytes (NULYTELY) 420 g solution          Review of Systems   Constitutional: Negative for chills and fever.   HENT: Negative for congestion.    Eyes: Negative for visual disturbance.   Respiratory: Negative for chest tightness and shortness of breath.    Cardiovascular: Negative for chest pain.   Gastrointestinal: Positive for abdominal pain, nausea and vomiting.   Genitourinary: Negative for hematuria.   Musculoskeletal: Negative for back pain.   Skin: Negative for rash and wound.   Neurological: Negative for syncope.   Hematological: Negative for adenopathy. Does not bruise/bleed easily.   Psychiatric/Behavioral: Negative for confusion.       Physical Exam   BP: (!) 140/61  Pulse: 72  Temp: 97.8  F (36.6  C)  Resp: 18  Weight: 57.2 kg (126 lb)  SpO2: 97 %      Physical Exam  Constitutional:       General: She is not in acute distress.     Appearance: She is well-developed. She is not diaphoretic.   HENT:      Head: Normocephalic and atraumatic.   Eyes:      General: No scleral icterus.     Conjunctiva/sclera: Conjunctivae normal.   Neck:      Musculoskeletal: Neck supple.   Cardiovascular:      Rate and Rhythm: Normal rate and regular rhythm.   Pulmonary:      Effort: Pulmonary effort is normal.      Breath sounds: Normal  breath sounds.   Abdominal:      Palpations: Abdomen is soft.      Tenderness: There is abdominal tenderness in the right upper quadrant. There is no guarding or rebound.   Musculoskeletal:         General: No deformity.   Lymphadenopathy:      Cervical: No cervical adenopathy.   Skin:     General: Skin is warm and dry.      Findings: No rash.   Neurological:      Mental Status: She is alert and oriented to person, place, and time. Mental status is at baseline.   Psychiatric:         Mood and Affect: Mood normal.         Behavior: Behavior normal.         ED Course        Procedures               Critical Care time:  none               Results for orders placed or performed during the hospital encounter of 06/01/21 (from the past 24 hour(s))   Symptomatic SARS-CoV-2 COVID-19 Virus (Coronavirus) by PCR    Specimen: Nasopharyngeal   Result Value Ref Range    SARS-CoV-2 Virus Specimen Source Nasopharyngeal     SARS-CoV-2 PCR Result NEGATIVE     SARS-CoV-2 PCR Comment       Testing was performed using the Xpert Xpress SARS-CoV-2 Assay on the Cepheid Gene-Xpert   Instrument Systems. Additional information about this Emergency Use Authorization (EUA)   assay can be found via the Lab Guide.     XR Abdomen 2 Views    Narrative    PROCEDURE: XR ABDOMEN 2VIEWS 6/1/2021 11:41 AM    HISTORY: n/v. CT 3 days ago showed thickening of distal transverse  colon, concern for carcinoma, set to have colonoscopy. obstruction?    COMPARISONS: None.    TECHNIQUE: Flat and upright    FINDINGS: There are distended loops of small bowel with air-fluid  levels. Gas is seen in the nondilated right colonic loops. Gas is seen  in nondilated descending colon loops and rectum. There is no  extraluminal gas. There are is no pathologic intra-abdominal  calcifications. Thoracolumbar scoliosis is noted.         Impression    IMPRESSION: Mildly distended small bowel loops with air-fluid levels    CATHERINE CAMACHO MD   CBC with platelets differential    Result Value Ref Range    WBC 5.8 4.0 - 11.0 10e9/L    RBC Count 4.22 3.8 - 5.2 10e12/L    Hemoglobin 12.1 11.7 - 15.7 g/dL    Hematocrit 36.3 35.0 - 47.0 %    MCV 86 78 - 100 fl    MCH 28.7 26.5 - 33.0 pg    MCHC 33.3 31.5 - 36.5 g/dL    RDW 14.1 10.0 - 15.0 %    Platelet Count 193 150 - 450 10e9/L    Diff Method Automated Method     % Neutrophils 65.5 %    % Lymphocytes 22.9 %    % Monocytes 9.0 %    % Eosinophils 1.2 %    % Basophils 1.2 %    % Immature Granulocytes 0.2 %    Absolute Neutrophil 3.8 1.6 - 8.3 10e9/L    Absolute Lymphocytes 1.3 0.8 - 5.3 10e9/L    Absolute Monocytes 0.5 0.0 - 1.3 10e9/L    Absolute Eosinophils 0.1 0.0 - 0.7 10e9/L    Absolute Basophils 0.1 0.0 - 0.2 10e9/L    Abs Immature Granulocytes 0.0 0 - 0.4 10e9/L   Comprehensive metabolic panel   Result Value Ref Range    Sodium 139 134 - 144 mmol/L    Potassium 4.0 3.5 - 5.1 mmol/L    Chloride 104 98 - 107 mmol/L    Carbon Dioxide 29 21 - 31 mmol/L    Anion Gap 6 3 - 14 mmol/L    Glucose 99 70 - 105 mg/dL    Urea Nitrogen 19 7 - 25 mg/dL    Creatinine 0.94 0.60 - 1.20 mg/dL    GFR Estimate 56 (L) >60 mL/min/[1.73_m2]    GFR Estimate If Black 68 >60 mL/min/[1.73_m2]    Calcium 8.7 8.6 - 10.3 mg/dL    Bilirubin Total 0.7 0.3 - 1.0 mg/dL    Albumin 3.5 3.5 - 5.7 g/dL    Protein Total 5.4 (L) 6.4 - 8.9 g/dL    Alkaline Phosphatase 84 34 - 104 U/L    ALT 7 7 - 52 U/L    AST 15 13 - 39 U/L   Lipase   Result Value Ref Range    Lipase 7 (L) 11 - 82 U/L   Lactic acid whole blood   Result Value Ref Range    Lactic Acid 0.7 0.7 - 2.0 mmol/L   Troponin GH   Result Value Ref Range    Troponin 4.4 <34.0 pg/mL   EKG 12 lead   Result Value Ref Range    Interpretation ECG Click View Image link to view waveform and result    US Abdomen Limited    Narrative    PROCEDURES: US ABDOMEN LIMITED    HISTORY: n/v. RUQ pain. Gallbladder?    TECHNIQUE: Grayscale ultrasound of the upper abdomen was performed.    COMPARISON: none     FINDINGS:    MEASUREMENTS:   Liver  length:  15 cm.   Common duct diameter:  0.6 cm.    LIVER: The liver is free of masses or biliary ductal enlargement.    GALLBLADDER: No gallstones are seen.    ABDOMINAL AORTA AND IVC: The abdominal aorta is normally tapering. The  inferior vena cava is patent.    PANCREAS: The pancreas appears normal.    Right KIDNEY: The right kidney is free of masses or hydronephrosis.        Impression    IMPRESSION:     No gallstones seen    CATHERINE CAMACHO MD       Medications   0.9% sodium chloride BOLUS (0 mLs Intravenous Stopped 6/1/21 1309)   ondansetron (ZOFRAN) injection 4 mg (4 mg Intravenous Given 6/1/21 1143)       Assessments & Plan (with Medical Decision Making)   Nontoxic in NAD. She has some RUQ pain without guarding or rebound. VSS, afebrile.     No leukocytosis, normal hgb, CMP unremarkable, neg trop, normal lactate, neg COVID.     US read as:  No gallstones seen    abd xray read as:  Mildly distended small bowel loops with air-fluid levels    Patient is p.o. challenged and drinking fluids just fine, she has had no episodes of vomiting while in emergency department.    I discussed case with Dr. Lakhani, surgeon.  Is felt that if she is otherwise doing well there is less concern for an obstruction at this time and that hospitalization is not needed.  CT scan from the other day did show concern for possible enteritis.  I did prescribe her some OsmoPrep pills for her colonoscopy coming up in 2 days, there were able to grab their packet that they needed to get for that procedure.  She is told to her very soft diet including liquids and Ensure.  We will also send her home with some Zofran.    Very strict return precautions are given including intractable vomiting or pain.  She and her  understand and agree with the plan the patient is discharged.    Venkat Ching PA-C        I have reviewed the nursing notes.    I have reviewed the findings, diagnosis, plan and need for follow up with the patient.        Discharge Medication List as of 6/1/2021  1:27 PM      START taking these medications    Details   ondansetron (ZOFRAN ODT) 4 MG ODT tab Take 1 tablet (4 mg) by mouth every 8 hours as needed, Disp-10 tablet, R-0, E-Prescribe             Final diagnoses:   Nausea and vomiting       6/1/2021   [facility] AND [facility]     Venkat Ching PA  06/01/21 0867

## 2021-06-01 NOTE — ED TRIAGE NOTES
ED Nursing Triage Note (General)   ________________________________    Melissa Quinteros is a 86 year old Female that presents to triage via private vehicle with complaints of abdominal pain.  Patient states she was seen in the ED last week and was scheduled to have a colonoscopy today at 1100 today, however, came to ED due to nausea and 2 episoes of emesis last night.  Patient states she was concerned about being able to do the bowel prep for her colonoscopy due to recent nausea so is presenting to the ED instead. Patient states she has been NPO today.  Significant symptoms had onset 12 hours ago.  GCS-15  Breathing noted as Normal  Action taken:  Level 3      PRE HOSPITAL PRIOR LIVING SITUATION-home

## 2021-06-01 NOTE — ED NOTES
Ultrasound finished at bedside. Patient sitting up drinking water, no complaints of nausea at this time.

## 2021-06-03 NOTE — ANESTHESIA PREPROCEDURE EVALUATION
Anesthesia Pre-Procedure Evaluation    Patient: Melissa Quinteros   MRN: [id number] : 1935        Preoperative Diagnosis: Abdominal pain [R10.9]   Procedure : Procedure(s):  COLONOSCOPY     Past Medical History:   Diagnosis Date     Esophageal obstruction     2016     Essential (primary) hypertension     2016     Hyperlipidemia     No Comments Provided     Zoster without complications     X2      Past Surgical History:   Procedure Laterality Date     APPENDECTOMY       ARTHROPLASTY KNEE Right     [place]     COLONOSCOPY      No Comments Provided     ESOPHAGOGASTRODUODENOSCOPY       HYSTERECTOMY TOTAL ABDOMINAL      1982,incidental appendectomy     TONSILLECTOMY, ADENOIDECTOMY, COMBINED      childhood      Allergies   Allergen Reactions     Lovastatin      Other reaction(s): Arthralgia      Social History     Tobacco Use     Smoking status: Never Smoker     Smokeless tobacco: Never Used   Substance Use Topics     Alcohol use: Yes     Alcohol/week: 0.0 standard drinks     Comment: Alcoholic Drinks/day: Seldom      Wt Readings from Last 1 Encounters:   21 57.2 kg (126 lb)        Anesthesia Evaluation   Pt has had prior anesthetic.     No history of anesthetic complications       ROS/MED HX  ENT/Pulmonary:  - neg pulmonary ROS     Neurologic:  - neg neurologic ROS     Cardiovascular:     (+) Dyslipidemia hypertension-----    METS/Exercise Tolerance: >4 METS    Hematologic:  - neg hematologic  ROS     Musculoskeletal:  - neg musculoskeletal ROS     GI/Hepatic:     (+) GERD,     Renal/Genitourinary:  - neg Renal ROS     Endo:  - neg endo ROS     Psychiatric/Substance Use:  - neg psychiatric ROS     Infectious Disease:  - neg infectious disease ROS     Malignancy:  - neg malignancy ROS     Other:  - neg other ROS          Physical Exam    Airway        Mallampati: II   TM distance: > 3 FB   Neck ROM: full   Mouth opening: > 3 cm    Respiratory Devices and Support         Dental       (+) upper  dentures and partials      Cardiovascular   cardiovascular exam normal       Rhythm and rate: regular and normal     Pulmonary   pulmonary exam normal        breath sounds clear to auscultation           OUTSIDE LABS:  CBC:   Lab Results   Component Value Date    WBC 5.8 06/01/2021    WBC 7.3 05/28/2021    HGB 12.1 06/01/2021    HGB 12.7 05/28/2021    HCT 36.3 06/01/2021    HCT 38.2 05/28/2021     06/01/2021     05/28/2021     BMP:   Lab Results   Component Value Date     06/01/2021     05/28/2021    POTASSIUM 4.0 06/01/2021    POTASSIUM 3.7 05/28/2021    CHLORIDE 104 06/01/2021    CHLORIDE 101 05/28/2021    CO2 29 06/01/2021    CO2 26 05/28/2021    BUN 19 06/01/2021    BUN 16 05/28/2021    CR 0.94 06/01/2021    CR 0.86 05/28/2021    GLC 99 06/01/2021    GLC 98 05/28/2021     COAGS: No results found for: PTT, INR, FIBR  POC: No results found for: BGM, HCG, HCGS  HEPATIC:   Lab Results   Component Value Date    ALBUMIN 3.5 06/01/2021    PROTTOTAL 5.4 (L) 06/01/2021    ALT 7 06/01/2021    AST 15 06/01/2021    ALKPHOS 84 06/01/2021    BILITOTAL 0.7 06/01/2021     OTHER:   Lab Results   Component Value Date    LACT 0.7 06/01/2021    A1C 5.6 08/20/2020    DARIANA 8.7 06/01/2021    MAG 1.9 06/19/2018    LIPASE 7 (L) 06/01/2021    TSH 0.94 08/20/2020       Anesthesia Plan    ASA Status:  3   NPO Status:  NPO Appropriate    Anesthesia Type: MAC.   Induction: Propofol.           Consents    Anesthesia Plan(s) and associated risks, benefits, and realistic alternatives discussed. Questions answered and patient/representative(s) expressed understanding.     - Discussed with:  Patient      - Extended Intubation/Ventilatory Support Discussed: No.      - Patient is DNR/DNI Status: No    Use of blood products discussed: Yes.     - Discussed with: Patient.     - Consented: consented to blood products            Reason for refusal: other.     Postoperative Care            Comments:                AUSTIN DSOUZA  OUMAR FRANK CRNA

## 2021-06-03 NOTE — ANESTHESIA CARE TRANSFER NOTE
Patient: Melissa Quinteros    Procedure(s):  COLONOSCOPY, WITH POLYPECTOMY AND BIOPSY with endoscopic tattoo    Diagnosis: Abdominal pain [R10.9]  Diagnosis Additional Information: No value filed.    Anesthesia Type:   MAC     Note:    Oropharynx: oropharynx clear of all foreign objects  Level of Consciousness: awake  Oxygen Supplementation: room air    Independent Airway: airway patency satisfactory and stable  Dentition: dentition unchanged  Vital Signs Stable: post-procedure vital signs reviewed and stable  Report to RN Given: handoff report given  Patient transferred to: Phase II    Handoff Report: Identifed the Patient, Identified the Reponsible Provider, Reviewed the pertinent medical history, Discussed the surgical course, Reviewed Intra-OP anesthesia mangement and issues during anesthesia, Set expectations for post-procedure period and Allowed opportunity for questions and acknowledgement of understanding      Vitals: (Last set prior to Anesthesia Care Transfer)  CRNA VITALS  6/3/2021 1603 - 6/3/2021 1637      6/3/2021             Resp Rate (set):  10        Electronically Signed By: OUMAR MCMILLAN CRNA  Nirmala 3, 2021  4:37 PM

## 2021-06-03 NOTE — OP NOTE
PROCEDURE NOTE    SURGEON:Del Pang MD    PRE-OP DIAGNOSIS:  Colon thickening on CT      POST-OP DIAGNOSIS: Transverse colon mass    PROCEDURE:  Colonoscopy with snare    SPECIMEN:      ID Type Source Tests Collected by Time Destination   A :  Polyp Large Intestine, Transverse SURGICAL PATHOLOGY EXAM Del Pang MD 6/3/2021  3:55 PM    B :  Polyp Large Intestine, Right/Ascending SURGICAL PATHOLOGY EXAM Del Pang MD 6/3/2021  3:58 PM    C : transverse colon mass Biopsy Large Intestine, Transverse SURGICAL PATHOLOGY EXAM Del Pang MD 6/3/2021  4:10 PM    D : descending colon polyps Polyp Large Intestine, Left/Descending SURGICAL PATHOLOGY EXAM Del Pang MD 6/3/2021  4:22 PM          ANESTHESIA:  Monitor Anesthesia Care CRNA Independent: Chio Tierney APRN CRNA   Coverage requested due to age over 70    ESTIMATED BLOOD LOSS: none    COMPLICATIONS:  None    INDICATION FOR THE PROCEDURE: The patient is a 86 year old female. The patient presents with intermittent abdominal pain and colon thickening on CT. I explained to the patient the risks, benefits and alternatives to screening colonoscopy for evaluating for thickening and to look for malignancy. We discussed the risks including bleeding, perforation, potential inability to reach the cecum and the risks of sedation. The patient's questions were answered and the patient wished to proceed. Informed consent paperwork was completed.    PROCEDURE: The patient was taken to the endoscopy suite. Appropriate monitors were attached. The patient was placed in the left lateral decubitus position. Timeout was performed confirming the patient's identity and procedure to be performed.  After appropriate sedation was confirmed, digital rectal exam was performed.  There was normal tone and no gross abnormality was noted.  The lubricated colonoscope was introduced into the anus the colon was insufflated with air. The prep quality was adequate. Under  direct visualization the scope was advanced to 60 cm where a mass was apparent.  This was partially obstructing, but able to be traversed to complete our way to the cecum. The mucosa of the colon was inspected while withdrawing the scope.  Three ascending colon polyps were removed with snare (3-7mm).  Representative samples of the mass were removed with hot snare. The distal aspect was tattooed. Three additional transverse colon polyps were removed with cold snare (2-6mm). One 5mm descending colon polyp was removed with cold snare.   The scope was retroflexed in the rectum and the anorectal junction was inspected. No abnormalities were noted. The scope was returned to aneutral position and the colon was decompressed. The scope was removed. The patient tolerated the procedure with no immediately apparent complication. The patient was taken to recovery in stable condition.    FOLLOW UP: RECOMMEND high fiber diet, will call with pathology results. CEA and CT chest, plan for resection in near future.      Bret Jimenez MD on 6/3/2021 at 4:31 PM

## 2021-06-03 NOTE — ANESTHESIA POSTPROCEDURE EVALUATION
Patient: Melissa Quinteros    Procedure(s):  COLONOSCOPY, WITH POLYPECTOMY AND BIOPSY with endoscopic tattoo    Diagnosis:Abdominal pain [R10.9]  Diagnosis Additional Information: No value filed.    Anesthesia Type:  MAC    Note:  Disposition: Outpatient   Postop Pain Control: Uneventful            Sign Out: Well controlled pain   PONV: No   Neuro/Psych: Uneventful            Sign Out: Acceptable/Baseline neuro status   Airway/Respiratory: Uneventful            Sign Out: Acceptable/Baseline resp. status   CV/Hemodynamics: Uneventful            Sign Out: Acceptable CV status; No obvious hypovolemia; No obvious fluid overload   Other NRE: NONE   DID A NON-ROUTINE EVENT OCCUR? No           Last vitals:  Vitals:    06/03/21 1240 06/03/21 1637   BP: (!) 141/74 120/66   Pulse:  70   Resp: 16 14   Temp: 98.3  F (36.8  C) 96.8  F (36  C)   SpO2: 97% 99%       Last vitals prior to Anesthesia Care Transfer:  CRNA VITALS  6/3/2021 1603 - 6/3/2021 1641      6/3/2021             Resp Rate (set):  10          Electronically Signed By: OUMAR MCMILLAN CRNA  Nirmala 3, 2021  4:41 PM

## 2021-06-03 NOTE — INTERVAL H&P NOTE
I saw and examined Melissa Quinteros.  I have reviewed the history and physical and find no changes to the patient's medical status or condition with the exceptions noted below.     Del Pang MD   3:12 PM 6/3/2021

## 2021-06-03 NOTE — DISCHARGE INSTRUCTIONS
[facility]  Adult Discharge Orders & Instructions    ________________________________________________________________          For 12 hours after surgery  1. Get plenty of rest.  A responsible adult must stay with you for at least 12 hours after you leave the hospital.   2. You may feel lightheaded.  IF so, sit for a few minutes before standing.  Have someone help you get up.   3. You may have a slight fever. Call the doctor if your fever is over 101 F (38.3 C) (taken under the tongue) or lasts longer than 24 hours.  4. You may have a dry mouth, a sore throat, muscle aches or trouble sleeping.  These should go away after 24 hours.  5. Do not make important or legal decisions.  6.   Do not drive or use heavy equipment.  If you have weakness or tingling, don't drive or use heavy equipment until this feeling goes away.    To contact a doctor, call   [phone]_______________________

## 2021-06-03 NOTE — OR NURSING
Patient was discharged home with  via w/c to car.   Discharge instructions were reviewed with patient and . And they verbalized understanding.   Prescriptions: none    Dr. Pang explained and provided verbal and written information regarding colon resection. Questions answered.   Labs were drawn today.     Patient denies pain, other than mild cramping. She's passing gas. Denies nausea.

## 2021-06-04 PROBLEM — C18.4 MALIGNANT NEOPLASM OF TRANSVERSE COLON (H): Status: ACTIVE | Noted: 2021-01-01

## 2021-06-07 NOTE — TELEPHONE ENCOUNTER
Patient's spouse is calling stating that someone called regarding patients surgery today 6/7 and the patient doesn't remember any of the information that was given to her. The spouse would like a call back with the same information so he can get it all wrote down and have all the information for the patient.

## 2021-06-07 NOTE — TELEPHONE ENCOUNTER
Spoke with  to let him know per patient, that the surgery folder at [facility] [address] registration.  He will pick it up tomorrow.  Lesia Herrera LPN..........6/7/2021  3:40 PM

## 2021-06-08 NOTE — NURSING NOTE
"Chief Complaint   Patient presents with     Derm Problem     itchy, dry legs     Patient presents to clinic with dry, itchy legs. She states that she doesn't know why, but she itches so much it takes skin off. She has been using triamcinolone cream, but not the clobetasol yet.     Initial /70 (BP Location: Right arm, Patient Position: Sitting, Cuff Size: Adult Regular)   Pulse 52   Temp 98.5  F (36.9  C) (Tympanic)   Resp 18   Ht 1.613 m (5' 3.5\")   Wt 53.7 kg (118 lb 6.4 oz)   SpO2 96%   BMI 20.64 kg/m   Estimated body mass index is 20.64 kg/m  as calculated from the following:    Height as of this encounter: 1.613 m (5' 3.5\").    Weight as of this encounter: 53.7 kg (118 lb 6.4 oz).         Medication Reconciliation: Complete      Magda Naylor   "

## 2021-06-08 NOTE — PROGRESS NOTES
"HPI:    Melissa Quinteros is a 86 year old female who presents to clinic today for derm concerns.  She has had itchy feet and ankles for quite some time, she thinks for few months.  She was seen on [place] for this but was not given any specific diagnosis.  She was also seen recently in the clinic for this.  She has been using triamcinolone cream which has not been helpful and she was given a tube of clobetasol cream has not started this.  She does apply aloe gel to her feet daily.  She has no itching or rash anywhere else on her body.  She is under extreme amount of stress currently due to upcoming colon cancer surgery.    Past Medical History:   Diagnosis Date     Esophageal obstruction     8/30/2016     Essential (primary) hypertension     8/30/2016     Hyperlipidemia     No Comments Provided     Zoster without complications     X2         Current Outpatient Medications   Medication Sig Dispense Refill     doxepin (ZONALON) 5 % external cream Apply topically 3 times daily 45 g 1     magnesium oxide (MAG-OX) 400 MG tablet 1 tab OTC daily 7 tablet      metroNIDAZOLE (FLAGYL) 500 MG tablet As directed from colon prep. 3 tablet 0     neomycin (MYCIFRADIN) 500 MG tablet As directed for colon prep. 6 tablet 0     omeprazole (PRILOSEC) 20 MG CR capsule Take 1 capsule (20 mg) by mouth daily 90 capsule 3     triamcinolone (KENALOG) 0.1 % external ointment Apply topically 2 times daily       clobetasol (TEMOVATE) 0.05 % external cream Apply topically 2 times daily (Patient not taking: Reported on 6/8/2021) 45 g 1       Allergies   Allergen Reactions     Lovastatin      Other reaction(s): Arthralgia       ROS:  Pertinent positives and negatives are noted in HPI.    EXAM:  /70 (BP Location: Right arm, Patient Position: Sitting, Cuff Size: Adult Regular)   Pulse 52   Temp 98.5  F (36.9  C) (Tympanic)   Resp 18   Ht 1.613 m (5' 3.5\")   Wt 53.7 kg (118 lb 6.4 oz)   SpO2 96%   BMI 20.64 kg/m    General appearance: " well appearing female, in no acute distress  Dermatological: Mildly dry skin around bilateral ankles but no rash or skin changes noted  Psychological: normal affect, alert and pleasant    ASSESSMENT AND PLAN:    1. Pruritus        Pruritus of feet and ankles.  Unsure of cause at this time.  We will have her start clear to soak in twice daily as well as using Eucerin cream or Vaseline to her area twice daily.  Consider dermatology referral if symptoms persist after upcoming surgery.    OUMAR Pretty CNP..................6/8/2021 1:21 PM

## 2021-06-14 NOTE — INTERVAL H&P NOTE
I saw and examined Melissa Quinteros.  I have reviewed the history and physical and find no changes to the patient's medical status or condition with the exceptions noted below.     Del Pang MD   12:06 PM 6/14/2021

## 2021-06-14 NOTE — OR NURSING
PACU Transfer Note    Melissa Quinteros was transferred to [facility] via bed.  Equipment used for transport:  none.  Accompanied by:  Angie PHILLIPS  Prescriptions were: none    PACU Respiratory Event Documentation     1) Episodes of Apnea greater than or equal to 10 seconds: no    2) Bradypnea - less than 8 breaths per minute: no    3) Pain score on 0 to 10 scale: 0    4) Pain-sedation mismatch (yes or no): no    5) Repeated 02 desaturation less than 90% (yes or no): no    Anesthesia notified? (yes or no): no    Any of the above events occuring repeatedly in separate 30 minute intervals may be considered recurrent PACU respiratory events.    Patient stable and meets phase 1 discharge criteria for transport from PACU.

## 2021-06-14 NOTE — PHARMACY-ADMISSION MEDICATION HISTORY
Pharmacy -- Admission Medication Reconciliation    Prior to admission (PTA) medications were reviewed and the patient's PTA medication list was updated.    Sources Consulted: chart review, [facility] refill history,  Ilya interview    The reliability of this Medication Reconciliation is: Reliability: Reliable    The following significant changes were made:    Removed metronidazole--pre op abx complete    Removed neomycin--pre op abx complete      ** says patient takes an oral medication once weekly on Fridays for her macular degeneration. She gets it from a doctor in [place].  No fills on refill history for this.   will let us know name of medication, please add upon receipt.      In addition, the patient's allergies were reviewed with the patient and updated as follows:   Allergies: Lovastatin    The pharmacist has reviewed with the patient that all personal medications should be removed from the building or locked in the belongings safe.  Patient shall only take medications ordered by the physician and administered by the nursing staff.       Medication barriers identified: none   Medication adherence concerns: none   Understanding of emergency medications: LULU Collier RPH, 6/14/2021,  11:13 AM

## 2021-06-14 NOTE — ANESTHESIA PREPROCEDURE EVALUATION
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]   Procedure : Procedure(s):  COLECTOMY, LAPAROSCOPIC     Past Medical History:   Diagnosis Date     Esophageal obstruction     2016     Essential (primary) hypertension     2016     Hyperlipidemia     No Comments Provided     Zoster without complications     X2      Past Surgical History:   Procedure Laterality Date     APPENDECTOMY       ARTHROPLASTY KNEE Right     HANG Bowles     COLONOSCOPY      No Comments Provided     COLONOSCOPY N/A 6/3/2021    Procedure: COLONOSCOPY, WITH POLYPECTOMY AND BIOPSY with endoscopic tattoo;  Surgeon: Del Pang MD;  Location: [facility] OR     ESOPHAGOGASTRODUODENOSCOPY       HYSTERECTOMY TOTAL ABDOMINAL      1982,incidental appendectomy     TONSILLECTOMY, ADENOIDECTOMY, COMBINED      childhood      Allergies   Allergen Reactions     Lovastatin      Other reaction(s): Arthralgia      Social History     Tobacco Use     Smoking status: Never Smoker     Smokeless tobacco: Never Used   Substance Use Topics     Alcohol use: Not Currently     Comment: Alcoholic Drinks/day: Seldom      Wt Readings from Last 1 Encounters:   21 53.5 kg (118 lb)        Anesthesia Evaluation   Pt has had prior anesthetic.         ROS/MED HX  ENT/Pulmonary:  - neg pulmonary ROS     Neurologic:  - neg neurologic ROS     Cardiovascular:     (+) hypertension-----    METS/Exercise Tolerance: 4 - Raking leaves, gardening    Hematologic:  - neg hematologic  ROS     Musculoskeletal:   (+) arthritis,     GI/Hepatic: Comment: Malignant neoplasm of the transverse colon    (+) GERD,     Renal/Genitourinary:       Endo:  - neg endo ROS     Psychiatric/Substance Use:  - neg psychiatric ROS     Infectious Disease:  - neg infectious disease ROS     Malignancy:   (+) Malignancy, History of GI.GI CA Active status post.        Other:   - neg other ROS          Physical Exam    Airway      Comment: Large over bite    Mallampati: II   TM distance: > 3 FB   Neck ROM: full   Mouth opening: > 3 cm    Respiratory Devices and Support         Dental       (+) partials      Cardiovascular   cardiovascular exam normal       Rhythm and rate: regular and normal     Pulmonary   pulmonary exam normal        breath sounds clear to auscultation           OUTSIDE LABS:  CBC:   Lab Results   Component Value Date    WBC 5.8 06/01/2021    WBC 7.3 05/28/2021    HGB 12.1 06/01/2021    HGB 12.7 05/28/2021    HCT 36.3 06/01/2021    HCT 38.2 05/28/2021     06/01/2021     05/28/2021     BMP:   Lab Results   Component Value Date     06/01/2021     05/28/2021    POTASSIUM 4.0 06/01/2021    POTASSIUM 3.7 05/28/2021    CHLORIDE 104 06/01/2021    CHLORIDE 101 05/28/2021    CO2 29 06/01/2021    CO2 26 05/28/2021    BUN 19 06/01/2021    BUN 16 05/28/2021    CR 0.94 06/01/2021    CR 0.86 05/28/2021    GLC 99 06/01/2021    GLC 98 05/28/2021     COAGS: No results found for: PTT, INR, FIBR  POC: No results found for: BGM, HCG, HCGS  HEPATIC:   Lab Results   Component Value Date    ALBUMIN 3.5 06/01/2021    PROTTOTAL 5.4 (L) 06/01/2021    ALT 7 06/01/2021    AST 15 06/01/2021    ALKPHOS 84 06/01/2021    BILITOTAL 0.7 06/01/2021     OTHER:   Lab Results   Component Value Date    LACT 0.7 06/01/2021    A1C 5.6 08/20/2020    DARIANA 8.7 06/01/2021    MAG 1.9 06/19/2018    LIPASE 7 (L) 06/01/2021    TSH 0.94 08/20/2020       Anesthesia Plan    ASA Status:  2   NPO Status:  NPO Appropriate    Anesthesia Type: General.     - Airway: ETT              Consents    Anesthesia Plan(s) and associated risks, benefits, and realistic alternatives discussed. Questions answered and patient/representative(s) expressed understanding.     - Discussed with:  Patient         Postoperative Care    Pain management: intrathecal morphine.   PONV prophylaxis: Ondansetron (or other 5HT-3),  Dexamethasone or Solumedrol     Comments:                David Kellerman, APRN CRNA

## 2021-06-14 NOTE — PROGRESS NOTES
Admission Note    Data:  Melissa Quinteros admitted to 334 from surgery at 1835.      Action:  Dr. Pang has been notified of admission. Pt oriented to unit, call light in reach.     Response:  Patient tolerated transfer well .

## 2021-06-14 NOTE — ANESTHESIA CARE TRANSFER NOTE
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    Anesthesia Type:   General     Note:      Level of Consciousness: drowsy  Oxygen Supplementation: face mask (transported on 8 L/min)  Level of Supplemental Oxygen (L/min / FiO2): 99%  Independent Airway: airway patency satisfactory and stable  Dentition: dentition unchanged  Vital Signs Stable: post-procedure vital signs reviewed and stable  Report to RN Given: handoff report given  Patient transferred to: PACU    Handoff Report: Identifed the Patient, Identified the Reponsible Provider, Reviewed the pertinent medical history, Discussed the surgical course, Reviewed Intra-OP anesthesia mangement and issues during anesthesia, Set expectations for post-procedure period and Allowed opportunity for questions and acknowledgement of understanding      Vitals: (Last set prior to Anesthesia Care Transfer)  CRNA VITALS  6/14/2021 1709 - 6/14/2021 1746      6/14/2021             Resp Rate (set):  10        Electronically Signed By: David Kellerman, APRN CRNA  June 14, 2021  5:46 PM

## 2021-06-14 NOTE — ANESTHESIA PROCEDURE NOTES
Intrathecal injection Procedure Note  Pre-Procedure   Staff -        CRNA: Nile Abdi APRN CRNA       Performed By: CRNA       Location: OR       Procedure Start/Stop Times: 6/14/2021 2:21 PM and 6/14/2021 2:26 PM       Pre-Anesthestic Checklist: patient identified, IV checked, risks and benefits discussed, informed consent, monitors and equipment checked, pre-op evaluation, at physician/surgeon's request and post-op pain management  Timeout:       Correct Patient: Yes        Correct Procedure: Yes        Correct Site: Yes        Correct Position: Yes   Procedure Documentation  Procedure: intrathecal injection       Diagnosis: Post-operative pain control for Colectomy        Patient Position: sitting       Patient Prep/Sterile Barriers: sterile gloves, mask       Skin prep: Chloraprep       Insertion Site: L3-4. (right paramedian approach).       Needle Gauge: 25.        Needle Length (Inches): 3.5        Spinal Needle Type: Vladimir tip       Introducer used       Introducer: 20 G       # of attempts: 1 and  # of redirects:  1    Assessment/Narrative         Paresthesias: No.       CSF fluid: clear.    Medication(s) Administered   Medication Administration Time: 6/14/2021 2:26 PM    Comments:  No complications encountered. Pt tolerated procedure well.

## 2021-06-14 NOTE — ANESTHESIA PROCEDURE NOTES
Airway         Procedure Start/Stop Times: 6/14/2021 2:30 PM and 6/14/2021 2:32 PM  Staff -        CRNA: Nile Abdi APRN CRNA       Performed By: CRNA  Consent for Airway        Urgency: elective  Indications and Patient Condition       Indications for airway management: liliya-procedural       Induction type:intravenous       Mask difficulty assessment: 1 - vent by mask    Final Airway Details       Final airway type: endotracheal airway       Successful airway: ETT - single  Endotracheal Airway Details        ETT size (mm): 7.0       Successful intubation technique: direct laryngoscopy       DL Blade Type: MAC 3       Grade View of Cords: 1       Position: Right       Measured from: gums/teeth       Secured at (cm): 22       Bite block used: None    Post intubation assessment        Placement verified by: capnometry, equal breath sounds and chest rise        Number of attempts at approach: 1       Number of other approaches attempted: 0       Secured with: silk tape       Ease of procedure: easy       Dentition: Intact and Unchanged    Medication(s) Administered   Medication Administration Time: 6/14/2021 2:31 PM    Additional Comments       Pt intubated by LUKE Tyson under CRNA supervision.

## 2021-06-14 NOTE — BRIEF OP NOTE
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]  Post-operative diagnosis malignant neoplasm of the transverse colon (adenocarcinoma, peritoneal implants and incarceration of small bowel loops x3, previous pelvic surgery with adhesions    Procedure: Procedure(s):  COLECTOMY, LAPAROSCOPIC, small bowel resection X2, extended Right hemicolectomy, biopsy of the mesenteric node  Surgeon: Surgeon(s) and Role:     * Del Pang MD - Primary  Anesthesia: General   Estimated blood loss: 150  Drains: None  Specimens:   ID Type Source Tests Collected by Time Destination   A : mesenteric implant Tissue Small Intestine, Jejunum SURGICAL PATHOLOGY EXAM Del Pang MD 6/14/2021  4:08 PM    B : Right, transverse and terminal ileum Tissue Small Intestine, Ileum SURGICAL PATHOLOGY EXAM Del Pang MD 6/14/2021  4:10 PM    C : jejunum Tissue Small Intestine, Jejunum SURGICAL PATHOLOGY EXAM Del Pang MD 6/14/2021  4:17 PM      Findings:   None.  Complications: None   Implants: * No implants in log *

## 2021-06-15 NOTE — PLAN OF CARE
Patient admitted POD #1 following lap colectomy. VSS and WNL. Active bowel sounds, denies n/v. Patient's pain well controlled until around 1300 then patient was in excruciating pain, 10/10. Surgeon notified, labs ordered, and pain medication increased. Pain tolerable level at this time with PRN Dilaudid, Oxy and scheduled Toradol and Tylenol. Heat pack applied to abdomen PRN. Tolerating full liquid diet, has not had much of an appetite since increase in pain. Patient due to void post catheter removal. Oral intake encouraged but patient's pain has decreased oral fluid intake. Up with SBA. Will continue to monitor. Temp: 98.2  F (36.8  C) Temp src: Tympanic BP: 116/59 Pulse: 84   Resp: 18 SpO2: 95 % O2 Device: None (Room air)       Dulce Maria Hernandez RN on 6/15/2021 at 4:51 PM

## 2021-06-15 NOTE — ANESTHESIA POSTPROCEDURE EVALUATION
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    Anesthesia Type:  General    Note:  Disposition: Inpatient   Postop Pain Control:            Sign Out: Well controlled pain   PONV: No   Neuro/Psych:             Sign Out: Acceptable/Baseline neuro status   Airway/Respiratory:             Sign Out: Acceptable/Baseline resp. status   CV/Hemodynamics:             Sign Out: Acceptable CV status   Other NRE: NONE   DID A NON-ROUTINE EVENT OCCUR? No           Last vitals:  Vitals:    06/14/21 1820 06/14/21 1821 06/14/21 1835   BP: (!) 154/71  (!) 154/69   Pulse: 64  62   Resp:   14   Temp: 97.8  F (36.6  C)  95.5  F (35.3  C)   SpO2: 96% 97% 97%       Last vitals prior to Anesthesia Care Transfer:  CRNA VITALS  6/14/2021 1709 - 6/14/2021 1809      6/14/2021             Resp Rate (set):  10          Electronically Signed By: David Kellerman, APRN CRNA  June 14, 2021  7:01 PM

## 2021-06-15 NOTE — PLAN OF CARE
Pt is POD 1 following colectomy. BS active, abdomen soft. Dressings intact with shadowing. Pt had high blood pressure associated with pain. Treated with 2.5 mg of oxycodone and able to sleep between cares. Pleasant and cooperative, confused and forgetful at times. Easily redirected. Sipping water, no nausea. Vitals stable. Capnography monitoring in place and WDL. Uses call light appropriately. Oakley cath patent.

## 2021-06-15 NOTE — OP NOTE
Procedure Date: 06/14/2021    PREOPERATIVE DIAGNOSIS:  Malignant mass in the transverse colon, adenocarcinoma.    POSTOPERATIVE DIAGNOSIS:  Locally metastatic invasive transverse colon mass invasive into small bowel loops x 3, previous pelvic surgery with adhesions to the right lower quadrant.    PROCEDURE PERFORMED:  Laparoscopic extended right colectomy, laparoscopic mobilization of splenic flexure, small bowel resection x 2, enterolysis for about 20 minutes, biopsy of mesenteric lymph node.    SURGEON:  Del Pang MD    ASSISTANT:  JAZMYN Dela Cruz.    ANESTHESIA:  General with local.    ESTIMATED BLOOD LOSS:  150 mL.    SPECIMENS:  Mesenteric implant, right and transverse colon and terminal ileum jejunal resection x 2.    FINDINGS:  Metastatic invasion into the small bowel loops x 3.    COMPLICATIONS:  None.      SURGICAL IMPLANTS:  None.    DESCRIPTION OF PROCEDURE:  The patient was taken to the OR and positioned supine on the operating table.  Her abdomen was sterilely prepped and draped in the usual manner.  An infraumbilical incision was made and the umbilical stalk grasped with a Kocher clamp.  Veress needle was inserted, and the abdomen was insufflated to 15 mmHg.  We then proceeded to insert a trocar with the Optiview technique.  We then inspected for any damage; none was noted.  Additional trocars in the left abdomen were placed.  With this, we were able to start mobilizing the terminal ileum.  Here, there were adhesions to the right lower quadrant.  These were tethering the terminal ileum and were actually fairly stenotic.  A proximal transection point was then chosen.  The terminal ileum was divided with a JH stapler.  We then dissected the mesentery and mobilized the right colon.  Once we had made a limited dissection from the cecum up to the ascending colon, we then proceeded to divide the gastrocolic ligament.  This was divided out to the hepatic flexure.  Blunt dissection was then used to  free the colon off of the duodenum.  Once this was freed off the duodenum, we then were able to dissect cephalad and caudad until we met.  We then chose our distal transection point.  This was just about 5 cm distal to the inked margin.  To facilitate mobilization of the colon, we took down the splenic flexure.  In locating the inked specimen, we noted that there were 3 to 3+ small bowel loops that were adherent.  This looked like mesenteric invasion.  We then chose to remove our specimen and complete our anastomosis as well as address the small bowel.  We then delivered the specimen and small bowel through a wound protector.  Once we had done this, we could clearly see that there were 3 loops of small bowel that were incarcerated in the tumor.  We divided these implants with a JH stapler.  We then assessed her narrowing.  Two of the implants did not produce a narrowing.  The third did produce a kinking of the bowel.  There were additional mesenteric implants noted.  We then proceeded to fashion our terminal ileum to transverse colon anastomosis.  Enterotomies were made in each corner of the staple line.  A JH stapler was used to make a side-to-side anastomosis.  The enterotomies were then offset and closed with another firing of the JH stapler.  Staple lines were then imbricated with a 3-0 Vicryl.  The mesenteric defect was closed with a 2-0 Vicryl.  We then assessed our small-bowel implants.  It was felt to be that the patient would have residual disease if left in place.  We proceeded to make a small-bowel resection to remove the distal 2 sites of implants.  With this, we performed a side-to-side small-bowel anastomosis.  This was constructed in the same way as the ileocolic anastomosis.  We then proceeded to run the bowel.  We used 2-0 Vicryl again to close down the mesenteric window.  When we got to finishing running our bowel, the jejunum near the ligament of Treitz had a focus of mesenteric or small-bowel  invasion.  This was done as a separate small-bowel resection.  This was passed off as a smaller specimen within the group of jejunum.  Again, the side-to-side anastomosis was performed after making 2 enterotomies.  The enterotomies were then offset and removed with another firing of the stapler.  The staple lines were then imbricated with 3-0 Vicryl.  We then began to close.  A closing tray was utilized.  A 0 loop PDS was used to run the fascia closed.  This was then tied to itself.  Interrupted and running 4-0 Monocryl was used to close the skin.  The other 2 port sites that were not incorporated into the specimen retrieval incision were closed with 4-0 Monocryl.  Steri-Strips were applied.  Sterile bandages were applied, and the patient was taken to recovery in satisfactory condition.    Del Pang MD        D: 2021   T: 2021   MT: [facility]    Name:     CHINO HAINESMulu  MRN:      [id number]        Account:        [id number]   :      1935           Procedure Date: 2021     Document: [id number]

## 2021-06-15 NOTE — PROVIDER NOTIFICATION
Attempted to notify MD of patient's increasing abd pain, unable to get ahold of MD. Will continue to monitor.

## 2021-06-15 NOTE — PROGRESS NOTES
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       HPI: This is an 86 year old female who presented on 6/3/21 with intermittent abdominal pain and colon thickening on CT. A diagnostic colonoscopy was performed on 6/3/21 which showed a malignant neoplasm of the transverse colon. A CT of the chest was ordered and did not show metastatic disease. CEA levels were 241.6. A laparoscopic right colectomy was performed on 6/14/21 along with small bowel resection x 2 due to mesenteric invasion into the small bowel loops x 3. Specimens of the mesenteric implant, right colon, transverse colon, and terminal ileal resection x 2 were obtained. Patient tolerated the procedure will with no known complications.    EXAM:  [id number]: Intake 2545 mL/kg, Output 2625 mL/kg, Net -80 mL/kg    Pain: Highest has been 5, recently spiked to 10  U/O: 2625 mL/kg  Drains: n/a  NG: n/a    VSR: /150/49-61, HR 49-69, R 12-22. O2 96-98  TMAXR(24): 98.4 - 98.6 F    GENERAL: alert, NAD, sitting upright in recliner.   CV: RRR, no murmurs  RESPIRATORY: no dyspnea, clear bilat  ABDOMEN: non-distended, +BS, soft, appropriately tender, incision c/d/i, no signs of infection  EXTREMITY: no edema, neg Aminata's  SKIN: warm and dry, no jaundice no rashes  NEURO: cranial nerves II-XII grossly intact, motor exam intact    LABS  Recent Labs   Lab Test 06/15/21  0555 06/01/21  1150 08/30/16  1622 08/30/16  1622 08/20/16  0012   WBC 10.5 5.8   < >  --   --    RBC 4.10 4.22   < >  --   --    HGB 11.7 12.1   < > 14.8 14.4   HCT 35.6 36.3   < > 47.5 42.0   MCV 87 86   < > 91 90   MCH 28.5 28.7   < > 28.3 30.9   MCHC 32.9 33.3   < > 31.1* 34.2   PLT  213 193   < > 237 220   MPV  --   --   --  9.1 8.8    < > = values in this interval not displayed.       Recent Labs   Lab Test 06/15/21  0555 06/01/21  1150   POTASSIUM 4.2 4.0   CHLORIDE 108* 104   BUN 12 19       Recent Labs   Lab Test 06/01/21  1150 05/28/21  1219 05/28/21  1121   PROTEIN  --  Negative  --    BILITOTAL 0.7  --  0.9   AST 15  --  14   ALT 7  --  7       IMAGING  I personally reviewed patient's CT chest with contrast showing no evidence of metastatic disease to the chest.     ASSESSMENT  86 year old female POD #1 s/p  extended right colectomy complicated by small bowel resection x 2 due to metastatic involvement of the small bowel x 3.     PLAN  Patient doing well. Increase oxycodone to 5 mg every 4 hours as needed. Continue to monitor for signs of infection.  Pain: 10/10, not well controlled currently  Nutrition: regular adult diet as tolerated  Ulcer prophylaxis: Protonix 40 mg PO q 24 hours before breakfast  DVT prophylaxis : Lovenox 40 mg injection q 24 hours   ID: Antibiotics: n/a  Oakley: removal at earliest convenience    Recommend discharge tomorrow if oral pain medications are tolerated and no signs of infection are present including fevers, chills, erythema, warmth, or induration of incision.     LUKE Askew

## 2021-06-15 NOTE — PROGRESS NOTES
Patient unable to void post catheter removal. Patient bladder scan 142. IVF restarted at 100 mL/hr. Patient encouraged to have oral fluid intake. Pain better controlled at this time. Patient rates pain 5 out of 10. x1 bile color emesis. Resting in bed. Will continue to monitor. Temp: 98.2  F (36.8  C) Temp src: Tympanic BP: 116/59 Pulse: 84   Resp: 18 SpO2: 95 % O2 Device: None (Room air)        Dulce Maria Hernandez RN on 6/15/2021 at 6:30 PM

## 2021-06-16 PROBLEM — N17.0 ACUTE KIDNEY FAILURE WITH TUBULAR NECROSIS (H): Status: ACTIVE | Noted: 2021-01-01

## 2021-06-16 PROBLEM — E83.42 HYPOMAGNESEMIA: Status: ACTIVE | Noted: 2021-01-01

## 2021-06-16 NOTE — PROGRESS NOTES
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    HPI: This is an 86 year old female POD #2 from a laparoscopic right colectomy with small bowel resection x 2 due to mesenteric invasion into the small bowel loops x 3. Patient is having increased abdominal distension and discomfort ranging from a 5-8 out of ten. Denies passing any flatulence. Pain is being controlled with scheduled Ibuprofen and Tylenol. Oakley catheter was removed yesterday and patient has been having difficulty voiding since. Patient has not been hydrating well due to increased pain. Patient was able to void 200 mL of lele urine with sediment this morning. Per nursing, patient has been having increased confusion and memory loss which appears to be her baseline.    EXAM:  [id number]: Intake 743, Output 200, Net 543    Pain: ranges from 5-8/10  U/O: 200 mL  Drains: n/a  NG: n/a    VSR: -112/45-49, HR 83-86, R 18, O2 97%  TMAXR(24): 97.4-98.2 F    GENERAL: alert, NAD  CV: RRR, nomurmurs  RESPIRATORY: no dyspnea, clear bilat  ABDOMEN: mildly distended, +BS, soft, appropriately tender, incision c/d/i, no signs of infection  EXTREMITY: no edema, neg Aminata's  SKIN: warm and dry, no jaundice norashes  NEURO: cranial nerves II-XII grossly intact, motor exam intact    LABS  Recent Labs   Lab Test 06/16/21  0615 06/15/21  1342 08/30/16  1622 08/30/16  1622 08/20/16  0012   WBC 10.5 7.3   < >  --   --    RBC 4.34 4.71   < >  --   --    HGB 12.5 13.5   < > 14.8 14.4   HCT 37.8 41.0   < > 47.5 42.0   MCV 87 87   < > 91 90   MCH 28.8 28.7   < > 28.3 30.9   MCHC 33.1 32.9   < > 31.1* 34.2    254   < > 237 220   MPV  --   --   --  9.1 8.8     < > = values in this interval not displayed.       Recent Labs   Lab Test 06/16/21  0615 06/15/21  1342   POTASSIUM 4.6 3.8   CHLORIDE 103 104   BUN 23 15       Recent Labs   Lab Test 06/15/21  1342 06/01/21  1150 05/28/21  1219   PROTEIN  --   --  Negative   BILITOTAL 0.8 0.7  --    AST 14 15  --    ALT 8 7  --      ASSESSMENT  86 year old female POD #2 s/p extended right colectomy complicated by small bowel resection x 2 due to metastatic involvement of the small bowel x 3.     - Elevated Cr, dehydration     PLAN  Patient doing relatively well. Continue scheduled tylenol and ibuprofen. Monitor for signs of infection.   Pain: 5-8/10  Nutrition: regular adult diet as tolerated   Ulcer prophylaxis: Protonix 40 mg PO q 24 hours before breakfast  DVT prophylaxis : Lovenox 40 mg injection q 24 hours   ID: Antibiotics n/a  Oakley: removed yesterday    - Increase IVF to 150ml / Hr    Recommend discharge tomorrow if oral pain medications are tolerated and no signs of infection are present including fevers, chills, erythema, warmth, or induration of incision.     JACE Askew-S with attending Dr. Del Pang MD on 6/17/2021 at 9:57 AM

## 2021-06-16 NOTE — PLAN OF CARE
Pt now POD 2 following lap colectomy. Pain ranging from 5-8 out of 10 in the abdomen. Pain being treated with scheduled Tylenol and Ibuprofen. See MAR. No additional medications needed overnight. Pt has 3 lap sites that have dried drainage outlined. Abdomen soft, non-distended. Abdomen slightly tender to RUQ/RLQ. BS active. Is not passing flatus. Belching this morning. Pt had trouble voiding overnight. Was given 1X bolus. Able to void 200 mL lele urine with sediment this morning. Pt ambulating assist of 1 with walker. IV infusing LR. VSS.  Jossie Deng RN on 6/16/2021 at 5:35 AM    Temp: 97.4  F (36.3  C) Temp src: Tympanic BP: 112/45 Pulse: 83   Resp: 18 SpO2: 97 % O2 Device: None (Room air)

## 2021-06-16 NOTE — PROGRESS NOTES
Pt unable to void. Has no urge to void. VSS except soft BP (103/49). MD Parisi notified. Orders for 500 mL bolus over 1 hour of LR. Will bladder scan in 4 hours after bolus if pt is still unable to void. Jossie Deng RN on 6/15/2021 at 10:45 PM    Pt was able to void 1X (umeasured due to missing hat). Last bladder scan check was 74 mL. Jossie Deng RN on 6/16/2021 at 3:25 AM

## 2021-06-16 NOTE — PROGRESS NOTES
Weights reviewed per MST screen: pt has not had significant wt changes in past month. And is fairly consistent with wt from 2 years ago. She ate well yesterday. Do not anticipate any nutrition intervention needs at this time. However, if any issues arise, please order nutrition consult.   Wt Readings from Last 10 Encounters:   06/16/21 55.7 kg (122 lb 12.7 oz)   06/08/21 53.7 kg (118 lb 6.4 oz)   06/03/21 57.2 kg (126 lb)   06/01/21 57.2 kg (126 lb)   05/28/21 57.2 kg (126 lb)   05/14/21 55.6 kg (122 lb 9.6 oz)   10/09/19 59.7 kg (131 lb 9.6 oz)   06/12/19 62.8 kg (138 lb 6.4 oz)   09/05/18 66.2 kg (146 lb)   08/28/18 66.2 kg (146 lb)   Shamika Rose RD on 6/16/2021 at 1:54 PM

## 2021-06-16 NOTE — PHARMACY
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       Will continue to follow and make adjustments accordingly. Thank You.    Parker Gaines [facility] ....................  6/16/2021   9:52 AM

## 2021-06-16 NOTE — PLAN OF CARE
Patient admitted for lap colectomy, POD #2. VSS and WNL. Pain better controlled, rating pain around 5 out of 10 with scheduled and PRN medications. Patient able to tolerate regular diet, ambulate without much discomfort. X1 episode of nausea during shift today, PRN Zofran given. Patient has minimal, dark lele urine. PO fluid intake encouraged, IVF increased to 150mL/hr. x2 dark, loose stools. Patient intermittently confused during shift but at baseline oriented. Up with assist x1 and a walker. Will continue to monitor. Temp: 97.7  F (36.5  C) Temp src: Tympanic BP: 116/40 Pulse: 84   Resp: 16 SpO2: 98 % O2 Device: None (Room air)        Dulce Maria Hernandez RN on 6/16/2021 at 2:29 PM

## 2021-06-16 NOTE — PROGRESS NOTES
Patient urine output improved during shift. Urine pale, clear, and yellow. Will continue to monitor.

## 2021-06-16 NOTE — PROGRESS NOTES
06/16/21 1500   Signing Clinician's Name / Credentials   Signing clinician's name / credentials Karen Owen PTA   Quick Adds   Rehab Discipline PT   PT Assistant Visit Number 1   Functional Transfer Training   Treatment Detail CGA with FWW  (CGA with Fww)   Gait Training   Symptoms Noted During/After Treatment (Gait Training) fatigue   Distance in Feet (Required for LE Total Joints) 150'   Treatment Detail amb in hallway   [place] Level (Gait Training) contact guard   Physical Assistance Level (Gait Training) 1 person assist   Weight Bearing (Gait Training) full weight-bearing   Assistive Device (Gait Training) rolling walker   PT Discharge Planning    PT Discharge Recommendation (DC Rec) home with assist;home with home care physical therapy   PT Brief overview of current status  CGA for functional mobility   Additional Documentation   Rehab Comments pt would benfit from continued PT to improve strength and safety with functional mobility   PT Plan continue PT   Total Session Time   Total Session Time (minutes) 25 minutes

## 2021-06-16 NOTE — PROGRESS NOTES
SAFETY CHECKLIST  ID Bands and Risk clasps correct and in place (DNR, Fall risk, Allergy, Latex, Limb):  Yes  All Lines Reconciled and labeled correctly: Yes  Whiteboard updated:Yes  Environmental interventions (bed/chair alarm on, call light, side rails, restraints, sitter....): Yes  Verify Tele #: not on tele    Jossie Deng RN on 6/15/2021 at 7:12 PM

## 2021-06-17 NOTE — PROGRESS NOTES
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    HPI: This is an 86 year old female POD #3 from a laparoscopic right colectomy with small bowel resection x 2 due to mesenteric invasion into the small bowel loops x 3. Patient is having increased abdominal distension and discomfort ranging from a 5-8 out of ten. No flatus, no BM. Per nursing, patient has been having increased confusion and memory loss which appears to be her baseline.    EXAM:  Date 06/17/21 0700 - 06/18/21 0659   Shift 1499-0471 9505-3009 8384-8389 24 Hour Total   INTAKE   Shift Total(mL/kg)       OUTPUT   Urine 200   200   Shift Total(mL/kg) 200(3.42)   200(3.42)   Weight (kg) 58.51 58.51 58.51 58.51        Vitals:    06/16/21 2003 06/17/21 0332 06/17/21 0416 06/17/21 0811   BP: 104/44  116/47 115/44   BP Location: Right arm  Right arm Right arm   Pulse: 85  85 84   Resp: 16  16 16   Temp: 98.6  F (37  C)  97.3  F (36.3  C) 97.4  F (36.3  C)   TempSrc: Tympanic  Tympanic Tympanic   SpO2: 99%  96% 96%   Weight:  58.5 kg (129 lb)     Height:             VSR: -112/45-49, HR 83-86, R 18, O2 97%  TMAXR(24): 97.4-98.2 F    GENERAL: alert, NAD  CV: RRR, nomurmurs  RESPIRATORY: no dyspnea, clear bilat  ABDOMEN: mildly distended, +BS, soft, appropriately tender, incision c/d/i, no signs of infection  EXTREMITY: no edema, neg Aminata's  SKIN: warm and dry, no jaundice norashes  NEURO: cranial nerves II-XII grossly intact, motor exam intact    LABS  Recent Labs   Lab Test 06/17/21  0532 06/16/21  0615 06/15/21  1342 08/30/16 1622 08/30/16 1622 08/20/16  0012   WBC  --  10.5 7.3   < >  --   --    RBC  --  4.34 4.71   < >  --   --    HGB  --   12.5 13.5   < > 14.8 14.4   HCT  --  37.8 41.0   < > 47.5 42.0   MCV  --  87 87   < > 91 90   MCH  --  28.8 28.7   < > 28.3 30.9   MCHC  --  33.1 32.9   < > 31.1* 34.2    217 254   < > 237 220   MPV  --   --   --   --  9.1 8.8    < > = values in this interval not displayed.       Recent Labs   Lab Test 06/16/21  0615 06/15/21  1342   POTASSIUM 4.6 3.8   CHLORIDE 103 104   BUN 23 15       Recent Labs   Lab Test 06/16/21  1424 06/15/21  1342 06/01/21  1150 05/28/21  1219   PROTEIN Negative  --   --  Negative   BILITOTAL  --  0.8 0.7  --    AST  --  14 15  --    ALT  --  8 7  --      ASSESSMENT  86 year old female POD #3 s/p extended right colectomy complicated by small bowel resection x 2 due to metastatic involvement of the small bowel x 3.     - Elevated Cr, dehydration     PLAN  Patient doing relatively well. Continue scheduled tylenol and ibuprofen. Monitor for signs of infection.   Pain: 5-8/10  Nutrition: regular adult diet as tolerated   Ulcer prophylaxis: Protonix 40 mg PO q 24 hours before breakfast  DVT prophylaxis : Lovenox 40 mg injection q 24 hours   ID: Antibiotics n/a  Oakley: removed    - SLIV    Recommend discharge when ileus resolves.     Del Pang MD on 6/17/2021 at 9:57 AM

## 2021-06-17 NOTE — PROGRESS NOTES
SAFETY CHECKLIST  ID Bands and Risk clasps correct and in place (DNR, Fall risk, Allergy, Latex, Limb):  Yes  All Lines Reconciled and labeled correctly: Yes  Whiteboard updated:Yes  Environmental interventions (bed/chair alarm on, call light, side rails, restraints, sitter....): Yes  Verify Tele #: not on tele    Jossie Deng RN on 6/16/2021 at 7:16 PM

## 2021-06-17 NOTE — PLAN OF CARE
Pt now POD 3 from lap colectomy. Pt rating generalized abdominal discomfort 4-5 out of 10. Pain being treated with scheduled Tylenol and Ibuprofen. See MAR. Pt has 3 lap sites covered with steri strips. Small amt of dried drainage present. Bruising noted to lower incision. No complaints of nausea. Lung sounds clear, dim to the bases. HRR stable. Urine originally lele- now yellow and clear. LR infusing at 150. Up assist of 1 with walker. Pt more confused overnight then previous night. Reoriented as needed and fall precautions in place. Jossie Deng RN on 6/17/2021 at 6:15 AM    Temp: 97.3  F (36.3  C) Temp src: Tympanic BP: 116/47 Pulse: 85   Resp: 16 SpO2: 96 % O2 Device: None (Room air)

## 2021-06-18 NOTE — PROGRESS NOTES
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    HPI: This is an 86 year old female POD #4 from a laparoscopic right colectomy with small bowel resection x 2 due to mesenteric invasion into the small bowel loops x 3. BM Noted.      EXAM:  Date 06/17/21 0700 - 06/18/21 0659   Shift 6080-3294 1259-6738 8700-6434 24 Hour Total   INTAKE   Shift Total(mL/kg)       OUTPUT   Urine 200   200   Shift Total(mL/kg) 200(3.42)   200(3.42)   Weight (kg) 58.51 58.51 58.51 58.51        Vitals:    06/17/21 2022 06/18/21 0413 06/18/21 0438 06/18/21 0809   BP: 119/42  (!) 156/59 (!) 145/58   BP Location: Right arm  Right arm Right arm   Pulse: 88  90 92   Resp: 16  18 18   Temp: 97.4  F (36.3  C)  97.3  F (36.3  C) 96.6  F (35.9  C)   TempSrc: Tympanic  Tympanic Tympanic   SpO2: 97%  95% 96%   Weight:  57.6 kg (126 lb 15.8 oz)     Height:             VSR: -112/45-49, HR 83-86, R 18, O2 97%  TMAXR(24): 97.4-98.2 F    GENERAL: alert, NAD  CV: RRR, nomurmurs  RESPIRATORY: no dyspnea, clear bilat  ABDOMEN: mildly distended, +BS, soft, appropriately tender, incision c/d/i, no signs of infection  EXTREMITY: no edema, neg Aminata's  SKIN: warm and dry, no jaundice norashes  NEURO: cranial nerves II-XII grossly intact, motor exam intact    LABS  Recent Labs   Lab Test 06/18/21  0522 06/17/21  0532 06/16/21  0615 08/30/16  1622 08/30/16  1622 08/20/16  0012   WBC 10.4  --  10.5   < >  --   --    RBC 3.82  --  4.34   < >  --   --    HGB 10.9*  --  12.5   < > 14.8 14.4   HCT 32.7*  --  37.8   < > 47.5 42.0   MCV 86  --  87   < > 91 90   MCH 28.5  --  28.8   < > 28.3 30.9   MCHC 33.3  --  33.1   < > 31.1* 34.2    200 217   < >  237 220   MPV  --   --   --   --  9.1 8.8    < > = values in this interval not displayed.       Recent Labs   Lab Test 06/18/21  0522 06/16/21  0615   POTASSIUM 4.2 4.6   CHLORIDE 104 103   BUN 25 23       Recent Labs   Lab Test 06/16/21  1424 06/15/21  1342 06/01/21  1150 05/28/21  1219   PROTEIN Negative  --   --  Negative   BILITOTAL  --  0.8 0.7  --    AST  --  14 15  --    ALT  --  8 7  --      ASSESSMENT  86 year old female POD #4 s/p extended right colectomy complicated by small bowel resection x 2 due to metastatic involvement of the small bowel x 3. Bowel function returning    - Elevated Cr, dehydration     - Bolus today    - Upper GI series    PLAN  Patient doing relatively well. Continue scheduled tylenol and ibuprofen. Monitor for signs of infection.   Pain: 5-8/10  Nutrition: regular adult diet as tolerated   Ulcer prophylaxis: Protonix 40 mg PO q 24 hours before breakfast  DVT prophylaxis : Lovenox 40 mg injection q 24 hours   ID: Antibiotics n/a  Oakley: removed      Recommend discharge when ileus resolves.       Del Pang MD on 6/18/2021 at 9:32 AM

## 2021-06-18 NOTE — PROGRESS NOTES
SAFETY CHECKLIST  ID Bands and Risk clasps correct and in place (DNR, Fall risk, Allergy, Latex, Limb):  Yes  All Lines Reconciled and labeled correctly: Yes  Whiteboard updated:Yes  Environmental interventions (bed/chair alarm on, call light, side rails, restraints, sitter....): Yes    Shilo Ignacio RN on 6/18/2021 at 7:58 AM

## 2021-06-18 NOTE — PROGRESS NOTES
"VSS. Patient has complaints since her fall. Pain has improved. Being given PRN oxycodone and scheduled motrin. Oxycodone last given at 1231. On clear liquid diet due to not tolerating solids and having an upset stomach. Had a bedside abdominal xray- and is starting PO gastrografin at 1900 with a follow up xray in the morning. Is NPO after drinking gastrografin. Dr. Dutton didn't want patient to have an NG tube for this- there's an order. Will continue to monitor.    BP 98/48 (BP Location: Right arm)   Pulse 115   Temp 97.5  F (36.4  C) (Tympanic)   Resp 20   Ht 1.613 m (5' 3.5\")   Wt 57.6 kg (126 lb 15.8 oz)   SpO2 95%   BMI 22.14 kg/m      Shilo Ignacio RN on 6/18/2021 at 6:37 PM    "

## 2021-06-18 NOTE — PROGRESS NOTES
"Patient had an unassisted, unwitnessed fall. Patient was found on the floor in front of the reclining chair. Per patient, she was in the bed and had gotten up to get her iPad and forgot to call for help. VSS. Patient reports feeling normal and report she didn't hit her head. Abdominal incisions CDI; no bleeding. Dr. Maldonado notified.    BP 98/48 (BP Location: Right arm)   Pulse 115   Temp 97.5  F (36.4  C) (Tympanic)   Resp 20   Ht 1.613 m (5' 3.5\")   Wt 57.6 kg (126 lb 15.8 oz)   SpO2 95%   BMI 22.14 kg/m      Shilo Ignacio RN on 6/18/2021 at 5:10 PM    "

## 2021-06-18 NOTE — PLAN OF CARE
Pt now POD 4 following lap colectomy. Pt restless overnight and unable to sleep. Complains of 4-6 pain in the abdomen as well as complaints of 9/10 pain in the lower back. Was given scheduled Ibuprofen and PRN Oxycodone 1X. See MAR. Abdomen slightly more distended then previous night. BS active. No BMs overnight. Tender to RLQ/LLQ. 3 lap sites to abdomen present with steri strips in place. Dried drainage present. No nausea/vomiting. Voiding without difficulty. Pt intermittently confused- setting off bed alarm at times. Easily reoriented. IV saline locked. Pt up SBA with walker. Jossie Deng RN on 6/18/2021 at 5:20 AM    Temp: 97.3  F (36.3  C) Temp src: Tympanic BP: (!) 156/59 Pulse: 90   Resp: 18 SpO2: 95 % O2 Device: None (Room air)

## 2021-06-18 NOTE — PLAN OF CARE
Attempted to see pt x2 today. Pt in significant pain in am and tearful. RN notified. In PM, attempted to see but pt was asleep. Will attempt to see tomorrow.

## 2021-06-18 NOTE — PROGRESS NOTES
SAFETY CHECKLIST  ID Bands and Risk clasps correct and in place (DNR, Fall risk, Allergy, Latex, Limb):  Yes  All Lines Reconciled and labeled correctly: Yes  Whiteboard updated:Yes  Environmental interventions (bed/chair alarm on, call light, side rails, restraints, sitter....): Yes  Verify Tele #: not on tele    Jossie Deng RN on 6/17/2021 at 7:15 PM

## 2021-06-19 PROBLEM — R41.82 ALTERED MENTAL STATUS: Status: ACTIVE | Noted: 2021-01-01

## 2021-06-19 NOTE — PROGRESS NOTES
Patient moved to room 302 by nurses desk for safety reasons d/t increased confusion at night and patient setting off bed alarm multiple times while actively getting out of bed unassisted.    Shilo Fletcher RN June 19, 2021 1:31 AM

## 2021-06-19 NOTE — PROGRESS NOTES
SAFETY CHECKLIST  ID Bands and Risk clasps correct and in place (DNR, Fall risk, Allergy, Latex, Limb):  Yes  All Lines Reconciled and labeled correctly: Yes  Whiteboard updated:Yes  Environmental interventions (bed/chair alarm on, call light, side rails, restraints, sitter....): Yes    Shilo Ignacio RN on 6/19/2021 at 7:23 AM

## 2021-06-19 NOTE — CONSULTS
[facility]    Hospitalist Consultation    Date of Admission:  6/14/2021    Assessment & Plan   Melissa Quinteros is a 86 year old female who was admitted on 6/14/2021. I was asked to see the patient for assistance in medical management.  The patient was noted to have some confusion during the night and attempted to get out of bed on her own.  She subsequently was transferred to room 302.  She has been doing better today..    Principal Problem:    Malignant neoplasm of transverse colon (H)    Assessment: Had a fairly extensive surgery.  She is now POD #5.    Plan: Continue per Dr. Dutton.  Active Problems:    Essential hypertension    Assessment: Blood pressure has been adequately controlled    Plan: Continue current meds and continue to monitor.    Acute kidney failure with tubular necrosis (H)    Assessment: Recent creatinine was relatively stable.  Patient's intake and output have decreased.  She does appear to be slightly dehydrated.    Plan: Fluid bolus and replacement today, monitor labs    Hypomagnesemia    Assessment: Mild at 1.8    Plan: Resume replacement    Altered mental status    Assessment: This occurred mainly at night with some confusion.  She did have a fall but no apparent injury.  She states she is not sleeping well at night.    Plan: She has been moved to a room closer to the nurses station for more careful monitoring.  In addition we will try melatonin, which she states she is taken in the past, to aid with sleep.  Mild anemia-continue to monitor.    DVT Prophylaxis: Enoxaparin (Lovenox) SQ  Code Status: Full Code    GALEN GAR    Reason for Consult   Reason for consult: I was asked by Dr. Dutton to evaluate this patient for distance and medical management.    Primary Care Physician   DERRICK PASCUAL    Chief Complaint   At this time the patient has no complaints.  She is accompanied by her .  She states she just feels tired and does not have much of an appetite,  however denies being nauseated at this time.    History is obtained from the patient and chart review    History of Present Illness   Melissa Quinteros is a 86 year old female who presents with malignant tumor of the transverse colon with extension into the small bowel, now at postoperative day #5 after surgery.  Some confusion has been noted during the night.  Her intake has not been good, and there was concern about an obstructive issue, however she has had a Gastrografin study which indicated adequate flow through the small bowel and anastomosis.  She did have 2 areas of resection in the small bowel as well as the transverse colon.    She has not been on any significant medication.  She has been in generally good health.  There has been some mild confusion noted in the past.    Past Medical History    I have reviewed this patient's medical history and updated it with pertinent information if needed.   Past Medical History:   Diagnosis Date     Esophageal obstruction     8/30/2016     Essential (primary) hypertension     8/30/2016     Hyperlipidemia     No Comments Provided     Zoster without complications     X2       Past Surgical History   I have reviewed this patient's surgical history and updated it with pertinent information if needed.  Past Surgical History:   Procedure Laterality Date     APPENDECTOMY       ARTHROPLASTY KNEE Right 2017    [place], [place]     COLONOSCOPY      No Comments Provided     COLONOSCOPY N/A 6/3/2021    Procedure: COLONOSCOPY, WITH POLYPECTOMY AND BIOPSY with endoscopic tattoo;  Surgeon: Del Pang MD;  Location:  OR     ESOPHAGOGASTRODUODENOSCOPY       HYSTERECTOMY TOTAL ABDOMINAL      1982,incidental appendectomy     LAPAROSCOPIC ASSISTED COLECTOMY N/A 6/14/2021    Procedure: COLECTOMY, LAPAROSCOPIC, small bowel resection X2, extended Right hemicolectomy, biopsy of the mesenteric node;  Surgeon: Del Pang MD;  Location:  OR     TONSILLECTOMY, ADENOIDECTOMY, COMBINED       childhood       Prior to Admission Medications   Prior to Admission Medications   Prescriptions Last Dose Informant Patient Reported? Taking?   clobetasol (TEMOVATE) 0.05 % external cream Past Week at AM Spouse/Significant Other No Yes   Sig: Apply topically 2 times daily   doxepin (ZONALON) 5 % external cream not using at not using Spouse/Significant Other No Yes   Sig: Apply topically 3 times daily   magnesium oxide (MAG-OX) 400 MG tablet Past Week at AM Spouse/Significant Other Yes Yes   Sig: Take 400 mg by mouth daily    omeprazole (PRILOSEC) 20 MG CR capsule Past Week at AM Spouse/Significant Other No Yes   Sig: Take 1 capsule (20 mg) by mouth daily   triamcinolone (KENALOG) 0.1 % external ointment not using at not using Spouse/Significant Other Yes Yes   Sig: Apply topically 2 times daily      Facility-Administered Medications: None     Allergies   Allergies   Allergen Reactions     Lovastatin      Other reaction(s): Arthralgia       Social History   I have reviewed this patient's social history and updated it with pertinent information if needed. Melissa Quinteros  reports that she has never smoked. She has never used smokeless tobacco. She reports previous alcohol use. She reports that she does not use drugs.    Family History   I have reviewed this patient's family history and updated it with pertinent information if needed.   Family History   Problem Relation Age of Onset     Other - See Comments Father         Old age     Lung Cancer Mother      Brain Cancer Brother        Review of Systems     Other than what is noted in the HPI, she denies any significant problems when review of systems is done.    The 10 point Review of Systems is negative other than noted in the HPI or here.     Physical Exam   Temp: 97.7  F (36.5  C) Temp src: Tympanic BP: 101/44 Pulse: 102   Resp: 18 SpO2: 95 % O2 Device: None (Room air)    Vital Signs with Ranges  Temp:  [97.1  F (36.2  C)-97.7  F (36.5  C)] 97.7  F (36.5   C)  Pulse:  [] 102  Resp:  [18-20] 18  BP: ()/(43-60) 101/44  SpO2:  [94 %-97 %] 95 %  126 lbs 15.76 oz    Constitutional: She is awake and alert, lying in bed, with her  present.  She is pleasant and cooperative, answers questions appropriately, however allows him to do much of the talking for her.  She is in no apparent distress  Eyes: Pupils are equal round and reactive, extraocular movements intact.  HEENT: Dry mouth, otherwise unremarkable for age  Respiratory: Lungs are clear with good air movement  Cardiovascular: Regular rhythm, rate of 90, no murmur gallop appreciated.  GI: Soft, nontender, adequate bowel sounds.  Incision appears to be healing nicely  Lymph/Hematologic: No bruising  Genitourinary: Not examined  Skin: Warm and dry, no diaphoresis  Musculoskeletal: Moves all extremities, no muscle atrophy.  No significant edema.  Neurologic: Intact and nonfocal  Psychiatric: Alert and oriented, answers questions appropriately    Data   -Data reviewed today: All pertinent laboratory and imaging results from this encounter were reviewed. I personally reviewed no images or EKG's today.  I reviewed her labs as well as her Gastrografin study report.  Recent Labs   Lab 06/18/21  0522 06/17/21  0532 06/16/21  0615 06/15/21  1342   WBC 10.4  --  10.5 7.3   HGB 10.9*  --  12.5 13.5   MCV 86  --  87 87    200 217 254     --  135 136   POTASSIUM 4.2  --  4.6 3.8   CHLORIDE 104  --  103 104   CO2 24  --  21 20*   BUN 25  --  23 15   CR 1.31*  --  1.26* 1.03   ANIONGAP 8  --  11 12   DARIANA 8.4*  --  8.2* 8.5*   GLC 78  --  92 156*   ALBUMIN  --   --   --  3.2*   PROTTOTAL  --   --   --  5.4*   BILITOTAL  --   --   --  0.8   ALKPHOS  --   --   --  70   ALT  --   --   --  8   AST  --   --   --  14       Recent Results (from the past 24 hour(s))   XR Gastrografin  Challenge    Narrative    Abdomen flat and upright both with and without Gastrografin.    HISTORY: Evaluate small bowel  anastomosis    FINDINGS: Contrast material is present in the colon to the rectum.  There is a small amount of residual contrast seen in distended small  bowel loops.      Impression    IMPRESSION: contrast material has reached the rectum at 12 hours.  Small bowel loops however are dilated and a small amount of residual  contrast is seen within the small bowel    CATHERINE CAMAHCO MD

## 2021-06-19 NOTE — PROVIDER NOTIFICATION
06/19/21 0755   Vital Signs   Resp 18   Pulse 102   Pulse Rate Source Monitor   /44   BP Location Right arm     Notified Dr. Dutton of trending tachycardia- inquired about administering IVF. Dr. Dutton to review chart

## 2021-06-19 NOTE — PLAN OF CARE
"Pt had 2 episodes's of confusion and disorientation, became anxious and stated \" I have no idea what is going on, I can't remember anything\". Pt tearful but easily redirected by spouse and writer, will continue to monitor.  "

## 2021-06-19 NOTE — PROGRESS NOTES
Patient appears to be having difficulty swallowing thin liquids and swallowing pills. Was able to swallow pills crushed in applesauce. Notified Dr. Jimenez and ordered a swallow eval, along with a dysphagia level II diet with thin liquids. Will continue to monitor.    Shilo Ignacio RN on 6/19/2021 at 4:18 PM

## 2021-06-19 NOTE — PROGRESS NOTES
"Patient had a good day. Patient was drowsy this morning and has been sleeping on and off. However, has been A&OX4 all day. IV infiltrated this morning and was replaced in the left lower forearm. Pain well controlled with scheduled motrin. Dr. Dutton reviewed upper GI series- in which gastrografin moved through the colon and showed no obstruction with some dilated small bowel. Monitor ileus. Patient's creatinine is elevated at 1.31 from 1.26- encouraging PO intake and was given a 500 ml fluid bolus with maintenance fluid of D5 and 1/2 NaCL with 20 mEq K+ infusing at 75 ml/hr. Melatonin and ativan ordered for this evening PRN. Appointment set up for podiatry outpatient per patient request. PT/OT attempted to evaluate patient, but patient declined therapy due to weakness. Will attempt again tomorrow. VSS. Was having difficulty swallowing pills and liquids- see previous note. Will continue to monitor.    /44 (BP Location: Right arm)   Pulse 95   Temp 96.6  F (35.9  C) (Tympanic)   Resp 14   Ht 1.613 m (5' 3.5\")   Wt 57.6 kg (126 lb 15.8 oz)   SpO2 94%   BMI 22.14 kg/m      Shilo Ignacio RN on 6/19/2021 at 5:24 PM    "

## 2021-06-19 NOTE — PROGRESS NOTES
SAFETY CHECKLIST  ID Bands and Risk clasps correct and in place (DNR, Fall risk, Allergy, Latex, Limb):  Yes  All Lines Reconciled and labeled correctly: Yes  Whiteboard updated:Yes  Environmental interventions (bed/chair alarm on, call light, side rails, restraints, sitter....): Yes    Kaylin Murphy RN on 6/18/2021 at 7:32 PM

## 2021-06-19 NOTE — PLAN OF CARE
PT/OT attempting to work with pt. She had just transferred and refused to work with therapies as she wanted to lay down. End of day and unable to re-approach

## 2021-06-19 NOTE — PROGRESS NOTES
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    HPI: This is an 86 year old female POD #5 from a laparoscopic right colectomy with small bowel resection x 2 due to mesenteric invasion into the small bowel loops x 3. BM Noted. Confused last night-moved closer to the station.  visiting. No vomiting. No fever.      EXAM:  I/O last 3 completed shifts:  In: 253 [P.O.:250; I.V.:3]  Out: 100 [Urine:100]       Vitals:    06/18/21 1932 06/19/21 0119 06/19/21 0755 06/19/21 0900   BP: 114/52 (!) 151/60 101/44    BP Location: Right arm Right arm Right arm    Pulse: 101 114 102    Resp: 18 18 18    Temp: 97.1  F (36.2  C) 97.3  F (36.3  C)  97.7  F (36.5  C)   TempSrc: Tympanic Tympanic  Tympanic   SpO2: 96% 94% 95%    Weight:       Height:         /44 (BP Location: Right arm)   Pulse 102   Temp 97.7  F (36.5  C) (Tympanic)   Resp 18   Ht 1.613 m (5' 3.5\")   Wt 57.6 kg (126 lb 15.8 oz)   SpO2 95%   BMI 22.14 kg/m    GENERAL: alert, NAD  CV: RRR, nomurmurs  RESPIRATORY: no dyspnea, clear bilat  ABDOMEN: mildly distended, +BS, soft, appropriately tender, incision c/d/i, no signs of infection  EXTREMITY: no edema, neg Aminata's  SKIN: warm and dry, no jaundice no rashes. Callous left great toe   NEURO: cranial nerves II-XII grossly intact, motor exam intact    LABS  Recent Labs   Lab Test 06/18/21  0522 06/17/21  0532 06/16/21  0615 08/30/16  1622 08/30/16  1622 08/20/16  0012   WBC 10.4  --  10.5   < >  --   --    RBC 3.82  --  4.34   < >  --   --    HGB 10.9*  --  12.5   < > 14.8 14.4   HCT 32.7*  --  37.8   < > 47.5 42.0   MCV 86  --  87   < > 91 90   MCH 28.5  --  28.8   < > 28.3 30.9   MCHC 33.3  -- "  33.1   < > 31.1* 34.2    200 217   < > 237 220   MPV  --   --   --   --  9.1 8.8    < > = values in this interval not displayed.       Recent Labs   Lab Test 06/18/21  0522 06/16/21  0615   POTASSIUM 4.2 4.6   CHLORIDE 104 103   BUN 25 23       Recent Labs   Lab Test 06/16/21  1424 06/15/21  1342 06/01/21  1150 05/28/21  1219   PROTEIN Negative  --   --  Negative   BILITOTAL  --  0.8 0.7  --    AST  --  14 15  --    ALT  --  8 7  --      ASSESSMENT  86 year old female POD #5 s/p extended right colectomy complicated by small bowel resection x 2 due to metastatic involvement of the small bowel x 3. Bowel function returning-    - Elevated Cr, dehydration     - Bolus today    - Upper GI series this am-contrast in colon, dilated small bowel without obstruction    PLAN  Patient doing relatively well. Continue scheduled tylenol and ibuprofen. Monitor for signs of infection.   Pain:0/10 currently  Nutrition: clears   Ulcer prophylaxis: Protonix 40 mg PO q 24 hours before breakfast  DVT prophylaxis : Lovenox 40 mg injection q 24 hours   ID: Antibiotics n/a  Oakley: removed  Recommend discharge when ileus resolves.

## 2021-06-19 NOTE — PLAN OF CARE
"Patient was alert and oriented x4 with some forgetfulness at the start of the shift, then had some increased confusion during the night- disoriented to place and situation. Does not use call light appropriately, tries to get out of bed by herself, was moved near nurses station for this reason. Standby assist. VSS. Right sided/back pain increased throughout the night, repo, ice and PRN tylenol ineffective, oxycodone 5mg given. Steri-strips remain intact with some dried  drainage, no current bleeding. Abdomen is soft and tender to palpation, BS active in all four quadrants. One episode of bowel incontinence this shift, up to commode for voiding without difficulty. Denies nausea. Patient needs to be reminded that she is NPO until morning for imaging. Slept first half of shift but not much during the second half.    BP (!) 151/60 (BP Location: Right arm)   Pulse 114   Temp 97.3  F (36.3  C) (Tympanic)   Resp 18   Ht 1.613 m (5' 3.5\")   Wt 57.6 kg (126 lb 15.8 oz)   SpO2 94%   BMI 22.14 kg/m      Kaylin Murphy RN on 6/19/2021 at 6:39 AM        "

## 2021-06-20 NOTE — PROGRESS NOTES
SAFETY CHECKLIST  ID Bands and Risk clasps correct and in place (DNR, Fall risk, Allergy, Latex, Limb):  Yes  All Lines Reconciled and labeled correctly: Yes  Whiteboard updated:Yes  Environmental interventions (bed/chair alarm on, call light, side rails, restraints, sitter....): Yes    Kaylin Murphy RN on 6/19/2021 at 7:45 PM

## 2021-06-20 NOTE — PROGRESS NOTES
SAFETY CHECKLIST  ID Bands and Risk clasps correct and in place (DNR, Fall risk, Allergy, Latex, Limb):  Yes  All Lines Reconciled and labeled correctly: Yes  Whiteboard updated:Yes  Environmental interventions (bed/chair alarm on, call light, side rails, restraints, sitter....): Yes    Shilo Ignacio RN on 6/20/2021 at 7:09 AM

## 2021-06-20 NOTE — PLAN OF CARE
"Patient was alert and oriented at start of shift, forgetful this evening once her  left. Woke up many times in the night and stated she was so confused, didn't know where she was, wanted to take her IV out. Easily reminded/redirectable. Assist of 1. VSS. Steri-strips remain intact at 3 lap sites with some dried drainage and bruising. She denied pain and nausea this shift. Continent of two loose BMs and urine overnight. Abdomen is soft and denies tenderness on palpation, BS active in all four quadrants. Patient got up to commode and stated she was having some burning with urination, only complained of this once. Encouraged fluid intake, IV fluids running at 75mL/hr. No difficulty with swallowing this shift.     /49 (BP Location: Right arm)   Pulse 96   Temp 97.1  F (36.2  C) (Tympanic)   Resp 16   Ht 1.613 m (5' 3.5\")   Wt 57.6 kg (126 lb 15.8 oz)   SpO2 95%   BMI 22.14 kg/m      Kaylin Murphy RN on 6/20/2021 at 5:53 AM        "

## 2021-06-20 NOTE — PROGRESS NOTES
06/20/21 1200   Signing Clinician's Name / Credentials   Signing clinician's name / credentials Otilia Bear OTR/L   Quick Adds   Rehab Discipline OT   ADL Training   Minutes of Treatment 10   Grooming Training   [place] Level (Grooming Training) minimum assist (75% patient effort)   Assistance (Grooming Training) set-up required;1 person assist   Treatment Detail Patient required set-up and Min A to brush teeth and partial dentures sitting in bed.    OT Discharge Planning    OT Discharge Recommendation (DC Rec) Transitional Care Facility   OT Rationale for DC Rec Patient currently requires assist with self-cares and mobility.  She has significantly decreased activity tolerance.    OT Brief overview of current status  Patient completed short distance ambulation with PT prior to OT.  She is agreeable to completing light grooming with OT.  Completes tooth brushing with Min A and set-up assist.  Encouraged patient to continue drinking fluids.    Additional Documentation   OT Plan Continue OT   Total Session Time   Total Session Time (minutes) 25 minutes

## 2021-06-20 NOTE — PROGRESS NOTES
[facility]    Hospitalist Progress Note      Assessment & Plan   Melissa Quinteros is a 86 year old female who was admitted on 6/14/2021.  She underwent a fairly extensive surgery and is now POD #6.  Oral intake has been very poor.    Principal Problem:    Malignant neoplasm of transverse colon (H)    Assessment: Extensive surgery as noted above    Plan: Per Dr. Dutton  Active Problems:    Essential hypertension    Assessment: Blood pressure is well controlled    Plan: Continue current meds    Acute kidney failure with tubular necrosis (H)    Assessment: Creatinine is markedly elevated today compared to yesterday.  Etiology is likely due to inadequate hydration.    Plan: Saline bolus, increase IV fluids, encourage p.o. intake, recheck labs in the morning.    Hypomagnesemia    Assessment: 3.1-resolved    Plan: No further treatment needed    Altered mental status    Assessment: Patient has been intermittently confused, but easily redirected.    Plan: She has not received any lorazepam.  Melatonin was helpful for sleep.  We will continue the same.    Diet: Dysphagia Diet Level 2 [facility] Altered Thin Liquids (water, ice chips, juice, milk gelatin, ice cream, etc)    DVT Prophylaxis: Enoxaparin (Lovenox) SQ  Oakley Catheter: not present  Code Status: Full Code           Disposition Plan   Expected discharge: 2 - 3 days, recommended to Prior living arrangements with home care, or possibly an acute rehab facility. once adequate pain management/ tolerating PO medications and Adequate p.o. intake.  Entered: GALEN GAR MD 06/20/2021, 10:48 AM       The patient's care was discussed with the Bedside Nurse, Patient and Patient's Family.    GALEN GAR MD  Hospitalist Service  [facility]  Contact information available via [facility] Paging/Directory    ______________________________________________________________________    Interval History   No real complaints this morning.    states he just cannot get her to eat much of anything.  She is having trouble even getting in liquids, stating that she just does not feel like drinking.  She denies increased pain.  RN noted intermittent confusion but easily redirected    -Data reviewed today: I reviewed all new labs and imaging results over the last 24 hours. I personally reviewed no images or EKG's today.  Labs are remarkable for a markedly elevated creatinine at 2.66, and a sodium of 128.    Physical Exam   Temp: 96.3  F (35.7  C) Temp src: Tympanic BP: 109/45 Pulse: 90   Resp: 18 SpO2: 97 % O2 Device: None (Room air)    Vitals:    06/16/21 0506 06/17/21 0332 06/18/21 0413   Weight: 55.7 kg (122 lb 12.7 oz) 58.5 kg (129 lb) 57.6 kg (126 lb 15.8 oz)     Vital Signs with Ranges  Temp:  [96.3  F (35.7  C)-97.3  F (36.3  C)] 96.3  F (35.7  C)  Pulse:  [] 90  Resp:  [14-18] 18  BP: (109-130)/(44-49) 109/45  SpO2:  [94 %-97 %] 97 %  I/O last 3 completed shifts:  In: 2054 [P.O.:600; I.V.:1454]  Out: -     Constitutional: She is awake and alert, lying in bed, pleasant and cooperative.  She appears in no acute distress.  She is minimally confused.   and friend are present.  Respiratory: Lungs are clear  Cardiovascular: Regular rhythm with no murmur gallop  GI: Diminished soft and nontender  Skin/Integumen: Warm and dry, decreased turgor, no diaphoresis.  Other: Mouth is quite dry    Medications     sodium chloride 100 mL/hr at 06/20/21 0933       enoxaparin ANTICOAGULANT  30 mg Subcutaneous Q24H     gabapentin  100 mg Oral At Bedtime     ibuprofen  600 mg Oral Q6H     lactated ringers  500 mL Intravenous Once     pantoprazole  40 mg Oral QAM AC     sodium chloride (PF)  3 mL Intracatheter Q8H       Data   Recent Labs   Lab 06/20/21  0525 06/18/21  0522 06/17/21  0532 06/16/21  0615 06/15/21  1342   WBC 6.2 10.4  --  10.5 7.3   HGB 10.7* 10.9*  --  12.5 13.5   MCV 86 86  --  87 87    213 200 217 254   * 136  --  135 136    POTASSIUM 4.5 4.2  --  4.6 3.8   CHLORIDE 99 104  --  103 104   CO2 20* 24  --  21 20*   BUN 45* 25  --  23 15   CR 2.66* 1.31*  --  1.26* 1.03   ANIONGAP 9 8  --  11 12   DARIANA 8.0* 8.4*  --  8.2* 8.5*   * 78  --  92 156*   ALBUMIN  --   --   --   --  3.2*   PROTTOTAL  --   --   --   --  5.4*   BILITOTAL  --   --   --   --  0.8   ALKPHOS  --   --   --   --  70   ALT  --   --   --   --  8   AST  --   --   --   --  14       No results found for this or any previous visit (from the past 24 hour(s)).

## 2021-06-20 NOTE — PROGRESS NOTES
"   06/20/21 1000   Signing Clinician's Name / Credentials   Signing clinician's name / credentials Meeta Ho DPT   Functional Transfer Training   Minutes of Treatment 10   Symptoms Noted During/After Treatment fatigue;increased pain   Treatment Detail min assist with FWW x 3 with attempt x 2 to stand from recliner, CGA to stand from bed. Pt felt like legs were very weak and \"I don't think i can do this\" with enocuragement from  able to stand with PT assist. sitting to supine at edge of bed with mod assist for legs and SBA for cues to reposition herself due to back pain.    Gait Training   Minutes of Treatment (Gait Training) 10   Symptoms Noted During/After Treatment (Gait Training) fatigue;shortness of breath   Distance in Feet (Required for LE Total Joints) 20 feet   Treatment Detail ambulated in room, trying to get to/from door to room but pt unable to make it and needed to turn around by bathroom door.    [place] Level (Gait Training) contact guard   Physical Assistance Level (Gait Training) 1 person assist   Weight Bearing (Gait Training) full weight-bearing   Assistive Device (Gait Training) rolling walker   Gait Analysis Deviations decreased weight-shifting ability;decreased stride length;decreased step length   PT Discharge Planning    PT Discharge Recommendation (DC Rec) home with assist;home with home care physical therapy;Transitional Care Facility   PT Rationale for DC Rec to promote safe transition to home and increase indepedence, if pt continues on current decline since her admission TCU may be indicated to improve likelyhood of a successful transition back to home.    PT Brief overview of current status  CGA  to min assist for functional mobility   Additional Documentation   Rehab Comments pt would benfit from continued PT to improve strength and safety with functional mobility   PT Plan continue PT   Total Session Time   Total Session Time (minutes) 20 minutes     "

## 2021-06-20 NOTE — PROGRESS NOTES
" VSS. Patient had a good day. Worked well with PT/OT (stood up for a timed two minutes, and walked to the bathroom and back to the chair). Steri strip on left lower lap site fell off. Denies pain- declined ibuprofen at 1600. Denies nausea. Bowel sounds hypoactive, lungs clear. +1 edema lower extremities. Disoriented to situation. , Dhruv was here for quite a few hours. Didn't require ativan. Patient has a poor appetite. Continue to encourage PO intake. Received 500 ml bolus today d/t creatinine and possible UTI. Has continuous IVF infusing at 150 ml/hr. Occasionally has difficulty swallowing. Continue dysphagia diet level 2 with thin liquids. Swallow eval ordered- should be completed tomorrow. Will continue to monitor.    /40 (BP Location: Right arm)   Pulse 90   Temp 96.8  F (36  C) (Tympanic)   Resp 14   Ht 1.613 m (5' 3.5\")   Wt 57.6 kg (126 lb 15.8 oz)   SpO2 95%   BMI 22.14 kg/m      Shilo Ignacio RN on 6/20/2021 at 4:57 PM    "

## 2021-06-20 NOTE — PROGRESS NOTES
06/20/21 1200   Quick Adds   Type of Visit Initial Occupational Therapy Evaluation   Living Environment   People in home spouse   Current Living Arrangements house   Transportation Anticipated family or friend will provide   Living Environment Comments Hard to gather information on living environment and PLOF d/t patient being poor historian and  busy talking to doctor.     Self-Care   Usual Activity Tolerance good   Current Activity Tolerance moderate   Equipment Currently Used at Home none   Activity/Exercise/Self-Care Comment Difficult to gather PLOF information d/t patient being poor historian.    Disability/Function   Hearing Difficulty or Deaf yes   Wear Glasses or Blind yes   Vision Management glasses   Concentrating, Remembering or Making Decisions Difficulty yes   Difficulty Communicating no   Difficulty Eating/Swallowing no   Walking or Climbing Stairs Difficulty no   Dressing/Bathing Difficulty yes   Dressing/Bathing bathing difficulty, assistance 1 person   Toileting issues no   Doing Errands Independently Difficulty (such as shopping) no   Change in Functional Status Since Onset of Current Illness/Injury yes   General Information   Onset of Illness/Injury or Date of Surgery 06/14/21   Referring Physician Dr. Dutton   Patient/Family Therapy Goal Statement (OT) To return to home environment with spouse   Existing Precautions/Restrictions fall   General Observations and Info Patient is 86-year-old female who had abdominal surgery 5 days ago.  She is inpatient currently and declining with mobility.  She lives with her  at baseline who supports her d/t cognitive decline.     Bed Mobility   Bed Mobility scooting/bridging   Scooting/Bridging Pettibone (Bed Mobility) maximum assist (25% patient effort);moderate assist (50% patient effort)   Assistive Device (Bed Mobility) draw sheet   Grooming Assessment/Training   [place]ib[place] Level (Grooming) minimum assist (75% patient effort)   Position  (Grooming) sitting up in bed   Comment (Grooming) Patient requires Min A for toothbrushing while seated in bed.  Reports difficulty seeing and that this is impacting her ability to brush partial dentures.    Clinical Impression   Criteria for Skilled Therapeutic Interventions Met (OT) yes   OT Diagnosis Decreased independence with self-cares and functional mobilityi   OT Problem List-Impairments impacting ADL activity tolerance impaired;pain;mobility   Assessment of Occupational Performance 1-3 Performance Deficits   Planned Therapy Interventions (OT) ADL retraining;transfer training;bed mobility training   Clinical Decision Making Complexity (OT) low complexity   Therapy Frequency (OT) Daily   Predicted Duration of Therapy 2-3 days   Risk & Benefits of therapy have been explained evaluation/treatment results reviewed;patient;spouse/significant other   OT Discharge Planning    OT Discharge Recommendation (DC Rec) Transitional Care Facility   OT Rationale for DC Rec Patient currently requires assist with self-cares and mobility.  She has significantly decreased activity tolerance.    OT Brief overview of current status  Patient completed short distance ambulation with PT prior to OT.  She is agreeable to completing light grooming with OT.  Completes tooth brushing with Min A and set-up assist.  Encouraged patient to continue drinking fluids.    Total Evaluation Time (Minutes)   Total Evaluation Time (Minutes) 15

## 2021-06-20 NOTE — PROGRESS NOTES
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    HPI: This is an 86 year old female POD #6 from a laparoscopic right colectomy with small bowel resection x 2 due to mesenteric invasion into the small bowel loops x 3. BM Noted. Confused last night-moved closer to the station.  visiting. No vomiting. No fever.      EXAM:  I/O last 3 completed shifts:  In: 2054 [P.O.:600; I.V.:1454]  Out: - not recorded       Vitals:    06/19/21 1326 06/19/21 2005 06/20/21 0149 06/20/21 0712   BP: 114/44 130/46 120/49 109/45   BP Location: Right arm Right arm Right arm Right arm   Pulse: 95 100 96 90   Resp: 14 16 16 18   Temp: 96.6  F (35.9  C) 97.3  F (36.3  C) 97.1  F (36.2  C) 96.3  F (35.7  C)   TempSrc: Tympanic Tympanic Tympanic Tympanic   SpO2: 94% 95% 95% 97%   Weight:       Height:         /45 (BP Location: Right arm)   Pulse 90   Temp 96.3  F (35.7  C) (Tympanic)   Resp 18   Ht 1.613 m (5' 3.5\")   Wt 57.6 kg (126 lb 15.8 oz)   SpO2 97%   BMI 22.14 kg/m    GENERAL: alert, NAD  CV: RRR, no murmur  RESPIRATORY: no dyspnea, clear bilat  ABDOMEN: mildly distended, +BS, soft, appropriately tender, incisions c/d/i, no signs of infection  EXTREMITY: no edema, neg Aminata's  SKIN: warm and dry, no jaundice no rashes. Callous left great toe-stable   NEURO: cranial nerves II-XII grossly intact, motor exam intact    LABS  Recent Labs   Lab Test 06/20/21  0525 06/18/21  0522 08/30/16  1622 08/30/16  1622 08/20/16  0012   WBC 6.2 10.4   < >  --   --    RBC 3.79* 3.82   < >  --   --    HGB 10.7* 10.9*   < > 14.8 14.4   HCT 32.4* 32.7*   < > 47.5 42.0   MCV 86 86   < > 91 90   MCH 28.2 28.5   < > 28.3 30.9   MCHC " 33.0 33.3   < > 31.1* 34.2    213   < > 237 220   MPV  --   --   --  9.1 8.8    < > = values in this interval not displayed.       Recent Labs   Lab Test 06/20/21  0525 06/18/21  0522   POTASSIUM 4.5 4.2   CHLORIDE 99 104   BUN 45* 25       Recent Labs   Lab Test 06/16/21  1424 06/15/21  1342 06/01/21  1150 05/28/21  1219   PROTEIN Negative  --   --  Negative   BILITOTAL  --  0.8 0.7  --    AST  --  14 15  --    ALT  --  8 7  --      ASSESSMENT  86 year old female POD #6 s/p extended right colectomy complicated by small bowel resection x 2 due to metastatic involvement of the small bowel x 3. Bowel function returning-    - Elevated Cr, dehydration -bolus and encouraging oral intake-hospitalist following  - Upper GI series showed, dilated small bowel without obstruction    PLAN  Patient doing relatively well. Continue scheduled tylenol and ibuprofen. Monitor for signs of infection.   Pain:0/10 currently  Nutrition: will advance diet again   Ulcer prophylaxis: Protonix 40 mg PO q 24 hours before breakfast  DVT prophylaxis : Lovenox 40 mg injection q 24 hours   ID: Antibiotics n/a  Oakley: removed  Recommend discharge when ileus resolves and improved status Working with physical therapy and occupational therapy.

## 2021-06-21 PROBLEM — E87.1 HYPONATREMIA: Status: ACTIVE | Noted: 2021-01-01

## 2021-06-21 NOTE — PROGRESS NOTES
Dr. Frausto at bedside speaking with patient and patient's family regaring POC.     Ayse Buchanan BSN, RN, PHN on 6/21/2021 at 12:53 PM

## 2021-06-21 NOTE — PROGRESS NOTES
"Clinical Nutrition / Initial Assessment     Reason for Assessment:  Length of stay    Assessment:   Client History:  Pt was resting, visited with her  Dhruv. He said her appetite is finally starting to return. She ate well this morning and was actually asking for food. She had 75% of her jello and soup, some apple juice. He fed her and she tolerated this. Staff may have to assist if  is not available.   Diet Order:  NDD2, thin liquids  Oral Intake:  Finally ate 75% at breakfast today, otherwise has been poor  Supplement Intake:  Will add Ensure Clear (apple flavor) TID to trays. Will add Gelatein protein jello daily as well.   Weight:   Wt Readings from Last 10 Encounters:   06/18/21 57.6 kg (126 lb 15.8 oz)   06/08/21 53.7 kg (118 lb 6.4 oz)   06/03/21 57.2 kg (126 lb)   06/01/21 57.2 kg (126 lb)   05/28/21 57.2 kg (126 lb)   05/14/21 55.6 kg (122 lb 9.6 oz)   10/09/19 59.7 kg (131 lb 9.6 oz)   06/12/19 62.8 kg (138 lb 6.4 oz)   09/05/18 66.2 kg (146 lb)   08/28/18 66.2 kg (146 lb)   Height: 5' 3.5\"  BMI: Body mass index is 22.14 kg/m .    Estimated nutritional needs based on:  current body weight  58 kg / 126 lbs   Estimated energy needs:  0241-0601 kcal/day (25-30 kcal/kg)  Estimated protein needs:  70-87 gm/day (1.2-1.5 g/kg)  Estimated fluid needs:  9306-8679 ml/day (1 ml/kcal)    Malnutrition Criteria:  (Need to have 2 indicators to qualify recommendation)  Energy Intake:  Acute Moderate: < 75% of estimated energy requirement for > 7days  Interpretation of Weight Loss:  No significant weight loss  Physical Findings:  Acute Fluid Accumulation:  mild  Reduced  Strength:  likely reduced-appears weak    Recommended Nutrition Diagnosis:   Moderate Malnutrition in the context of acute illness or injury - based on AND/ASPEN Clinical Characterstics of Malnutrition May 2012      Nutrition Education: Nutrition education will be provided as appropriate.    Nutrition Diagnosis: Oral or Nutrition Support " Intake:  inadequate energy intakes related to decreased appetite as evidenced by intakes of 50% or less at most meals over past 6 days.    Intervention:  Nutrition Prescription:     Nutrition Intervention(s):NDD 2 diet, thin liquids  1. Meals and Snacks: small, frequent meals/snacks  2. Medical Food Supplement: Will add Ensure Clear at meals at this time TID, 1 Gelatin protein jello daily as well.     Nutrition Goal(s):  1. Pt will consume 50% or more at meals and supplements   2. Pt will not have unplanned wt loss during hospitalization   3. Pt will tolerate diet as ordered, texture per speech therapy recommendations     Monitoring and Evaluation:   Food Intake, diet tolerance, weights, labs    Discharge Recommendation:   Nutrition Discharge Planning  likely will need supplements on discharge until appetite and strength return.     RD will reassess in within 1-5 days or sooner.    Shamika Rose RD on 6/21/2021 at 12:32 PM

## 2021-06-21 NOTE — PROGRESS NOTES
[facility]    Medicine Progress Note - Hospitalist Service       Date of Admission:  6/14/2021    Assessment & Plan         Melissa Quinteros is a 86 year old female who was admitted on 6/14/2021.  She underwent a fairly extensive surgery and is now POD #7. Oral intake better today in part due to her  pushing her.       Principal Problem:    Malignant neoplasm of transverse colon (H)    Assessment: Laparoscopic extended right colectomy, laparoscopic mobilization of splenic flexure, small bowel resection x 2, enterolysis for about 20 minutes, biopsy of mesenteric lymph node.    Plan: Per Dr. Carvajal  Active Problems:    Essential hypertension    Assessment: Blood pressure is well controlled    Plan: Continue current meds    Acute kidney failure with tubular necrosis (H)    Assessment: Creatinine is stable with slight improvement since yesterday with fluids.    Plan: Continue IVF at 150ml/hr.  Renal ultrasound does not show hydronephrosis.  Daily weights, recheck in AM.    Hypomagnesemia    Assessment: 3.1-resolved    Plan: No further treatment needed    Altered mental status    Assessment: Patient has been intermittently confused, but easily redirected.    Plan: She has not received any lorazepam.  Melatonin was helpful for sleep.  We will continue the same.             Diet: Dysphagia [facility] Al[facility] Thin Liquids (water, ice chips, juice, milk gelatin, ice cream, etc)    DVT Prophylaxis: change to heparin 5000u bid due to renal function  Oakley Catheter: Not present  Central Lines: None  Code Status: Full Code      Disposition Plan   Expected discharge: 2 - 3 days, recommended to transitional care unit vs home with homecare once adequate pain management/ tolerating PO medications and renal function improved.     The patient's care was discussed with the Patient and Patient's Family.    Diallo Frausto MD  Hospitalist Service  [facility]  Securely message with  the [email or web address] Web Console (learn more here)  Text page via [facility] Paging/Directory      Risk Factors Present on Admission                ______________________________________________________________________    Interval History   Patient reports that she feels tired and worn out.  Her lips are a little sore and feel dry to her.  Also has some pain on her left heel.  She did eat 75% of her breakfast this morning and is willing to try some ensures.  She denies any chest pain or shortness of breath.  Abdominal pain significantly improved as well.  No fevers.  No new issues.    Data reviewed today: I reviewed all medications, new labs and imaging results over the last 24 hours. I personally reviewed abdominal ultrasound which does not show any hydronephrosis.    Physical Exam   Vital Signs: Temp: 94.4  F (34.7  C) Temp src: Tympanic BP: 107/45 Pulse: 95   Resp: 16 SpO2: 94 % O2 Device: None (Room air)    Weight: 126 lbs 15.76 oz  Constitutional: awake, alert, cooperative, no apparent distress, and appears stated age  Respiratory: Clear to auscultation bilaterally  Cardiovascular: Regular rate and rhythm.  No murmurs rubs or gallops.  No JVD.  Patient does have 3+ bilateral edema from the knees down.  GI: Abdomen is soft with very minimal distention.  Bowel sounds heard in all quadrants.  Musculoskeletal: Generalized weakness.  Skin: Currently no redness or skin breakdown at the left heel.  Lips slightly dry but no cracking or scabbing.  No mucosal bleeding, exfoliation or other concerns.  Neuropsychiatric: General: normal, calm and normal eye contact  Orientation: oriented to self, place, time and situation  Memory and insight: memory for past and recent events intact and thought process normal    Data   Recent Labs   Lab 06/21/21  0610 06/20/21  0525 06/18/21  0522 06/17/21  0532 06/16/21  0615 06/15/21  1342   WBC  --  6.2 10.4  --  10.5 7.3   HGB  --  10.7* 10.9*  --  12.5 13.5   MCV  --  86 86  --  87 87   PLT  --  218  213 200 217 254   * 128* 136  --  135 136   POTASSIUM 4.5 4.5 4.2  --  4.6 3.8   CHLORIDE 104 99 104  --  103 104   CO2 16* 20* 24  --  21 20*   BUN 47* 45* 25  --  23 15   CR 2.54* 2.66* 1.31*  --  1.26* 1.03   ANIONGAP 11 9 8  --  11 12   DARIANA 7.3* 8.0* 8.4*  --  8.2* 8.5*   GLC 50* 137* 78  --  92 156*   ALBUMIN  --   --   --   --   --  3.2*   PROTTOTAL  --   --   --   --   --  5.4*   BILITOTAL  --   --   --   --   --  0.8   ALKPHOS  --   --   --   --   --  70   ALT  --   --   --   --   --  8   AST  --   --   --   --   --  14     Recent Results (from the past 24 hour(s))   US Renal Complete    Narrative    PROCEDURE: US RENAL COMPLETE 6/21/2021 8:32 AM    HISTORY: JAXSON post op    COMPARISONS: None.    TECHNIQUE: Routine ultrasound of the kidneys.    FINDINGS: Right kidney measures 9.2 cm in length and the left 10.3 cm  in length. No renal mass is seen and there is no significant  hydronephrosis.    There is ascites with fluid seen in all quadrants. Fluid adjacent to  the gallbladder appears somewhat complex with possible septations.  Gallbladder wall does not appear significantly thickened and no  definite gallstones are seen. There may be some sludge in the  gallbladder.         Impression    IMPRESSION:   1. No renal mass or hydronephrosis.  2. Ascites with complex appearing fluid adjacent to the gallbladder.  This may contain some septations. Etiology of this is not entirely  certain. Consider CT of the abdomen and pelvis with contrast to  further evaluate this finding.    PAULO LOPEZ MD

## 2021-06-21 NOTE — PLAN OF CARE
"Patient is alert and oriented x3, disoriented to situation, forgetful, confusion increases at night. SBA. Had some difficulty with low back pain control early in the shift, tylenol and heat pack ineffective. Oxycodone 5mg administered x2, lidocaine patch applied and has been resting comfortably. VSS, LS clear, denies nausea. Bowel sounds hypoactive, abdomen soft and tender. Lap sites ecchymotic with dried drainage. IV fluids running 150mL/hr, poor appetite and oral intake.     /54 (BP Location: Right arm)   Pulse 98   Temp 97.1  F (36.2  C) (Tympanic)   Resp 16   Ht 1.613 m (5' 3.5\")   Wt 57.6 kg (126 lb 15.8 oz)   SpO2 95%   BMI 22.14 kg/m      Kaylin Murphy RN on 6/21/2021 at 6:11 AM    "

## 2021-06-21 NOTE — PROGRESS NOTES
SAFETY CHECKLIST  ID Bands and Risk clasps correct and in place (DNR, Fall risk, Allergy, Latex, Limb):  Yes  All Lines Reconciled and labeled correctly: Yes  Whiteboard updated:Yes  Environmental interventions (bed/chair alarm on, call light, side rails, restraints, sitter....): Yes    Ayse DURÁNN, RN, PHN on 6/21/2021 at 8:40 AM

## 2021-06-21 NOTE — PLAN OF CARE
Alert. Oriented to self. Confused. Forgetful. Responds to reorientation and redirection. Patient' spouse present at bedside majority of shift. Clear liquid diet. Poor appetite. Needs encouragement to eat and drink. Patient's spouse has been assisting with meals. Ate (1) jello cup for breakfast and (2/3rds) of Boost protein shake for lunch. IV fluids infusing @ 150 mL/hr. No BM. BS active, audible. Bladder scan for 378 mL. Voided 200 mL. Lap adolfo incisions sites are approximated, scabbed. Aloe vesta barrier cream applied to patient's dry, flaky left heel and left big toe. (+2) Edema to bilateral lower extremities. Assist x2 with gait belt & FWW. Participated in PT. Will continue to monitor.     Temp: 95.7  F (35.4  C) Temp src: Tympanic BP: 109/42 Pulse: 89   Resp: 16 SpO2: 95 % O2 Device: None (Room air)      Ayse DURÁNN, RN, PHN on 6/21/2021 at 4:28 PM

## 2021-06-21 NOTE — PROGRESS NOTES
SAFETY CHECKLIST  ID Bands and Risk clasps correct and in place (DNR, Fall risk, Allergy, Latex, Limb):  Yes  All Lines Reconciled and labeled correctly: Yes  Whiteboard updated:Yes  Environmental interventions (bed/chair alarm on, call light, side rails, restraints, sitter....): Yes    Kaylin Murphy RN on 6/20/2021 at 7:35 PM

## 2021-06-22 NOTE — PROGRESS NOTES
Patient arrived to the unit with CRNA and OR nurses. She is still lethargic but is able to give one word responses. Vital signs are stable on 4 L NC.

## 2021-06-22 NOTE — BRIEF OP NOTE
[facility]    Brief Operative Note    Pre-operative diagnosis: Intra-abdominal free air of unknown etiology [K66.8]  Post-operative diagnosis Anastamotic leak and IC anastamosis    Procedure: Procedure(s):  Exploratory LAPAROTOMY with transverse colon Resection, and ileostomy  Surgeon: Surgeon(s) and Role:     * Del Pang MD - Primary  Anesthesia: General   Estimated blood loss: Less than 100 ml  Drains: Arnel-Guevara  Specimens:   ID Type Source Tests Collected by Time Destination   A : ileum and transverse colon resection Tissue Large Intestine, Transverse SURGICAL PATHOLOGY EXAM Del Pang MD 6/22/2021 12:29 PM      Findings:   None.  Complications: None.  Implants: * No implants in log *

## 2021-06-22 NOTE — PROGRESS NOTES
06/21/21 1035   Signing Clinician's Name / Credentials   Signing clinician's name / credentials Anna Wick OTR/L    Quick Adds   Rehab Discipline OT   Functional Transfer Training   Symptoms Noted During/After Treatment fatigue;increased pain   Gait Training   Symptoms Noted During/After Treatment (Gait Training) fatigue;increased pain   Treatment Detail short walk in room with FWW and CGA of 2   Hopkins Level (Gait Training) contact guard   Physical Assistance Level (Gait Training) 2 person assist   Assistive Device (Gait Training) rolling walker   OT Discharge Planning    OT Discharge Recommendation (DC Rec) Transitional Care Facility   OT Rationale for DC Rec Will continue to assess closer to D/C    OT Brief overview of current status  Pt needed much encouragement to get OOB, needed max assist of 2 for supine to sit, CGA of 2 with FWW and encouragement to ambulate to door and back   Additional Documentation   OT Plan cont OT    Total Session Time   Total Session Time (minutes) 30 minutes

## 2021-06-22 NOTE — ANESTHESIA PREPROCEDURE EVALUATION
Anesthesia Pre-Procedure Evaluation    Patient: Melissa Quinteros   MRN: [id number] : 1935        Preoperative Diagnosis: Intra-abdominal free air of unknown etiology [K66.8]   Procedure : Procedure(s):  Exploratory LAPAROTOMY with Possible Resection     Past Medical History:   Diagnosis Date     Esophageal obstruction     2016     Essential (primary) hypertension     2016     Hyperlipidemia     No Comments Provided     Zoster without complications     X2      Past Surgical History:   Procedure Laterality Date     APPENDECTOMY       ARTHROPLASTY KNEE Right     [place]     COLONOSCOPY      No Comments Provided     COLONOSCOPY N/A 6/3/2021    Procedure: COLONOSCOPY, WITH POLYPECTOMY AND BIOPSY with endoscopic tattoo;  Surgeon: Del Pang MD;  Location:  OR     ESOPHAGOGASTRODUODENOSCOPY       HYSTERECTOMY TOTAL ABDOMINAL      ,incidental appendectomy     LAPAROSCOPIC ASSISTED COLECTOMY N/A 2021    Procedure: COLECTOMY, LAPAROSCOPIC, small bowel resection X2, extended Right hemicolectomy, biopsy of the mesenteric node;  Surgeon: Del Pang MD;  Location:  OR     TONSILLECTOMY, ADENOIDECTOMY, COMBINED      childhood      Allergies   Allergen Reactions     Lovastatin      Other reaction(s): Arthralgia      Social History     Tobacco Use     Smoking status: Never Smoker     Smokeless tobacco: Never Used   Substance Use Topics     Alcohol use: Not Currently     Comment: Alcoholic Drinks/day: Seldom      Wt Readings from Last 1 Encounters:   21 57.6 kg (126 lb 15.8 oz)        Anesthesia Evaluation   Pt has had prior anesthetic.         ROS/MED HX  ENT/Pulmonary:  - neg pulmonary ROS     Neurologic:  - neg neurologic ROS     Cardiovascular:     (+) hypertension-----    METS/Exercise Tolerance: 4 - Raking leaves, gardening    Hematologic:  - neg hematologic  ROS     Musculoskeletal:   (+) arthritis,     GI/Hepatic: Comment: Malignant neoplasm of the transverse colon  Free  air in abdomen per CT    (+) GERD,     Renal/Genitourinary:       Endo:  - neg endo ROS     Psychiatric/Substance Use:  - neg psychiatric ROS     Infectious Disease:  - neg infectious disease ROS     Malignancy:   (+) Malignancy, History of GI.GI CA Active status post.        Other:  - neg other ROS          Physical Exam    Airway  airway exam normal      Mallampati: II   TM distance: > 3 FB   Neck ROM: full   Mouth opening: > 3 cm    Respiratory Devices and Support         Dental       (+) partials      Cardiovascular   cardiovascular exam normal          Pulmonary   pulmonary exam normal                OUTSIDE LABS:  CBC:   Lab Results   Component Value Date    WBC 12.9 (H) 06/22/2021    WBC 6.2 06/20/2021    HGB 9.9 (L) 06/22/2021    HGB 10.7 (L) 06/20/2021    HCT 29.9 (L) 06/22/2021    HCT 32.4 (L) 06/20/2021     06/22/2021     06/20/2021     BMP:   Lab Results   Component Value Date     (L) 06/22/2021     (L) 06/21/2021    POTASSIUM 4.9 06/22/2021    POTASSIUM 4.5 06/21/2021    CHLORIDE 106 06/22/2021    CHLORIDE 104 06/21/2021    CO2 15 (L) 06/22/2021    CO2 16 (L) 06/21/2021    BUN 48 (H) 06/22/2021    BUN 47 (H) 06/21/2021    CR 2.64 (H) 06/22/2021    CR 2.54 (H) 06/21/2021     (H) 06/22/2021    GLC 50 (L) 06/21/2021     COAGS: No results found for: PTT, INR, FIBR  POC: No results found for: BGM, HCG, HCGS  HEPATIC:   Lab Results   Component Value Date    ALBUMIN 3.2 (L) 06/15/2021    PROTTOTAL 5.4 (L) 06/15/2021    ALT 8 06/15/2021    AST 14 06/15/2021    ALKPHOS 70 06/15/2021    BILITOTAL 0.8 06/15/2021     OTHER:   Lab Results   Component Value Date    LACT 1.3 06/22/2021    A1C 5.6 08/20/2020    DARIANA 6.9 (L) 06/22/2021    PHOS 3.3 06/15/2021    MAG 2.4 06/21/2021    LIPASE 7 (L) 06/01/2021    TSH 0.94 08/20/2020       Anesthesia Plan    ASA Status:  3, emergent    NPO Status:  NPO Appropriate    Anesthesia Type: General.     - Airway: ETT   Induction: Intravenous.    Maintenance: Balanced.        Consents    Anesthesia Plan(s) and associated risks, benefits, and realistic alternatives discussed. Questions answered and patient/representative(s) expressed understanding.     - Discussed with:  Patient      - Extended Intubation/Ventilatory Support Discussed: No.      - Patient is DNR/DNI Status: No    Use of blood products discussed: No .     Postoperative Care    Pain management: IV analgesics, Multi-modal analgesia.   PONV prophylaxis: Ondansetron (or other 5HT-3), Dexamethasone or Solumedrol     Comments:    Risks, benefits and alternatives discussed with  d/t patient somnolence and would like to proceed.            OUMAR Strange CRNA

## 2021-06-22 NOTE — ANESTHESIA CARE TRANSFER NOTE
Patient: Melissa Quinteros    Procedure(s):  Exploratory LAPAROTOMY with transverse colon Resection, and ileostomy    Diagnosis: Intra-abdominal free air of unknown etiology [K66.8]  Diagnosis Additional Information: No value filed.    Anesthesia Type:   General     Note:    Oropharynx: spontaneously breathing, nasal gatric tube in place and endotracheal tube in place  Level of Consciousness: drowsy  Oxygen Supplementation: face mask  Level of Supplemental Oxygen (L/min / FiO2): 6  Independent Airway: airway patency satisfactory and stable    Vital Signs Stable: post-procedure vital signs reviewed and stable  Report to RN Given: handoff report given  Patient transferred to: ICU    ICU Handoff: Call for PAUSE to initiate/utilize ICU HANDOFF, Identified Patient, Identified Responsible Provider, Reviewed the Pertinent Medical History, Discussed Surgical Course, Reviewed Intra-OP Anesthesia Management and Issues during Anesthesia, Set Expectations for Post Procedure Period and Allowed Opportunity for Questions and Acknowledgement of Understanding      Vitals: (Last set prior to Anesthesia Care Transfer)  CRNA VITALS  6/22/2021 1330 - 6/22/2021 1419      6/22/2021             Pulse:  94    Ht Rate:  94    SpO2:  97 %    Resp Rate (set):  10        Electronically Signed By: OUMAR HADLEY CRNA  June 22, 2021  2:19 PM

## 2021-06-22 NOTE — PLAN OF CARE
"Patient alert to self only overnight, heavy assist of 2. Patient had an episode where vitals began to decline, O2 was 85% and needed 2L of O2, weaned by morning, tachycardic (150s), soft blood pressures, low urine output- MD notified (second LR bolus administered). Declined pain. She slept for a few hours following this then woke up very anxious, restless and panicked- PRN ativan given and effective as patient is now resting comfortably. IVF running at 150mL/hr, lungs remain clear, edema unchanged.     /41 (BP Location: Right arm)   Pulse 104   Temp 97.1  F (36.2  C) (Tympanic)   Resp 18   Ht 1.613 m (5' 3.5\")   Wt 57.6 kg (126 lb 15.8 oz)   SpO2 95%   BMI 22.14 kg/m      Kaylin Murphy RN on 6/22/2021 at 6:35 AM    "

## 2021-06-22 NOTE — PROGRESS NOTES
Shift summary:   Pt not seen by RT this shift.  Was reported to be on RA with no RT needs at this time.  Carrol Braden, RT on 6/22/2021 at 3:45 AM

## 2021-06-22 NOTE — ANESTHESIA POSTPROCEDURE EVALUATION
Patient: Melissa Quinteros    Procedure(s):  Exploratory LAPAROTOMY with transverse colon Resection, and ileostomy    Diagnosis:Intra-abdominal free air of unknown etiology [K66.8]  Diagnosis Additional Information: No value filed.    Anesthesia Type:  General    Note:  Disposition: ICU            ICU Sign Out: Anesthesiologist/ICU physician sign out WAS performed   Postop Pain Control: Uneventful            Sign Out: Well controlled pain   PONV: No   Neuro/Psych: Uneventful            Sign Out: Acceptable/Baseline neuro status   Airway/Respiratory: Uneventful            Sign Out: Acceptable/Baseline resp. status   CV/Hemodynamics: Uneventful            Sign Out: Acceptable CV status; No obvious hypovolemia; No obvious fluid overload   Other NRE: NONE   DID A NON-ROUTINE EVENT OCCUR? No    Event details/Postop Comments:  VSS, resting comfortably in ICU.            Last vitals:  Vitals:    06/22/21 1400 06/22/21 1415 06/22/21 1423   BP:  (!) 127/117    Pulse:  90 90   Resp:  12    Temp: 97.2  F (36.2  C)     SpO2:  97%        Last vitals prior to Anesthesia Care Transfer:  CRNA VITALS  6/22/2021 1330 - 6/22/2021 1430      6/22/2021             Pulse:  94    Ht Rate:  94    SpO2:  97 %    Resp Rate (set):  10          Electronically Signed By: OUMAR Strange CRNA  June 22, 2021  2:47 PM

## 2021-06-22 NOTE — PROGRESS NOTES
"Resting in bed.  No c/o untol pain but says her stomachs a \"little\" sore . Falls to sleep when not talking to pt but does rouse easily.  NG pulling brown/yellow returns.  REMI pulling serroussang returns.  All dressings D/I.  Cont to monitor.    "

## 2021-06-22 NOTE — PROGRESS NOTES
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    HPI: This is an 86 year old female POD #8 from a laparoscopic right colectomy with small bowel resection x 2 due to mesenteric invasion into the small bowel loops x 3. BM Noted. No vomiting. No fever.      EXAM:  I/O last 3 completed shifts:  In: 3851 [P.O.:590; I.V.:3261]  Out: 200 [Urine:200]not recorded       Vitals:    06/22/21 0444 06/22/21 0516 06/22/21 0608 06/22/21 0823   BP: 118/41   102/41   BP Location: Right arm   Right arm   Pulse: 104   97   Resp: 18   16   Temp: 97.1  F (36.2  C)   96.7  F (35.9  C)   TempSrc: Tympanic   Tympanic   SpO2: 91% 95% 92% 93%   Weight:       Height:         /41 (BP Location: Right arm)   Pulse 97   Temp 96.7  F (35.9  C) (Tympanic)   Resp 16   Ht 1.613 m (5' 3.5\")   Wt 57.6 kg (126 lb 15.8 oz)   SpO2 93%   BMI 22.14 kg/m    GENERAL: alert, NAD  CV: RRR, no murmur  RESPIRATORY: no dyspnea, clear bilat  ABDOMEN: mildly distended, +BS, soft, appropriately tender, incisions c/d/i, no signs of infection  EXTREMITY: no edema, neg Aminata's  SKIN: warm and dry, no jaundice no rashes. Callous left great toe-stable   NEURO: cranial nerves II-XII grossly intact, motor exam intact    LABS  Recent Labs   Lab Test 06/22/21  0805 06/20/21  0525 08/30/16  1622 08/30/16  1622 08/20/16  0012   WBC 12.9* 6.2   < >  --   --    RBC 3.45* 3.79*   < >  --   --    HGB 9.9* 10.7*   < > 14.8 14.4   HCT 29.9* 32.4*   < > 47.5 42.0   MCV 87 86   < > 91 90   MCH 28.7 28.2   < > 28.3 30.9   MCHC 33.1 33.0   < > 31.1* 34.2    218   < > 237 220   MPV  --   --   --  9.1 8.8    < > = values in this interval not displayed. "       Recent Labs   Lab Test 06/22/21  0805 06/21/21  0610   POTASSIUM 4.9 4.5   CHLORIDE 106 104   BUN 48* 47*       Recent Labs   Lab Test 06/16/21  1424 06/15/21  1342 06/01/21  1150 05/28/21  1219   PROTEIN Negative  --   --  Negative   BILITOTAL  --  0.8 0.7  --    AST  --  14 15  --    ALT  --  8 7  --      ASSESSMENT  86 year old female POD #7 s/p extended right colectomy complicated by small bowel resection x 2 due to metastatic involvement of the small bowel x 3. Bowel function returning-    - Elevated Cr, dehydration -bolus and encouraging oral intake-hospitalist following   - Renal US shows fluid collection      - CT abd- No IV contrast      - Upper GI series showed, dilated small bowel without obstruction    PLAN  Patient doing relatively well. Continue scheduled tylenol and ibuprofen. Monitor for signs of infection.   Pain:0/10 currently  Nutrition: Regular as tolerated   Ulcer prophylaxis: Protonix 40 mg PO q 24 hours before breakfast  DVT prophylaxis : Lovenox 40 mg injection q 24 hours   ID: Antibiotics n/a  Oakley: removed  Recommend discharge when ileus resolves and improved status Working with physical therapy and occupational therapy.

## 2021-06-22 NOTE — PROGRESS NOTES
Notified MD Frausto of sustained tachycardia following 500mL LR bolus, soft BPs and patient now requiring 2 LPM nasal cannula. Received telephone order for 500mL LR bolus. Patient was placed on continuous pulse ox. Will continue to monitor.     Shilo Fletcher RN June 22, 2021 1:40 AM

## 2021-06-22 NOTE — PROGRESS NOTES
SAFETY CHECKLIST  ID Bands and Risk clasps correct and in place (DNR, Fall risk, Allergy, Latex, Limb):  Yes  All Lines Reconciled and labeled correctly: Yes  Whiteboard updated:Yes  Environmental interventions (bed/chair alarm on, call light, side rails, restraints, sitter....): Yes    Kaylin Murphy RN on 6/21/2021 at 7:47 PM

## 2021-06-22 NOTE — PROGRESS NOTES
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    HPI: This is an 86 year old female POD #7 from a laparoscopic right colectomy with small bowel resection x 2 due to mesenteric invasion into the small bowel loops x 3. BM Noted. No vomiting. No fever.      EXAM:  I/O last 3 completed shifts:  In: 3851 [P.O.:590; I.V.:3261]  Out: 200 [Urine:200]not recorded       Vitals:    06/22/21 0444 06/22/21 0516 06/22/21 0608 06/22/21 0823   BP: 118/41   102/41   BP Location: Right arm   Right arm   Pulse: 104   97   Resp: 18   16   Temp: 97.1  F (36.2  C)   96.7  F (35.9  C)   TempSrc: Tympanic   Tympanic   SpO2: 91% 95% 92% 93%   Weight:       Height:         /41 (BP Location: Right arm)   Pulse 97   Temp 96.7  F (35.9  C) (Tympanic)   Resp 16   Ht 1.613 m (5' 3.5\")   Wt 57.6 kg (126 lb 15.8 oz)   SpO2 93%   BMI 22.14 kg/m    GENERAL: alert, NAD  CV: RRR, no murmur  RESPIRATORY: no dyspnea, clear bilat  ABDOMEN: mildly distended, +BS, soft, appropriately tender, incisions c/d/i, no signs of infection  EXTREMITY: no edema, neg Aminata's  SKIN: warm and dry, no jaundice no rashes. Callous left great toe-stable   NEURO: cranial nerves II-XII grossly intact, motor exam intact    LABS  Recent Labs   Lab Test 06/22/21  0805 06/20/21  0525 08/30/16  1622 08/30/16  1622 08/20/16  0012   WBC 12.9* 6.2   < >  --   --    RBC 3.45* 3.79*   < >  --   --    HGB 9.9* 10.7*   < > 14.8 14.4   HCT 29.9* 32.4*   < > 47.5 42.0   MCV 87 86   < > 91 90   MCH 28.7 28.2   < > 28.3 30.9   MCHC 33.1 33.0   < > 31.1* 34.2    218   < > 237 220   MPV  --   --   --  9.1 8.8    < > = values in this interval not displayed. "       Recent Labs   Lab Test 06/22/21  0805 06/21/21  0610   POTASSIUM 4.9 4.5   CHLORIDE 106 104   BUN 48* 47*       Recent Labs   Lab Test 06/16/21  1424 06/15/21  1342 06/01/21  1150 05/28/21  1219   PROTEIN Negative  --   --  Negative   BILITOTAL  --  0.8 0.7  --    AST  --  14 15  --    ALT  --  8 7  --      ASSESSMENT  86 year old female POD #7 s/p extended right colectomy complicated by small bowel resection x 2 due to metastatic involvement of the small bowel x 3. Bowel function returning-    - Elevated Cr, dehydration -bolus and encouraging oral intake-hospitalist following   - Renal US   - Upper GI series showed, dilated small bowel without obstruction    PLAN  Patient doing relatively well. Continue scheduled tylenol and ibuprofen. Monitor for signs of infection.   Pain:0/10 currently  Nutrition: Regular as tolerated   Ulcer prophylaxis: Protonix 40 mg PO q 24 hours before breakfast  DVT prophylaxis : Lovenox 40 mg injection q 24 hours   ID: Antibiotics n/a  Oakley: removed  Recommend discharge when ileus resolves and improved status Working with physical therapy and occupational therapy.

## 2021-06-22 NOTE — PROGRESS NOTES
"   06/22/21 0800   General Information   Onset of Illness/Injury or Date of Surgery 06/14/21   Referring Physician Dr. Pang   Patient/Family Therapy Goal Statement (SLP) To increase oral intake   Pertinent History of Current Problem SEe EMR. Pt's  stating no difficulties with physical swallow, but that the pt is just not eating or drinking. Per nursing notes, pt has had some difficulties with PO intake safely without concens of aspiration.   General Observations Pt was fatigued from breakfast when SLP arrived. Pt tolerated sips of thin liquids and bites of puree; however, she refused additional trials. Pt will benefit from additional assessment of increased trials   Past History of Dysphagia Pt's  reports history of \"having throat stretched but that she has not had difficulties swallowing until now where she is refusing to eat\". He stated that she felt like purees were sticking now; however she did not have difficulties with trials with SLP. He did not have concerns of choking or aspirating with PO intake at this time or prior to admission    Type of Evaluation   Type of Evaluation Swallow Evaluation   Oral Motor   Oral Musculature anomalies present   Structural Abnormalities none present   Mucosal Quality adequate   Dentition (Oral Motor)   Dentition (Oral Motor) natural dentition;adequate dentition   Tongue Function (Oral Motor)   Comment, Tongue Function (Oral Motor) Pt presenting with min to mod oral residue from jello, as well as difficulty controlling bolus. This suggests oral motor weakness. Pt also presents with slow oral motor movements with PO intake   General Swallowing Observations   Current Diet/Method of Nutritional Intake (General Swallowing Observations, NIS) clear liquid diet;dysphagia level 2 (mechanically altered);thin liquids   Swallowing Evaluation Clinical swallow evaluation   Clinical Swallow Evaluation   Feeding Assistance set up only required   Rationale for completing " additional evaluation To assess swallow safety for PO intake   Clinical Swallow Evaluation Textures Trialed Thin Liquids;Puree Textures   Clinical Swallow Eval: Thin Liquid Texture Trial   Diagnostic Statement No overt S/sx of aspiration. Min to mod residue with jello.    Clinical Swallow Evaluation: Puree Solid Texture Trial   Diagnostic Statement No overt S/sx of aspiration   Swallowing Recommendations   Comment, Swallowing Recommendations NDD2 with thins, oral cares after PO intake, close supervision with cues to swallow before next bite/sip, rotate consistencies, discontinue with fatigue/S/sx of aspiration, or globus sensation   SLP Therapy Assessment/Plan   Criteria for Skilled Therapeutic Interventions Met (SLP Eval) yes   SLP Diagnosis dysphagia   Rehab Potential (SLP Eval) good, to achieve stated therapy goals   Therapy Frequency (SLP Eval) other (see comments)  (1-3x per week)   Predicted Duration of Therapy Intervention (SLP Eval) duration of this hospital stay   SLP Discharge Planning    SLP Discharge Recommendation (DC Rec) home with assist;home with outpatient speech therapy   SLP Rationale for DC Rec assessment outcomes from 06/21/2021   SLP Brief overview of current status  pt presents with dysphagia; however, due to limited trials, she will continue to benefit from further intervention and assessment.     Total Evaluation Time   Total Evaluation Time (Minutes) 15

## 2021-06-22 NOTE — PROGRESS NOTES
Spoke to Dr. Pang. Ok to leave current abdominal dressing on until he assess tomorrow morning. Also to keep NG to LIS until told otherwise..

## 2021-06-22 NOTE — PROGRESS NOTES
"Notified MD Frausto of increased heart rate, low urine output, and new onset of throbbing pain behind the knee. No redness, increased swelling or tenderness behind knee. SCDs remain in place and continue with heparin. New verbal order for 500mL LR bolus. Will continue to monitor.     /45 (BP Location: Right arm)   Pulse 144   Temp 96.8  F (36  C) (Tympanic)   Resp 18   Ht 1.613 m (5' 3.5\")   Wt 57.6 kg (126 lb 15.8 oz)   SpO2 93%   BMI 22.14 kg/m      "

## 2021-06-22 NOTE — ANESTHESIA PROCEDURE NOTES
Airway         Procedure Start/Stop Times: 6/22/2021 11:29 AM  Staff -        CRNA: Daisha Palomares APRN CRNA       Performed By: CRNA  Consent for Airway        Urgency: elective  Indications and Patient Condition       Indications for airway management: liliya-procedural       Induction type:intravenous       Mask difficulty assessment: 1 - vent by mask    Final Airway Details       Final airway type: endotracheal airway       Successful airway: ETT - single  Endotracheal Airway Details        ETT size (mm): 7.0       Cuffed: yes       Successful intubation technique: direct laryngoscopy       DL Blade Type: MAC 4       Grade View of Cords: 1       Position: Right       Measured from: gums/teeth       Secured at (cm): 22       Bite block used: None    Post intubation assessment        Placement verified by: capnometry and equal breath sounds        Number of attempts at approach: 1       Secured with: silk tape       Ease of procedure: easy       Dentition: Intact    Medication(s) Administered   Medication Administration Time: 6/22/2021 11:29 AM

## 2021-06-22 NOTE — PROGRESS NOTES
[facility]    Medicine Progress Note - Hospitalist Service       Date of Admission:  6/14/2021    Assessment & Plan                  Melissa Quinteros is a 86 year old female who was admitted on 6/14/2021.  She underwent a fairly extensive surgery and is now POD #8. She is obtunded this morning and CT scan of abdomen shows free intraperitoneal air and a large amount of ascites.     Principal Problem:    Malignant neoplasm of transverse colon (H)    Assessment: Laparoscopic extended right colectomy, laparoscopic mobilization of splenic flexure, small bowel resection x 2, enterolysis for about 20 minutes, biopsy of mesenteric lymph node.  Now noted with free air.    Plan: Per Dr. Carvajal  Active Problems:    Essential hypertension    Assessment: Blood pressure is well controlled    Plan: Continue current meds    Acute kidney failure with tubular necrosis (H)    Assessment: Creatinine is stable but remains elevated above baseline.    Plan: Continue IVF at 150ml/hr.   Daily weights, recheck in AM.    Hypomagnesemia    Assessment: 3.1-resolved    Plan: No further treatment needed    Altered mental status    Assessment: Patient is obtunded this morning, potentially from lorazepam at 439 vs evolving infection in abdomen.    Plan: Plan is for surgery.               Diet: Dysphagia Diet Level 2 [facility] Altered Thin Liquids (water, ice chips, juice, milk gelatin, ice cream, etc)  Snacks/Supplements Adult: Ensure Clear; With Meals  Snacks/Supplements Adult: Gelatein Plus; With Meals    DVT Prophylaxis: Heparin SQ  Oakley Catheter: Not present  Central Lines: None  Code Status: Full Code      Disposition Plan   Expected discharge: 4 - 7 days, recommended to transitional care unit once antibiotic plan established, hemoglobin stable, mental status at baseline, renal function improved and SIRS/Sepsis treated.     The patient's care was discussed with the Patient and Patient's Family.    Diallo Frausto,  MD  Hospitalist Service  [facility]  Securely message with the Latest Medical Web Console (learn more here)  Text page via [employer] Paging/Directory      Risk Factors Present on Admission                ______________________________________________________________________    Interval History   Patient is obtunded.  She received a dose of Ativan about 6 hours ago.  Last night patient had increased heart rate and low urine output.  Was given two 500 mL boluses of fluid and heart rate normalized and she urinated.    Today not able to give any history.  Yesterday was agitated but reported only intermittent abdominal pain during the day.  Ate a good breakfast.  No fevers.      Data reviewed today: I reviewed all medications, new labs and imaging results over the last 24 hours. I personally reviewed the abdominal CT image(s) showing free air, ascities..    Physical Exam   Vital Signs: Temp: 97.5  F (36.4  C) Temp src: Tympanic BP: 113/44 Pulse: 92   Resp: 16 SpO2: 94 % O2 Device: None (Room air) Oxygen Delivery: 1 LPM(decreased to RA)  Weight: 126 lbs 15.76 oz  Constitutional: currently obtunded  Respiratory: No increased respiratory effort.  Crackles at bases posteriorly  Cardiovascular: Regular rate and rhythm.  No murmurs rubs or gallops.2+ LE edema bilaterally  GI: Mildly distended, bowel sounds present.      Data   Recent Labs   Lab 06/22/21  0805 06/21/21  0610 06/20/21  0525 06/18/21  0522 06/15/21  1342 06/15/21  1342   WBC 12.9*  --  6.2 10.4   < > 7.3   HGB 9.9*  --  10.7* 10.9*   < > 13.5   MCV 87  --  86 86   < > 87     --  218 213   < > 254   * 131* 128* 136   < > 136   POTASSIUM 4.9 4.5 4.5 4.2   < > 3.8   CHLORIDE 106 104 99 104   < > 104   CO2 15* 16* 20* 24   < > 20*   BUN 48* 47* 45* 25   < > 15   CR 2.64* 2.54* 2.66* 1.31*   < > 1.03   ANIONGAP 11 11 9 8   < > 12   DARIANA 6.9* 7.3* 8.0* 8.4*   < > 8.5*   * 50* 137* 78   < > 156*   ALBUMIN  --   --   --   --   --  3.2*    PROTTOTAL  --   --   --   --   --  5.4*   BILITOTAL  --   --   --   --   --  0.8   ALKPHOS  --   --   --   --   --  70   ALT  --   --   --   --   --  8   AST  --   --   --   --   --  14    < > = values in this interval not displayed.     Recent Results (from the past 24 hour(s))   CT Abdomen pelvis w/o contrast    Narrative    EXAMINATION: CT ABDOMEN PELVIS W/O CONTRAST, 6/22/2021 9:32 AM    TECHNIQUE:  Helical CT images from the lung bases through the  symphysis pubis were obtained  without IV contrast. Contrast dose:    COMPARISON: none    HISTORY: Abdominal distension; s/p ext right colectomy and small bowel  resection X 2 with destion and ileus.    FINDINGS:    There are moderate bilateral pleural effusions. There is compressive  atelectasis in both lower lungs. The heart is normal in size.    The liver is free of masses or biliary ductal enlargement. There is  some radiopaque material in the dependent portions of the gallbladder  most likely sludge.    The the spleen and pancreas appear normal.    The adrenal glands are normal.    The right and left kidneys are free of masses or hydronephrosis.    The periaortic lymph nodes are normal in caliber.    There is a large amount of free intraperitoneal air. A large amount of  ascites is noted. Surgical staple lines are seen in the abdomen. There  is mild gaseous distention of small bowel loops.    In the pelvis the bladder and rectum appear normal.    Advanced degenerative changes are present in the thoracic and lumbar  spine      Impression    IMPRESSION: Moderate bilateral pleural effusions    Large amount of ascites    A large amount of free intraperitoneal air is noted. This is more than  would be expected for a patient who underwent surgery a week ago.    CATHERINE CAMACHO MD

## 2021-06-23 NOTE — PROGRESS NOTES
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    HPI: CT scan yesterday with free air and free fluid. Anastomotic leak at IC anastomosis.      EXAM:  I/O last 3 completed shifts:  In: 2973.2 [I.V.:2973.2]  Out: 1425 [Urine:720; Emesis/NG output:400; Drains:230; Stool:75]not recorded       Vitals:    06/23/21 0815 06/23/21 0830 06/23/21 0845 06/23/21 0900   BP:  125/46  130/48   Pulse: 93 93  93   Resp: 15 13  15   Temp:    97  F (36.1  C)   TempSrc:    Temporal   SpO2: 95% 95% 95% 95%   Weight:       Height:         /48   Pulse 93   Temp 97  F (36.1  C) (Temporal)   Resp 15   Ht 1.613 m (5' 3.5\")   Wt 71.3 kg (157 lb 3 oz)   SpO2 95%   BMI 27.41 kg/m    GENERAL: alert, NAD  CV: RRR, no murmur  RESPIRATORY: no dyspnea, clear bilat  ABDOMEN: mildly distended, ileostomy output noted, ostomy is pink, soft, appropriately tender, incision is open and packed, no signs of infection  EXTREMITY: no edema, neg Aminata's  SKIN: warm and dry, no jaundice no rashes. Callous left great toe-stable   NEURO: cranial nerves II-XII grossly intact, motor exam intact    LABS  Recent Labs   Lab Test 06/23/21  0525 06/22/21  0805 08/30/16  1622 08/30/16  1622 08/20/16  0012   WBC 8.9 12.9*   < >  --   --    RBC 3.35* 3.45*   < >  --   --    HGB 9.3* 9.9*   < > 14.8 14.4   HCT 28.6* 29.9*   < > 47.5 42.0   MCV 85 87   < > 91 90   MCH 27.8 28.7   < > 28.3 30.9   MCHC 32.5 33.1   < > 31.1* 34.2    196   < > 237 220   MPV  --   --   --  9.1 8.8    < > = values in this interval not displayed.       Recent Labs   Lab Test 06/23/21  0525 06/22/21  0805   POTASSIUM 4.4 4.9   CHLORIDE 112* 106   BUN 56* 48*       Recent " Labs   Lab Test 06/16/21  1424 06/15/21  1342 06/01/21  1150 05/28/21  1219   PROTEIN Negative  --   --  Negative   BILITOTAL  --  0.8 0.7  --    AST  --  14 15  --    ALT  --  8 7  --      ASSESSMENT  86 year old female POD #8 s/p extended right colectomy complicated by small bowel resection x 2 due to metastatic involvement of the small bowel x 3. Bowel function returning-  POD # 1 s/p anastomotic leak, transverse colectomy and anastomosis. Diverting loop ileostomy      - Elevated Cr, dehydration - IVF   - improved       PLAN    - Possible NGT removal tomorrow    - Wound care bid    Del Pang MD on 6/23/2021 at 10:44 AM

## 2021-06-23 NOTE — PROGRESS NOTES
"Dressing around J tube re enforced d/t sero sanguinous drainage noted. Drainage also noted in bulb suction. Patient states No when asked if in pain, but asking \"what happened to me\". Re orientated patient to situation and easily calmed. Colostomy bag intact moderate amount of loose brown liquid noted. Abd dressing intact   "

## 2021-06-23 NOTE — PROGRESS NOTES
Patient moaning in bed, restless, and teary. Dilaudid 0.5mg IV provided for pain. States back and abd hurts when asked. Will monitor. Repositioned in bed

## 2021-06-23 NOTE — PROGRESS NOTES
"Pt awakes easily. Confused to time, place and situation. Pt gets teary eyed when awake stating \" I don't know what happened to me\" easily calmed. When at rest breathing even and non labored. VSS. Oakley patent and good amount of urine noted. States \" no\" when asked about pain. Edema noted in lower extremities. Dressings intact and no further leaking noted. Will monitor  "

## 2021-06-23 NOTE — PROGRESS NOTES
Patient continues to be resting in bed. Opens eyes spontaneously with verbal stimuli. Answers questions denies pain. VSS, no fever. Abd dressing dry and intact. Dressing around tube re enforced earlier small amount of drainage noted. Drain to bulb suction and sero sanguineous fluid noted in drain. Will continue to monitor

## 2021-06-23 NOTE — PROGRESS NOTES
Pt resting appears comfortable. Denies pain when asked. Opens eyes to voice, RR manually counted for a full minute and are 10 per minute. HIRR in mid 80's, O2Sat 90-94 on RA. Co2 30mmHg. Pt is mouth breathing and her oral cavity is very dry. MT to bring swabs and chap stick. Abdominal dressings DI, Flakito draining serosanguinous fluid. Ostomy pink, bag intact with sm amt of fecal material. Oakley bag with very small amt of pale yellow urine. Oral cares completed.

## 2021-06-23 NOTE — PLAN OF CARE
Problem: Bleeding (Cholecystectomy)  Goal: Absence of Bleeding  Outcome: No Change  Intervention: Monitor and Manage Bleeding  Recent Flowsheet Documentation  Taken 6/22/2021 2006 by Anh Liang RN  Bleeding Management: dressing monitored     Problem: Bowel Motility Impaired (Cholecystectomy)  Goal: Effective Bowel Elimination  Outcome: No Change     Problem: Infection (Cholecystectomy)  Goal: Absence of Infection Signs and Symptoms  Outcome: No Change     Problem: Ongoing Anesthesia Effects (Cholecystectomy)  Goal: Anesthesia/Sedation Recovery  Outcome: No Change  Intervention: Optimize Anesthesia Recovery  Recent Flowsheet Documentation  Taken 6/22/2021 2006 by Anh Liang RN  Safety Promotion/Fall Prevention: activity supervised     Problem: Pain (Cholecystectomy)  Goal: Acceptable Pain Control  Outcome: No Change     Problem: Postoperative Nausea and Vomiting (Cholecystectomy)  Goal: Nausea and Vomiting Relief  Outcome: No Change     Problem: Postoperative Urinary Retention (Cholecystectomy)  Goal: Effective Urinary Elimination  Outcome: No Change     Problem: Hypertension Comorbidity  Goal: Blood Pressure in Desired Range  Outcome: No Change     Problem: Adult Inpatient Plan of Care  Goal: Plan of Care Review  Outcome: No Change  Flowsheets (Taken 6/22/2021 2256)  Plan of Care Reviewed With: spouse  Progress: no change  Goal: Patient-Specific Goal (Individualized)  Outcome: No Change  Goal: Absence of Hospital-Acquired Illness or Injury  Outcome: No Change  Intervention: Identify and Manage Fall Risk  Recent Flowsheet Documentation  Taken 6/22/2021 2006 by Anh Liang RN  Safety Promotion/Fall Prevention: activity supervised  Intervention: Prevent Skin Injury  Recent Flowsheet Documentation  Taken 6/22/2021 2200 by Anh Liang RN  Body Position:   turned   right  Taken 6/22/2021 2006 by Anh Liang RN  Body Position: turned  Intervention: Prevent and Manage VTE (Venous Thromboembolism) Risk  Recent  Flowsheet Documentation  Taken 6/22/2021 2006 by Anh Liang RN  VTE Prevention/Management: anticoagulant therapy maintained  Goal: Optimal Comfort and Wellbeing  Outcome: No Change  Goal: Readiness for Transition of Care  Outcome: No Change     Problem: OT General Care Plan  Goal: Transfer (OT)  Description: Transfer (OT)  Patient will be able to complete transfers safely with SBA and use of FWW as needed for decreased fall risk prior to discharge from inpatient.   Outcome: No Change  Goal: Toilet Transfer/Toileting (OT)  Description: Toilet Transfer/Toileting (OT)  Patient will be able to complete 3/3 steps of toileting and toilet transfer with CGA and use of AE as needed for increased independence prior to discharge from inpatient.   Outcome: No Change

## 2021-06-23 NOTE — OP NOTE
Procedure Date: 06/22/2021    PREOPERATIVE DIAGNOSIS:  Intra-abdominal free air status post previous surgery.    POSTOPERATIVE DIAGNOSIS:  Anastomotic leak at the ileotransverse colon anastomosis.    PROCEDURES:  Exploratory laparotomy, resection of transverse colon with recreation of anastomosis between the ileum and descending colon diverting loop ileostomy.    SURGEON:  Del Pang M.D.    ASSISTANT:  JAZMYN West.    ESTIMATED BLOOD LOSS:  100 mL.    DRAIN:  Arnel-Guevara.    DESCRIPTION OF PROCEDURE:  The patient was taken to the OR and positioned supine on the operating table.  Her abdomen was sterilely prepped and draped in the usual manner.  Her previous incision was reopened.  We then entered into the abdomen.  There was feculent material and free air that came forth.  We then proceeded to suction free the abdomen.  The dense caking of fibrinous material and succuss was slowly dissected.  Once we had dissected this free to an appreciable extent, we could find bubbling air coming from the anastomosis in the left upper quadrant.  We proceeded to fully mobilize the splenic flexure.  We then proceeded to divide the remaining edematous portion of the transverse colon.  We then continued to free up the small bowel.  The mesentery no longer reached from the previous location of the ileotransverse anastomosis to the right colon, so an additional 15 cm of small bowel was resected.  This got to a longer length of mesentery.  This was able to be rotated and allow all of the small bowel to go to the right of the abdomen.  Our anastomosis then sat in the left of the abdomen.  We intraoperatively placed a nasogastric tube.  This had some difficulty with nasal anatomy.  Once we secured this to the nose and it was verified in position, we left it to low intermittent suction.  We then proceeded to make a loop ileostomy.  Kocher clamp was used to make a circular skin incision.  We then dissected down to the fascia.   At the edge of the rectus, a cruciate incision was made.  A muscle-sparing splitting was then used, and the loop of ileum was passed up.  This was at the area of the previous small-bowel anastomosis.  The small-bowel anastomosis was opened.  The staple line was resected.  We then proceeded to formalize the dermal mucosal stitches for the loop ileostomy.  The midline fascia was closed with a 0 looped PDS.  The skin was left open and will need delayed primary closure in a few days.  At the end of the case, all suture, needle and instrument counts were correct.    Del Pang MD        D: 2021   T: 2021   MT: [facility]    Name:     CHINO HAINESMulu  MRN:      [id number]        Account:        [id number]   :      1935           Procedure Date: 2021     Document: [id number]

## 2021-06-23 NOTE — PROGRESS NOTES
SLP spoke with RN in ICU who stated pt was not ready for further speech treatment session due to level of alertness and pending surgical visit. RN to notify SLP when pt is appropriate for further follow-up.

## 2021-06-23 NOTE — PROGRESS NOTES
Patient resting in bed. Awakes easily and opens eyes. Answers questions. Oral cares provided for dry mouth. Abdominal dressing intact, shadowing noted but no leaking. Will monitor. Dressing around REMI drain leaking serosanguinous fluid, dressing re enforced. Drainage noted in REMI drain. NG tube in place and brown/yellow contents noted. Daily cares performed and repositioned. Pt tolerated well and denies pain. Spouse in room. Stoma site red and small amount liquid brown stool noted in collection bag. Will continue to monitor. SCDs on and running

## 2021-06-23 NOTE — PROGRESS NOTES
Pt was weaned to RA with resulting SpO2 of 96%.  No other RT needs at this time.      Carrol Braden, RT on 6/23/2021 at 5:33 AM

## 2021-06-23 NOTE — PROGRESS NOTES
Pt's HR increased into the 140's-150's. Sinus tach per tele. Grimacing.  States she just wants to go home.  Denies pain but pt's history per  is to deny pain regardless.  MD called.  Orders received.

## 2021-06-24 NOTE — PLAN OF CARE
HR return to WDL, continues on O2 per n.c. at 2 LPM to maintain saturation above 90%. Denies pain declines pain medication when offered however periodically yells out in pain when nobody is in the room and yells, grimaces, and guards with any movement. Did give Dilaudid x's 3 through the night after education to patient. NG putting out adequate amounts of brown stomach contents, colostomy is putting out liquid brown stool, REMI is patent and putting out a jerson colored drainage. Dressing to abdomen changed, did report pain during the process and was premedicated from pain. Repositioned Q 2-3 hours through the night. Very confused yelling out that she can't get up out of bed and very upset about this between 3-430 required constant redirection and reassurance is now resting quietly with eyes closed for the first time this shift.    Shamika Ayoub RN on 6/24/2021 at 4:55 AM

## 2021-06-24 NOTE — PROGRESS NOTES
Attempted to see patient this AM for skilled dysphagia intervention treatment session, however, per discussion with RN, patient is not alert or appropriate for feeding at this time. Patient also is experiencing bloody output from NG and is holding off on po intake at this time. Will continue to attempt to see this patient during duration of hospital stay to ensure safe po intake and determine dietary recommendations. Thank you.    Komal Rosales SLP on 6/24/2021 at 8:27 AM

## 2021-06-24 NOTE — PROGRESS NOTES
Pt still on 2 lpm.  Continue to attempt to wean.  No report on when pt will be transferred to [facility] or to home.    Carrol Braden, RT on 6/24/2021 at 5:38 AM

## 2021-06-24 NOTE — PROGRESS NOTES
Called patient regarding follow up on ER visit 04/08/2017. Patient states has been doing extremely well and has no complications. Patient verbalizes he has appointment with Urologist on 04/14/2017 to have garvey catheter removed. [facility]    Medicine Progress Note - Hospitalist Service       Date of Admission:  6/14/2021    Assessment & Plan           Melissa Quinteros is a 86 year old female who was admitted on 6/14/2021.  She underwent a fairly extensive surgery and is now POD #10 with follow up surgery 6/22 due to anastomotic leak.  He has had more pain today and subsequently after treatment with Dilaudid has been confused.  Her renal function continues to gradually improve and vital signs have been stable.     Principal Problem:    Malignant neoplasm of transverse colon (H)    Assessment:     On 6/14 Laparoscopic extended right colectomy, laparoscopic mobilization of splenic flexure, small bowel resection x 2, enterolysis for about 20 minutes, biopsy of mesenteric lymph node.     On 6/22 Exploratory laparotomy, resection of transverse colon with recreation of anastomosis between the ileum and descending colon diverting loop ileostomy.      Plan: Per Dr. Carvajal    Active Problems:    Essential hypertension    Assessment: Blood pressure is well controlled    Plan: Continue current meds    Acute kidney failure with tubular necrosis (H)    Assessment: Creatinine continues to slowly improve.  She is heavily third spacing.    Plan: We will reduce IV fluid to 75 mL/h and change to D5 half-normal saline due to borderline hypoglycemia.    Hypomagnesemia    Assessment: had resolved.    Plan: Recheck   Severe protein calorie malnutrition    Assessment: Prealbumin very low.    Plan: Refeed as soon as able.  Consider TPN if continues to have prolonged inadequate caloric intake.                   Diet: Snacks/Supplements Adult: Ensure Clear; With Meals  Snacks/Supplements Adult: Gelatein Plus; With Meals    DVT Prophylaxis: Heparin SQ  Oakley Catheter: PRESENT, indication: Strict 1-2 Hour I&O, /GI/GYN Pelvic Procedure  Central Lines: None  Code Status: Full Code      Disposition Plan   Expected discharge: 4 - 7 days, recommended  "to transitional care unit once adequate pain management/ tolerating PO medications, antibiotic plan established, mental status at baseline and safe disposition plan/ TCU bed available.     The patient's care was discussed with the Patient and Patient's Family.    Diallo Frausto MD  Hospitalist Service  [facility]  Securely message with the Voc[employer] Web Console (learn more here)  Text page via [facility] Paging/Directory      Risk Factors Present on Admission                ______________________________________________________________________    Interval History   Patient is responsive to voice and touch.  Initially told me that she was doing \"okay\" but later in the conversation told me that pain was an issue.   feels that she is doing better.  Nursing notes reviewed.    Data reviewed today: I reviewed all medications, new labs and imaging results over the last 24 hours. I personally reviewed no images or EKG's today.    Physical Exam   Vital Signs: Temp: 97  F (36.1  C) Temp src: Temporal BP: 134/62 Pulse: 98   Resp: 12 SpO2: 96 % O2 Device: Nasal cannula Oxygen Delivery: 2 LPM  Weight: 162 lbs 7.66 oz  Constitutional: Somnolent but arouses to voice or light touch.  Respiratory: Clear to auscultation presently.  Cardiovascular: Regular rate and rhythm.  She has 3+ lower extremity pitting edema to pelvis.  GI: Abdomen is soft, appropriately tender.  Bowel sounds hypoactive.      Data   Recent Labs   Lab 06/24/21  0430 06/23/21  0525 06/22/21  0805   WBC 9.2 8.9 12.9*   HGB 8.7* 9.3* 9.9*   MCV 88 85 87    221 196    138 132*   POTASSIUM 4.3 4.4 4.9   CHLORIDE 116* 112* 106   CO2 13* 14* 15*   BUN 59* 56* 48*   CR 1.77* 2.15* 2.64*   ANIONGAP 11 12 11   DARIANA 7.4* 6.7* 6.9*   GLC 68* 82 114*   ALBUMIN <1.5*  --   --    PROTTOTAL 3.9*  --   --    BILITOTAL 0.6  --   --    ALKPHOS 134*  --   --    ALT 12  --   --    AST 33  --   --      No results found for this or any previous visit " (from the past 24 hour(s)).

## 2021-06-24 NOTE — PLAN OF CARE
Appears to be a small amount of blood in NG tubing, no changes in status or VS, shut suction off for short time to allow rest, flushed with 30 ml's as well

## 2021-06-24 NOTE — PLAN OF CARE
Updated Dr. Frausto changes in cognition, visual tracking, following commands, very confused with repetitive statements, worrisome facial expressions, even with repetitive explanation and reminders is continuously asking to go to the bathroom, does not understand that she has a colostomy and burrows catheter.     Shamika Ayoub RN on 6/24/2021 at 6:37 AM

## 2021-06-24 NOTE — PLAN OF CARE
Bowel sounds hypoactive. Denies any nausea. Abdomen is tender to palpation. NG tube remains in place and connected to LIS. Stoma is red, moist and raised. Ostomy is stooling. Ostomy pouch remains intact with no signs of leaking. Dressing to incision remains clean, dry, and intact. Patient will get restless and have increased moaning. PRN dilaudid given and effective. REMI drain remains in place to thumb print suction. REMI output is serosanguinous. Oakley remains in place and patent with clear yellow urine.

## 2021-06-24 NOTE — PROGRESS NOTES
Dr. Pang notified of small amounts of bloody NG output. Orders to clamp for now. He was also made aware that patient appears to experiencing delirium.

## 2021-06-24 NOTE — PROGRESS NOTES
Patient requiring more Dilaudid today. She becomes very tearful and moans when stimulated at all. She is very delirious when awake but physiologic signs of pain are obvious. MD's are aware.

## 2021-06-25 PROBLEM — A41.9 SEPSIS (H): Status: ACTIVE | Noted: 2021-01-01

## 2021-06-25 PROBLEM — R79.89 ELEVATED TROPONIN: Status: ACTIVE | Noted: 2021-01-01

## 2021-06-25 PROBLEM — A41.9 SEPSIS (H): Status: RESOLVED | Noted: 2021-01-01 | Resolved: 2021-01-01

## 2021-06-25 PROBLEM — E87.20 METABOLIC ACIDOSIS: Status: ACTIVE | Noted: 2021-01-01

## 2021-06-25 PROBLEM — E87.70 HYPERVOLEMIA: Status: ACTIVE | Noted: 2021-01-01

## 2021-06-25 PROBLEM — D64.9 ANEMIA: Status: ACTIVE | Noted: 2021-01-01

## 2021-06-25 PROBLEM — E87.1 HYPONATREMIA: Status: RESOLVED | Noted: 2021-01-01 | Resolved: 2021-01-01

## 2021-06-25 PROBLEM — E43 SEVERE PROTEIN-CALORIE MALNUTRITION (GOMEZ: LESS THAN 60% OF STANDARD WEIGHT) (H): Status: ACTIVE | Noted: 2021-01-01

## 2021-06-25 NOTE — ED NOTES
Report received from Shamika PHILLIPS, pt appears to be sleeping at this time, appears comfortable

## 2021-06-25 NOTE — PROGRESS NOTES
06/25/21 1200   General Information   Patient/Family Therapy Goal Statement (SLP) To assess swallowing function and determine dietary recommendations as patient's NG tube has been removed   General Observations Upon SLPs arrival, patient was resting in bed with inconsistent levels of alertness. Patient completed a brief dysphagia assessment in which puree textures and moderately thick liquids were attempted, however, patient was not able to strip bolus off the spoon or cup with maximum assist and demonstrated no awareness or motor ability to intake orally at this time. Patient will need to be NPO given her performance today. Patient did not trigger any type of swallow and had no awareness of a teaspoon of puree texture placed onto her tongue by SLP. Will continue to assess swallowing function and nursing dysphagia screen can be initiated as patient becomes more alert.    Disability/Function   Hearing Difficulty or Deaf yes   Wear Glasses or Blind yes   Difficulty Communicating yes   Communication difficulty speaking;difficulty understanding   Difficulty Eating/Swallowing yes   Eating/Swallowing Management No awarenes or motor ability to locate and intake foods/liquids orally at this time   Type of Evaluation   Type of Evaluation Swallow Evaluation   Oral Motor   Oral Musculature anomalies present   Dentition (Oral Motor)   Dentition (Oral Motor) natural dentition;adequate dentition   General Swallowing Observations   Current Diet/Method of Nutritional Intake (General Swallowing Observations, NIS) regular diet   Swallowing Evaluation Clinical swallow evaluation   Clinical Swallow Evaluation   Rationale for completing additional evaluation To assess swallow safety for PO intake   Clinical Swallow Evaluation: Puree Solid Texture Trial   Mode of Presentation, Puree fed by clinician   Volume of Puree Presented 1 teaspoon   Diagnostic Statement Upon SLP's presentation of 1 teapsoon of applesauce, patient was not able  to close mouth or strip bolus over the spoon. Patient had no awareness of the spoon touching her lips and when a small teapsoon of applesauce was placed on tongue, patient did not trigger any swallow or attempt to locate bolus with tongue. Bolus was manually removed from mouth by SLP to prevent aspiration. Patient is NOT safe to tolerate anything by mouth at this time.   Swallowing Recommendations   Diet Consistency Recommendations NPO   SLP Therapy Assessment/Plan   Criteria for Skilled Therapeutic Interventions Met (SLP Eval) yes   SLP Diagnosis severe dysphagia   Rehab Potential (SLP Eval) fair, will monitor progress closely   Predicted Duration of Therapy Intervention (SLP Eval) duration of this hospital stay   SLP Discharge Planning    SLP Discharge Recommendation (DC Rec) Long term care [facility]   SLP Brief overview of current status  Patient was seen for a brief swallow evaluation today as NG tube was removed and patient has no form of nutrition. Patient was unable to strip bolus from spoon or cup and had no awareness or motor ability to complete oral feeding tasks. She was unable to trigger a swallow and will need to be NPO for the time being because of these defiicits. Nursing dysphagia screen should be initated over the weekend if patient's alertness increases and if she is able to recognize/sense feeding utensils on/in her mouth enough to intake orally. Will continue to assess and treat patient's dysphagia for duration of this hospital stay. Patient's intermittent alertness continues to be a significant barrier of treatment.     Total Evaluation Time   Total Evaluation Time (Minutes) 9

## 2021-06-25 NOTE — PROGRESS NOTES
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    HPI: NGT output minimal, flatus and stool to bag.     EXAM:  I/O last 3 completed shifts:  In: 4461.5 [I.V.:4461.5]  Out: 1870 [Urine:1000; Emesis/NG output:400; Drains:120; Stool:350]not recorded       Vitals:    06/24/21 1730 06/24/21 1800 06/24/21 1830 06/24/21 1857   BP: 130/53 122/80 115/57    BP Location:       Cuff Size:       Pulse: 96 98 96    Resp:       Temp:    97.8  F (36.6  C)   TempSrc:    Temporal   SpO2: 97% 98% 98%    Weight:       Height:         /57   Pulse 96   Temp 97.8  F (36.6  C) (Temporal)   Resp 16   Ht 1.613 m (5' 3.5\")   Wt 73.7 kg (162 lb 7.7 oz)   SpO2 98%   BMI 28.33 kg/m    GENERAL: alert, NAD  CV: RRR, no murmur  RESPIRATORY: no dyspnea, clear bilat  ABDOMEN: mildly distended, ileostomy output noted, ostomy is pink, soft, appropriately tender, incision is open and packed, no signs of infection  EXTREMITY: no edema, neg Aminata's  SKIN: warm and dry, no jaundice no rashes. Callous left great toe-stable   NEURO: cranial nerves II-XII grossly intact, motor exam intact    LABS  Recent Labs   Lab Test 06/24/21  0430 06/23/21  0525 08/30/16  1622 08/30/16  1622 08/20/16  0012   WBC 9.2 8.9   < >  --   --    RBC 3.12* 3.35*   < >  --   --    HGB 8.7* 9.3*   < > 14.8 14.4   HCT 27.3* 28.6*   < > 47.5 42.0   MCV 88 85   < > 91 90   MCH 27.9 27.8   < > 28.3 30.9   MCHC 31.9 32.5   < > 31.1* 34.2    221   < > 237 220   MPV  --   --   --  9.1 8.8    < > = values in this interval not displayed.       Recent Labs   Lab Test 06/24/21  0430 06/23/21  0525   POTASSIUM 4.3 4.4   CHLORIDE 116* 112*   BUN 59* 56*       Recent " Labs   Lab Test 06/24/21  0430 06/16/21  1424 06/15/21  1342 05/28/21  1219 05/28/21  1219   PROTEIN  --  Negative  --   --  Negative   BILITOTAL 0.6  --  0.8   < >  --    AST 33  --  14   < >  --    ALT 12  --  8   < >  --     < > = values in this interval not displayed.     ASSESSMENT  86 year old female POD #9 s/p extended right colectomy complicated by small bowel resection x 2 due to metastatic involvement of the small bowel x 3. Bowel function returning-  POD # 2 s/p anastomotic leak, transverse colectomy and anastomosis. Diverting loop ileostomy      - Elevated Cr, dehydration - IVF   - improved   - Anemia- Anemia from poor nutritional intake, hemodilution, operative blood loss, and critical illness   - Monitor       PLAN    - NGT removal tomorrow    - Wound care bid      Del Pang MD on 6/24/2021 at 7:35 PM

## 2021-06-25 NOTE — PROGRESS NOTES
"Increased work of breath, wheezing audible, increased RR, crackles throughout, 2 - 3+ pitting edema covering entire body except breast and shoulders, edema greater near RLQ and right upper hip and skin is white in color to the most edematous areas. Updated Dr. Maldonado with new orders noted. Pupils are not reactive to light and are constricted. Bowel sounds hypoactive, brown stool to colostomy, stoma is red. Surgical wound bed appears red and healthy with minimal nonviable tissue, large amount of serosanguinous drainage, did grimace with dressing change. REMI draining well, when dressing removed insertion site wept clear to serosanguinous drainage, large amount on dressing when removed, no redness noted. Has been given Dilaudid for screaming, constant touching abdomen and NG tubing, yelling out \"owe owe\" with repositioning, unable to console or redirect, Dilaudid was effective also used ice. Was given Seroquel at bedtime to aid in sleep and slept from hours of 1115 to 0245. Is resting quietly with eyes open at this time.     Shamika Ayoub RN on 6/25/2021 at 3:13 AM    "

## 2021-06-25 NOTE — PLAN OF CARE
Attempted PT/OT today, pt was not able to follow directions, restless, moaning in pain, not appropriate/safe to attempt to mobilize at this time, will continue to assess and initiate when appropriate.     Anna Wick, OT on 6/25/2021 at 1:37 PM

## 2021-06-25 NOTE — PROGRESS NOTES
[facility]    Hospitalist Progress Note      Assessment & Plan   Melissa Quinteros is a 86 year old female who was admitted on 6/14/2021.     Principal Problem:    Malignant neoplasm of transverse colon (H)    Assessment: POD #10 s/p extended right colectomy complicated by small bowel resection x 2 due to metastatic involvement of the small bowel x 3. On 6/22 noted to have free intraperitoneal air and large amount of ascites and was obtunded, CT with large perforation. Now POD #3 s/p anastomotic leak, transverse colectomy and anastomosis. Diverting loop ileostomy.  Hemodynamically stable clearly volume overloaded and nearly malnourished with a guarded prognosis overall.    Plan:   -Wound care, diet, activity restrictions per general surgery  -Zosyn day 4  -Repeat REMI drain culture to further tailor antibiotics  -Follow-up  -Follow-up repeat pathology  -aspiration precautions     Hypervolemia    Assessment: Significant pitting edema to her dependent portions with pleural effusions, possibly related to poor nutritional status and third spacing versus CHF with elevated BNP/troponin and no new EKG changes, contribution of renal failure.  Improved I's/O's and renal function with 1 dose of Lasix overnight.    Plan:   -Lasix 20 mg IV every 8  -Strict I's and O's and daily weights  -Obtain echo  -Trend troponin  -Continue Oakley    Active Problems:    Essential hypertension    Assessment: Stable and not actively treated as an outpatient    Plan:   -Monitor      Acute kidney failure with tubular necrosis (H)    Assessment: Improving,, no evidence of obstruction, unlikely to be prerenal    Plan:   -Diuresis as above  -Avoid NSAIDs and nephrotoxins  -Monitor daily      Hypomagnesemia    Assessment: stable    Plan: - replace per protocol      Altered mental status    Assessment: Not oriented to person place or time with significant delirium, adequate urine output as above, ostomy proving, likely infectious and  metabolic encephalopathy due to sepsis and multiple lab abnormalities with acute kidney injury metabolic acidosis and others.    Plan:   -Treat infection as above  -Early mobilization with physical and Occupational Therapy  -Advance diet as able with ongoing oral cares  -seroquel at bedtime if agitated      Metabolic acidosis    Assessment: Nonanion gap, likely due to GI losses from NG tube, large crystalloid infusions for resuscitation, kidney impairment all likely playing a role.    Plan:   -Diuresis as above  -NG out today per general surgery  -Trend ABG  -Bicarb drip if worsening      Anemia    Assessment: Multifactorial and likely from phlebotomy, surgery, chronic disease and iron deficiency.  Not a IV iron candidate due to active infection.    Plan:   -Trend hemoglobin and transfuse to keep greater than 7      Severe protein-calorie malnutrition (Schumacher: less than 60% of standard weight) (H)    Assessment: Prealbumin 5, poor oral intake.    Plan:   -Appreciate dietary consult and speech consult for diet advancement  -We will defer to general surgery regarding timing of considering enteral nutrition if not able to advance diet      Elevated troponin    Assessment: Likely demand from acute kidney injury, acute infection, no obvious EKG changes or cardiopulmonary symptoms concerning for ACS.    Plan:   -Trend   -telemetry       Diet: Snacks/Supplements Adult: Ensure Clear; With Meals  Snacks/Supplements Adult: Gelatein Plus; With Meals  Regular Diet Adult    DVT Prophylaxis: Enoxaparin (Lovenox) subcutaneous and SCD's  Oakley Catheter: PRESENT, indication: Strict 1-2 Hour I&O, /GI/GYN Pelvic Procedure  Code Status: Full Code           Disposition Plan   Expected discharge: > 7 days, recommended to pending once adequate pain management/ tolerating PO medications, antibiotic plan established, mental status at baseline and renal function improved.  Entered: Tha Hines MD 06/25/2021, 12:43 PM       The patient's  care was discussed with the Patient.    Tha Hines MD  Hospitalist Service  [facility]  Contact information available via [facility] Paging/Directory    ______________________________________________________________________    Interval History   Overnight increasing shortness of breath and wheezing, given Lasix 20 mg x 1 with nearly 700 and urine output, patient unable to give any meaningful information, intermittently crying out nondirectable.    -Data reviewed today: I reviewed all new labs and imaging results over the last 24 hours.  EKG reviewed: Normal sinus rhythm, frequent PACs, infrequent PVCs, no new ST-T wave inversions elevations or depressions.      Physical Exam   Temp: 98.2  F (36.8  C) Temp src: Temporal BP: 125/52 Pulse: 92   Resp: 16 SpO2: 94 % O2 Device: None (Room air) Oxygen Delivery: 2 LPM  Vitals:    06/23/21 0430 06/24/21 0544 06/25/21 0355   Weight: 71.3 kg (157 lb 3 oz) 73.7 kg (162 lb 7.7 oz) 72.8 kg (160 lb 7.9 oz)     Vital Signs with Ranges  Temp:  [97.8  F (36.6  C)-98.9  F (37.2  C)] 98.2  F (36.8  C)  Pulse:  [] 92  Resp:  [8-26] 16  BP: (104-135)/(45-85) 125/52  SpO2:  [85 %-100 %] 94 %  I/O last 3 completed shifts:  In: 2780 [I.V.:2780]  Out: 1795 [Urine:1425; Emesis/NG output:180; Drains:90; Stool:100]    Exam:   GENERAL: Laying in bed, not tracking, not crying out not consistently directable but not in any apparent distress.   CARDIOVASCULAR: regular rate and rhythm, no murmurs, rubs, or gallops. Normal S1/S2. No lower extremity edema.   RESPIRATORY: Diminished breath sounds at the bilateral bases with air movement in all other fields, significant expiratory wheezing with crackles at the bases, no accessory muscle use or evidence of respiratory distress.    GI: soft, non-tender, non-distended, hypoactive bowel sounds.  Ileostomy in place with thin dark stool.  NG currently in place.  Abdominal incisions covered with bulky dressings, clean dry and  intact.  : Oakley catheter in place with clear yellow urine.  MUSCULOSKELETAL: warm and well perfused, 2+ dorsalis pedis pulses bilaterally.    SKIN: no pallor, jaundice, or rashes    Medications     sodium chloride Stopped (06/24/21 1825)       acetaminophen  650 mg Oral Q8H    Or     acetaminophen  650 mg Rectal Q8H     albuterol  2.5 mg Nebulization Q4H While awake     enoxaparin ANTICOAGULANT  30 mg Subcutaneous Q24H     furosemide  20 mg Intravenous Q8H     gabapentin  100 mg Oral At Bedtime     HYDROmorphone  0.5 mg Intravenous Once     iohexol  1,000 mL Oral Once     lidocaine  1 patch Transdermal Q24H     lidocaine   Transdermal Q8H     [START ON 6/26/2021] pantoprazole  40 mg Oral QAM AC     piperacillin-tazobactam  2.25 g Intravenous Q6H     QUEtiapine  25 mg Oral At Bedtime     sodium chloride (PF)  3 mL Intracatheter Q8H       Data   Recent Labs   Lab 06/25/21  0440 06/24/21  0430 06/23/21  0525   WBC 7.6 9.2 8.9   HGB 8.1* 8.7* 9.3*   MCV 87 88 85    182 221    140 138   POTASSIUM 3.6 4.3 4.4   CHLORIDE 116* 116* 112*   CO2 16* 13* 14*   BUN 62* 59* 56*   CR 1.52* 1.77* 2.15*   ANIONGAP 9 11 12   DARIANA 7.5* 7.4* 6.7*   * 68* 82   ALBUMIN <1.5* <1.5*  --    PROTTOTAL 3.6* 3.9*  --    BILITOTAL 1.1* 0.6  --    ALKPHOS 186* 134*  --    ALT 13 12  --    AST 37 33  --        Recent Results (from the past 24 hour(s))   XR Chest Port 1 View    Narrative    PROCEDURE: XR CHEST PORT 1 VIEW 6/25/2021 8:42 AM    HISTORY: respiratory distress    COMPARISONS: 8/28/2018.    TECHNIQUE: Portable AP views.    FINDINGS: There is a nasogastric tube with its tip distal to the EG  junction. Tip is not included on the film.    Heart size is difficult to assess due to patchy airspace disease at  both lung bases with mild to moderate pleural effusions. There is some  vascular congestion.         Impression    IMPRESSION: Bilateral lung infiltrates with pleural effusions  suggestive of congestive heart  failure. Superimposed pneumonia cannot  be excluded.    PAULO LOPEZ MD

## 2021-06-25 NOTE — PROGRESS NOTES
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    HPI: NGT- will remove, path looks good. Agitated yesterday.     EXAM:  .iod      Vitals:    06/25/21 0600 06/25/21 0700 06/25/21 0800 06/25/21 0827   BP: 125/47  118/85    BP Location:       Pulse: 102 101 107 101   Resp: 18 18 25 18   Temp:    98.9  F (37.2  C)   TempSrc:    Tympanic   SpO2: 97% 96% 92% 91%   Weight:       Height:         /85   Pulse 101   Temp 98.9  F (37.2  C) (Tympanic)   Resp 18   Ht 1.613 m (5' 3.5\")   Wt 72.8 kg (160 lb 7.9 oz)   SpO2 91%   BMI 27.98 kg/m    GENERAL: alert, NAD  CV: RRR, no murmur  RESPIRATORY: no dyspnea, clear bilat  ABDOMEN: mildly distended, ileostomy output noted, ostomy is pink, soft, appropriately tender, incision is open and packed, no signs of infection  EXTREMITY: no edema, neg Aminata's  SKIN: warm and dry, no jaundice no rashes. Callous left great toe-stable   NEURO: cranial nerves II-XII grossly intact, motor exam intact    LABS  Recent Labs   Lab Test 06/25/21  0440 06/24/21  0430 08/30/16  1622 08/30/16  1622 08/20/16  0012   WBC 7.6 9.2   < >  --   --    RBC 2.88* 3.12*   < >  --   --    HGB 8.1* 8.7*   < > 14.8 14.4   HCT 25.1* 27.3*   < > 47.5 42.0   MCV 87 88   < > 91 90   MCH 28.1 27.9   < > 28.3 30.9   MCHC 32.3 31.9   < > 31.1* 34.2    182   < > 237 220   MPV  --   --   --  9.1 8.8    < > = values in this interval not displayed.       Recent Labs   Lab Test 06/25/21  0440 06/24/21  0430   POTASSIUM 3.6 4.3   CHLORIDE 116* 116*   BUN 62* 59*       Recent Labs   Lab Test 06/25/21  0440 06/24/21  0430 06/16/21  1424 05/28/21  1219 05/28/21  1219   PROTEIN  --   --  Negative  --  " Negative   BILITOTAL 1.1* 0.6  --    < >  --    AST 37 33  --    < >  --    ALT 13 12  --    < >  --     < > = values in this interval not displayed.     ASSESSMENT  86 year old female POD #10 s/p extended right colectomy complicated by small bowel resection x 2 due to metastatic involvement of the small bowel x 3. Bowel function returning-  POD # 3 s/p anastomotic leak, transverse colectomy and anastomosis. Diverting loop ileostomy      - Elevated Cr, dehydration - IVF   - improved, lasix trialed  - Anemia- Anemia from poor nutritional intake, hemodilution, operative blood loss, and critical illness   - Monitor       PLAN    - NGT removal    - Wound care bid    - Regular diet    Del Pang MD on 6/25/2021 at 9:43 AM

## 2021-06-25 NOTE — PROGRESS NOTES
"Clinical Nutrition / Reassessment     Assessment:   Client History:  Pt was unable to participate in speech therapy session today, they recommend NPO. She has not been able to consume any oral nutrition now for past 3 days, NG tube removed today. Intakes were generally poor prior the NG placement for suction following surgical intervention for an anastomotic leak on 6/22. Could consider enteral feeding as she is not able to adequately consume oral nutrition at this time.     **if enteral feeding if initiated, recommend house formula of Isosource 1.5 at 42 ml/hour for ~1000 ml total formula daily. This would provide: 1500 kcal, 68 g protein, 760 ml fluids. Recommend starting feeding at 20 ml/hour for minimum of 4 hours and monitor tolerance. Advance to goal rate by 10 ml/hour every 4 hours until goal is reached. Monitor for refeeding syndrome as she has been without substantial nutrition for several days now. Tap water flushes to be provided when not on IV fluids at 120 ml every 4 hours for an additional 720 ml fluids (daily total when NOT on IV fluids would be 1480 ml from flushes and formula).       Diet Order: NPO  Oral Intake:  NPO over past 3 days, was minimal prior to surgery on 6/22  Supplement Intake:  Will add Ensure Clear (apple flavor) TID to trays. Will add Gelatein protein jello daily as well--when able to tolerate PO, recommend restarting supplements    Weight:   Wt Readings from Last 10 Encounters:   06/25/21 72.8 kg (160 lb 7.9 oz)   06/08/21 53.7 kg (118 lb 6.4 oz)   06/03/21 57.2 kg (126 lb)   06/01/21 57.2 kg (126 lb)   05/28/21 57.2 kg (126 lb)   05/14/21 55.6 kg (122 lb 9.6 oz)   10/09/19 59.7 kg (131 lb 9.6 oz)   06/12/19 62.8 kg (138 lb 6.4 oz)   09/05/18 66.2 kg (146 lb)   08/28/18 66.2 kg (146 lb)   Height: 5' 3.5\"  BMI: Body mass index is 27.98 kg/m .    Estimated nutritional needs based on:  current body weight  58 kg / 126 lbs   Estimated energy needs:  9119-2171 kcal/day (25-30 " kcal/kg)  Estimated protein needs:  70-87 gm/day (1.2-1.5 g/kg)  Estimated fluid needs:  7271-5996 ml/day (1 ml/kcal)    Malnutrition Criteria:  (Need to have 2 indicators to qualify recommendation)  Energy Intake: Acute Severe: < 50% of estimated energy requirement for > 7days  Interpretation of Weight Loss:  No significant weight loss  Physical Findings:  Acute Fluid Accumulation:  mild- significant now  Reduced  Strength:  likely reduced-appears weak    Recommended Nutrition Diagnosis:   Severe Malnutrition in the context of acute illness or injury - based on AND/ASPEN Clinical Characterstics of Malnutrition May 2012      Nutrition Education: Nutrition education will be provided as appropriate.    Nutrition Diagnosis: Oral or Nutrition Support Intake:  inadequate energy intakes related to decreased appetite as evidenced by intakes of 50% or less at most meals over past 6 days.    Intervention:  Nutrition Prescription:     Nutrition Intervention(s): NPO currently   1. Meals and Snacks: small, frequent meals/snacks--when able to tolerate  2. Medical Food Supplement: Will add Ensure Clear at meals at this time TID, 1 Gelatin protein jello daily as well--when able to tolerate    Nutrition Goal(s):  1. Pt will consume 50% or more at meals and supplements - no met  2. Pt will not have unplanned wt loss during hospitalization - on going  3. Pt will tolerate diet as ordered, texture per speech therapy recommendations - not met     Monitoring and Evaluation:   Food Intake, diet tolerance, weights, labs    Discharge Recommendation:   Nutrition Discharge Planning  Will address closer to discharge.     RD will reassess in within 1-5 days or sooner.    Shamika Rose RD on 6/25/2021 at 2:06 PM

## 2021-06-25 NOTE — PROGRESS NOTES
Per discussion with RN at approximately 10:40AM, will initiate speech therapy swallowing evaluation at approximately 12:00 today to allow for patient to increase in alertness and determine appropriate dietary recommendations for hospital stay. Thank you.    CLAIRE Dick on 6/25/2021 at 10:57 AM

## 2021-06-26 NOTE — PROGRESS NOTES
Patient mildly restless and fidgeting, denies pain initially then says yes when asked about pain, moved to room 914 for greater visualization, IV dilaudid given, coughs when given small sips of water, able to swallow small bites of applesauce - PO meds crushed and given, will continue to monitor. Quinton Venegas RN on 6/25/2021 at 9:42 PM

## 2021-06-26 NOTE — PROGRESS NOTES
Pt is more alert today and less confused per her  Ilya who is at the bedside. Pt agreeable and cooperative to get out of bed and sit in chair. She denied pain but after moving a little requested pain medication. Pt was a two person assist into chair. She is very weak and unable to bear much weight. She was able to sit up right in chair and have thickend water and jello. Her stoma is budded and well perfused. She is putting out dark green liquid stool. MD at the bedside redressed her abdominal incision with packing and dry dressing.

## 2021-06-26 NOTE — PROGRESS NOTES
[facility]    Hospitalist Progress Note      Assessment & Plan   Melissa Quinteros is a 86 year old female who was admitted on 6/14/2021.     Principal Problem:    Malignant neoplasm of transverse colon (H)    Assessment: POD #11 s/p extended right colectomy complicated by small bowel resection x 2 due to metastatic involvement of the small bowel x 3. On 6/22 noted to have free intraperitoneal air and large amount of ascites and was obtunded, CT with large perforation. Now POD #4 s/p anastomotic leak, transverse colectomy and anastomosis. Diverting loop ileostomy.  severly malnourished with a guarded prognosis overall.    Plan:   -Wound care, diet, activity restrictions per general surgery  -Zosyn day 5  -Repeat REMI drain culture to further tailor antibiotics  -Follow-up repeat pathology  -aspiration precautions  -defer ng and tube feed timing to surgery     Hypervolemia    Assessment: improved today and after aggressive diuresis on 6/25. Possibly related to poor nutritional status and third spacing versus CHF with elevated BNP/troponin and no new EKG changes, contribution of renal failure. Improved renal function but now with mild hypernatremia and unlikely to be able to tolerate further diuresis today.    Plan:   -discontinue Lasix  -Repeat BMP this a.m. to ensure stable sodium  -Strict I's and O's and daily weights  -echo results reviewed  -Continue Oakley    Active Problems:    Essential hypertension    Assessment: Stable and not actively treated as an outpatient    Plan:   -Monitor      Acute kidney failure with tubular necrosis (H)    Assessment: Improving, no evidence of obstruction, unlikely to be prerenal, possibly a cardiorenal component.    Plan:   -Avoid NSAIDs and nephrotoxins  -Monitor daily      Hypomagnesemia    Assessment: stable    Plan: - replace per protocol      Altered mental status    Assessment: Improved today but continues to not be oriented to person place or time with  significant delirium, adequate urine output as above, ostomy stable, likely infectious and metabolic encephalopathy due to sepsis and multiple lab abnormalities with acute kidney injury metabolic acidosis and others.    Plan:   -Treat infection as above  -Early mobilization with physical and Occupational Therapy  -Advance diet as able with ongoing oral cares  -seroquel at bedtime if agitated  -electrolyte management as above      Metabolic acidosis    Assessment: improved, ongoing nonanion gap, likely due to GI losses from NG tube, large crystalloid infusions for resuscitation previously, kidney impairment all likely playing a role.    Plan:   -NG out on 6/25   -monitor daily      Anemia    Assessment: stable. Multifactorial and likely from phlebotomy, surgery, chronic disease and iron deficiency.  Not a IV iron candidate due to active infection.    Plan:   -Trend hemoglobin and transfuse to keep greater than 7      Severe protein-calorie malnutrition (Schumacher: less than 60% of standard weight) (H)    Assessment: Prealbumin 5, poor oral intake.  INR greater than 2 and likely due to nutritional deficits, no evidence of consumptive process.    Plan:   -Appreciate dietary consult and speech consult for diet advancement  -5 mg p.o. vitamin K today  -Trend INR  -Hold Lovenox for DVT prophylaxis      Elevated troponin    Assessment: Likely demand from acute kidney injury, acute infection, no obvious EKG changes or cardiopulmonary symptoms concerning for ACS. Now trended down.     Plan:   -telemetry     Diet: Snacks/Supplements Adult: Ensure Clear; With Meals  Snacks/Supplements Adult: Gelatein Plus; With Meals  NPO for Medical/Clinical Reasons Except for: No Exceptions    DVT Prophylaxis: SCD's  Oakley Catheter: PRESENT, indication: Strict 1-2 Hour I&O, /GI/GYN Pelvic Procedure  Code Status: Full Code           Disposition Plan   Expected discharge: > 7 days, recommended to pending once adequate pain management/ tolerating  PO medications, antibiotic plan established, mental status at baseline and renal function improved.  Entered: Tha Hines MD 06/26/2021, 10:49 AM       The patient's care was discussed with the Patient and general surgery.     Tha Hines MD  Hospitalist Service  [facility]  Contact information available via [facility] Paging/Directory    ______________________________________________________________________    Interval History   Overnight no acute events and afebrile, diuresed well yesterday, patient denies any pain currently, she does not know where she is she answers to her name, she does not consistently track and not able to give any meaningful information.  No further crying out and more directable.      -Data reviewed today: I reviewed all new labs and imaging results over the last 24 hours.      Physical Exam   Temp: 97.7  F (36.5  C) Temp src: Temporal BP: 118/53 Pulse: 103   Resp: 10 SpO2: 94 % O2 Device: None (Room air)    Vitals:    06/24/21 0544 06/25/21 0355 06/26/21 0711   Weight: 73.7 kg (162 lb 7.7 oz) 72.8 kg (160 lb 7.9 oz) 69.8 kg (153 lb 14.1 oz)     Vital Signs with Ranges  Temp:  [97.4  F (36.3  C)-98.2  F (36.8  C)] 97.7  F (36.5  C)  Pulse:  [] 103  Resp:  [10-25] 10  BP: (118-143)/(49-82) 118/53  SpO2:  [92 %-97 %] 94 %  I/O last 3 completed shifts:  In: 394 [I.V.:394]  Out: 3475 [Urine:3275; Drains:50; Stool:150]    Exam:   GENERAL: Laying in bed, spontaneously awake, intermittently tracking with frequent cueing, not consistently following commands, not crying and in no apparent distress.  Able to speak peak fluently and clearly but about nonsensical topics.  CARDIOVASCULAR: regular rate and rhythm, no murmurs, rubs, or gallops. Normal S1/S2. No lower extremity edema.   RESPIRATORY: Clear to auscultation in all fields, no wheezing, crackles, accessory muscle use or evidence of respiratory distress.    GI: soft, non-tender, non-distended, hypoactive bowel sounds.   Ileostomy in place with thin dark stool.  NG out. REMI with serous drainage.  Abdominal incisions covered with bulky dressings, clean dry and intact.  : Oakley catheter in place with clear yellow urine.  MUSCULOSKELETAL: warm and well perfused, 2+ dorsalis pedis pulses bilaterally.    SKIN: no pallor, jaundice, or rashes    Medications     sodium chloride Stopped (21 0315)       acetaminophen  650 mg Oral Q8H    Or     acetaminophen  650 mg Rectal Q8H     albuterol  2.5 mg Nebulization Q4H While awake     gabapentin  100 mg Oral At Bedtime     HYDROmorphone  0.5 mg Intravenous Once     iohexol  1,000 mL Oral Once     lidocaine  1 patch Transdermal Q24H     lidocaine   Transdermal Q8H     magnesium sulfate  2 g Intravenous Once     pantoprazole  40 mg Oral QAM AC     piperacillin-tazobactam  2.25 g Intravenous Q6H     sodium chloride (PF)  3 mL Intracatheter Q8H       Data   Recent Labs   Lab 21  0445 21  1256 21  0440 21  0430   WBC 6.5  --  7.6 9.2   HGB 7.9*  --  8.1* 8.7*   MCV 85  --  87 88     --  175 182   INR 2.77*  --   --   --    * 144 141 140   POTASSIUM 3.2* 3.5 3.6 4.3   CHLORIDE 119* 118* 116* 116*   CO2 16* 14* 16* 13*   BUN 57* 61* 62* 59*   CR 1.38* 1.48* 1.52* 1.77*   ANIONGAP 13 12 9 11   DARIANA 7.6* 8.2* 7.5* 7.4*    111* 115* 68*   ALBUMIN <1.5*  --  <1.5* <1.5*   PROTTOTAL 3.8*  --  3.6* 3.9*   BILITOTAL 2.0*  --  1.1* 0.6   ALKPHOS 197*  --  186* 134*   ALT 15  --  13 12   AST 37  --  37 33       Recent Results (from the past 24 hour(s))   Echocardiogram Complete    Narrative    [id number]  [id number]  [id number]  [id number]^[email or web address]^[email or web address]^FRANCESCO     [facility]  [address] [place] [address]     Name: CHINO HAINES  MRN: [id number]  : 1935  Study Date: 2021 01:30 PM  Age: 86 yrs  Gender: Female  Patient Location: [facility]  Reason For Study: CHF  Ordering Physician: SHRUTHI ADORNO  Performed By: Rosemary Kirkland RDCS, RVT      BSA: 1.8 m2  Height: 63 in  Weight: 160 lb  HR: 84  BP: 125/52 mmHg  ______________________________________________________________________________  Procedure  Complete Portable Echo Adult.  ______________________________________________________________________________  Interpretation Summary  Global and regional left ventricular function is normal with an EF of 60-65%.  A left pleural effusion is present.  Global right ventricular function is normal.  Dilation of the inferior vena cava is present with abnormal respiratory  variation in diameter.  IVC diameter >2.1 cm collapsing <50% with sniff suggests a high RA pressure  estimated at 15 mmHg or greater.     Previous study not available for comparison.  ______________________________________________________________________________  Left Ventricle  Left ventricular size is normal. Global and regional left ventricular function  is normal with an EF of 60-65%. Relative wall thickness is increased  consistent with concentric remodeling. Left ventricular diastolic function is  indeterminate.     Right Ventricle  The right ventricle is normal size. Global right ventricular function is  normal.     Atria  Both atria appear normal.     Mitral Valve  The mitral valve is normal. Trace mitral insufficiency is present.     Aortic Valve  The valve leaflets are not well visualized. On Doppler interrogation, there is  no significant stenosis or regurgitation.     Tricuspid Valve  The tricuspid valve is normal. Trace tricuspid insufficiency is present. Right  ventricular systolic pressure is 40mmHg above the right atrial pressure.     Pulmonic Valve  The pulmonic valve is normal.     Vessels  The aorta root is normal. The thoracic aorta is normal. Dilation of the  inferior vena cava is present with abnormal respiratory variation in diameter.  IVC diameter >2.1 cm collapsing <50% with sniff suggests a high RA pressure  estimated at 15 mmHg or greater.     Pericardium  No  pericardial effusion is present.     Miscellaneous  A left pleural effusion is present.     Compared to Previous Study  Previous study not available for comparison.  ______________________________________________________________________________  MMode/2D Measurements & Calculations     IVSd: 0.87 cm  LVIDd: 3.4 cm  LVIDs: 2.2 cm  LVPWd: 0.98 cm  FS: 35.9 %  LV mass(C)d: 88.2 grams  LV mass(C)dI: 50.1 grams/m2  Ao root diam: 2.7 cm  asc Aorta Diam: 2.8 cm  LVOT diam: 2.0 cm  LVOT area: 3.1 cm2  LA Volume (BP): 32.0 ml  LA Volume Index (BP): 18.2 ml/m2  RWT: 0.57     Doppler Measurements & Calculations  MV E max madhu: 122.0 cm/sec  MV A max madhu: 140.0 cm/sec  MV E/A: 0.87  MV dec slope: 760.0 cm/sec2  MV dec time: 0.16 sec  Ao V2 max: 185.0 cm/sec  Ao max P.0 mmHg  Ao V2 mean: 127.0 cm/sec  Ao mean P.0 mmHg  Ao V2 VTI: 34.3 cm  LAINA(I,D): 2.4 cm2  LAINA(V,D): 2.2 cm2  LV V1 max P.9 mmHg  LV V1 max: 131.0 cm/sec  LV V1 VTI: 26.5 cm  SV(LVOT): 83.3 ml  SI(LVOT): 47.3 ml/m2  TR max madhu: 316.3 cm/sec  TR max P.0 mmHg  AV Madhu Ratio (DI): 0.71  LAINA Index (cm2/m2): 1.4  E/E' av.9  Lateral E/e': 10.1  Medial E/e': 17.8     ______________________________________________________________________________  Report approved by: Chinmay HAAS 2021 02:44 PM

## 2021-06-26 NOTE — PROGRESS NOTES
Shift summary:    Pts:  RR 16  Work of Breathing nml  Breath sounds dim/clear/occasional expiratory wheezes  HR 80's  SpO2 mid 90's  FiO2 ra  Weaning needs n/a  Respiratory Equipment used n/a  Respiratory Equipment changes n/a  Respiratory Equipment Settings n/a  Respiratory Home Equipment Needs n/a  Plan n/a    Gilda Tibbets, RT

## 2021-06-26 NOTE — PROGRESS NOTES
LAB in to draw 5pm BMP however pt was in the process of transferring to the floor. She was also receiving oral potassium replacement. Lab asked to draw pt at 9pm since she was just now receiving po kcl.

## 2021-06-26 NOTE — PROGRESS NOTES
GENERAL SURGERY PROGRESS NOTE  6/26/2021      Interval history:   Patient intermittently confused overnight.  She took out her IV.  Patient complains of generalized abdominal pain, seems to respond to pain meds given.  Patient is unable to give much history.  Per the nurse she has been up into the chair.  They have been attempting sips of water but she coughs when she drinks.    Physical Exam:   Vital signs are reviewed and there are no significant abnormalities.     UOP over past 24 hours is 3275 mL  maurisio 50 mL  Stool 150 mL    General: Laying in bed appears in mild distress  HEENT: Mouth open, poor dentition  Lungs: No increased work of breathing, severely diminished breath sounds at the bases  CV: Regular rate and rhythm, no murmur  Abdomen: Soft, generalized mild discomfort to palpation discomfort with midline dressing change.  Midline dressing is clean and Fash appears intact.  There is 1 small area of exudate near the lower pole of the incision, no significant drainage from the midline wound.  MAURISIO drain is draining clear, serosanguineous fluid.  Loop colostomy in the left upper quadrant is edematous and pink with good stool and air in the bag.  MSK: Generalized edema in upper and lower extremities  Neuro: Alert not oriented to place or time    Labs are reviewed and are significant for sodium 148, potassium 3.2, creatinine 1.38, calcium 7.6, magnesium 1.8, albumin 1.5, total protein 3.8, bilirubin 2.0, alkaline phosphatase 197, WBC 6.5, hemoglobin 7.9, platelet 187, INR 2.7, pH 7.35, PCO2 30, PO2 63, bicarb 17    Cardiac echo on 625 shows ejection fraction of 60%-65%.  Normal right heart function      Assessment / Plan:   Melissa Quinteros is a 86 year old female who is POD #11 s/p extended right colectomy complicated by small bowel resection x 2 due to metastatic involvement of the small bowel x 3 and POD # 4 s/p anastomotic leak, transverse colectomy and anastomosis. Diverting loop ileostomy    Continue daily wound  cares to midline incision, change dressing twice daily  Continue normal ostomy cares  Advance diet as able    -Would suggest replacement of nasogastric tube and initiation of tube feeds if the patient is unable to tolerate oral intake today  Appreciate medical management per internal medicine  Patient will be transferred to the floor today      JONY Parisi MD   6/26/2021

## 2021-06-26 NOTE — PLAN OF CARE
Patient oriented to self only, intermittent restlessness, pulled out IV in left hand, IV dilaudid given x3 for pain, new wet-dry dressing applied to abdominal wound - wound cleansed with sterile water, wound bed moist, pink, and pale with some yellow/white spots and black/brown spots, resting quietly at this time. Quinton Venegas RN on 6/26/2021 at 5:16 AM

## 2021-06-27 NOTE — PROGRESS NOTES
06/27/21 1132   Signing Clinician's Name / Credentials   Signing clinician's name / credentials Emily Nelson PT   Quick Adds   Rehab Discipline PT   Therapeutic Activity   Symptoms Noted During/After Treatment Fatigue;Increased pain   Treatment Detail Bed mobility with mod assist of 2, extra time. Supine to sit with mod assist of 2 . Able to sit on edge of bed with min assist of 1 to CGA. Stand pivot transfer bed to recliner with Mod to max assist of 2. Positioned in recliner for comfort   PT Discharge Planning    PT Discharge Recommendation (DC Rec) Transitional Care Facility   PT Rationale for DC Rec to promote patient's highest level of functional, safe and independent mobility; pt has significant decline in functional ability requiring moderate assist for bed mobility and stand pivot transfer   PT Brief overview of current status  Moderate to max assist of 2 for bed mobility, stand pivot tranfer bed to recliner.    Additional Documentation   PT Plan Continue PT progressing with mobility skills as able   Total Session Time   Total Session Time (minutes) 30 minutes   Emily Nelson PT on 6/27/2021 at 11:38 AM

## 2021-06-27 NOTE — PROGRESS NOTES
GENERAL SURGERY PROGRESS NOTE  6/27/2021      Interval history:   Patient intermittently confused overnight. Transferred to the floor yesterday. IV came out again. Tolerating some PO.  says he was able to get her to take and entire BOOST by mouth.     Physical Exam:   Vital signs are reviewed and there are no significant abnormalities.     UOP over past 24 hours is 2425mL  maurisio 90 mL  Stool 910 mL    General: sitting up in the chair, no apparent distress   HEENT: Mouth open, poor dentition  Lungs: No increased work of breathing, severely diminished breath sounds at the bases  CV: Regular rate and rhythm, no murmur  Abdomen: Soft, generalized mild discomfort to palpation discomfort with midline dressing change.  Midline dressing is clean and fascia appears intact. Minimal to no granulation tissue present.   MAURISIO drain is draining clear, serosanguineous fluid.  Loop colostomy in the left upper quadrant is edematous and pink with liquid stool and air in the bag.  MSK: Generalized edema in upper and lower extremities  Neuro: Alert not oriented to place or time    Labs are reviewed and are significant for sodium 147, potassium 3.2, creatinine 1.17, calcium 7.5, magnesium 2.3, WBC 7.1, hemoglobin 7.9, platelet 191, INR 1.25    CXR shows improving interstitial infiltrates. Right effusion stable       Assessment / Plan:   Melissa Quinteros is a 86 year old female who is POD #12 s/p extended right colectomy complicated by small bowel resection x 2 due to metastatic involvement of the small bowel x 3 and POD # 5 s/p anastomotic leak, transverse colectomy and anastomosis. Diverting loop ileostomy    Continue daily wound cares to midline incision, change dressing twice daily  Continue normal ostomy cares   Continue to encourage oral intake   Appreciate medical management per internal medicine      JONY Parisi MD   6/27/2021

## 2021-06-27 NOTE — PROGRESS NOTES
06/27/21 1200   Signing Clinician's Name / Credentials   Signing clinician's name / credentials Emily Nelson, PT   Quick Adds   Rehab Discipline PT   Therapeutic Activity   Symptoms Noted During/After Treatment Fatigue;Increased pain   Treatment Detail Transfer from recliner to bed with max assist of 2. Max assist of 2 for bed mobility: sit to supine. Leakage noted from pt's colostomy bag: assisted nursing in changing bed, bed bath with assist for rolling, positioning.    PT Discharge Planning    PT Discharge Recommendation (DC Rec) Transitional Care Facility   PT Rationale for DC Rec to promote patient's highest level of functional, safe and independent mobility; pt has significant decline in functional ability requiring moderate assist for bed mobility and stand pivot transfer   PT Brief overview of current status  Moderate to max assist of 2 for bed mobility, stand pivot tranfer bed to recliner.    Additional Documentation   PT Plan Continue PT progressing with mobility skills as able   Total Session Time   Total Session Time (minutes) 30 minutes

## 2021-06-27 NOTE — PLAN OF CARE
"Patient AxO x2 (self, place), reports back discomfort. Lidocaine patch applied. Lung sounds diminished throughout. Stoma WDL with dark green output. Dressing on abdomen changed d/t moderate amount of serous drainage. Dressing around REMI bulb changed. Oral potassium replacement provided. VSS. Patient restless and yelling, unable to calm patient, PRN zyprexa given. BP (!) 160/58 (BP Location: Left arm)   Pulse 87   Temp 96.3  F (35.7  C) (Tympanic)   Resp 22   Ht 1.613 m (5' 3.5\")   Wt 69.8 kg (153 lb 14.1 oz)   SpO2 92%   BMI 26.83 kg/m      "

## 2021-06-27 NOTE — PROGRESS NOTES
06/27/21 1226   Signing Clinician's Name / Credentials   Signing clinician's name / credentials Stephie Meeks OTR/L   Quick Adds   Rehab Discipline OT   Therapeutic Activity   Symptoms Noted During/After Treatment Fatigue;Increased pain   Treatment Detail OT/PT transfer patient from chair back to bed.  Pt increased fatigue and required max assist x 2 for pivot transfer.  Noted colostomy bag to be leaking upon transfer and nursing notified.  PT/OT assisted with clean up and rolling side to side in bed for bed change, etc.    OT Discharge Planning    OT Discharge Recommendation (DC Rec) Transitional Care Facility   OT Rationale for DC Rec pt has significant decline in functional ability and requires increased assist with mobility and self cares; would benefit from continued rehab for strengthening   Additional Documentation   Rehab Comments Pt very weak unable to tolerate much activity   OT Plan continue OT   Total Session Time   Total Session Time (minutes) 30 minutes

## 2021-06-27 NOTE — PROGRESS NOTES
[facility]    Hospitalist Progress Note      Assessment & Plan   Melissa Quinteros is a 86 year old female who was admitted on 6/14/2021.     Principal Problem:    Malignant neoplasm of transverse colon (H)    Assessment: POD #12 s/p extended right colectomy complicated by small bowel resection x 2 due to metastatic involvement of the small bowel x 3. On 6/22 noted to have free intraperitoneal air and large amount of ascites and was obtunded, CT with large perforation. Now POD #5 s/p anastomotic leak, transverse colectomy and anastomosis. Diverting loop ileostomy.  severly malnourished with a guarded prognosis overall.    Plan:   -Wound care, diet, activity restrictions per general surgery  -Zosyn day 6->augmentin day 7 given post op REMI drain cx with enterococcus  -Follow-up repeat pathology  -aspiration precautions  -defer tube feed need/timing to general surgery    Hypernatremia  Assessment: Worsening on 6/26 after aggressive diuresis on 6/25.  Likely free water deficit from diuresis and will benefit from correction given poor oral intake.  Pain:  -D5W at 75 mls/hr  -Trend BMP     Hypervolemia    Assessment: stable. aggressive diuresis on 6/25. More likely related to poor nutritional status and third spacing versus CHF with elevated BNP/troponin and no new EKG changes, contribution of renal failure. normalized renal function.    Plan:   -Strict I's and O's and daily weights  -echo results reviewed  -Oakley out in am if able    Active Problems:    Essential hypertension    Assessment: Stable and not actively treated as an outpatient    Plan:   -Monitor      Acute kidney failure with tubular necrosis (H)    Assessment: resolved and likely prerenal, possibly a cardiorenal component.    Plan:   -Avoid NSAIDs and nephrotoxins  -Monitor daily      Hypomagnesemia    Assessment: stable    Plan: - replace per protocol      Altered mental status    Assessment: due to metabolic and infectious  encephalopathy.  Significantly improved today and oriented to person and , adequate urine output as above, ostomy stable, and metabolic acidosis stable.     Plan:   -Treat infection as above  -Early mobilization with physical and Occupational Therapy  -Advance diet as able with ongoing oral cares  -seroquel at bedtime if agitated  -electrolyte management as above      Metabolic acidosis    Assessment: improved, ongoing nonanion gap, likely due to GI losses from NG tube, large crystalloid infusions for resuscitation previously, kidney impairment all likely playing a role.    Plan:   -NG out on 6/25   -monitor daily      Anemia    Assessment: stable. Multifactorial and likely from phlebotomy, surgery, chronic disease and iron deficiency.  Not a IV iron candidate due to active infection.    Plan:   -Trend hemoglobin and transfuse to keep greater than 7      Severe protein-calorie malnutrition (Schumacher: less than 60% of standard weight) (H)    Assessment: Prealbumin 5, poor oral intake.  INR greater than 2 and likely due to nutritional deficits, no evidence of consumptive process.    Plan:   -Appreciate dietary consult and speech consult for diet advancement  -5 mg p.o. vitamin K on 6/26   -Trend INR  -Hold Lovenox for DVT prophylaxis      Elevated troponin    Assessment: resolved.  Likely demand from acute kidney injury, acute infection, no obvious EKG changes or cardiopulmonary symptoms concerning for ACS.     Plan:   -telemetry     Diet: Snacks/Supplements Adult: Ensure Clear; With Meals  Snacks/Supplements Adult: Gelatein Plus; With Meals  Dysphagia Diet Level 1 Pureed Honey Thickened Liquids (pre-thickened or use instant food thickener)    DVT Prophylaxis: SCD's  Oakley Catheter: PRESENT, indication: Strict 1-2 Hour I&O, /GI/GYN Pelvic Procedure  Code Status: Full Code           Disposition Plan   Expected discharge: > 7 days, recommended to pending once adequate pain management/ tolerating PO medications,  antibiotic plan established, mental status at baseline and renal function improved.  Entered: Tha Hines MD 06/27/2021, 1:29 PM       The patient's care was discussed with the Patient and general surgery.     Tha Hines MD  Hospitalist Service  [facility]  Contact information available via [facility] Paging/Directory    ______________________________________________________________________    Interval History   Overnight no acute events and afebrile, diuresed well yesterday, patient denies any pain currently, she does not know where she is she answers to her name, she does not consistently track and not able to give any meaningful information.  No further crying out and more directable.      -Data reviewed today: I reviewed all new labs and imaging results over the last 24 hours.      Physical Exam   Temp: 97.3  F (36.3  C) Temp src: Tympanic BP: 138/62 Pulse: 89   Resp: 18 SpO2: 97 % O2 Device: None (Room air)    Vitals:    06/25/21 0355 06/26/21 0711 06/27/21 0248   Weight: 72.8 kg (160 lb 7.9 oz) 69.8 kg (153 lb 14.1 oz) 68.5 kg (151 lb)     Vital Signs with Ranges  Temp:  [95.3  F (35.2  C)-97.7  F (36.5  C)] 97.3  F (36.3  C)  Pulse:  [87-90] 89  Resp:  [18-23] 18  BP: (138-160)/(58-62) 138/62  SpO2:  [91 %-98 %] 97 %  I/O last 3 completed shifts:  In: 978.33 [P.O.:405; I.V.:573.33]  Out: 3425 [Urine:2425; Drains:90; Stool:910]    Exam:   GENERAL: Sitting up in bed, spontaneously awake, tracking, interactive, oriented to , able to answer some questions appropriately, in no apparent distress.   CARDIOVASCULAR: regular rate and rhythm, no murmurs, rubs, or gallops. Normal S1/S2. No lower extremity edema.   RESPIRATORY: Clear to auscultation in all fields, no wheezing, crackles.  GI: soft, non-tender, non-distended, hypoactive bowel sounds.  Ileostomy in place with thin dark stool.  REMI with serous drainage.  Abdominal incisions covered with bulky dressings, clean dry and intact.  :  Oakley catheter in place with clear yellow urine.  MUSCULOSKELETAL: warm and well perfused, 2+ dorsalis pedis pulses bilaterally.    SKIN: no pallor, jaundice, or rashes    Medications     D5W 75 mL/hr at 06/27/21 0858     sodium chloride Stopped (06/26/21 0315)       acetaminophen  650 mg Oral Q8H    Or     acetaminophen  650 mg Rectal Q8H     gabapentin  100 mg Oral At Bedtime     HYDROmorphone  0.5 mg Intravenous Once     iohexol  1,000 mL Oral Once     lidocaine  1 patch Transdermal Q24H     lidocaine   Transdermal Q8H     pantoprazole  40 mg Oral QAM AC     piperacillin-tazobactam  2.25 g Intravenous Q6H     sodium chloride (PF)  3 mL Intracatheter Q8H       Data   Recent Labs   Lab 06/27/21  1006 06/27/21  0250 06/26/21  2129 06/26/21  1030 06/26/21  0445 06/25/21  0440 06/25/21  0440   WBC  --  7.1  --   --  6.5  --  7.6   HGB  --  7.9*  --   --  7.9*  --  8.1*   MCV  --  83  --   --  85  --  87   PLT  --  191  --   --  187  --  175   INR  --  1.25*  --   --  2.77*  --   --    NA  --  147* 145* 148* 148*   < > 141   POTASSIUM 3.2* 3.2* 2.9* 3.1* 3.2*   < > 3.6   CHLORIDE  --  118* 117* 118* 119*   < > 116*   CO2  --  18* 17* 18* 16*   < > 16*   BUN  --  49* 53* 54* 57*   < > 62*   CR  --  1.17 1.25* 1.37* 1.38*   < > 1.52*   ANIONGAP  --  11 11 12 13   < > 9   DARIANA  --  7.5* 8.1* 8.1* 7.6*   < > 7.5*   GLC  --  206* 176* 110* 103   < > 115*   ALBUMIN  --   --   --   --  <1.5*  --  <1.5*   PROTTOTAL  --   --   --   --  3.8*  --  3.6*   BILITOTAL  --   --   --   --  2.0*  --  1.1*   ALKPHOS  --   --   --   --  197*  --  186*   ALT  --   --   --   --  15  --  13   AST  --   --   --   --  37  --  37    < > = values in this interval not displayed.       No results found for this or any previous visit (from the past 24 hour(s)).

## 2021-06-28 NOTE — PROGRESS NOTES
:    It was mentioned per discharge planning meeting, patient may benefit from SNF placement at discharge.  Met with patient and patient's spouse in room to discuss options.  They chose [facility] from list.  Referral sent to [facility].  Spoke with Sophia, Admissions Coordinator at [facility] and they are aware of referral.  GV to screen patient.    Pt/family was given the Medicare Compare list for SNF, with associated star ratings to assist with choice for referrals/discharge planning Yes    Education was given to pt/family that star ratings are updated/maintained by Medicare and can be reviewed by visiting www.medicare.gov Yes    AUGUST Nguyen on 6/28/2021 at 1:38 PM

## 2021-06-28 NOTE — PROGRESS NOTES
Pt not seen today due to surgical procedure with post operative fatigue. SLP will attempt to see pt tomorrow.

## 2021-06-28 NOTE — ANESTHESIA CARE TRANSFER NOTE
Patient: Melissa Quinteros    Procedure(s):  Washout and closure of midline laparotomy.    Diagnosis: Malignant neoplasm of transverse colon (H) [C18.4]  Diagnosis Additional Information: No value filed.    Anesthesia Type:   MAC     Note:    Oropharynx: oropharynx clear of all foreign objects  Level of Consciousness: drowsy  Oxygen Supplementation: nasal cannula  Level of Supplemental Oxygen (L/min / FiO2): 2  Independent Airway: airway patency satisfactory and stable  Dentition: dentition unchanged  Vital Signs Stable: post-procedure vital signs reviewed and stable  Report to RN Given: handoff report given  Patient transferred to: PACU    Handoff Report: Identifed the Patient, Identified the Reponsible Provider, Reviewed the pertinent medical history, Discussed the surgical course, Reviewed Intra-OP anesthesia mangement and issues during anesthesia, Set expectations for post-procedure period and Allowed opportunity for questions and acknowledgement of understanding      Vitals: (Last set prior to Anesthesia Care Transfer)  CRNA VITALS  6/28/2021 0946 - 6/28/2021 1020      6/28/2021             Pulse:  83    Ht Rate:  83    SpO2:  97 %    Resp Rate (set):  10        Electronically Signed By: OUMAR Salguero CRNA  June 28, 2021  10:20 AM

## 2021-06-28 NOTE — BRIEF OP NOTE
[facility]    Brief Operative Note    Pre-operative diagnosis: Malignant neoplasm of transverse colon (H) [C18.4]  Post-operative diagnosis Same as pre-operative diagnosis    Procedure: Procedure(s):  Washout and closure of midline laparotomy.  Surgeon: Surgeon(s) and Role:     * Del Pang MD - Primary  Anesthesia: Monitor Anesthesia Care   Estimated blood loss: Less than 50 ml  Drains: None  Specimens: * No specimens in log *  Findings:   None.  Complications: None.  Implants: * No implants in log *

## 2021-06-28 NOTE — PLAN OF CARE
"Patient AxO, complains of discomfort in back. PRN oxycodone given, relief provided. Stoma WDL, dark green output. Abdominal dressing changed, CDI. REMI drainage serosanguinous. LS clear. Sips of honey thickened liquid provided. VSS. BP (!) 143/64 (BP Location: Right arm)   Pulse 83   Temp 96.3  F (35.7  C) (Tympanic)   Resp 18   Ht 1.613 m (5' 3.5\")   Wt 68.5 kg (151 lb)   SpO2 97%   BMI 26.33 kg/m      "

## 2021-06-28 NOTE — ANESTHESIA PREPROCEDURE EVALUATION
Anesthesia Pre-Procedure Evaluation    Patient: Melissa Quinteros   MRN: [id number] : 1935        Preoperative Diagnosis: Malignant neoplasm of transverse colon (H) [C18.4]   Procedure : Procedure(s):  Washout and closure of midline laparotomy.     Past Medical History:   Diagnosis Date     Esophageal obstruction     2016     Essential (primary) hypertension     2016     Hyperlipidemia     No Comments Provided     Zoster without complications     X2      Past Surgical History:   Procedure Laterality Date     APPENDECTOMY       ARTHROPLASTY KNEE Right     [place], [place]     COLONOSCOPY      No Comments Provided     COLONOSCOPY N/A 6/3/2021    Procedure: COLONOSCOPY, WITH POLYPECTOMY AND BIOPSY with endoscopic tattoo;  Surgeon: Del Pang MD;  Location:  OR     ESOPHAGOGASTRODUODENOSCOPY       HYSTERECTOMY TOTAL ABDOMINAL      1982,incidental appendectomy     LAPAROSCOPIC ASSISTED COLECTOMY N/A 2021    Procedure: COLECTOMY, LAPAROSCOPIC, small bowel resection X2, extended Right hemicolectomy, biopsy of the mesenteric node;  Surgeon: Del Pang MD;  Location:  OR     LAPAROTOMY EXPLORATORY N/A 2021    Procedure: Exploratory LAPAROTOMY with transverse colon Resection, and ileostomy;  Surgeon: Del Pang MD;  Location:  OR     TONSILLECTOMY, ADENOIDECTOMY, COMBINED      childhood      Allergies   Allergen Reactions     Lovastatin      Other reaction(s): Arthralgia      Social History     Tobacco Use     Smoking status: Never Smoker     Smokeless tobacco: Never Used   Substance Use Topics     Alcohol use: Not Currently     Comment: Alcoholic Drinks/day: Seldom      Wt Readings from Last 1 Encounters:   21 65 kg (143 lb 6.4 oz)        Anesthesia Evaluation   Pt has had prior anesthetic.         ROS/MED HX  ENT/Pulmonary:  - neg pulmonary ROS     Neurologic: Comment: Altered mental status       Cardiovascular:     (+) hypertension-----VACA.     METS/Exercise Tolerance:  1 - Eating, dressing    Hematologic:     (+) anemia,     Musculoskeletal:   (+) arthritis,     GI/Hepatic:     (+) GERD,     Renal/Genitourinary:     (+) renal disease,     Endo:  - neg endo ROS     Psychiatric/Substance Use:       Infectious Disease:  - neg infectious disease ROS     Malignancy:   (+) Malignancy, History of GI.GI CA status post Surgery.        Other:            Physical Exam    Airway        Mallampati: II   TM distance: > 3 FB   Neck ROM: limited   Mouth opening: > 3 cm    Respiratory Devices and Support         Dental       (+) missing and chipped      Cardiovascular   cardiovascular exam normal       Rhythm and rate: regular     Pulmonary   pulmonary exam normal        breath sounds clear to auscultation           OUTSIDE LABS:  CBC:   Lab Results   Component Value Date    WBC 7.7 06/28/2021    WBC 7.1 06/27/2021    HGB 8.8 (L) 06/28/2021    HGB 7.9 (L) 06/27/2021    HCT 26.5 (L) 06/28/2021    HCT 23.1 (L) 06/27/2021     06/28/2021     06/27/2021     BMP:   Lab Results   Component Value Date     (H) 06/28/2021     06/27/2021    POTASSIUM 3.2 (L) 06/28/2021    POTASSIUM 3.2 (L) 06/27/2021    CHLORIDE 117 (H) 06/28/2021    CHLORIDE 117 (H) 06/27/2021    CO2 20 (L) 06/28/2021    CO2 18 (L) 06/27/2021    BUN 42 (H) 06/28/2021    BUN 47 (H) 06/27/2021    CR 0.94 06/28/2021    CR 1.01 06/27/2021     (H) 06/28/2021     (H) 06/27/2021     COAGS:   Lab Results   Component Value Date    INR 1.06 06/28/2021     POC: No results found for: BGM, HCG, HCGS  HEPATIC:   Lab Results   Component Value Date    ALBUMIN <1.5 (L) 06/26/2021    PROTTOTAL 3.8 (L) 06/26/2021    ALT 15 06/26/2021    AST 37 06/26/2021    ALKPHOS 197 (H) 06/26/2021    BILITOTAL 2.0 (H) 06/26/2021     OTHER:   Lab Results   Component Value Date    PH 7.35 06/26/2021    LACT 0.9 06/25/2021    A1C 5.6 08/20/2020    DARIANA 7.7 (L) 06/28/2021    PHOS 3.6 06/26/2021    MAG 2.3 06/27/2021    LIPASE 7 (L)  06/01/2021    TSH 0.94 08/20/2020       Anesthesia Plan    ASA Status:  3   NPO Status:  NPO Appropriate    Anesthesia Type: MAC.     - Reason for MAC: straight local not clinically adequate              Consents    Anesthesia Plan(s) and associated risks, benefits, and realistic alternatives discussed. Questions answered and patient/representative(s) expressed understanding.     - Discussed with:  Patient         Postoperative Care       PONV prophylaxis: Ondansetron (or other 5HT-3), Dexamethasone or Solumedrol     Comments:                David Kellerman, APRN CRNA

## 2021-06-28 NOTE — PROGRESS NOTES
"Clinical Nutrition / Reassessment     Assessment:   Client History:  Pt drank 4 Boost's yesterday.  encourages. Calorie count sheets in room, nursing aware.   Diet Order: NDD 1 pureed with honey thick fluids  Oral Intake:  4 boosts yesterday, otherwise minimal  Supplement Intake:  Boost Plus on trays, encourage as able and tolerates  Weight:   Wt Readings from Last 10 Encounters:   06/28/21 65 kg (143 lb 6.4 oz)   06/08/21 53.7 kg (118 lb 6.4 oz)   06/03/21 57.2 kg (126 lb)   06/01/21 57.2 kg (126 lb)   05/28/21 57.2 kg (126 lb)   05/14/21 55.6 kg (122 lb 9.6 oz)   10/09/19 59.7 kg (131 lb 9.6 oz)   06/12/19 62.8 kg (138 lb 6.4 oz)   09/05/18 66.2 kg (146 lb)   08/28/18 66.2 kg (146 lb)   Height: 5' 3.5\"  BMI: Body mass index is 25 kg/m .    Estimated nutritional needs based on:  current body weight  58 kg / 126 lbs   Estimated energy needs:  8422-7200 kcal/day (25-30 kcal/kg)  Estimated protein needs:  70-87 gm/day (1.2-1.5 g/kg)  Estimated fluid needs:  1056-6629 ml/day (1 ml/kcal)    Malnutrition Criteria:  (Need to have 2 indicators to qualify recommendation)  Energy Intake: Acute Severe: < 50% of estimated energy requirement for > 7days  Interpretation of Weight Loss:  No significant weight loss  Physical Findings:  Acute Fluid Accumulation:  mild- significant now  Reduced  Strength:  likely reduced-appears weak    Recommended Nutrition Diagnosis:   Severe Malnutrition in the context of acute illness or injury - based on AND/ASPEN Clinical Characterstics of Malnutrition May 2012      Nutrition Education: Nutrition education will be provided as appropriate.    Nutrition Diagnosis: Oral or Nutrition Support Intake:  inadequate energy intakes related to decreased appetite as evidenced by intakes of 50% or less at most meals over past 6 days.    Intervention:  Nutrition Prescription:     Nutrition Intervention(s): NDD I diet, honey thick fluids  1. Meals and Snacks: small, frequent meals/snacks--when " able to tolerate  2. Medical Food Supplement: Boost Plus, on trays and additional as she tolerates    Nutrition Goal(s):  1. Pt will consume 50% or more at meals and supplements - no met  2. Pt will not have unplanned wt loss during hospitalization - on going  3. Pt will tolerate diet as ordered, texture per speech therapy recommendations - not met     Monitoring and Evaluation:   Food Intake, diet tolerance, weights, labs    Discharge Recommendation:   Nutrition Discharge Planning  Will address closer to discharge.     RD will reassess in within 1-5 days or sooner.    Shamika Rose RD on 6/28/2021 at 3:02 PM

## 2021-06-28 NOTE — OR NURSING
PACU Transfer Note    Melissa Quinteros was transferred to [facility] via cart.  Equipment used for transport:  none.  Accompanied by:  Chante DSOUZA RN, Gogo RN  Prescriptions were: none    PACU Respiratory Event Documentation     1) Episodes of Apnea greater than or equal to 10 seconds: 0    2) Bradypnea - less than 8 breaths per minute: 0    3) Pain score on 0 to 10 scale: 0    4) Pain-sedation mismatch (yes or no): no    5) Repeated 02 desaturation less than 90% (yes or no): no    Anesthesia notified? (yes or no): no    Any of the above events occuring repeatedly in separate 30 minute intervals may be considered recurrent PACU respiratory events.    Patient stable and meets phase 1 discharge criteria for transport from PACU.  Handoff to Anna PHILLIPS

## 2021-06-28 NOTE — ANESTHESIA POSTPROCEDURE EVALUATION
Patient: Melissa Quinteros    Procedure(s):  Washout and closure of midline laparotomy.    Diagnosis:Malignant neoplasm of transverse colon (H) [C18.4]  Diagnosis Additional Information: No value filed.    Anesthesia Type:  MAC    Note:  Disposition: Inpatient   Postop Pain Control: Uneventful            Sign Out: Well controlled pain   PONV: No   Neuro/Psych: Uneventful            Sign Out: Acceptable/Baseline neuro status   Airway/Respiratory: Uneventful            Sign Out: Acceptable/Baseline resp. status   CV/Hemodynamics: Uneventful            Sign Out: Acceptable CV status; No obvious hypovolemia; No obvious fluid overload   Other NRE: NONE   DID A NON-ROUTINE EVENT OCCUR? No           Last vitals:  Vitals:    06/28/21 1019 06/28/21 1025 06/28/21 1030   BP: 126/53 126/51 132/58   Pulse: 83 80 82   Resp: 12     Temp: 97.5  F (36.4  C)  97.5  F (36.4  C)   SpO2: 98% 98% 98%       Last vitals prior to Anesthesia Care Transfer:  CRNA VITALS  6/28/2021 0946 - 6/28/2021 1043      6/28/2021             Pulse:  83    Ht Rate:  83    SpO2:  97 %    Resp Rate (set):  10          Electronically Signed By: OUMAR Salguero CRNA  June 28, 2021  10:43 AM

## 2021-06-28 NOTE — PHARMACY
Pharmacy - Transfer Medication Reconciliation     The patient's transfer medication orders have been compared to the medication administration record and to the Prior to Admissions Medications list - any noted discrepancies were resolved with the MD.     Thank you. Pharmacy will continue to monitor.     Gilbert Sanchez [facility] ....................  6/28/2021   11:38 AM

## 2021-06-28 NOTE — PLAN OF CARE
Pt returned from OR at 1055.  VSS have remained stable, pt is denying pain at this time.  Dressing is clean, dry, and intact.  Pt alert and oriented, but is confused at times. Took oral medications from this morning with thickened liquid without issue.  Will continue to monitor throughout the day.  Anna Carr RN............................ 6/28/2021 12:30 PM    VS remaining stable after surgery.  Pt alert and answering questions correctly.  Visiting with .  Potassium replaced at 1600 while pt was fully alert and able to take larger amounts of fluid at one time.    Anna Carr RN............................ 6/28/2021 4:08 PM    Pt requesting drinks of water and doing well with thickened water.  Easiest if she holds the cup herself as she has better control that way.  Reclining in chair now - A1-2 to pivot.  Anna Carr RN............................ 6/28/2021 5:46 PM    Pt colostomy bag came off of skin causing liquid BM to leak everywhere.  New bag was placed and new medipore bandage was placed over midline incision as there was stool on it.  Incision has no drainage at this time.  Pt was cleaned up and clean gown put on. Pt tolerated well  Anna Carr RN............................ 6/28/2021 6:08 PM

## 2021-06-28 NOTE — PROGRESS NOTES
[facility]    Medicine Progress Note - Hospitalist Service       Date of Admission:  6/14/2021    Assessment & Plan           Melissa Quinteros is a 86 year old female who was admitted on 6/14/2021.     Principal Problem:    Malignant neoplasm of transverse colon (H)    Assessment: POD #13 s/p extended right colectomy complicated by small bowel resection x 2 due to metastatic involvement of the small bowel x 3. On 6/22 noted to have free intraperitoneal air and large amount of ascites and was obtunded, CT with large perforation. Now POD #6 s/p anastomotic leak, transverse colectomy and anastomosis. Diverting loop ileostomy.  severly malnourished with a guarded prognosis overall.    Plan:   -Wound care, diet, activity restrictions per general surgery  -Zosyn day 6->augmentin day 8 given post op REMI drain cx with enterococcus  -Follow-up repeat pathology  -aspiration precautions  -defer tube feed need/timing to general surgery    Hypernatremia  Assessment: Worsening on again today.  First noted on 6/26 after aggressive diuresis on 6/25.  Likely free water deficit from diuresis and will benefit from correction given poor oral intake.  Pain:  -D5W at 75 mls/hr  -Trend BMP     Hypervolemia    Assessment: stable. aggressive diuresis on 6/25. More likely related to poor nutritional status and third spacing versus CHF with elevated BNP/troponin and no new EKG changes, contribution of renal failure. normalized renal function.  Improving gradually.    Plan:   -Strict I's and O's and daily weights  -echo results reviewed  -Oakley out in am if able    Active Problems:    Essential hypertension    Assessment: Stable and not actively treated as an outpatient.    Plan:   -Monitor      Acute kidney failure with tubular necrosis (H)    Assessment: resolved and likely prerenal, possibly a cardiorenal component.    Plan:   -Avoid NSAIDs and nephrotoxins  -Monitor daily      Hypomagnesemia    Assessment: stable     Plan: - replace per protocol      Altered mental status    Assessment: due to metabolic and infectious encephalopathy.  Significantly improved since yesterday, remains oriented to person and , adequate urine output as above, ostomy stable, and metabolic acidosis stable.     Plan:   -Treat infection as above  -Early mobilization with physical and Occupational Therapy  -Advance diet as able with ongoing oral cares  -seroquel at bedtime if agitated  -electrolyte management as above      Metabolic acidosis    Assessment: ongoing improvement, ongoing nonanion gap, likely due to GI losses from NG tube, large crystalloid infusions for resuscitation previously, kidney impairment all likely playing a role.    Plan:   -NG out on 6/25   -monitor daily      Anemia    Assessment: stable. Multifactorial and likely from phlebotomy, surgery, chronic disease and iron deficiency.  Not a IV iron candidate due to active infection.    Plan:   -Trend hemoglobin and transfuse to keep greater than 7      Severe protein-calorie malnutrition (Schumacher: less than 60% of standard weight) (H)    Assessment: Prealbumin 5, poor oral intake.  INR was greater than 2 and likely due to nutritional deficits, no evidence of consumptive process.  Now normalized.    Plan:   -Appreciate dietary consult and speech consult for diet advancement  -5 mg p.o. vitamin K on 6/26   -Trend INR  -Continue SCD's.      Elevated troponin    Assessment: resolved.  Likely demand from acute kidney injury, acute infection, no obvious EKG changes or cardiopulmonary symptoms concerning for ACS.     Plan:   -telemetry            Diet: Snacks/Supplements Adult: Ensure Clear; With Meals  Snacks/Supplements Adult: Gelatein Plus; With Meals  NPO for Medical/Clinical Reasons Except for: NPO but receiving PN    DVT Prophylaxis: SCD  Oakley Catheter: PRESENT, indication: Strict 1-2 Hour I&O, /GI/GYN Pelvic Procedure  Central Lines: None  Code Status: Full Code      Disposition  Plan   Expected discharge: several days, recommended to transitional care unit once adequate pain management/ tolerating PO medications, antibiotic plan established and mental status at baseline.     The patient's care was discussed with the Patient and Patient's Family.    Diallo Frausto MD  Hospitalist Service  [facility]  Securely message with the Vocera Web Console (learn more here)  Text page via [facility] Paging/Directory      Risk Factors Present on Admission                ______________________________________________________________________    Interval History   Patient did reasonably well yesterday and early this morning.   was able to encourage her to drink four bottles of Ensure.  Pain control continues to be an issue but is gradually getting better.  This morning she reports ongoing pain in her abdomen and some nausea but no other issues.    Data reviewed today: I reviewed all medications, new labs and imaging results over the last 24 hours. I personally reviewed no images or EKG's today.    Physical Exam   Vital Signs: Temp: 98.2  F (36.8  C) Temp src: Tympanic BP: 137/52 Pulse: 81   Resp: 16 SpO2: 98 % O2 Device: None (Room air)    Weight: 143 lbs 6.4 oz  Constitutional: awake, alert but somnolent, cooperative, no apparent distress.  Respiratory: Clear toascultation bilaterally.  No increased respiratory effort.   Cardiovascular: Regular rate and rhythm.  No murmurs rubs or gallops heard.   GI: Abdomen Soft, non-tender.  No masses, rebound or guarding.   Neuropsychiatric: General: normal, calm and normal eye contact  Orientation: oriented to self, place, and situation      Data   Recent Labs   Lab 06/28/21  0535 06/27/21  1800 06/27/21  1006 06/27/21  0250 06/26/21  0445 06/26/21  0445 06/25/21  0440 06/25/21  0440   WBC 7.7  --   --  7.1  --  6.5  --  7.6   HGB 8.8*  --   --  7.9*  --  7.9*  --  8.1*   MCV 84  --   --  83  --  85  --  87     --   --  191  --  187   --  175   INR 1.06  --   --  1.25*  --  2.77*  --   --    * 144  --  147*   < > 148*   < > 141   POTASSIUM 3.2* 3.2* 3.2* 3.2*   < > 3.2*   < > 3.6   CHLORIDE 117* 117*  --  118*   < > 119*   < > 116*   CO2 20* 18*  --  18*   < > 16*   < > 16*   BUN 42* 47*  --  49*   < > 57*   < > 62*   CR 0.94 1.01  --  1.17   < > 1.38*   < > 1.52*   ANIONGAP 10 9  --  11   < > 13   < > 9   DARIANA 7.7* 7.5*  --  7.5*   < > 7.6*   < > 7.5*   * 201*  --  206*   < > 103   < > 115*   ALBUMIN  --   --   --   --   --  <1.5*  --  <1.5*   PROTTOTAL  --   --   --   --   --  3.8*  --  3.6*   BILITOTAL  --   --   --   --   --  2.0*  --  1.1*   ALKPHOS  --   --   --   --   --  197*  --  186*   ALT  --   --   --   --   --  15  --  13   AST  --   --   --   --   --  37  --  37    < > = values in this interval not displayed.     No results found for this or any previous visit (from the past 24 hour(s)).

## 2021-06-28 NOTE — OP NOTE
Procedure Date: 2021    PREOPERATIVE DIAGNOSES:  History of malignancy of the transverse colon, status post extended right colectomy and small bowel resection x2 with postoperative leak.  Status post washout and diversion with loop ileostomy and ileocolonic resection with heavy contamination.    POSTOPERATIVE DIAGNOSES:  History of malignancy of the transverse colon, status post extended right colectomy and small bowel resection x2 with postoperative leak.  Status post washout and diversion with loop ileostomy and ileocolonic resection with heavy contamination.    PROCEDURES PERFORMED:  Washout and closure of midline laparotomy incision.    ANESTHESIA:  Local and MAC.    ESTIMATED BLOOD LOSS:  Minimal.    DESCRIPTION OF PROCEDURE:  The patient was taken to the OR and positioned supine on the operating table.  Her abdomen was sterilely prepped and draped.  The deep subcutaneous tissues were closed with 2-0 Vicryl.  The deep dermal layer was then closed with 3-0 Vicryl.  Interrupted 4-0 Monocryl was used for interrupted subcuticular closure.  Steri-Strips were then applied.  We then dressed this wound.  We then removed her old colostomy appliance after removing the bridge.  Once we removed this, we sized a new appliance.  The skin was prepped.  The new appliance was applied.  This had a good seal.  The ostomy appears pink and moist, mildly edematous.  The patient was then taken to recovery.  The patient was then taken back to the floor in satisfactory condition.    Del Pang MD        D: 2021   T: 2021   MT: [id number]    Name:     CHINO HAINES  MRN:      [id number]        Account:        [id number]   :      1935           Procedure Date: 2021     Document: [id number]

## 2021-06-29 NOTE — PLAN OF CARE
"/52 (BP Location: Right arm)   Pulse 80   Temp 98.6  F (37  C) (Tympanic)   Resp 20   Ht 1.613 m (5' 3.5\")   Wt 65.8 kg (145 lb)   SpO2 97%   BMI 25.28 kg/m      VSS, afebrile, pain in knees and legs today.  PRN pain meds helping along with Tylenol.  Burrows removed at 1510 this afternoon - pt did have a wet brief shortly after, but pt still due to void as unknown if that was an actual void of if it was drawn out with burrows removal.  Pt had complaints off and on all day of feeling \"awful\".  General malaise and tiredness.   here most of the day.  Pt not really able to get any quality rest today.  Pt is alert and for the most part oriented.  Still some confusion as to situation.  Up to the chair for awhile today.  Swallow study also done and pt was moved to a level 2 mechanical and nectar thick liquids.  She is tolerating this well.  Still not eating much but likes vanilla boost.  Will continue to monitor.  Anna Carr RN............................ 6/29/2021 4:55 PM    "

## 2021-06-29 NOTE — PROGRESS NOTES
[facility]    Medicine Progress Note - Hospitalist Service       Date of Admission:  6/14/2021    Assessment & Plan                    Melissa Quinteros is a 86 year old female who was admitted on 6/14/2021.     Principal Problem:    Malignant neoplasm of transverse colon (H)    Assessment: POD #14 s/p extended right colectomy complicated by small bowel resection x 2 due to metastatic involvement of the small bowel x 3. On 6/22 noted to have free intraperitoneal air and large amount of ascites and was obtunded, CT with large perforation. Now POD #7s/p anastomotic leak, transverse colectomy and anastomosis. Diverting loop ileostomy.  Severly malnourished with a guarded prognosis overall.    Plan:   -Wound care, diet, activity restrictions per general surgery  -Zosyn day 7->augmentin day 9 given post op REMI drain cx with enterococcus.  Consider stopping tomorrow.  -Follow-up repeat pathology  -aspiration precautions  -defer tube feed need/timing to general surgery    Hypernatremia  Assessment: Improved with reintroduction of D5W.  Will recheck tomorrow.  Titrate IVF based on oral intake.  Pain:  -D5W at 75 mls/hr - titrate downward as able.  -Trend BMP     Hypervolemia    Assessment: stable. aggressive diuresis on 6/25. More likely related to poor nutritional status and third spacing versus CHF with elevated BNP/troponin and no new EKG changes, contribution of renal failure. normalized renal function.  Improving gradually.    Plan:   -Strict I's and O's and daily weights  -echo results reviewed  -Oakley out today    Active Problems:    Essential hypertension    Assessment: Stable and not actively treated as an outpatient.    Plan:   -Monitor      Acute kidney failure with tubular necrosis (H)    Assessment: resolved and likely prerenal, possibly a cardiorenal component.    Plan:   -Avoid NSAIDs and nephrotoxins  -Monitor daily      Hypomagnesemia    Assessment: stable    Plan: - replace per  protocol      Altered mental status    Assessment: due to metabolic and infectious encephalopathy.  Significantly improved since yesterday, remains oriented to person and , adequate urine output as above, ostomy stable, and metabolic acidosis stable.     Plan:   -Treat infection as above  -Early mobilization with physical and Occupational Therapy  -Advance diet as able with ongoing oral cares  -seroquel at bedtime if agitated  -electrolyte management as above      Metabolic acidosis    Assessment: ongoing improvement, ongoing nonanion gap, likely due to GI losses from NG tube, large crystalloid infusions for resuscitation previously, kidney impairment all likely playing a role.    Plan:   -NG out on 6/25   -monitor daily      Anemia    Assessment: stable. Multifactorial and likely from phlebotomy, surgery, chronic disease and iron deficiency.  Not a IV iron candidate due to active infection.    Plan:   -recheck cbc in am.    Severe protein-calorie malnutrition (Schumacher: less than 60% of standard weight) (H)    Assessment: Prealbumin 5, poor oral intake.  INR was greater than 2 and likely due to nutritional deficits, now normalized.  No evidence of consumptive process.     Plan:   -Appreciate dietary consult and speech consult for diet advancement  -5 mg p.o. vitamin K on 6/26   -Continue SCD's, ok to restart heparin.      Elevated troponin    Assessment: resolved.  Likely demand from acute kidney injury, acute infection, no obvious EKG changes or cardiopulmonary symptoms concerning for ACS.     Plan:   -telemetry              Diet: Snacks/Supplements Adult: Gelatein Plus; With Meals  Snacks/Supplements Adult: Boost Plus; With Meals  Dysphagia Diet Level 2 Mech Altered Nectar Thickened Liquids (pre-thickened or use instant food thickener)    DVT Prophylaxis: heparin SC  Oakley Catheter: PRESENT, indication: Strict 1-2 Hour I&O, /GI/GYN Pelvic Procedure  Central Lines: None  Code Status: Full Code       Disposition Plan   Expected discharge: 2 - 3 days, recommended to transitional care unit once adequate pain management/ tolerating PO medications, antibiotic plan established, mental status at baseline and renal function improved.     The patient's care was discussed with the Patient and Patient's Family.    Diallo Frausto MD  Hospitalist Service  [facility]  Securely message with the Vocera Web Console (learn more here)  Text page via [facility] Paging/Directory      Risk Factors Present on Admission                ______________________________________________________________________    Interval History   Patient continues to have nausea but reports her abdominal pain is improving.  No fevers or chills overnight.  No shortness of breath.  Nursing notes reviewed.  No new issues.    Data reviewed today: I reviewed all medications, new labs and imaging results over the last 24 hours. I personally reviewed no images or EKG's today.    Physical Exam   Vital Signs: Temp: 97.7  F (36.5  C) Temp src: Tympanic BP: 118/55 Pulse: 84   Resp: 16 SpO2: 98 % O2 Device: None (Room air)    Weight: 145 lbs 0 oz  Constitutional: awake, alert, cooperative, no apparent distress, and appears stated age  Respiratory: No increased work of breathing, good air exchange, clear to auscultation bilaterally, no crackles or wheezing  Cardiovascular: Regularrate and rhythm.  No murmurs rubs or gallops heard. 2+ LE edema bilaterally to knees.  GI: Abdomen Soft, mild tenderness appropriate.  Bowel sounds heard.  Musculoskeletal: Generalized weakness  Neuropsychiatric: Currently alert and oriented to person place and situation.    Data   Recent Labs   Lab 06/29/21  1017 06/29/21  0210 06/28/21 2011 06/28/21  1500 06/28/21  0535 06/27/21  1800 06/27/21  0250 06/27/21  0250 06/26/21  0445 06/26/21  0445 06/25/21  0440 06/25/21  0440   WBC  --   --   --   --  7.7  --   --  7.1  --  6.5  --  7.6   HGB  --   --   --   --  8.8*   --   --  7.9*  --  7.9*  --  8.1*   MCV  --   --   --   --  84  --   --  83  --  85  --  87   PLT  --  156  --   --  169  --   --  191  --  187  --  175   INR  --   --   --   --  1.06  --   --  1.25*  --  2.77*  --   --    NA  --   --   --  146* 147* 144  --  147*   < > 148*   < > 141   POTASSIUM 3.3* Canceled, Test credited 3.2*  --  3.2* 3.2*   < > 3.2*   < > 3.2*   < > 3.6   CHLORIDE  --   --   --   --  117* 117*  --  118*   < > 119*   < > 116*   CO2  --   --   --   --  20* 18*  --  18*   < > 16*   < > 16*   BUN  --   --   --   --  42* 47*  --  49*   < > 57*   < > 62*   CR  --   --   --   --  0.94 1.01  --  1.17   < > 1.38*   < > 1.52*   ANIONGAP  --   --   --   --  10 9  --  11   < > 13   < > 9   DARIANA  --   --   --   --  7.7* 7.5*  --  7.5*   < > 7.6*   < > 7.5*   GLC  --   --   --   --  113* 201*  --  206*   < > 103   < > 115*   ALBUMIN  --   --   --   --   --   --   --   --   --  <1.5*  --  <1.5*   PROTTOTAL  --   --   --   --   --   --   --   --   --  3.8*  --  3.6*   BILITOTAL  --   --   --   --   --   --   --   --   --  2.0*  --  1.1*   ALKPHOS  --   --   --   --   --   --   --   --   --  197*  --  186*   ALT  --   --   --   --   --   --   --   --   --  15  --  13   AST  --   --   --   --   --   --   --   --   --  37  --  37    < > = values in this interval not displayed.     No results found for this or any previous visit (from the past 24 hour(s)).

## 2021-06-29 NOTE — PROGRESS NOTES
06/29/21 0900   Signing Clinician's Name / Credentials   Signing clinician's name / credentials Ophelia Knight CCC-SLP   Quick Adds   Rehab Discipline SLP   Quick Adds Speech Language Pathology   SLP - Dysphagia/Swallow   Minutes of Treatment 20 minutes   Symptoms Noted During/After Treatment None   Treatment Detail Pt sat up in bed when therapist arrived. Pt alert and  present throughout skilled swallowing treatment. Pt trialing puree and semi-solid foods. Pt also trialing honey and nectar thick liquids. Pt completeing +3/3 puree trials with no overt s/sx asp/pen. Pt trialing semi-solid foods with change in vocal quality on +1/2 trials. On liquid trials, pt having difficulty drinking boost drink from straw. When not drinking from straw pt, taking large sips. Pt educated on importance of small sips/bites. Pt showing no overt s/sx asp/pen on +3/3 honey thick liquid trials. When trialing nectar, pt had slight change in vocal quality on +1/3 trials. Pt requesting drinking nectar thick liquids from spoon. Pt trialing nectar liquids from spoon with burping after +1/2 trials. Pt stating she enjoys the nectar thick apple juice and pears she has been given. It is recommended that pt's diet change from puree and honey to NDD2 and nectar thick liquids. If pt is showing s/sx asp/pen (i.e., coughing, choking, change in vocal quality, etc.) pt should be placed back onto purees and honey thick liquids. Pt will continue to be followed by SLP to monitor and treat dysphagia.   Additional Documentation   SLP Plan Continue Speech Tehrapy as needed/appropriate   Total Session Time   Total Session Time (minutes) 20 minutes

## 2021-06-29 NOTE — PLAN OF CARE
"Pt is alert and responds to voice. Can be confused at times. LS dim. Pt has been drinking thickened water and boost without any problems. Stool is liquid in ostomy bag. Abdomen bandages have scant amount of drainage. Pt complains of abd pain. Given oxycodone with relief. (see MAR). Will continue to monitor.   /44   Pulse 75   Temp 97.2  F (36.2  C) (Tympanic)   Resp 16   Ht 1.613 m (5' 3.5\")   Wt 65.8 kg (145 lb)   SpO2 98%   BMI 25.28 kg/m      Ann De Guzman RN on 6/29/2021 at 1:11 AM      "

## 2021-06-29 NOTE — PROGRESS NOTES
:    Left message with Sophia Admissions Coordinator at [facility] to provide update.   will continue to follow.    AUGUST Nguyen on 6/29/2021 at 1:00 PM

## 2021-06-29 NOTE — PROGRESS NOTES
" PROGRESS NOTE    Patient is an 86 year old female POD#1 s/p washout and closure of midline laparotomy incision, POD#14 s/p extended right colectomy complicated by small bowel resection x2 due to metastatic involvement of the small bowel x3, and POD#6 s/p anastomatic leak, transverse colectomy and anastomosis, and diverting loop ileostomy.    Overnight events: Unremarkable. No concerns from nursing. Patient is disoriented and poor historian.    Date 06/29/21 0700 - 06/30/21 0659   Shift 8378-8980 6808-2505 7826-2369 24 Hour Total   INTAKE   P.O. 250   250   Shift Total(mL/kg) 250(3.8)   250(3.8)   OUTPUT   Urine  325  325   Drains 30   30   Stool 825   825   Shift Total(mL/kg) 855(13) 325(4.94)  1180(17.94)   Weight (kg) 65.77 65.77 65.77 65.77          Vital signs:  Temp: 98.1  F (36.7  C) Temp src: Tympanic BP: 124/53 Pulse: 77   Resp: 16 SpO2: 97 % O2 Device: None (Room air) Oxygen Delivery: 1 LPM Height: 161.3 cm (5' 3.5\") Weight: 65.8 kg (145 lb)  Estimated body mass index is 25.28 kg/m  as calculated from the following:    Height as of this encounter: 1.613 m (5' 3.5\").    Weight as of this encounter: 65.8 kg (145 lb).    GENERAL: disoriented, NAD  CV: RRR, no murmurs noted  RESPIRATORY: no dyspnea, CTA bilaterally  ABDOMEN: non-distended, +BS, soft, appropriately tender, incision dressing intact without saturation, no signs of infection, colostomy bag with liquid output, REMI draining serosanguinous fluid  EXTREMITY: +3 pitting edema bilaterally  SKIN: warm and dry, no jaundice norashes  NEURO: grossly normal    LABS  Recent Labs   Lab Test 06/29/21  0210 06/28/21  0535 06/27/21  0250 08/30/16  1622 08/30/16  1622 08/20/16  0012   WBC  --  7.7 7.1   < >  --   --    RBC  --  3.17* 2.79*   < >  --   --    HGB  --  8.8* 7.9*   < > 14.8 14.4   HCT  --  26.5* 23.1*   < > 47.5 42.0   MCV  --  84 83   < > 91 90   MCH  --  27.8 28.3   < > 28.3 30.9   MCHC  --  33.2 34.2   < > 31.1* 34.2    169 191   < > 237 220 "   MPV  --   --   --   --  9.1 8.8    < > = values in this interval not displayed.       Recent Labs   Lab Test 06/29/21  0210 06/28/21 2011 06/28/21  0535 06/27/21  1800   POTASSIUM 3.2* 3.2* 3.2* 3.2*   CHLORIDE  --   --  117* 117*   BUN  --   --  42* 47*       Recent Labs   Lab Test 06/26/21  0445 06/25/21  0440 06/16/21  1424 06/16/21  1424 05/28/21  1219 05/28/21  1219   PROTEIN  --   --   --  Negative  --  Negative   BILITOTAL 2.0* 1.1*   < >  --    < >  --    AST 37 37   < >  --    < >  --    ALT 15 13   < >  --    < >  --     < > = values in this interval not displayed.     WOUND CULTURE:  Abdominal specimen  Light growth Enterococcus faecalis  Pan-sensitive    Assessment/Plan:  Patient is an 86 year old female POD#1 s/p washout and closure of midline laparotomy incision, POD#14 s/p extended right colectomy complicated by small bowel resection x2 due to metastatic involvement of the small bowel x3, and POD#6 s/p anastomatic leak, transverse colectomy and anastomosis, and diverting loop ileostomy.  Pain: Continue using tylenol as scheduled for pain management  Nutrition: Per nutritions recommendation, honey thick fluids and Boost plus for snacks as tolerated  DVT prophylaxis: Restarted subQ heparin, SCDs  ID: Augmentin day #4 s/p re-culture. Will continue and reassess clinic status tomorrow  Will plan for dressing change on midline incision and ostomy change tomorrow  Continue other cares per medicine    Cristina Barnes, [facility]    Del Pang MD on 6/29/2021 at 5:30 PM

## 2021-06-30 NOTE — PROGRESS NOTES
:    Spoke with Sophia at [facility] to give update.  They are still screening patient.  PAS completed.   will continue to follow.    AUGUST Nguyen on 6/30/2021 at 3:04 PM

## 2021-06-30 NOTE — PLAN OF CARE
"Pt is alert to self, respond to voice and follows directions. Denies pain. LS dim. Pt has colostomy, ostomy is red and swollen with red/brown liquid discharge. Pt colostomy leaking out of the bag from not getting a good seal. Ostomy hardware changed and frequent checking done every 1-2 hours. Pt REMI bandage changed and abd incision changed due to stool getting on bandage. Pt tolerated well. Pt incontinent of urine. Pure wic in place. Pt has +2 edema on lower extremities. Will continue to monitor.   /50 (BP Location: Right arm)   Pulse 86   Temp 98.2  F (36.8  C) (Tympanic)   Resp 16   Ht 1.613 m (5' 3.5\")   Wt 65.8 kg (145 lb)   SpO2 95%   BMI 25.28 kg/m      Ann De Guzman RN on 6/30/2021 at 2:17 AM      "

## 2021-06-30 NOTE — PROGRESS NOTES
Administered zofran for complaints of nausea- relief. Was up in chair today.  Ate half lunch and has been drinking fluids through the day.  Had small amount of dark red/black stool incontinent.  Is a2 for pivot transfers.  Dressing to left side abdomen CDI,  REMI draining, midline incision well approximated and stripped, colostomy swollen stoma- dark brown stool.  Aliyah Vieyra RN on 6/30/2021 at 7:02 PM

## 2021-06-30 NOTE — PROGRESS NOTES
[facility]    Medicine Progress Note - Hospitalist Service       Date of Admission:  6/14/2021    Assessment & Plan                             Melissa Quinteros is a 86 year old female who was admitted on 6/14/2021.     Principal Problem:    Malignant neoplasm of transverse colon (H)    Assessment: POD #14 s/p extended right colectomy complicated by small bowel resection x 2 due to metastatic involvement of the small bowel x 3. On 6/22 noted to have free intraperitoneal air and large amount of ascites and was obtunded, CT with large perforation. Now POD #7s/p anastomotic leak, transverse colectomy and anastomosis. Diverting loop ileostomy.  POD #2 from wound closure.  Remains severly malnourished but with improvement in oral intake the past two days.    Plan:   -Wound care, diet, activity restrictions per general surgery  -Zosyn day 9->augmentin day 10 given post op REMI drain cx with enterococcus.  Consider stopping tomorrow.  -Follow-up repeat pathology  -aspiration precautions      Hypernatremia  Assessment: Resolved  Pain:  Rehceck BMP in AM.       Hypervolemia    Assessment: stable. aggressive diuresis on 6/25. More likely related to poor nutritional status and third spacing versus CHF with elevated BNP/troponin and no new EKG changes, contribution of renal failure. normalized renal function.  Improving gradually.    Plan:   -Strict I's and O's and daily weights  -echo results reviewed  -Oakley out yesterday    Active Problems:    Essential hypertension    Assessment: Stable and not actively treated as an outpatient.    Plan:   -Monitor      Acute kidney failure with tubular necrosis (H)    Assessment: resolved and likely prerenal, possibly a cardiorenal component.    Plan:   -Avoid NSAIDs and nephrotoxins  -Monitor daily      Hypomagnesemia    Assessment: stable    Plan: - replace per protocol      Altered mental status    Assessment: due to metabolic and infectious encephalopathy.  Improved  with occasional forgetfulness.    Plan:   -Early mobilization with physical and Occupational Therapy  -seroquel at bedtime if agitated  -electrolyte management as above      Metabolic acidosis    Assessment: ongoing improvement, ongoing nonanion gap, likely due to GI losses from NG tube, large crystalloid infusions for resuscitation previously, kidney impairment all likely playing a role.    Plan:   -NG out on 6/25   -monitor daily      Anemia    Assessment: stable. Multifactorial and likely from phlebotomy, surgery, chronic disease and iron deficiency.  Not a IV iron candidate due to active infection.    Plan:   -recheck cbc in am.    Severe protein-calorie malnutrition (Schumacher: less than 60% of standard weight) (H)    Assessment: Prealbumin 5, poor oral intake.  INR was greater than 2 and likely due to nutritional deficits, now normalized.  No evidence of consumptive process.     Plan:   -Appreciate dietary consult and speech consult for diet advancement  -5 mg p.o. vitamin K on 6/26   -Continue SCD's, ok to restart heparin.      Elevated troponin    Assessment: resolved.  Likely demand from acute kidney injury, acute infection, no obvious EKG changes or cardiopulmonary symptoms concerning for ACS.     Plan:   -telemetry                Diet: Snacks/Supplements Adult: Gelatein Plus; With Meals  Snacks/Supplements Adult: Boost Plus; With Meals  Dysphagia Diet Level 2 Mech Altered Nectar Thickened Liquids (pre-thickened or use instant food thickener)    DVT Prophylaxis: Heparin SQ  Oakley Catheter: Not present  Central Lines: None  Code Status: Full Code      Disposition Plan   Expected discharge: 2 - 3 days, recommended to transitional care unit once adequate pain management/ tolerating PO medications, antibiotic plan established and mental status at baseline.     The patient's care was discussed with the Patient and Patient's Family.    Diallo Frausto MD  Hospitalist Service  [facility]  "Hospital  Securely message with the Vocera Web Console (learn more here)  Text page via [employer] Paging/Directory      Risk Factors Present on Admission                ______________________________________________________________________    Interval History   Told me she felt \"not too good\" early this morning.  Slept poorly.  Pain seems to come and go as does nausea.  Felt very weak and tired.  Later in the day she did feel better.  Had more energy.  Abdominal pain was improved and with 's encouragement was able to eat a good breakfast.    She did have an episode of scant maroon stool per nursing and they contacted surgery.    Data reviewed today: I reviewed all medications, new labs and imaging results over the last 24 hours. I personally reviewed .    Physical Exam   Vital Signs: Temp: 96.6  F (35.9  C) Temp src: Tympanic BP: 106/47 Pulse: 90   Resp: 16 SpO2: 96 % O2 Device: None (Room air)    Weight: 153 lbs 0 oz  Constitutional: awake, alert, cooperative, no apparent distress, and appears stated age  Respiratory: No increased work of breathing, good air exchange, clear to auscultation bilaterally, no crackles or wheezing  Cardiovascular: Regularrate and rhythm.  No murmurs rubs or gallops heard.  No JVD.  3+ lower extremity pitting edema to the knees  GI: Abdomen Soft, tenderness appropriate given surgery.  Bowel sounds heard in all quadrants  Musculoskeletal: Underlies weakness with no focal deficits.    Data   Recent Labs   Lab 06/30/21  0546 06/29/21  1545 06/29/21  1017 06/29/21  0210 06/28/21  1500 06/28/21  1500 06/28/21  0535 06/27/21  0250 06/27/21  0250 06/26/21  0445 06/26/21  0445 06/25/21  0440 06/25/21  0440   WBC 11.9*  --   --   --   --   --  7.7  --  7.1  --  6.5  --  7.6   HGB 8.2*  --   --   --   --   --  8.8*  --  7.9*  --  7.9*  --  8.1*   MCV 86  --   --   --   --   --  84  --  83  --  85  --  87     --   --  156  --   --  169  --  191  --  187  --  175   INR  --   --   --   --   " --   --  1.06  --  1.25*  --  2.77*  --   --      --   --  142  --  146* 147*   < > 147*   < > 148*   < > 141   POTASSIUM 3.5 3.4* 3.3* 3.3*  Canceled, Test credited   < >  --  3.2*   < > 3.2*   < > 3.2*   < > 3.6   CHLORIDE 109*  --   --  114*  --   --  117*   < > 118*   < > 119*   < > 116*   CO2 21  --   --  19*  --   --  20*   < > 18*   < > 16*   < > 16*   BUN 27*  --   --  37*  --   --  42*   < > 49*   < > 57*   < > 62*   CR 0.71  --   --  0.80  --   --  0.94   < > 1.17   < > 1.38*   < > 1.52*   ANIONGAP 9  --   --  9  --   --  10   < > 11   < > 13   < > 9   DARIANA 7.6*  --   --  7.9*  --   --  7.7*   < > 7.5*   < > 7.6*   < > 7.5*   GLC 87  --   --  125*  --   --  113*   < > 206*   < > 103   < > 115*   ALBUMIN  --   --   --   --   --   --   --   --   --   --  <1.5*  --  <1.5*   PROTTOTAL  --   --   --   --   --   --   --   --   --   --  3.8*  --  3.6*   BILITOTAL  --   --   --   --   --   --   --   --   --   --  2.0*  --  1.1*   ALKPHOS  --   --   --   --   --   --   --   --   --   --  197*  --  186*   ALT  --   --   --   --   --   --   --   --   --   --  15  --  13   AST  --   --   --   --   --   --   --   --   --   --  37  --  37    < > = values in this interval not displayed.     No results found for this or any previous visit (from the past 24 hour(s)).

## 2021-06-30 NOTE — PROGRESS NOTES
"Clinical Nutrition / Reassessment     Assessment:   Client History:  Pt at well at lunch today,  reported to nursing almost everything but the eggs and drank a boost. Plans to try another boost when she wakes up, resting now.  was on the phone.   Diet Order: NDD 2 with nectar thick fluids  Oral Intake:  Minimal reported yesterday, better today at lunch. Unable to locate the calorie count sheets, will reprint.   Supplement Intake:  Boost Plus on trays, encourage as able and tolerates  Weight:   Wt Readings from Last 10 Encounters:   06/30/21 69.4 kg (153 lb)   06/08/21 53.7 kg (118 lb 6.4 oz)   06/03/21 57.2 kg (126 lb)   06/01/21 57.2 kg (126 lb)   05/28/21 57.2 kg (126 lb)   05/14/21 55.6 kg (122 lb 9.6 oz)   10/09/19 59.7 kg (131 lb 9.6 oz)   06/12/19 62.8 kg (138 lb 6.4 oz)   09/05/18 66.2 kg (146 lb)   08/28/18 66.2 kg (146 lb)   Height: 5' 3.5\"  BMI: Body mass index is 26.68 kg/m .    Estimated nutritional needs based on:  current body weight  58 kg / 126 lbs   Estimated energy needs:  1079-0953 kcal/day (25-30 kcal/kg)  Estimated protein needs:  70-87 gm/day (1.2-1.5 g/kg)  Estimated fluid needs:  6160-2554 ml/day (1 ml/kcal)    Malnutrition Criteria:  (Need to have 2 indicators to qualify recommendation)  Energy Intake: Acute Severe: < 50% of estimated energy requirement for > 7days  Interpretation of Weight Loss:  No significant weight loss  Physical Findings:  Acute Fluid Accumulation:  mild- significant now  Reduced  Strength:  likely reduced-appears weak    Recommended Nutrition Diagnosis:   Severe Malnutrition in the context of acute illness or injury - based on AND/ASPEN Clinical Characterstics of Malnutrition May 2012      Nutrition Education: Nutrition education will be provided as appropriate.    Nutrition Diagnosis: Oral or Nutrition Support Intake:  inadequate energy intakes related to decreased appetite as evidenced by intakes of 50% or less at most meals over past 6 " days.    Intervention:  Nutrition Prescription:     Nutrition Intervention(s): NDD II diet, nectar thick fluids  1. Meals and Snacks: small, frequent meals/snacks  2. Medical Food Supplement: Boost Plus, on trays and additional as she tolerates    Nutrition Goal(s):  1. Pt will consume 50% or more at meals and supplements - no met  2. Pt will not have unplanned wt loss during hospitalization - on going  3. Pt will tolerate diet as ordered, texture per speech therapy recommendations - not met     Monitoring and Evaluation:   Food Intake, diet tolerance, weights, labs    Discharge Recommendation:   Nutrition Discharge Planning  Will address closer to discharge.     RD will reassess in within 1-5 days or sooner.    Shamika Rose RD on 6/30/2021 at 2:20 PM

## 2021-06-30 NOTE — PROGRESS NOTES
06/30/21 1634   Signing Clinician's Name / Credentials   Signing clinician's name / credentials Stephie Meeks OTR/L   Quick Adds   Rehab Discipline OT   Therapeutic Activity   Symptoms Noted During/After Treatment Increased pain   Treatment Detail OT approached pt in pm and found colostomy bag unsecure and pt/bed covered in contents of bag.  Nsg notified and OT assisted nursing in clean up. Rolling side to side with max assist of 1. scooting with max assist x 2.  Total assist to bathe torso, arms and legs and apply clean brief.    OT Discharge Planning    OT Discharge Recommendation (DC Rec) Transitional Care Facility   OT Rationale for DC Rec pt needs higher level of care due to decline in physical function; would benefit from rehab to promote increased independence with self cares, mobility   OT Brief overview of current status  pt requires max assist with self cares and mobility, continues to have poor tolerance for activity

## 2021-06-30 NOTE — PROGRESS NOTES
PROGRESS NOTE    Patient is an 86 year old female POD#2 s/p washout and closure of midline laparotomy incision, POD#15 s/p extended right colectomy complicated by small bowel resection x2 due to metastatic involvement of the small bowel x3, and POD#7 s/p anastomatic leak, transverse colectomy and anastomosis, and diverting loop ileostomy.    Past 24 hours: Patient's colostomy bag leaked. Nursing staff changed colostomy bag, midline incision dressing, and REMI tube dressing. Oakley catheter was removed. Patient up to sit in chair at bedside. Diet advanced to Dysphagia Diet Level 2 [facility] Altered Nectar Thickened Liquids (pre-thickened or use instant food thickener) per speech therapy recommendations. Patient complains of increased difficulty breathing and increased abdominal pain at the midline. Also promotes chills. She reports she did not sleep at all overnight. Denies chest pain.    EXAM:       Intake/Output Summary (Last 24 hours) at 6/30/2021 1136  Last data filed at 6/30/2021 0552  Gross per 24 hour   Intake 2149 ml   Output 1325 ml   Net 824 ml          Vitals:    06/29/21 1457 06/29/21 1949 06/30/21 0041 06/30/21 0300   BP: 131/52 117/41 125/50    BP Location: Right arm Right arm Right arm    Pulse: 80 80 86    Resp: 20 18 16    Temp: 98.6  F (37  C) 98  F (36.7  C) 98.2  F (36.8  C)    TempSrc: Tympanic Tympanic Tympanic    SpO2: 97% 99% 95%    Weight:    69.4 kg (153 lb)   Height:         GENERAL: remains afebrile, disoriented, NAD  CV: RRR, no murmurs noted  RESPIRATORY: no dyspnea, CTA bilaterally  ABDOMEN: non-distended, soft, appropriately tender, no rebound tenderness, incision dressing intact without saturation, no signs of infection, colostomy bag with liquid output, REMI draining serosanguinous fluid  EXTREMITY: +3 pitting edema bilaterally, SCDs in place  SKIN: warm and dry, no jaundice, no rashes    LABS  Recent Labs   Lab Test 06/30/21  0546 06/29/21  0210 06/28/21  0535 08/30/16  1622 08/30/16  1622  08/20/16  0012   WBC 11.9*  --  7.7   < >  --   --    RBC 2.92*  --  3.17*   < >  --   --    HGB 8.2*  --  8.8*   < > 14.8 14.4   HCT 25.1*  --  26.5*   < > 47.5 42.0   MCV 86  --  84   < > 91 90   MCH 28.1  --  27.8   < > 28.3 30.9   MCHC 32.7  --  33.2   < > 31.1* 34.2    156 169   < > 237 220   MPV  --   --   --   --  9.1 8.8    < > = values in this interval not displayed.       Recent Labs   Lab Test 06/29/21  1545 06/29/21  1017 06/29/21  0210 06/28/21  0535 06/28/21  0535   POTASSIUM 3.4* 3.3* 3.3*  Canceled, Test credited   < > 3.2*   CHLORIDE  --   --  114*  --  117*   BUN  --   --  37*  --  42*    < > = values in this interval not displayed.       Recent Labs   Lab Test 06/26/21  0445 06/25/21  0440 06/16/21  1424 06/16/21  1424 05/28/21  1219 05/28/21  1219   PROTEIN  --   --   --  Negative  --  Negative   BILITOTAL 2.0* 1.1*   < >  --    < >  --    AST 37 37   < >  --    < >  --    ALT 15 13   < >  --    < >  --     < > = values in this interval not displayed.       IMAGING: none    ASSESSMENT/PLAN  Patient is an 86 year old female POD#2 s/p washout and closure of midline laparotomy incision, POD#15 s/p extended right colectomy complicated by small bowel resection x2 due to metastatic involvement of the small bowel x3, and POD#7 s/p anastomatic leak, transverse colectomy and anastomosis, and diverting loop ileostomy.  Pain: Continue tylenol as scheduled  Nutrition: NDD2 and nectar thick liquids per speech therapy recommendations.   Electrolytes: Mg low today, will order replacement and recheck per protocol  DVT prophylaxis: SubQ heparin, SCDs  ID: Continue Augmentin, may consider discontinuing tomorrow if patient remains afebrile and free of new symptoms suggesting infection  Oakley: Removed yesterday, periwick in place  Continue other current cares per medicine team    Cristina Barnes [facility]    Del Pang MD on 6/30/2021 at 11:36 AM

## 2021-06-30 NOTE — PLAN OF CARE
"Called surgeon to inform of reddish liquid pooling around stoma. No new orders at this time. Will continue to monitor.   /41 (BP Location: Right arm)   Pulse 80   Temp 98  F (36.7  C) (Tympanic)   Resp 18   Ht 1.613 m (5' 3.5\")   Wt 65.8 kg (145 lb)   SpO2 99%   BMI 25.28 kg/m      Ann De Guzman RN on 6/29/2021 at 11:03 PM    "

## 2021-06-30 NOTE — PROGRESS NOTES
Called and updated Dr. Perla's about patients increased abdominal pain and formed bloody stool. She notes this is to be expected and will check on the patient later today.  Maral Maldonaod RN on 6/30/2021 at 9:10 AM

## 2021-07-01 NOTE — PROGRESS NOTES
07/01/21 1546   Signing Clinician's Name / Credentials   Signing clinician's name / credentials Say Santiago MPT   Quick Adds   Rehab Discipline PT   Functional Transfer Training   Symptoms Noted During/After Treatment fatigue;increased pain   Treatment Detail minimal assist of 2 for stand pivot with use of Fww   Gait Training   Symptoms Noted During/After Treatment (Gait Training) fatigue;increased pain   Distance in Feet (Required for LE Total Joints) patient took a few steps druing bed<>recliner transfer   Treatment Detail transfer bed<>recliner   [place] Level (Gait Training) minimum assist (75% patient effort)   Physical Assistance Level (Gait Training) 2 person assist   Weight Bearing (Gait Training) full weight-bearing   Assistive Device (Gait Training) rolling walker   Therapeutic Activity   Treatment Detail bed mobilities with maximal assist   PT Discharge Planning    PT Discharge Recommendation (DC Rec) Transitional Care Facility   PT Rationale for DC Rec to promote patient's highest level of functional, safe and independent mobility; pt has significant decline in functional ability requiring moderate assist for bed mobility and stand pivot transfer   PT Brief overview of current status  max assist of 2 for bed mobility, stand pivot transfer bed to recliner with minimal assist of 2.    Additional Documentation   Rehab Comments patient with increased WB/standing tolerance   PT Plan continue PT   Total Session Time   Total Session Time (minutes) 45 minutes

## 2021-07-01 NOTE — PROGRESS NOTES
:    I called Sophia at [facility] and gave her discharge update.  She has not accepted patient yet and is still screening.  PAS screen was completed.   will continue to follow.     AUGUST Whitley on 7/1/2021 at 11:26 AM    :    I received a call from Sophia at [facility] and they will accept patient at discharge.  We will need to send some ostomy supplies with patient until they can order them in.  Also, [facility] is needing a list of ostomy supplies wrote down and  faxed to [phone]. I will update nursing staff.  Anticipated discharge tomorrow.    AUGUST Whitley on 7/1/2021 at 12:02   PM    :    I met with patient and  in room  and gave them discharge update. Patients  was in agreement with discharge plan. Anticipated discharge tomorrow to [facility] via reclining wheelchair.  Ostomy supplies in bag already to go with patient tomorrow as well as list of supplies were faxed to [facility].    AUGUST Whitley on 7/1/2021 at 3:10 PM

## 2021-07-01 NOTE — PROGRESS NOTES
[facility]    Medicine Progress Note - Hospitalist Service       Date of Admission:  6/14/2021       Melissa Quinteros is a 86 year old female who was admitted on 6/14/2021.     She continues to have significant debility with malnutrition, deconditioning and intermittent delirium.  She does not appear to have an active infection.  Hemoglobin stable but borderline low.  Renal function remains at baseline and gradually mobilizing fluid.      Principal Problem:    Malignant neoplasm of transverse colon (H)    Assessment: POD #16 s/p extended right colectomy complicated by small bowel resection x 2 due to metastatic involvement of the small bowel x 3. On 6/22 noted to have free intraperitoneal air and large amount of ascites and was obtunded, CT with large perforation. Now POD #8 s/p anastomotic leak, transverse colectomy and anastomosis. Diverting loop ileostomy.  POD #3 from wound closure.  Remains severly malnourished but with improvement in oral intake the past two days.    Plan:   -Wound care, diet, activity restrictions per general surgery  -Zosyn x6d followed by augmentin for 10 day total.  Post op REIM drain cx with enterococcus resulted 6/23.   Suggest stopping at this point, will discuss with surgery.  -aspiration precautions      Hypernatremia  Assessment: Resolved  Pain:  Rehceck BMP daily       Hypervolemia    Assessment: stable. aggressive diuresis on 6/25. More likely related to poor nutritional status and third spacing versus CHF with elevated BNP/troponin and no new EKG changes, contribution of renal failure. Currenly renal function normal.  Diuresing with improving oral intake, currently down to 142lbs - about 20lbs above admit weight but down 20lbs from peak weight one week ago.    Plan:   -Strict I's and O's and daily weights      Active Problems:    Essential hypertension    Assessment: Stable and not actively treated as an outpatient.    Plan:   -Monitor      Acute kidney failure  with tubular necrosis (H)    Assessment: resolved and likely prerenal, possibly a cardiorenal component.    Plan:   -Avoid NSAIDs and nephrotoxins  -Monitor daily      Hypomagnesemia    Assessment: stable, mildly depresessed    Plan: - replace per protocol      Altered mental status    Assessment: Previously due to metabolic and infectious encephalopathy.  Has had significant improvement since repeat surgery.  Now with intermitted delusions/sundowning.     Plan:   -Continue physical and Occupational Therapy  -seroquel at bedtime if agitated        Anemia    Assessment: stable. Multifactorial and likely from phlebotomy, surgery, chronic disease and iron deficiency.  Not a IV iron candidate due to active infection.    Plan:   -recheck cbc in am.    Severe protein-calorie malnutrition (Schumacher: less than 60% of standard weight) (H)    Assessment: Prealbumin 5, poor oral intake.  INR was greater than 2 and likely due to nutritional deficits, now normalized.  No evidence of consumptive process.     Plan:   -Appreciate dietary consult and speech consult for diet advancement  -5 mg p.o. vitamin K on 6/26   -Continue SCD's, ok to restart heparin.      Elevated troponin 6/25    Assessment: mild and resolved.  Likely demand from acute kidney injury, acute infection, no obvious EKG changes or cardiopulmonary symptoms concerning for ACS.     Plan:   -telemetry                  Diet: Snacks/Supplements Adult: Gelatein Plus; With Meals  Snacks/Supplements Adult: Boost Plus; With Meals  Dysphagia Diet Level 2 [facility] Altered Nectar Thickened Liquids (pre-thickened or use instant food thickener)    DVT Prophylaxis: Heparin SQ  Oakley Catheter: Not present  Central Lines: None  Code Status: Full Code      Disposition Plan   Expected discharge: per surgery, recommended to transitional care unit once adequate pain management/ tolerating PO medications, hemoglobin stable and mental status at baseline.     The patient's care was discussed  "with the Patient.    Diallo Frausto MD  Hospitalist Service  [facility]  Securely message with the [employer] Web Console (learn more here)  Text page via [facility] Paging/Directory      Risk Factors Present on Admission                ______________________________________________________________________    Interval History   Patient was pleasantly confused this morning.  She reported no abdominal pain.  She told me she was \"a little\" nauseous.  She did not know she was in the hospital but reoriented quickly.  We had a nice conversation about the [facility].  She told me that she was hungry.    Nursing notes reviewed.    I also talked with her  at length yesterday.  He is concerned about her lack of progress.    Data reviewed today: I reviewed all medications, new labs and imaging results over the last 24 hours. I personally reviewed no images or EKG's today.    Physical Exam   Vital Signs: Temp: 98.7  F (37.1  C) Temp src: Tympanic BP: (!) 118/36 Pulse: 82   Resp: 16 SpO2: 95 % O2 Device: None (Room air)    Weight: 142 lbs 14.4 oz  Constitutional: awake, alert, cooperative, no apparent distress, and appears stated age  Respiratory: Clear to auscultation bilaterally  Cardiovascular: Regular rate and rhythm.  No murmurs.  No JVD.  2+ lower extremity edema to the knees  GI: Abdomen soft.  Currently with minimal tenderness.  Bowel sounds heard.  Neuropsychiatric: Patient is alert and oriented to self and with coaching to place and situation    Data   Recent Labs   Lab 07/01/21  0600 06/30/21  0546 06/29/21  1545 06/29/21  0210 06/29/21  0210 06/28/21  0535 06/28/21  0535 06/27/21  0250 06/27/21  0250 06/26/21  0445 06/26/21  0445 06/25/21  0440 06/25/21  0440   WBC 11.5* 11.9*  --   --   --   --  7.7  --  7.1  --  6.5  --  7.6   HGB 7.8* 8.2*  --   --   --   --  8.8*  --  7.9*  --  7.9*  --  8.1*   [facility] [place]  --   --   --   --  84  --  83  --  85  --  87    204  --   --  156 "  --  169  --  191  --  187  --  175   INR  --   --   --   --   --   --  1.06  --  1.25*  --  2.77*  --   --     139  --   --  142   < > 147*   < > 147*   < > 148*   < > 141   POTASSIUM 3.5 3.5 3.4*   < > 3.3*  Canceled, Test credited   < > 3.2*   < > 3.2*   < > 3.2*   < > 3.6   CHLORIDE 110* 109*  --   --  114*  --  117*   < > 118*   < > 119*   < > 116*   CO2 20* 21  --   --  19*  --  20*   < > 18*   < > 16*   < > 16*   BUN 23 27*  --   --  37*  --  42*   < > 49*   < > 57*   < > 62*   CR 0.65 0.71  --   --  0.80  --  0.94   < > 1.17   < > 1.38*   < > 1.52*   ANIONGAP 10 9  --   --  9  --  10   < > 11   < > 13   < > 9   DARIANA 7.6* 7.6*  --   --  7.9*  --  7.7*   < > 7.5*   < > 7.6*   < > 7.5*   GLC 68* 87  --   --  125*  --  113*   < > 206*   < > 103   < > 115*   ALBUMIN  --   --   --   --   --   --   --   --   --   --  <1.5*  --  <1.5*   PROTTOTAL  --   --   --   --   --   --   --   --   --   --  3.8*  --  3.6*   BILITOTAL  --   --   --   --   --   --   --   --   --   --  2.0*  --  1.1*   ALKPHOS  --   --   --   --   --   --   --   --   --   --  197*  --  186*   ALT  --   --   --   --   --   --   --   --   --   --  15  --  13   AST  --   --   --   --   --   --   --   --   --   --  37  --  37    < > = values in this interval not displayed.     No results found for this or any previous visit (from the past 24 hour(s)).

## 2021-07-01 NOTE — PROGRESS NOTES
07/01/21 0900   Signing Clinician's Name / Credentials   Signing clinician's name / credentials Ophelia Knight CCC-SLP   Quick Adds   Rehab Discipline SLP   SLP Discharge Planning    SLP Brief overview of current status  Pt laying in bed and accompanied by  when SLP arrived to room. Pt stating she does not want to eat breakfast this morning. Pt trialing thin and nectar liquids as well as semi-solid foods. Pt attempting to trial solid food but spit it out because she did not like the taste. Pt exhibiting displeasure to all trials except for water which was evident by facial grimacing. When asked, pt denied any pain when swallowing. Pt exhibiting double swallow on +1/1 semi-solid trial. Pt showing no overt s/sx asp/pen on +2/2 nectar thick liquid trials. Pt burping post thin liquid intake on +1/2 trials. Pt did not want to complete anymore PO intake trials. Pt and  were educated on diet recommendations and free water protocol. NDD2 and nectar-thick liquid continue to be recommended along with the implementation of free water protocol. Pt should remain sitting for 30 minutes post PO intake and oral cares should be completed after all PO intake.    Additional Documentation   SLP Plan Continue dysphagia treatment as needed/appropriate   Total Session Time   Total Session Time (minutes) 15 minutes

## 2021-07-01 NOTE — PROGRESS NOTES
Wound Ostomy Nurse:    Patient is an 86 year old female POD#3 s/p washout and closure of midline laparotomy incision, POD#16 s/p extended right colectomy complicated by small bowel resection x2 due to metastatic involvement of the small bowel x3, and POD#8 s/p anastomatic leak, transverse colectomy and anastomosis, and diverting loop ileostomy.      [facility] Nurse Inpatient [facility]   WO Nurse Inpatient Adult     Initial Assessment   Assessment of new loop Colostomy Stoma complication(s) none   Mucocutaneous junction; intact   Peristomal complication(s) none   Pouch wear time:less than 24 hours      Objective data:  Patient history according to medical record: reviewed. She does have dementia and is unable to retain ostomy teaching and is too frail to participate.  Current Diet/Nutrition: Orders Placed This Encounter      Dysphagia Diet Level 2 [facility] Altered Nectar Thickened Liquids (pre-thickened or use instant food thickener)     TPN no   I/O last 3 completed shifts:  In: 950 [P.O.:950]  Out: 2110 [Urine:250; Drains:60; Stool:1800]  Labs:    Recent Labs   Lab 07/01/21  0600 06/28/21  0535 06/28/21  0535 06/26/21  0445 06/26/21  0445   ALBUMIN  --   --   --   --  <1.5*   HGB 7.8*   < > 8.8*   < > 7.9*   INR  --   --  1.06   < > 2.77*   WBC 11.5*   < > 7.7   < > 6.5    < > = values in this interval not displayed.        Physical Exam:  Current pouching system:ConvaTec   Reason for pouch change today: leakage  Stoma appearance: viable, beefy red, oval and moist  Stoma size; 2 inch , one-piece ostomy appliance cut to fit (if using 2-piece will need a 70 mm size.  Peristomal skin: intact  Stoma output :brown, green, liquid and high volume   Abdominal  Assessment  soft , NG still in place? No  Surgical Site: open to air  Pain: Cramping  Is patient still on a PCA No    Interventions:  Patient's chart evaluated.  Focus of today's visit: refitting of appliance   Participant of teaching session today  unable to participate  Orders: Reviewed  Change made with ostomy management today: No  Patient/family: observing  Supplies:Gathered and at bedside    Plan:  Learning needs: will be going to a SNF for continued care and teaching  Preparation for discharge: Completed supply list and faxed to SNF  Recommend home care? patient going to a SNF    Discussed plan of care with Patient and Family  Nursing to notify the Provider(s) and re-consult the WOC Nurse if new ostomy concerns or discharge planned before next planned WOC visit.    WOC Nurse will return: PRN  Face to face time: 45 minutes    Vanessa Pang RN

## 2021-07-01 NOTE — PROGRESS NOTES
07/01/21 1539   Signing Clinician's Name / Credentials   Signing clinician's name / credentials Anna Wick OTR/L    Quick Adds   Rehab Discipline OT   Functional Transfer Training   Symptoms Noted During/After Treatment fatigue;increased pain   Treatment Detail pt transferred from bed to recliner taking a few steps with FWW with min assist of 2. Pt was pleasant and joking at times, then screaming at times, easily re-directed.    Gait Training   Symptoms Noted During/After Treatment (Gait Training) fatigue;increased pain   Distance in Feet (Required for LE Total Joints) few steps to recliner    [place] Level (Gait Training) minimum assist (75% patient effort)   Physical Assistance Level (Gait Training) 2 person assist   Assistive Device (Gait Training) rolling walker   OT Discharge Planning    OT Discharge Recommendation (DC Rec) Transitional Care Facility   OT Rationale for DC Rec Pt would benefit from on-going OT at STR to maximize level of independence needed to return home with  as previous    OT Brief overview of current status  Pt needed max assist of 2 for supine to sit, min assist of 2 to take a few steps to recliner with FWW, max assist with shampoo cap and to comb hair, pt progressing slowly.    Additional Documentation   OT Plan cont OT    Total Session Time   Total Session Time (minutes) 45 minutes

## 2021-07-01 NOTE — PLAN OF CARE
"Patient AxO x4. Complains of nausea, PRN compazine given. LS clear but diminished. Stoma WDL with reddish brown output. REMI dressing CDI. Surgical incision covered with steri-strips, CDI. Stool and urine incontinence, purewick in place. Lotion applied to skin. Screened by Sophia at [facility]. VSS. BP (!) 118/36 (BP Location: Right arm)   Pulse 82   Temp 98.7  F (37.1  C) (Tympanic)   Resp 16   Ht 1.613 m (5' 3.5\")   Wt 64.8 kg (142 lb 14.4 oz)   SpO2 95%   BMI 24.92 kg/m      Patient reported pain a 10/10, PRN oxycodone given with some relief. Cristina Monzon RN on 7/1/2021 at 5:49 PM    "

## 2021-07-01 NOTE — PLAN OF CARE
"Pt is alert and responds to voice. Pt complaining of increase nausea. Given zofran and compazine with minimal relief. Unable to take hs meds due to nausea. Complains of abd pain given oxycodone see mar. Pt has +2 edema on lower extremities. Incontinent of urine. Pure wick in place. Stoma is pink with yellow creamy output. REMI drain dressing is CDI. Having serous output. Afebrile. LS clear. Barrier cream applied to coccyx. VSS. Will continue to monitor.   /51 (BP Location: Right arm)   Pulse 88   Temp 98.6  F (37  C) (Tympanic)   Resp 16   Ht 1.613 m (5' 3.5\")   Wt 69.4 kg (153 lb)   SpO2 96%   BMI 26.68 kg/m      Ann De Guzman RN on 7/1/2021 at 1:00 AM    "

## 2021-07-01 NOTE — PROGRESS NOTES
PROGRESS NOTE    Patient is an 86 year old female POD#3 s/p washout and closure of midline laparotomy incision, POD#16 s/p extended right colectomy complicated by small bowel resection x2 due to metastatic involvement of the small bowel x3, and POD#8 s/p anastomatic leak, transverse colectomy and anastomosis, and diverting loop ileostomy.    Past 24 hours: Patient's colostomy bag leaked again per nursing report and patient was covered in her stool. Yesterday patient continued to improve/advance her diet. She was able to ambulate to a chair at bedside. She rates her abdominal pain as an 11 out of 10. She still promotes chills which has been consistent during her hospital stay. Upon awakening, she was asking where her  was. Denies chest pain or difficulty breathing.    EXAM:  Intake/Output Summary (Last 24 hours) at 7/1/2021 0653  Last data filed at 7/1/2021 0612  Gross per 24 hour   Intake 950 ml   Output 2110 ml   Net -1160 ml     Vitals:    06/30/21 2308 07/01/21 0220 07/01/21 0415 07/01/21 0741   BP: 138/51  130/49 (!) 118/36   BP Location: Right arm  Right arm Right arm   Pulse: 88  85 82   Resp: 16  20 16   Temp: 98.6  F (37  C)  98.3  F (36.8  C) 98.7  F (37.1  C)   TempSrc: Tympanic  Tympanic Tympanic   SpO2: 96%  96% 95%   Weight:  64.8 kg (142 lb 14.4 oz)     Height:           GENERAL: remains afebrile, disoriented, NAD   CV: RRR, systolic murmur noted  RESPIRATORY: no dyspnea, CTA bilaterally  ABDOMEN: non-distended, soft, appropriately tender, no rebound tenderness, incision dressing open to air with steristrips in place, no signs of infection, colostomy bag with light brown liquid output, REMI draining serosanguinous fluid  EXTREMITY: +3 pitting edema bilaterally, SCDs in place  SKIN: warm and dry, no jaundice, no rashes    LABS  Recent Labs   Lab Test 06/30/21  0546 06/29/21  0210 06/28/21  0535 08/30/16  1622 08/30/16  1622 08/20/16  0012   WBC 11.9*  --  7.7   < >  --   --    RBC 2.92*  --  3.17*    < >  --   --    HGB 8.2*  --  8.8*   < > 14.8 14.4   HCT 25.1*  --  26.5*   < > 47.5 42.0   MCV 86  --  84   < > 91 90   MCH 28.1  --  27.8   < > 28.3 30.9   MCHC 32.7  --  33.2   < > 31.1* 34.2    156 169   < > 237 220   MPV  --   --   --   --  9.1 8.8    < > = values in this interval not displayed.       Recent Labs   Lab Test 06/30/21  0546 06/29/21  1545 06/29/21  0210 06/29/21  0210   POTASSIUM 3.5 3.4*   < > 3.3*  Canceled, Test credited   CHLORIDE 109*  --   --  114*   BUN 27*  --   --  37*    < > = values in this interval not displayed.       Recent Labs   Lab Test 06/26/21  0445 06/25/21  0440 06/16/21  1424 06/16/21  1424 05/28/21  1219 05/28/21  1219   PROTEIN  --   --   --  Negative  --  Negative   BILITOTAL 2.0* 1.1*   < >  --    < >  --    AST 37 37   < >  --    < >  --    ALT 15 13   < >  --    < >  --     < > = values in this interval not displayed.     ASSESSMENT  Patient is an 86 year old female POD#3 s/p washout and closure of midline laparotomy incision, POD#16 s/p extended right colectomy complicated by small bowel resection x2 due to metastatic involvement of the small bowel x3, and POD#8 s/p anastomatic leak, transverse colectomy and anastomosis, and diverting loop ileostomy.    PLAN   Pain: Continue tylenol as scheduled  Nutrition: NDD2 and nectar thick liquids per speech therapy recommendations.   Electrolytes: Labs pending.  DVT prophylaxis: SubQ heparin, SCDs  ID: May discontinue Augmentin treatment today. Completed 5 days antibiotic treatment following re-culture. Will monitor clinically and closely for emerging signs/symptoms of infection including, but not limited to, fever, increased confusion, vital instability, etc.  Drains: Continue current REMI drain cares. Will continue to monitor output.  Continue other current cares per medicine team     WOLFGANG Terrazas Pang, MD on 7/1/2021 at 3:04 PM

## 2021-07-02 NOTE — PLAN OF CARE
"/45 (BP Location: Right arm)   Pulse 89   Temp 97.7  F (36.5  C) (Tympanic)   Resp 16   Ht 1.613 m (5' 3.5\")   Wt 61.7 kg (136 lb 1.6 oz)   SpO2 95%   BMI 23.73 kg/m      VS, afebrile.  Incision, colostomy sight intact and WDL.  Pt continues to be confused and yelling out all shift.  PRN pain medications adequate for relief for a time.  Zyprexa 5mg administered with no decrease in symptoms for increased agitation and impulsivity.  Adequate output, urine dark lele with sediment.  Will continue to monitor.  Sophia Frausto RN.............................7/2/2021 6:15 AM     "

## 2021-07-02 NOTE — PROGRESS NOTES
:    I called Sophia at [facility] and gave her discharge update.  Patient will be discharging today.  [facility] stated they will be here at 1045 to  patient via reclining wheelchair.  I updated patient and  in room.  No further needs at this time.    AUGUST Whitley on 7/2/2021 at 10:32 AM

## 2021-07-02 NOTE — PROGRESS NOTES
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       Precautions: None   Discharge order: Short Term Care less than 30 days   Condition at discharge: improving   Admission H&P remains valid and up to date: yes (If no, please update H&P)   Diet: Regular             Weight bearing status: Weight bearing as tolerated   Activity Level: Up with assist   Care Level: Skilled      CPR Status: Full Resuscitative Measures  Advanced Directives:   None   Rehabilitation Potential: Good Nursing home standing orders: yes      Physical Therapy: Evaluate and treat   Occupational Therapy: Evaluate and treat   Speech Therapy: Evaluate and treat       Give two-step Mantoux (PPD) per facility policy (0.1 ml 5 TU PPD)  Give Pneumovax 0.5 ml IM if not given in 5 years  Give influenza vaccine (October through March admissions)  Immunization History   Administered Date(s) Administered     COVID-19,PF,Pfizer 04/08/2021, 04/29/2021     [id number] Novel Flu 01/12/2010     Influenza (IIV3) PF 10/03/2011, 09/18/2012     Influenza Vaccine IM  > 6 months Valent IIV4 09/25/2014, 10/05/2015, 10/14/2016, 11/10/2017     Influenza Vaccine IM Ages 6-35 Months 4 Valent (PF) 10/31/2013    Special Treatments:   None   Lab, x-ray, future appointments, etc:  7/13 @ 1040 Other:       Stop date on all orders 60 days unless otherwise noted for long term care.    Discharging physician: Del Pang MD      Discharging physician signature:____________________________________ 7/2/2021 9:34 AM    Physician to follow: Staff MD/ Rani

## 2021-07-02 NOTE — DISCHARGE SUMMARY
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     SCC (squamous cell carcinoma), face     Hallux valgus, acquired, right     Malignant neoplasm of transverse colon (H)     Acute kidney failure with tubular necrosis (H)     Hypomagnesemia     Altered mental status     Metabolic acidosis     Anemia     Severe protein-calorie malnutrition (Schumacher: less than 60% of standard weight) (H)     Elevated troponin     Hypervolemia               Procedures:   Procedure(s):  Extended right hemicolectomy with small bowel resection x2       Return to the OR for anastomotic leak with segmental colon resection and anastomosis and diverting loop ileostomy    Washout and closure of midline laparotomy             Medications Prior to Admission:     Medications Prior to Admission   Medication Sig Dispense Refill Last Dose     clobetasol (TEMOVATE) 0.05 % external cream Apply topically 2 times daily 45 g 1 Past Week at AM     doxepin (ZONALON) 5 % external cream Apply topically 3 times daily 45 g 1 not using at not using     magnesium oxide (MAG-OX) 400 MG tablet Take 400 mg by mouth daily  7 tablet  Past Week at AM     omeprazole (PRILOSEC) 20 MG CR capsule Take 1 capsule (20 mg) by mouth daily 90 capsule 3 Past Week at AM     triamcinolone (KENALOG) 0.1 % external ointment Apply topically 2 times daily   not using at not using             Discharge Medications:     Current  Discharge Medication List      START taking these medications    Details   acetaminophen (TYLENOL) 325 MG tablet Take 2 tablets (650 mg) by mouth every 4 hours as needed for other (For optimal non-opioid multimodal pain management to improve pain control.)  Qty: 90 tablet, Refills: 3    Associated Diagnoses: Post-operative state      ibuprofen (ADVIL/MOTRIN) 600 MG tablet Take 1 tablet (600 mg) by mouth every 6 hours as needed for moderate pain  Qty: 30 tablet, Refills: 1    Associated Diagnoses: Post-operative state      oxyCODONE (ROXICODONE) 5 MG tablet Take 1 tablet (5 mg) by mouth every 4 hours as needed for severe pain  Qty: 15 tablet, Refills: 0    Associated Diagnoses: Post-operative state         CONTINUE these medications which have NOT CHANGED    Details   clobetasol (TEMOVATE) 0.05 % external cream Apply topically 2 times daily  Qty: 45 g, Refills: 1    Associated Diagnoses: Other atopic dermatitis      doxepin (ZONALON) 5 % external cream Apply topically 3 times daily  Qty: 45 g, Refills: 1    Associated Diagnoses: Other atopic dermatitis      magnesium oxide (MAG-OX) 400 MG tablet Take 400 mg by mouth daily   Qty: 7 tablet      omeprazole (PRILOSEC) 20 MG CR capsule Take 1 capsule (20 mg) by mouth daily  Qty: 90 capsule, Refills: 3    Associated Diagnoses: Gastroesophageal reflux disease without esophagitis      triamcinolone (KENALOG) 0.1 % external ointment Apply topically 2 times daily                   Consultations:   Consultation during this admission received from internal medicine and SLP, PT          Brief History of Illness:   Melissa is a 86-year-old female with a history of crampy abdominal pain after eating.  She subsequently had a colonoscopy which showed circumferential mass in the transverse colon.  She was taken to the OR for laparoscopic extended right colectomy.  At the time of surgery there was involvement of 3 separate pieces of small bowel.  2 these were able to be resected  together.  Another small area was resected individually.  To small bowel anastomosis was were performed.  A side-to-side functional end-to-end ileocolonic anastomosis was performed to the transverse colon.  Patient then had a couple of good hospital days followed by a slow trajectory down.  On postoperative day #8 she was found to have anastomotic leak.  She was taken back to the OR for exploration.  At this time the ileocolonic anastomosis had leaked.  We resected this anastomosis and reorientated the bowel.  Washed out thoroughly.  The previous distal small bowel anastomosis was used as a site for diverting loop ileostomy.  Patient was admitted to the ICU postop.  Patient was remained stable.  Patient had significant fluid overload.           Hospital Course:   Postoperatively from the second surgery she has been making a slow recovery.  We been able to get for her excess water off.  She has been taking good p.o.  She has good p.o. output to the ostomy appliance.  She has been up to chair.  She has not ambulated in the hallways.  Her baseline dementia has caused some delirium postop.  see medicine note for detailed problem description of their involvement          Discharge Instructions and Follow-Up:   Discharge diet: Regular   Discharge activity: Lifting restricted to 20 pounds X 3 weeks   Discharge follow-up: Follow up with me in 10 days   Wound care: Daily dressing changes  Ice to area for comfort  May get incision wet in shower but do not soak or scrub           Discharge Disposition:   Discharged to home      Attestation:  I have reviewed today's vital signs, notes, medications, labs and imaging.    Del Pang MD

## 2021-07-02 NOTE — PLAN OF CARE
Discharge Note      Data:  Melissa Quinteros discharged to nursing home at 1145 via wheel chair. Accompanied by staff.    Action:  Written discharge/follow-up instructions were provided to caregiver. Prescriptions sent to patients preferred pharmacy. All belongings sent with patient.    Response:  Caregiver verbalized understanding of discharge instructions, reason for discharge, and necessary follow-up appointments.

## 2021-07-02 NOTE — PHARMACY - DISCHARGE MEDICATION RECONCILIATION AND EDUCATION
Pharmacy:  Discharge Counseling and Medication Reconciliation    Melissa Quinteros  [address]  [place] [address]  [phone] (home)   86 year old female  PCP: Bret Jimenez    Allergies: Lovastatin    Discharge Counseling:    Pharmacist did NOT meet with patient prior to discharge, as patient is being discharged to [facility], where facility staff will administer medications.    Summary of Education: N/A- pharmacist did not meet with patient    Materials Provided:  MedCounselor sheets printed from Clinical Pharmacology on: no materials provided, as patient is being discharged to SNF    Discharge Medication Reconciliation:    It has been determined that the patient has an adequate supply of medications available or which can be obtained from [facility]'s preferred pharmacy, which has been confirmed as [id number].    Medications originally sent to [facility], but [facility] fills medications through [facility]- called [facility] to cancel Rx's and Dr. Pang alerted- he will resend prescriptions to [id number].    Thank you for the consult.    [facility], located within [facility]........July 2, 2021 10:46 AM

## 2021-07-02 NOTE — PROGRESS NOTES
Patient was pleasant. Vitals are stable. Patient's ostomy bag was leaking at shift change. New bag applied by Dr. Pang. And abdominal dressings changed by MD.     1100- patient's ostomy was leaking again. Bag was changed by nurse. Midline abdominal incision was cleaned and dressing was changed. MD stated to try to abdominal binder to prevent the patient from pulling at the ostomy.

## 2021-07-04 PROBLEM — R10.84 ABDOMINAL PAIN, GENERALIZED: Status: ACTIVE | Noted: 2021-01-01

## 2021-07-04 PROBLEM — T14.8XXA WOUND DRAINAGE: Status: ACTIVE | Noted: 2021-01-01

## 2021-07-04 NOTE — ED NOTES
Report from nursing home- ABD surgical wound with copious amounts of drainage, foul smelling, no temp. REMI drain no output today.

## 2021-07-04 NOTE — ED TRIAGE NOTES
ED Nursing Triage Note (General)   ________________________________    Melissa Quinteros is a 86 year old Female that presents to triage ambulance from [facility]. Patient had a sigmoid colon resection several weeks ago. Patient has been having increased drainage from abdominal incisional site. Staff state the drainage from her REMI drain has been green but now brown. Staff stated no drainage from her REMI drain but there is currently brown drainage in it. Patient states tenderness to her umbilical area. Afebrile. Patient does have chronic confusion. BG 88. MD at bedside. Blood noted to be in ostomy.   There were no vitals taken for this visit.  Patient appears alert , in no acute distress., and cooperative and calm behavior.    Airway: intact  Breathing noted as Normal.  Circulation Normal  Skin normal  Action taken:  Triage to critical care immediately      PRE [facility] PRIOR [facility] S[facility]UATION Residential Facility

## 2021-07-04 NOTE — ED NOTES
Colostomy emptied - 700mL yellow watery liquid, malodorous, abdominal dressing wound changed, large amount of bloody purulent malodorous drainage, patient tolerated well. Quinton Venegas RN on 7/4/2021 at 5:20 PM

## 2021-07-04 NOTE — ED PROVIDER NOTES
Emergency Department Provider Note  : 1935 Age: 86 year old Sex: female MRN: [id number]    Chief Complaint   Patient presents with     Post-op Problem       Medical Decision Making / Assessment / Plan   86 year old female with a dementia and recently diagnosed transverse colon malignancy status post partial colectomy, partial small bowel resection, and repeat operation for diverting ileostomy after anastomosis failure who presents with what appears to be blood draining from the inferior aspect of her wound.  There is also some blood in her ostomy.  She does have pain essentially everywhere to palpation in her abdomen but more significantly in the epigastric region.  She is not febrile here and has not apparently had fevers at her care facility.  She is normotensive not tachycardic here.  Due to her dementia, her exam and history are extremely limited.  The main concern would be repeat anastomotic failure versus developing hematoma or abscess.  We will send for a CT scan and obtain laboratory work-up.    ED Course as of 1942   Sun 2021   1543 INR(!): 2.39   1543 Sodium: 143   1543 Potassium(!): 3.1   1543 Chloride(!): 109   1543 WBC: 6.9   1543 Hemoglobin(!): 7.7      Ultimately, the patient does not have acidosis, does not have a white count, has a stable hemoglobin, but does have mild hypokalemia.  She remained hemodynamically stable here although intermittently she did have bouts of pain when she would scream out.  Is very unclear what was actually bothering her.  CT appears to show ongoing intraperitoneal fluid with a small bit of air as well as a rim-enhancing lesion adjacent to her abdominal wound.  Did discuss these findings with Dr. Parisi of surgery would like to admit the patient overnight for observation and a minimum.  Patient will be given Zosyn, saline infusion in addition to the fluid bolus given here and blood cultures will be obtained.    New Prescriptions    No medications  on file       Final diagnoses:   Abdominal pain, generalized   Wound drainage       Steve Shetty MD  7/4/2021   Emergency Department    Subjective   Melissa is a 86 year old female with PMH of dementia and recently diagnosed malignancy of transverse colon status post partial colectomy with partial resection of the small bowel multiple involved locations and subsequent second operation not for diverting ileostomy after anastomosis failure who presents at  2:41 PM for evaluation of leaking abdominal wound.  Appears the patient today started to have draining fluid from the lower aspect of her wound with a foul odor.  Patient has not apparently had fevers at the care facility that she is at but does appear to have some abdominal pain.  Her baseline dementia precludes getting any sort of meaningful history from the patient, unfortunately, and the remainder of history is limited at this point.  Will attempt to reach patient's care facility for additional information.    I have reviewed the Medications, Allergies, Past Medical and Surgical History, and Social History in the [employer] [facility] and with family.    Review of Systems:  Please see HPI for pertinent positives and negatives. All other systems reviewed and found to be negative.      Objective   BP: 129/52  Pulse: 84  Temp: 98.2  F (36.8  C)  Resp: 20  Weight: 59.6 kg (131 lb 8 oz)  SpO2: 99 %    Physical Exam:   Gen: Comfortable. NAD  HEENT: MMM. AT/NC.  Eye: EOMI.   CV:  Intact and symmetric distal pulses.  Regular rate and rhythm.  Pulm: Nonlabored, equal chest rise  Abd:  Midline vertical abdominal scar with thick lighter colored blood draining from the inferior aspect of the wound without apparent dehiscence of the wound.  Ostomy in place over the left hemiabdomen with some blood in the pouch as well as brown stool.  There is apparent tenderness palpation throughout the abdomen but more notably in the epigastrium and adjacent to the patient's wound.  No  significant overlying skin changes or significant contusion.  Ext: Full ROM. No edema  Neuro:  at baseline patient has dementia.  She is pleasant but mostly unaware of her current state.  Commands all extremities.  Psych:  Quite pleasant.  Demented.    Procedures / Critical Care   Procedures    Critical Care Time: None         Medical/Surgical History:  Past Medical History:   Diagnosis Date     Esophageal obstruction     8/30/2016     Essential (primary) hypertension     8/30/2016     Hyperlipidemia     No Comments Provided     Zoster without complications     X2     Past Surgical History:   Procedure Laterality Date     APPENDECTOMY       ARTHROPLASTY KNEE Right 2017    [place], [place]     COLONOSCOPY      No Comments Provided     COLONOSCOPY N/A 6/3/2021    Procedure: COLONOSCOPY, WITH POLYPECTOMY AND BIOPSY with endoscopic tattoo;  Surgeon: Del Pang MD;  Location:  OR     ESOPHAGOGASTRODUODENOSCOPY       HYSTERECTOMY TOTAL ABDOMINAL      1982,incidental appendectomy     INCISION AND DRAINAGE ABSCESS PELVIS, COMBINED N/A 6/28/2021    Procedure: Washout and closure of midline laparotomy.;  Surgeon: Del Pang MD;  Location:  OR     LAPAROSCOPIC ASSISTED COLECTOMY N/A 6/14/2021    Procedure: COLECTOMY, LAPAROSCOPIC, small bowel resection X2, extended Right hemicolectomy, biopsy of the mesenteric node;  Surgeon: Del Pang MD;  Location:  OR     LAPAROTOMY EXPLORATORY N/A 6/22/2021    Procedure: Exploratory LAPAROTOMY with transverse colon Resection, and ileostomy;  Surgeon: Del Pang MD;  Location:  OR     TONSILLECTOMY, ADENOIDECTOMY, COMBINED      childhood       Medications:  Current Facility-Administered Medications   Medication     0.9% sodium chloride BOLUS     piperacillin-tazobactam (ZOSYN) intermittent infusion 4.5 g     sodium chloride 0.9% infusion     Current Outpatient Medications   Medication     acetaminophen (TYLENOL) 325 MG tablet     clobetasol (TEMOVATE) 0.05 %  external cream     doxepin (ZONALON) 5 % external cream     ibuprofen (ADVIL/MOTRIN) 600 MG tablet     ibuprofen (ADVIL/MOTRIN) 600 MG tablet     magnesium oxide (MAG-OX) 400 MG tablet     omeprazole (PRILOSEC) 20 MG CR capsule     oxyCODONE (ROXICODONE) 5 MG tablet     triamcinolone (KENALOG) 0.1 % external ointment       Allergies:  Lovastatin    Relevant labs, images, EKGs, Epic and outside hospital (if applicable) charts were reviewed. The findings, diagnosis, plan, and need for follow up were discussed with the patient/family. Nursing notes were reviewed.

## 2021-07-05 PROBLEM — K65.1 POSTOPERATIVE INTRA-ABDOMINAL ABSCESS (H): Status: ACTIVE | Noted: 2021-01-01

## 2021-07-05 PROBLEM — T81.43XA POSTOPERATIVE INTRA-ABDOMINAL ABSCESS (H): Status: ACTIVE | Noted: 2021-01-01

## 2021-07-05 PROBLEM — D62 ACUTE ON CHRONIC BLOOD LOSS ANEMIA: Status: ACTIVE | Noted: 2021-01-01

## 2021-07-05 NOTE — PROGRESS NOTES
3rd dressing change since admission to unit. Large amount of drainage from surgical incision. Red, brown, and foul smelling. Outer dressings replaced with absorbant foam dressing and ABD.

## 2021-07-05 NOTE — PLAN OF CARE
Patient A&O to self, vitals stable, denies pain. Outer abdominal dressing changed today, inner packing changed today by Dr. Pang. Odorous, serosanguinous drainage noted.  One unit of blood administered, tolerated without complication. Stoma pink, brown output noted from ostomy.  Maral Maldonado RN on 7/5/2021 at 2:44 PM

## 2021-07-05 NOTE — PROGRESS NOTES
:     Patient is from [facility] term rehab.  Update provided to [facility].    AUGUST Lieberman on 7/5/2021 at 12:11 PM

## 2021-07-05 NOTE — PROGRESS NOTES
"Clinical Nutrition / Initial Assessment     Reason for Assessment:  Screened at nutritional risk due to:  Recent weight loss without trying, poor appetite, recent hospitalization with malnutrition    Assessment:   Client History:  Pt discharged after lengthy hospital stay on 7/2 to [facility]. Her appetite before leaving here was slowly improving but suspected to still be less than desired. Will print out calorie count sheets again to closer track of her intakes. She had been encouraged by her  to eat/drink and she typically had done well with that. Currently NPO for potential procedures. Recommend supplements to be added to her orders when able to take PO. She took the Boost Plus well last time.   Diet Order:  NPO  Supplement Intake:  Recommend adding Boost Plus to trays TID and as tolerated between   Weight:   Wt Readings from Last 10 Encounters:   07/05/21 60.8 kg (134 lb 0.6 oz)   07/02/21 61.7 kg (136 lb 1.6 oz)   06/08/21 53.7 kg (118 lb 6.4 oz)   06/03/21 57.2 kg (126 lb)   06/01/21 57.2 kg (126 lb)   05/28/21 57.2 kg (126 lb)   05/14/21 55.6 kg (122 lb 9.6 oz)   10/09/19 59.7 kg (131 lb 9.6 oz)   06/12/19 62.8 kg (138 lb 6.4 oz)   09/05/18 66.2 kg (146 lb)   Height:   Ht Readings from Last 2 Encounters:   06/14/21 1.613 m (5' 3.5\")   06/08/21 1.613 m (5' 3.5\")     BMI: Body mass index is 23.37 kg/m .    Estimated nutritional needs based on:  current body weight  61 kg / 134 lbs   Estimated energy needs:  2967-9735 kcal/day (25-30 kcal/kg)  Estimated protein needs:  73-92 gm/day (1.2-1.5 g/kg)  Estimated fluid needs:  2952-6713 ml/day (1 ml/kcal)    Malnutrition Criteria:  (Need to have 2 indicators to qualify recommendation)  Energy Intake: Acute Severe: < 50% of estimated energy requirement for > 7days  Interpretation of Weight Loss:  No significant weight loss  Physical Findings:  Acute Fluid Accumulation:  mild   Reduced  Strength:  likely reduced-appears weak     Recommended Nutrition " Diagnosis:              Severe Malnutrition in the context of acute illness or injury - based on AND/ASPEN Clinical Characterstics of Malnutrition May 2012     Nutrition Education: Nutrition education will be provided as appropriate.    Nutrition Diagnosis: Oral or Nutrition Support Intake:  inadequate energy intakes related to decreased appetite as evidenced by intakes during previous hospitalization very poor.    Intervention:  Nutrition Prescription:     Nutrition Intervention(s):Recommended general, healthful diet--when able per MD orders   1. Meals and Snacks: small, frequent meals/snacks when able to tolerate   2. Medical Food Supplement: Boost Plus on trays and as tolerate between when able to tolerate    Nutrition Goal(s):  1. Pt will tolerate diet as ordered  2. Pt will consume 50% or more at meals and supplements once diet advances  3. Pt will not have unplanned wt loss during hospitalization     Monitoring and Evaluation:   Food Intake, diet tolerance, weight, labs     Discharge Recommendation:   Nutrition Discharge Planning  recommend supplements on discharge to help meet needs, TID    RD will reassess in within 1-5 days or sooner.    Shamika Rose RD on 7/5/2021 at 8:46 AM

## 2021-07-05 NOTE — H&P
Extended right hemicolectomy with small bowel resection x2 6/14       Return to the OR for anastomotic leak with segmental colon resection and anastomosis and diverting loop ileostomy 6/22     Washout and closure of midline laparotomy 6/28      GENERAL SURGERY H&P    Melissa Quinteros   [address] [facility] [id number]  86 year old  female    Primary Care Provider:  Bret Jimenez      HPI: Melissa Quinteros presents to the emergency department with increased abdominal pain and drainage from her midline incision. Patient is seen with her  who provides some history as the patient is unable to give accurate history due to dementia. Per the , patient has been having increasing abdominal pain over the last couple of days. The day her pain continues to worsen and she began having voluminous drainage from the midline wound. States the patient has been eating and drinking normally. Patient notes some discomfort when drinking liquids. Patient denies fevers or chills.  Patient was discharged from this hospital on 7/2/2021. Previously, she underwent extended right hemicolectomy with small bowel resection x2 on 6/14/2021. Then, she was taken back to the operating room on 6/22/2021 for anastomotic leak from the ileocolic anastomosis, there she underwent exploratory laparotomy, washout, anastomotic revision with diverting loop ileostomy. Patient was taken back to the operating room on 628 for washout and closure of the midline incision.      REVIEW OF SYSTEMS:    GENERAL: No fevers or chills. Positive fatigue  HEENT: No sinus drainage. No changes with vision or hearing. No difficulty swallowing.   LYMPHATICS:  No swollen nodes in axilla, neck or groin.  CARDIOVASCULAR: Denies chest pain, palpitations and dyspnea on exertion.  PULMONARY: No shortness of breath or cough. No increase in sputum production.  GI: Denies melena,bright red blood in stools. No hematemesis. No constipation, only liquid stool from the  diverting loop ileostomy  : No dysuria or hematuria.  SKIN: No recent rashes or ulcers.   HEMATOLOGY:  No history of easy bruising or bleeding.  ENDOCRINE:  No history of diabetes or thyroid problems.  NEUROLOGY:  No history of seizures or headaches. No motor or sensory changes.        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     SCC (squamous cell carcinoma), face     Hallux valgus, acquired, right     Malignant neoplasm of transverse colon (H)     Acute kidney failure with tubular necrosis (H)     Hypomagnesemia     Altered mental status     Metabolic acidosis     Anemia     Severe protein-calorie malnutrition (Schumacher: less than 60% of standard weight) (H)     Elevated troponin     Hypervolemia     Abdominal pain, generalized     Wound drainage       Past Medical History:   Diagnosis Date     Esophageal obstruction     8/30/2016     Essential (primary) hypertension     8/30/2016     Hyperlipidemia     No Comments Provided     Zoster without complications     X2       Past Surgical History:   Procedure Laterality Date     APPENDECTOMY       ARTHROPLASTY KNEE Right 2017    [place], [place]     COLONOSCOPY      No Comments Provided     COLONOSCOPY N/A 6/3/2021    Procedure: COLONOSCOPY, WITH POLYPECTOMY AND BIOPSY with endoscopic tattoo;  Surgeon: Del Pang MD;  Location:  OR     ESOPHAGOGASTRODUODENOSCOPY       HYSTERECTOMY TOTAL ABDOMINAL      1982,incidental appendectomy     INCISION AND DRAINAGE ABSCESS PELVIS, COMBINED N/A 6/28/2021    Procedure: Washout and closure of midline laparotomy.;  Surgeon: Del Pang MD;  Location:  OR     LAPAROSCOPIC ASSISTED COLECTOMY N/A 6/14/2021    Procedure: COLECTOMY, LAPAROSCOPIC, small bowel resection X2, extended Right hemicolectomy, biopsy of the mesenteric node;  Surgeon: Del Pang MD;  Location:  OR      LAPAROTOMY EXPLORATORY N/A 6/22/2021    Procedure: Exploratory LAPAROTOMY with transverse colon Resection, and ileostomy;  Surgeon: Del Pang MD;  Location: [facility] OR     TONSILLECTOMY, ADENOIDECTOMY, COMBINED      childhood       Family History   Problem Relation Age of Onset     Other - See Comments Father         Old age     Lung Cancer Mother      Brain Cancer Brother        Social History     Social History Narrative    .  Retired .  Enjoys reading, crossword puzzles and sports.  [place] time between [place] area, [place]       Social History     Socioeconomic History     Marital status:      Spouse name: Not on file     Number of children: Not on file     Years of education: Not on file     Highest education level: Not on file   Occupational History     Not on file   Social Needs     Financial resource strain: Not on file     Food insecurity     Worry: Not on file     Inability: Not on file     Transportation needs     Medical: Not on file     Non-medical: Not on file   Tobacco Use     Smoking status: Never Smoker     Smokeless tobacco: Never Used   Substance and Sexual Activity     Alcohol use: Not Currently     Comment: Alcoholic Drinks/day: Seldom     Drug use: No     Comment: Drug use: No     Sexual activity: Yes     Partners: Male   Lifestyle     Physical activity     Days per week: Not on file     Minutes per session: Not on file     Stress: Not on file   Relationships     Social connections     Talks on phone: Not on file     Gets together: Not on file     Attends Jainism service: Not on file     Active member of club or organization: Not on file     Attends meetings of clubs or organizations: Not on file     Relationship status: Not on file     Intimate partner violence     Fear of current or ex partner: Not on file     Emotionally abused: Not on file     Physically abused: Not on file     Forced sexual activity: Not on file   Other Topics Concern     Not  on file   Social History Narrative    .  Retired .  Enjoys reading, crossword puzzles and sports.  Div[place] time between [place] area, [place]       No current facility-administered medications on file prior to encounter.   acetaminophen (TYLENOL) 325 MG tablet, Take 2 tablets (650 mg) by mouth every 4 hours as needed for other (For optimal non-opioid multimodal pain management to improve pain control.)  clobetasol (TEMOVATE) 0.05 % external cream, Apply topically 2 times daily  doxepin (ZONALON) 5 % external cream, Apply topically 3 times daily  ibuprofen (ADVIL/MOTRIN) 600 MG tablet, Take 1 tablet (600 mg) by mouth every 6 hours as needed for moderate pain  ibuprofen (ADVIL/MOTRIN) 600 MG tablet, Take 1 tablet (600 mg) by mouth every 6 hours as needed for moderate pain  magnesium oxide (MAG-OX) 400 MG tablet, Take 400 mg by mouth daily   omeprazole (PRILOSEC) 20 MG CR capsule, Take 1 capsule (20 mg) by mouth daily  oxyCODONE (ROXICODONE) 5 MG tablet, Take 0.5-1 tablets (2.5-5 mg) by mouth every 4 hours as needed for severe pain  triamcinolone (KENALOG) 0.1 % external ointment, Apply topically 2 times daily          ALLERGIES/SENSITIVITIES:   Allergies   Allergen Reactions     Lovastatin      Other reaction(s): Arthralgia       PHYSICAL EXAM:     /49   Pulse 85   Temp 97.5  F (36.4  C) (Tympanic)   Resp 26   Wt 59.6 kg (131 lb 8 oz)   SpO2 97%   BMI 22.93 kg/m      General Appearance:   Lying in bed, appears in moderate distress  HEENT: Pupils are equal and reactive, no scleral icterus,   Heart & CV:  RRR no murmur.  Intact distal pulses, good cap refill.  LUNGS: No increased work of breathing.Lugns are CTA B/L, no wheezing or crackles.  Abd: Abdomen is mildly distended. She is moderately tender around the incision with guarding. The midline incision is slightly open at the lower pole draining purulent, bloody fluid. I explored the wound locally and opened the lower half of  the incision. Underlying fascia up at this point felt intact, the fascia towards the upper part of the incision felt little spongy and I feel like the drainage is coming from this area. I do not sense any evisceration at this time. The subcutaneous tissues were packed lightly with a gauze and the wound was covered with clean bandages.  The loop ileostomy in the left abdomen still appears slightly edematous but pink and healthy with normal liquid ileostomy output in the bag.  Ext: Thin extremities, no lower extremity edema  Neuro: Alert, pleasantly demented.    Labs reviewed and are significant for sodium 143, potassium 3.1, chloride 109, creatinine 0.62, calcium 8.6, albumin 2.3, total protein 4.8, WBC 6.9, hemoglobin 7.7, platelet 375, INR 2.39      I reviewed the CT scan and that is significant for large intraperitoneal fluid collection with enhancing rim. There are couple of gas bubbles within this fluid collection as well, suspect abscess. No massive free air, do not suspect anastomotic leak.        CONSULTATION ASSESSMENT AND PLAN:    86 year old female with complicated recent surgical history likely presenting with postoperative intra-abdominal abscess. I suspect the abscess is spontaneously draining through the midline incision. Unlikely anastomotic leak. Cannot rule out enterocutaneous fistula, but no oral contrast was visualized in the intra-abdominal fluid collection. She is hemodynamically stable with normal white count. I do not suspect the patient is eviscerating. Thus, I do not feel emergent operative intervention is necessary. I suspect the fluid collection will mostly spontaneously drain through the midline incision but will require further drain placement to completely drain the abdomen.    Admit inpatient  N.p.o. for possible procedures  IV fluid resuscitation  Follow labs  Ciprofloxacin and Flagyl  Continue ostomy cares  Replace midline abdominal dressings for soaking  Consider repeat imaging at  some point to define residual fluid collection and for consideration of image guided drain placement.  Internal medicine consult for medical comanagement        Jonny Parisi MD on 7/4/2021 at 8:47 PM

## 2021-07-05 NOTE — PROGRESS NOTES
[facility] ADMISSION NOTE    Patient admitted to room 302 at approximately 2105 via cart from emergency room. Patient was accompanied by transport tech.     Verbal SBAR report received from ALAN Alcantara prior to patient arrival.     Patient trasferred to bed via slipsheet Patient alert and oriented X 1. The patient is not having any pain.  . Admission vital signs: Blood pressure 112/40, pulse 87, temperature 97.5  F (36.4  C), temperature source Tympanic, resp. rate 18, weight 59.6 kg (131 lb 8 oz), SpO2 96 %, not currently breastfeeding. Patient was oriented to plan of care, call light, bed controls, tv, telephone, bathroom and visiting hours.     Risk Assessment    The following safety risks were identified during admission: fall. Yellow risk band applied: YES.     Skin Initial Assessment    This writer admitted this patient and completed a full skin assessment and Stefano score in the Adult PCS flowsheet. Appropriate interventions initiated as needed.     Stefano Risk Assessment  Sensory Perception: 3-->slightly limited  Moisture: 3-->occasionally moist  Activity: 3-->walks occasionally  Mobility: 3-->slightly limited  Nutrition: 2-->probably inadequate  Friction and Shear: 1-->problem  Stefano Score: 15  Moisture Interventions: Incontinence pad  Nutrition Interventions: Maintain adequate hydration  Friction/Shear Interventions: HOB 30 degrees or less  Mattress: [facility] Mattress (Foam)    Education    Patient has a [place] to Observation order: No  Observation education completed and documented: N/A      Veronique Drummond RN

## 2021-07-05 NOTE — PLAN OF CARE
Patient oriented to self only. Admitted with hx of rt hemicolectomy, segmental colon resection with loop ileostomy, and washout of midline incision. Midline incision is open and packed, covered with 4x4s and ABDs. Moderate amount of drainage on outer abdominal dressing. Drainage is red, purulent, and foul smelling.  New 4x4s and ABDs applied. Patient denies pain in abdomen, nonverbal indicators present. PRN Norco administered per MAR. Patient sleeping upon reassessment. Colostomy intact. Output is watery and yellowish. REMI drain in place on ride side of abdomen. No output at this time. BLE edema +1/+2. Redness and tenderness noted on left heel; skin barrier cream applied and heels off bed. VSS. Will continue to monitor.     /40   Pulse 87   Temp 97.5  F (36.4  C) (Tympanic)   Resp 18   Wt 60.8 kg (134 lb 0.6 oz)   SpO2 96%   BMI 23.37 kg/m

## 2021-07-05 NOTE — CONSULTS
"[facility]  Consult Note - Hospitalist Service     Date of Admission:  7/4/2021  Consult Requested by: Dr. Parisi  Reason for Consult: medical comanagement    Assessment & Plan   Melissa Quinteros is a 86 year old female admitted on 7/4/2021. She has an intraabdominal abscess.     Principal Problem:    Postoperative intra-abdominal abscess  Assessment: present on admission. Per general surgery: \"Her recent surgical history includes extended right hemicolectomy with small bowel resection on 06/14, anastomotic leak leading to transverse colectomy and anastomosis with diverting loop ileostomy on 06/22, and washout and closure of midline incision on 06/28.\" She is recovering at a skilled nursing facility and presented with a draining wound. Wound culture from a week ago shows enterococcus. Now with a larger purulent fluid collection that is draining.  Plan: cipro/flagyl. Management per surgery      Acute on chronic blood loss anemia  Assessment: secondary to illness, surgery  Plan: transfuse 1 unit PRBC's      Active Problems:    Malignant neoplasm of transverse colon (H)  Assessment: status post right hemicolectomy and transverse colectomy.      Severe protein-calorie malnutrition (Schumacher: less than 60% of standard weight) (H)  Assessment: chronic, present on admission   Plan: encourage intake      Abdominal pain, generalized       The patient's care was discussed with the Patient and spouse.    Richard Pickett MD  [facility]  Securely message with the Vocera Web Console (learn more here)  Text page via [employer] Paging/Directory      ______________________________________________________________________    Chief Complaint   drainage    History is obtained from the patient, electronic health record and patient's spouse    History of Present Illness   Melissa Quinteros is a 86 year old female who underwent recent extended right hemicolectomy with small bowel resection on 06/14, " anastomotic leak leading to transverse colectomy and anastomosis with diverting loop ileostomy on 06/22, and washout and closure of midline incision on 06/28.  She was hospitalized until last week and then discharged to an SNF for further recovery.  She was doing well at tolerating food and activity at the SNF but presented on July 4 with drainage coming from the inferior aspect of her abdominal wound.  A CT was done in the emergency department showing ongoing intraperitoneal fluid.  It was discussed with surgery and the patient was admitted for antibiotic treatment.    Review of Systems   CONSTITUTIONAL:POSITIVE  for anorexia, arthralgias and malaise  INTEGUMENTARY/SKIN: NEGATIVE for worrisome rashes, moles or lesions  EYES: NEGATIVE for vision changes or irritation  ENT/MOUTH: NEGATIVE for ear, mouth and throat problems  RESP: NEGATIVE for significant cough or SOB  CV: NEGATIVE for chest pain, palpitations or peripheral edema  GI: NEGATIVE for abdominal pain generalized  : NEGATIVE for frequency, dysuria, or hematuria  MUSCULOSKELETAL: NEGATIVE for significant arthralgias or myalgia  NEURO: NEGATIVE for weakness, dizziness or paresthesias  ENDOCRINE: NEGATIVE for temperature intolerance, skin/hair changes  HEME: NEGATIVE for bleeding problems  PSYCHIATRIC: NEGATIVE for changes in mood or affect    Past Medical History    I have reviewed this patient's medical history and updated it with pertinent information if needed.   Past Medical History:   Diagnosis Date     Esophageal obstruction     8/30/2016     Essential (primary) hypertension     8/30/2016     Hyperlipidemia     No Comments Provided     Zoster without complications     X2       Past Surgical History   I have reviewed this patient's surgical history and updated it with pertinent information if needed.  Past Surgical History:   Procedure Laterality Date     APPENDECTOMY       ARTHROPLASTY KNEE Right 2017    HANG Bowles     COLONOSCOPY      No Comments  Provided     COLONOSCOPY N/A 6/3/2021    Procedure: COLONOSCOPY, WITH POLYPECTOMY AND BIOPSY with endoscopic tattoo;  Surgeon: Del Pang MD;  Location:  OR     ESOPHAGOGASTRODUODENOSCOPY       HYSTERECTOMY TOTAL ABDOMINAL      1982,incidental appendectomy     INCISION AND DRAINAGE ABSCESS PELVIS, COMBINED N/A 6/28/2021    Procedure: Washout and closure of midline laparotomy.;  Surgeon: Del Pang MD;  Location:  OR     LAPAROSCOPIC ASSISTED COLECTOMY N/A 6/14/2021    Procedure: COLECTOMY, LAPAROSCOPIC, small bowel resection X2, extended Right hemicolectomy, biopsy of the mesenteric node;  Surgeon: Del Pang MD;  Location:  OR     LAPAROTOMY EXPLORATORY N/A 6/22/2021    Procedure: Exploratory LAPAROTOMY with transverse colon Resection, and ileostomy;  Surgeon: Del Pang MD;  Location:  OR     TONSILLECTOMY, ADENOIDECTOMY, COMBINED      childhood       Social History   I have reviewed this patient's social history and updated it with pertinent information if needed.  Social History     Tobacco Use     Smoking status: Never Smoker     Smokeless tobacco: Never Used   Substance Use Topics     Alcohol use: Not Currently     Comment: Alcoholic Drinks/day: Seldom     Drug use: No     Comment: Drug use: No       Family History   I have reviewed this patient's family history and updated it with pertinent information if needed.  Family History   Problem Relation Age of Onset     Other - See Comments Father         Old age     Lung Cancer Mother      Brain Cancer Brother        Medications   Current Facility-Administered Medications   Medication     0.9% sodium chloride + KCl 20 mEq/L infusion     0.9% sodium chloride BOLUS     acetaminophen (TYLENOL) tablet 650 mg     ciprofloxacin (CIPRO) infusion 400 mg     clobetasol (TEMOVATE) 0.05 % cream     HYDROcodone-acetaminophen (NORCO) 5-325 MG per tablet 1-2 tablet     HYDROmorphone (PF) (DILAUDID) injection 0.2 mg     ibuprofen (ADVIL/MOTRIN)  tablet 600 mg     lidocaine (LMX4) cream     lidocaine 1 % 0.1-1 mL     metroNIDAZOLE (FLAGYL) infusion 500 mg     naloxone (NARCAN) injection 0.2 mg    Or     naloxone (NARCAN) injection 0.4 mg    Or     naloxone (NARCAN) injection 0.2 mg    Or     naloxone (NARCAN) injection 0.4 mg     ondansetron (ZOFRAN-ODT) ODT tab 4 mg    Or     ondansetron (ZOFRAN) injection 4 mg     oxyCODONE-acetaminophen (PERCOCET) 5-325 MG per tablet 1-2 tablet     pantoprazole (PROTONIX) EC tablet 40 mg     prochlorperazine (COMPAZINE) injection 5 mg    Or     prochlorperazine (COMPAZINE) tablet 5 mg    Or     prochlorperazine (COMPAZINE) suppository 12.5 mg     sodium chloride (PF) 0.9% PF flush 3 mL     sodium chloride (PF) 0.9% PF flush 3 mL       Allergies   Allergies   Allergen Reactions     Lovastatin      Other reaction(s): Arthralgia       Physical Exam   Vital Signs: Temp: (P) 97.6  F (36.4  C) Temp src: (P) Tympanic BP: 124/40 Pulse: 80   Resp: 16 SpO2: 98 % O2 Device: None (Room air)    Weight: 134 lbs .63 oz    Constitutional: In no apparent distress  Eyes: pupils reactive, extraocular movements intact. Anicteric sclera.   HEENT: TM's normal, oropharynx nonerythematous. Neck supple, no JVD.  Respiratory: no crackles noted, no wheezes.  Cardiovascular: regular, no murmur. No lower extremity edema.  GI: soft, non-tender, bowel sounds present.  Lymph/Hematologic: no cervical or supraclavicular LAD.  Genitourinary: deferred  Skin: no rashes, or sores  Musculoskeletal: no joint erythema or swelling  Neurologic: cranial nerves symmetric. Neuro exam nonfocal  Psychiatric: alert,  Interactive.       Data   Results for orders placed or performed during the hospital encounter of 07/04/21 (from the past 24 hour(s))   CBC with platelets differential   Result Value Ref Range    WBC 6.9 4.0 - 11.0 10e9/L    RBC Count 2.77 (L) 3.8 - 5.2 10e12/L    Hemoglobin 7.7 (L) 11.7 - 15.7 g/dL    Hematocrit 24.3 (L) 35.0 - 47.0 %    MCV 88 78 - 100 fl     MCH 27.8 26.5 - 33.0 pg    MCHC 31.7 31.5 - 36.5 g/dL    RDW 15.6 (H) 10.0 - 15.0 %    Platelet Count 375 150 - 450 10e9/L    Diff Method Automated Method     % Neutrophils 65.6 %    % Lymphocytes 19.4 %    % Monocytes 13.4 %    % Eosinophils 0.7 %    % Basophils 0.3 %    % Immature Granulocytes 0.6 %    Absolute Neutrophil 4.5 1.6 - 8.3 10e9/L    Absolute Lymphocytes 1.3 0.8 - 5.3 10e9/L    Absolute Monocytes 0.9 0.0 - 1.3 10e9/L    Absolute Eosinophils 0.1 0.0 - 0.7 10e9/L    Absolute Basophils 0.0 0.0 - 0.2 10e9/L    Abs Immature Granulocytes 0.0 0 - 0.4 10e9/L   INR   Result Value Ref Range    INR 2.39 (H) 0.86 - 1.14   Comprehensive metabolic panel   Result Value Ref Range    Sodium 143 134 - 144 mmol/L    Potassium 3.1 (L) 3.5 - 5.1 mmol/L    Chloride 109 (H) 98 - 107 mmol/L    Carbon Dioxide 26 21 - 31 mmol/L    Anion Gap 8 3 - 14 mmol/L    Glucose 73 70 - 105 mg/dL    Urea Nitrogen 19 7 - 25 mg/dL    Creatinine 0.62 0.60 - 1.20 mg/dL    GFR Estimate >90 >60 mL/min/[1.73_m2]    GFR Estimate If Black >90 >60 mL/min/[1.73_m2]    Calcium 8.6 8.6 - 10.3 mg/dL    Bilirubin Total 0.8 0.3 - 1.0 mg/dL    Albumin 2.3 (L) 3.5 - 5.7 g/dL    Protein Total 4.8 (L) 6.4 - 8.9 g/dL    Alkaline Phosphatase 217 (H) 34 - 104 U/L    ALT 16 7 - 52 U/L    AST 25 13 - 39 U/L   CT Abdomen Pelvis w Contrast    Narrative    PROCEDURE INFORMATION:   Exam: CT Abdomen And Pelvis With Contrast   Exam date and time: 7/4/2021 3:15 PM   Age: 86 years old   Clinical indication: Abdominal tenderness and other: Drainage from surgical   site; Prior surgery; Surgery date: <1 month; Surgery type: Sigmoid colon   resection; Additional info: 86 year old female that presents to triage   ambulance from [facility]. Patient had a sigmoid colon   resection several weeks ago. Patient has been having increased drainage from   abdominal incisional site. Staff state the drainage from her maurisio drain has been   green but now brown. Staff  stated no drainage from her maurisio drain but there is   currently brown drainage in it. Patient states tenderness to her umbilical   area. Blood noted to be in ostomy.     TECHNIQUE:   Imaging protocol: Computed tomography of the abdomen and pelvis with contrast.   Total images: 373   Radiation optimization: All CT scans at this facility use at least one of these   dose optimization techniques: automated exposure control; mA and/or kV   adjustment per patient size (includes targeted exams where dose is matched to   clinical indication); or iterative reconstruction.   Contrast material: ISOVIEW 370; Contrast volume: 75 ml; Contrast route:   INTRAVENOUS (IV);    Other contrast: Oral, omnipaque 9mg, 1000 mL;     COMPARISON:   CT ABDOMEN PELVIS W/O CONTRAST 6/22/2021 9:21 AM     FINDINGS:   Tubes, catheters and devices: Drainage catheter in the deep pelvis.     Pleural spaces: Large bilateral pleural effusions remain.     Liver: Normal. No mass.   Gallbladder and bile ducts: Normal. No calcified stones. No ductal dilation.   Pancreas: Normal. No ductal dilation.   Spleen: Normal. No splenomegaly.   Adrenal glands: Bilateral thickened adrenal glands.   Kidneys and ureters: Normal. No hydronephrosis.   Stomach and bowel: Left-sided bowel ostomy. Postsurgical changes of bowel in   the left abdomen. Some high attenuation within bowel in the left abdomen/colon   may represent contrast mixing with fluid.   Appendix: No evidence of appendicitis.     Intraperitoneal space: Moderate to large amount of ascites/intraperitoneal   fluid. Associated perceptible enhancing wall throughout. Numerous gas bubbles   within the intraperitoneal fluid/cavity as well. Moderate distension of   remaining small bowel loops.   Vasculature: Advanced atherosclerotic changes of the abdominal aorta and   branches. Prominent peripheral thrombus.   Lymph nodes: Unremarkable. No enlarged lymph nodes.   Urinary bladder: Small amount of gas within the bladder.  Correlate for recent   instrumentation.   Reproductive: Unremarkable as visualized.   Bones/joints: Degenerative changes lumbar spine. Associated anterolisthesis of   L4 on L5.   Soft tissues: Diffuse subcutaneous/soft tissue edema. Soft tissue stranding   with some bubbles of gas and fluid along the midline anterior abdominal wall.   Small rim enhancing fluid collection along the left anterior abdominal wall   musculature.     Other findings: Presacral fluid/soft tissue stranding.       Impression    IMPRESSION:   1. Moderate to large amount of intraperitoneal fluid with enhancing perceptible   wall. Some associated gas bubbles within. Cannot exclude infected peritoneal   fluid/peritonitis. The presence of gas within could indicate underlying   infection although the possibility of an underlying bowel perforation/leak   cannot entirely be excluded.   2. Soft tissue stranding with strandy fluid and gas bubbles along the midline   anterior abdominal wall. Possibly postsurgical however the possibility of   underlying abdominal wall infection cannot be excluded. There is an additional   small rim enhancing fluid collection along the left anterior abdominal wall   musculature measuring approximately 1.5 cm in transverse dimension.   3. Large bilateral pleural effusions with associated bilateral lower lobe   atelectasis and or infiltrate.   4. Extensive postsurgical changes of bowel with left-sided ostomy. There is   some relative diffuse distension of remaining small bowel loops nonspecific.   5. Small amount of gas within the bladder. Nonspecific. Correlate for recent   instrumentation.   6. See above for other details.     THIS DOCUMENT HAS BEEN ELECTRONICALLY SIGNED BY NAVYA TONG MD   Asymptomatic SARS-CoV-2 COVID-19 Virus (Coronavirus) by PCR    Specimen: Nasopharyngeal   Result Value Ref Range    SARS-CoV-2 Virus Specimen Source Nasopharyngeal     SARS-CoV-2 PCR Result NEGATIVE     SARS-CoV-2 PCR Comment        Testing was performed using the Xpert Xpress SARS-CoV-2 Assay on the Cepheid Gene-Xpert   Instrument Systems. Additional information about this Emergency Use Authorization (EUA)   assay can be found via the Lab Guide.     Blood culture    Specimen: Blood   Result Value Ref Range    Specimen Description Blood     Culture Micro No growth after 11 hours    Blood culture    Specimen: Blood   Result Value Ref Range    Specimen Description Blood     Culture Micro No growth after 11 hours    Magnesium   Result Value Ref Range    Magnesium 1.5 (L) 1.9 - 2.7 mg/dL   Phosphorus   Result Value Ref Range    Phosphorus 2.7 2.5 - 5.0 mg/dL   Potassium   Result Value Ref Range    Potassium 3.1 (L) 3.5 - 5.1 mmol/L   Basic metabolic panel   Result Value Ref Range    Sodium 142 134 - 144 mmol/L    Potassium 3.5 3.5 - 5.1 mmol/L    Chloride 109 (H) 98 - 107 mmol/L    Carbon Dioxide 24 21 - 31 mmol/L    Anion Gap 9 3 - 14 mmol/L    Glucose 64 (L) 70 - 105 mg/dL    Urea Nitrogen 17 7 - 25 mg/dL    Creatinine 0.64 0.60 - 1.20 mg/dL    GFR Estimate 88 >60 mL/min/[1.73_m2]    GFR Estimate If Black >90 >60 mL/min/[1.73_m2]    Calcium 7.8 (L) 8.6 - 10.3 mg/dL   Magnesium   Result Value Ref Range    Magnesium 2.5 1.9 - 2.7 mg/dL   CBC with platelets differential   Result Value Ref Range    WBC 7.3 4.0 - 11.0 10e9/L    RBC Count 2.45 (L) 3.8 - 5.2 10e12/L    Hemoglobin 6.9 (LL) 11.7 - 15.7 g/dL    Hematocrit 21.7 (L) 35.0 - 47.0 %    MCV 89 78 - 100 fl    MCH 28.2 26.5 - 33.0 pg    MCHC 31.8 31.5 - 36.5 g/dL    RDW 15.9 (H) 10.0 - 15.0 %    Platelet Count 318 150 - 450 10e9/L    Diff Method Automated Method     % Neutrophils 67.8 %    % Lymphocytes 16.6 %    % Monocytes 12.4 %    % Eosinophils 1.8 %    % Basophils 0.7 %    % Immature Granulocytes 0.7 %    Absolute Neutrophil 5.0 1.6 - 8.3 10e9/L    Absolute Lymphocytes 1.2 0.8 - 5.3 10e9/L    Absolute Monocytes 0.9 0.0 - 1.3 10e9/L    Absolute Eosinophils 0.1 0.0 - 0.7 10e9/L    Absolute  Basophils 0.1 0.0 - 0.2 10e9/L    Abs Immature Granulocytes 0.1 0 - 0.4 10e9/L   ABO/Rh type and screen   Result Value Ref Range    Units Ordered 2     ABO O     RH(D) Pos     Antibody Screen Neg     Test Valid Only At [facility]        Specimen Expires 07/08/2021     Crossmatch Red Blood Cells    Blood component   Result Value Ref Range    Unit Number [id number]     Blood Component Type Red Blood Cells LeukoReduced (Part 2)     Division Number 00     Status of Unit Released to care unit 07/05/2021 0805     Blood Product Code [id number]     Unit Status ISS    Blood component   Result Value Ref Range    Unit Number [id number]     Blood Component Type Red Blood Cells LeukoReduced (Part 2)     Division Number 00     Status of Unit Ready for patient 07/05/2021 0743     Blood Product Code [id number]     Unit Status JONATAN

## 2021-07-05 NOTE — PHARMACY-ADMISSION MEDICATION HISTORY
Pharmacy -- Admission Medication Reconciliation    Prior to admission (PTA) medications were reviewed and the patient's PTA medication list was updated.    Sources Consulted: [place] [facility], sure scripts    The reliability of this Medication Reconciliation is: Reliability: Reliable    The following significant changes were made:    Added discharge pharmacy    Removed duplicate ibuprofen    Updated oxycodone directions    In addition, the patient's allergies were reviewed with the patient and updated as follows:   Allergies: Lovastatin    The pharmacist has reviewed with the patient that all personal medications should be removed from the building or locked in the belongings safe.  Patient shall only take medications ordered by the physician and administered by the nursing staff.       Medication barriers identified: NA   Medication adherence concerns: NA   Understanding of emergency medications: LULU Collier RPH, 7/5/2021,  7:26 AM

## 2021-07-05 NOTE — PROGRESS NOTES
PROGRESS NOTE    Patient was readmitted on 07/04/2021 with clinical presentation consistent with postoperative intra-abdominal abscess. Her recent surgical history includes extended right hemicolectomy with small bowel resection on 06/14, anastomotic leak leading to transverse colectomy and anastomosis with diverting loop ileostomy on 06/22, and washout and closure of midline incision on 06/28.    Past 24 hours: Midline incision opened at the inferior aspect and is draining malodorous serosanguinous fluid requiring multiple dressing changes per day. Patient expressed her concerns and fears regarding an additional surgery or procedure. Hemoglobin was 6.9, received 1 unit of pRBCs.     EXAM:  Vitals:    07/05/21 0830 07/05/21 0933 07/05/21 1023 07/05/21 1045   BP: (!) 110/39 110/41 121/43 124/40   Pulse: 80 84 79 80   Resp: 16 16 16 16   Temp: 97.4  F (36.3  C) 97.8  F (36.6  C) 97.7  F (36.5  C)    TempSrc: Tympanic Tympanic Tympanic Tympanic   SpO2: 97% 97% 98% 98%   Weight:         Intake/Output Summary (Last 24 hours) at 7/5/2021 1057  Last data filed at 7/5/2021 1045  Gross per 24 hour   Intake 1103 ml   Output 235 ml   Net 868 ml     GENERAL: alert, NAD  CV: no cyanosis noted of extremities.  RESPIRATORY: normal respiratory effort  ABDOMEN: non-distended, soft, moderately tender. Midline incision draining serosanguinous fluid, fascia intact. Dressings saturated with malodorous drainage. Dressings changed. Ileostomy bag intact with good seal, brown liquid output. REMI drain in place.  EXTREMITY: no edema  SKIN: warm and dry, no jaundice norashes  NEURO: no focal deficit    LABS  Recent Labs   Lab Test 07/05/21  0530 07/04/21  1511 08/30/16  1622 08/30/16  1622 08/20/16  0012   WBC 7.3 6.9   < >  --   --    RBC 2.45* 2.77*   < >  --   --    HGB 6.9* 7.7*   < > 14.8 14.4   HCT 21.7* 24.3*   < > 47.5 42.0   MCV 89 88   < > 91 90   MCH 28.2 27.8   < > 28.3 30.9   MCHC 31.8 31.7   < > 31.1* 34.2    375   < > 237  220   MPV  --   --   --  9.1 8.8    < > = values in this interval not displayed.       Recent Labs   Lab Test 07/05/21  0530 07/04/21  2127 07/04/21  1511   POTASSIUM 3.5 3.1* 3.1*   CHLORIDE 109*  --  109*   BUN 17  --  19       Recent Labs   Lab Test 07/04/21  1511 06/26/21  0445 06/16/21  1424 06/16/21  1424 05/28/21  1219 05/28/21  1219   PROTEIN  --   --   --  Negative  --  Negative   BILITOTAL 0.8 2.0*   < >  --    < >  --    AST 25 37   < >  --    < >  --    ALT 16 15   < >  --    < >  --     < > = values in this interval not displayed.     IMAGING  I personally reviewed patient's CT abdomen and pelvis w/ contrast  IMPRESSION:   1. Moderate to large amount of intraperitoneal fluid with enhancing perceptible   wall. Some associated gas bubbles within. Cannot exclude infected peritoneal   fluid/peritonitis. The presence of gas within could indicate underlying   infection although the possibility of an underlying bowel perforation/leak   cannot entirely be excluded.   2. Soft tissue stranding with strandy fluid and gas bubbles along the midline   anterior abdominal wall. Possibly postsurgical however the possibility of   underlying abdominal wall infection cannot be excluded. There is an additional   small rim enhancing fluid collection along the left anterior abdominal wall   musculature measuring approximately 1.5 cm in transverse dimension.   3. Large bilateral pleural effusions with associated bilateral lower lobe   atelectasis and or infiltrate.   4. Extensive postsurgical changes of bowel with left-sided ostomy. There is   some relative diffuse distension of remaining small bowel loops nonspecific.   5. Small amount of gas within the bladder. Nonspecific. Correlate for recent   instrumentation.   6. See above for other details.     ASSESSMENT  Patient is an 86 year old female with a clinical presentation consistent with postoperative intra-abdominal abscess.    PLAN  Discussed imaging results and patient  clinical presentation with interventional radiology. They plan on placing a drain in the RUQ area of fluid collection/abscess tomorrow.  Continue changing the midline dressing PRN if becomes saturated  Will continue to monitor clinically and closely  Pain: May use tylenol PRN for pain  Nutrition: Dysphagia diet level 2, nectar thickened liquids  DVT prophylaxis: SCDs  ID: Antibiotics flagyl and cipro. Frequency per orders.  Oakley: Periwick in place.    Cristina Barnes, MS4

## 2021-07-05 NOTE — PLAN OF CARE
Patient admitted for postoperative intra-abdominal abscess. VSS and WNL. Afebrile. C/o discomfort when repositioning ostomy, abdominal dressing but denies general pain. Alert and Disoriented at baseline. Abd would dressing changed x1 during shift. Odorous, purulent, brown, and serosanguinous drainage from would. Ostomy appliance replaced. Odorous, liquid, and yellow stool in ostomy bag. Moderate edema to BLLE, encouraged to elevate. Patient c/o discomfort to L heel, preventative foam dressing placed. Oral intake encouraged. Purewick in place with lele urine output, IVF infusing. Will continue to monitor. Temp: 98.8  F (37.1  C) Temp src: Tympanic BP: (!) 108/38 Pulse: 81   Resp: 16 SpO2: 95 % O2 Device: None (Room air)        Dulce Maria Hernandez RN on 7/5/2021 at 6:02 PM

## 2021-07-05 NOTE — PROGRESS NOTES
DATE:  7/5/2021   TIME OF RECEIPT FROM LAB:  0615  LAB TEST:  hemoglobin  LAB VALUE:  6.9  RESULTS GIVEN WITH READ-BACK TO (PROVIDER):  Jonny Parisi MD  TIME LAB VALUE REPORTED TO PROVIDER:   0615

## 2021-07-05 NOTE — PROGRESS NOTES
Changed saturated abd dressing.  Do not pull out the deeper packing.  The outer abd pad and the 6 X 6 gauze  can be changed PRN,

## 2021-07-06 NOTE — PLAN OF CARE
Patient A&O, reports of pain in abdomen area, 10/10, PRN tylenol given with relief. Abdominal dressing changed, ostomy changed due to leaky site. Ostomy with yellow/brown stool. Patient drank 50%, 120 mL of her vanilla boost with medications today. No new concerns.  Maral Maldonado RN on 7/6/2021 at 1:00 PM

## 2021-07-06 NOTE — PROGRESS NOTES
DATE:  7/5/2021   TIME OF RECEIPT FROM LAB:  0825  LAB TEST:  Blood cultures  LAB VALUE:  Gram Negative Rods  RESULTS GIVEN WITH READ-BACK TO (PROVIDER):  Richard Pickett MD  TIME LAB VALUE REPORTED TO PROVIDER:   0881

## 2021-07-06 NOTE — PROGRESS NOTES
Received report from Maral Maldonado, ALAN @ 3613.   Ostomy bag intact, emptied 150mL yellow liquid stool.  Changed abd dressing, wet to dry, saline 4x4, dry 4x4 and ABD, taped edges.  Old dressing saturated with serosanguinous drainage.  REMI drain in place with minimal output.  Purewick in place for urine output.   BP (!) 104/36   Pulse 72   Temp 97  F (36.1  C) (Tympanic)   Resp 16   Wt 60.8 kg (134 lb 0.6 oz)   SpO2 98%   BMI 23.37 kg/m     Lucero Castillo RN on 7/6/2021 at 3:17 PM

## 2021-07-06 NOTE — PLAN OF CARE
Patient AxO x4, reports pain from surgical incision a 10/10, PRN tylenol given. Purulent serosanguinous drainage from surgical incision. REMI drain to bulb suction, incisional site pink. Colostomy draining yellow stool. Foam dressing on left heel. Oral intake, protein encouraged. Sips of boost. Purewick in place for urine incontinence. VSS. /40   Pulse 74   Temp 97.6  F (36.4  C) (Tympanic)   Resp 16   Wt 60.8 kg (134 lb 0.6 oz)   SpO2 98%   BMI 23.37 kg/m      Surgical dressing changed d/t saturation. Nausea intermittent. PRN zofran given, relief provided. Cristina Monzon RN on 7/6/2021 at 4:51 AM

## 2021-07-06 NOTE — PROGRESS NOTES
[facility]    Medicine Progress Note - Hospitalist Service       Date of Admission:  7/4/2021    Assessment & Plan             Postoperative intra-abdominal abscess  Assessment: blood culture positive for gram negative rods. Spoke with Dr. Pang about plan to re-image with a possible drain.   Plan: cipro/flagyl. Management per surgery    Bacteremia  Assessment: gram negative rods   Plan: cipro/flagyl       Acute on chronic blood loss anemia  Assessment: secondary to illness, surgery. Improved with blood transfusion.   Plan: monitor     Active Problems:    Malignant neoplasm of transverse colon (H)  Assessment: status post right hemicolectomy and transverse colectomy.       Severe protein-calorie malnutrition (Schumacher: less than 60% of standard weight) (H)  Assessment: chronic, present on admission   Plan: encourage intake       Abdominal pain, generalized       Diet: Dysphagia Diet Level 2 [facility] Altered Nectar Thickened Liquids (pre-thickened or use instant food thickener)    DVT Prophylaxis: Pneumatic Compression Devices  Oakley Catheter: Not present  Central Lines: None  Code Status: Full Code      Disposition Plan   Expected discharge: 2 - 3 days, recommended to prior living arrangement once SIRS/Sepsis treated.     The patient's care was discussed with the Patient and spouse.    Richard Pickett MD  Hospitalist Service  [facility]  Securely message with the Vocera Web Console (learn more here)  Text page via [employer] Paging/Directory        Interval History   Complains of pain. Not hungry. No fevers, chills. No nausea, vomiting. No dyspnea.     Data reviewed today: I reviewed all medications, new labs and imaging results over the last 24 hours. I personally reviewed no images or EKG's today.    Physical Exam   Vital Signs: Temp: 97  F (36.1  C) Temp src: Tympanic BP: (!) 104/36 Pulse: 72   Resp: 16 SpO2: 98 % O2 Device: None (Room air)    Weight: 134 lbs .63 oz  GENERAL:  Comfortable, no apparent distress.  CARDIOVASCULAR: regular rate and rhythm, no murmur. No lower extremity edema   RESPIRATORY: Clear to auscultation bilaterally, no wheezes or crackles.  GI: non-tender, non-distended, normal bowel sounds.   SKIN: warm periphery, no rashes      Data   Recent Labs   Lab 07/06/21  0515 07/05/21  0530 07/04/21  2127 07/04/21  1511 07/02/21  0628   WBC 7.5 7.3  --  6.9  --    HGB 8.2* 6.9*  --  7.7* 7.9*   MCV 87 89  --  88  --     318  --  375 289   INR 2.53*  --   --  2.39* 2.02*    142  --  143  --    POTASSIUM 3.8 3.5 3.1* 3.1* 3.6   CHLORIDE 110* 109*  --  109*  --    CO2 24 24  --  26  --    BUN 19 17  --  19  --    CR 0.76 0.64  --  0.62  --    ANIONGAP 6 9  --  8  --    DARIANA 7.5* 7.8*  --  8.6  --    * 64*  --  73  --    ALBUMIN  --   --   --  2.3*  --    PROTTOTAL  --   --   --  4.8*  --    BILITOTAL  --   --   --  0.8  --    ALKPHOS  --   --   --  217*  --    ALT  --   --   --  16  --    AST  --   --   --  25  --      Medications     0.9% sodium chloride + KCl 20 mEq/L 75 mL/hr at 07/05/21 1944       ciprofloxacin  400 mg Intravenous Q12H     clobetasol   Topical BID     metroNIDAZOLE  500 mg Intravenous Q12H     pantoprazole  40 mg Oral Daily     sodium chloride (PF)  3 mL Intracatheter Q8H

## 2021-07-06 NOTE — PROGRESS NOTES
PROGRESS NOTE    Patient was readmitted on 07/04/2021 with clinical presentation consistent with postoperative intra-abdominal abscess. Her recent surgical history includes extended right hemicolectomy with small bowel resection on 06/14, anastomotic leak leading to transverse colectomy and anastomosis with diverting loop ileostomy on 06/22, and washout and closure of midline incision on 06/28.    Past 24 hours:     EXAM:    Intake/Output Summary (Last 24 hours) at 7/6/2021 0802  Last data filed at 7/6/2021 0641  Gross per 24 hour   Intake 698 ml   Output 2095 ml   Net -1397 ml     Vitals:    07/05/21 1128 07/05/21 1653 07/05/21 2003 07/06/21 0346   BP:  (!) 108/38 106/40 104/42   Pulse:  81 74 72   Resp:  16 16 16   Temp: 97.6  F (36.4  C) 98.8  F (37.1  C) 97.6  F (36.4  C) 96.4  F (35.8  C)   TempSrc: Tympanic Tympanic Tympanic Tympanic   SpO2:  95% 98% 97%   Weight:         GENERAL: alert, NAD  CV: no cyanosis noted of extremities.  RESPIRATORY: normal respiratory effort  ABDOMEN: non-distended, soft, moderately tender. Midline incision draining serosanguinous fluid, fascia intact. Dressings saturated with malodorous drainage. Dressings changed. Ileostomy bag intact with good seal, brown liquid output. REMI drain in place.  EXTREMITY: no edema  SKIN: warm and dry, no jaundice norashes  NEURO: no focal deficit    LABS  Recent Labs   Lab Test 07/06/21  0515 07/05/21  0530 08/30/16  1622 08/30/16  1622 08/20/16  0012   WBC 7.5 7.3   < >  --   --    RBC 2.93* 2.45*   < >  --   --    HGB 8.2* 6.9*   < > 14.8 14.4   HCT 25.4* 21.7*   < > 47.5 42.0   MCV 87 89   < > 91 90   MCH 28.0 28.2   < > 28.3 30.9   MCHC 32.3 31.8   < > 31.1* 34.2    318   < > 237 220   MPV  --   --   --  9.1 8.8    < > = values in this interval not displayed.       Recent Labs   Lab Test 07/06/21  0515 07/05/21  0530   POTASSIUM 3.8 3.5   CHLORIDE 110* 109*   BUN 19 17       Recent Labs   Lab Test 07/04/21  1511 06/26/21  0445  06/16/21  1424 06/16/21  1424 05/28/21  1219 05/28/21  1219   PROTEIN  --   --   --  Negative  --  Negative   BILITOTAL 0.8 2.0*   < >  --    < >  --    AST 25 37   < >  --    < >  --    ALT 16 15   < >  --    < >  --     < > = values in this interval not displayed.     Blood culture: Gram negative rods, 1 of 4 bottles positive    ASSESSMENT  Patient is an 86 year old female with a clinical presentation consistent with postoperative intra-abdominal abscess.    PLAN   Discussed imaging results and patient clinical presentation with interventional radiology. They plan on placing a drain in the RUQ area of fluid collection/abscess tomorrow.  Continue changing the midline dressing PRN if becomes saturated  Will continue to monitor clinically and closely  Pain: May use tylenol PRN for pain  Nutrition: Dysphagia diet level 2, nectar thickened liquids  DVT prophylaxis: SCDs  ID: Antibiotics flagyl and cipro. Frequency per orders.  Oakley: Periwick in place.     Cristina Barnes, MS4    Del Pang MD on 7/6/2021 at 8:27 AM

## 2021-07-07 PROBLEM — Z98.890 POSTOPERATIVE STATE: Status: ACTIVE | Noted: 2021-01-01

## 2021-07-07 NOTE — PLAN OF CARE
Patient alert to self. VSS. Denies abdominal pain but does complain of pain in her foot, tylenol given with relief. Tramadol given later in the night for increased pain. LS clear and dim, cardiac WNL. Abdominal dressing changed 1 time due to copious amount of serosanguinous/brown drainage, wet-to-dry with abdominal pad, now CDI. REMI drain patent with a very small amount of purulent, pink-tinged drainage. Slightly pink at insertion site. Ostomy intact and draining well, 250mL of yellow/green liquid stool emptied this shift. Patient had sips of water with her meds but declined any snacks or other drinks.     Had some trouble falling asleep and night became worse from there. She started to become very irritable with staff, was offered many things to make her more comfortable but just became more upset, screamed at staff, refused to have her saturated brief changed, threw objects in room. Very short with staff, using vulgar language. Patient did not sleep all night, just laid in her bed and stared at the ceiling. Was asked if she would like something for sleep and refused. Cannot be reasoned with, patient will just talk over you and use sarcasm when asked how she can be helped. Did not attempt to get out of bed.    /42   Pulse 77   Temp 96.4  F (35.8  C) (Tympanic)   Resp 16   Wt 58.7 kg (129 lb 6.6 oz)   SpO2 97%   BMI 22.56 kg/m

## 2021-07-07 NOTE — PROGRESS NOTES
"Patient continues to be uncooperative, grabbing IV line and will not let go. Reassured patient that IV is for antibiotics to help her, patient stated \"she would rather die\". Patient refused to let go of IV line.  Patient continues to attempt grabbing and hitting staff.  Patient refused oral medication. Patient yelling out \"She wants to be left alone\"  Reassured that staff is there to help her and Patient stated \" Ya Right\"  Patient chewing the  flaky skin off her hands and spitting at staff.  Offered lotion, patient \"stated\" no.  Offered snacks, Patient stated 'no\".  Made a call to patients  x2 with no answer.   MD made aware of behaviors.   Lucero Castillo RN on 7/7/2021 at 11:14 AM      Patient continued to grab at IV line and REMI drain.  Threw lunch onto floor and on herself.  Replaced Pure wick. Complete bed and gown change.  Talked with MD about patient continuing  to pull REMI and IV line.  Lucero Castillo RN on 7/7/2021 at 12:42 PM      MD placed one time order for 5mg Zyprexa. Gave IM right thigh.  Watson peek a vela mitts on, will continue to monitor and check q 2 hours.   Sitter in place for patient safety.  Lucero Castillo RN on 7/7/2021 at 1:22 PM    "

## 2021-07-07 NOTE — PROGRESS NOTES
:     Coordination with Sophia at [facility].  Provided with a discharge update.  Anticipated discharge 2-3 days.     AUGUST Lieberman on 7/7/2021 at 1:31 PM

## 2021-07-07 NOTE — PROGRESS NOTES
Patient removing posey peek a vela mitts. Trying to pull at REMI drain and IV line. MD aware, inquired about soft non violent restraints. Order with read back, soft non violent restraints applied, circulation good, offered water, snack, rest. Will assess patient q 2 hours, 1:1 attended check patient q 15 minutes.   Lucero Castillo RN on 7/7/2021 at 5:13 PM    Reassessment of patient, skin and CMS intact @ 1601  Lucero Castillo RN on 7/7/2021 at 6:21 PM

## 2021-07-07 NOTE — PROGRESS NOTES
"Patient alert. Laying flat in bed and looking around room. Writer asked patient where she was right now. Patient states, \"i'm not going to tell you where I am, if you don't know where we are then we have problems.\" Writer asked, \"can you tell me the date.\" Patient states \"no.\" Writer asked, \"can you tell me your husbands name?\" Patient states,\"nope.\" Patient refuses nurse to assess vitals, wound, colostomy, IV. Patient refusing breakfast. Explained to patient the importance of replacing dressing and monitoring wound. Patient states, \"I know that, I'd just rather die.\"  Will continue to monitor.   Lore Shaikh RN on 7/7/2021 at 8:07 AM    "

## 2021-07-07 NOTE — PROGRESS NOTES
[facility]    Medicine Progress Note - Hospitalist Service       Date of Admission:  7/4/2021    Assessment & Plan           Encephalopathy  Assessment: acute, suspect medication related. New med was tramadol   Plan: stop tramadol, zyprexa to use for delirium and combative nature, and IV dilaudid for pain control             Postoperative intra-abdominal abscess  Assessment: blood culture positive for gram negative rods. Spoke with Dr. Pang about plan to re-image with a possible drain.   Plan: cipro/flagyl. Management per surgery    Bacteremia  Assessment: gram negative rods   Plan: cipro/flagyl, await culture results       Acute on chronic blood loss anemia  Assessment: secondary to illness, surgery. Improved with blood transfusion.   Plan: monitor     Active Problems:    Malignant neoplasm of transverse colon (H)  Assessment: status post right hemicolectomy and transverse colectomy.       Severe protein-calorie malnutrition (Schumacher: less than 60% of standard weight) (H)  Assessment: chronic, present on admission   Plan: encourage intake       Abdominal pain, generalized         Diet: Dysphagia Diet Level 2 [facility] Altered Nectar Thickened Liquids (pre-thickened or use instant food thickener)  Snacks/Supplements Adult: Ensure Enlive; With Meals    DVT Prophylaxis: Pneumatic Compression Devices  Oakley Catheter: Not present  Central Lines: None  Code Status: Full Code      Disposition Plan   Expected discharge: 2 - 3 days, recommended to prior living arrangement once antibiotic plan established.     The patient's care was discussed with the Patient and spouse Ilya.    Richard Pickett MD  Hospitalist Service  [facility]  Securely message with the Vocera Web Console (learn more here)  Text page via [employer] Paging/Directory        ______________________________________________________________________    Interval History   Refuses to answer questions. Refuses to let me examine  her. Combative with nurses, pulling on lines.     Data reviewed today: I reviewed all medications, new labs and imaging results over the last 24 hours. I personally reviewed no images or EKG's today.    Physical Exam   Vital Signs: Temp: 96.4  F (35.8  C) Temp src: Tympanic BP: 111/42 Pulse: 77   Resp: 16 SpO2: 97 % O2 Device: None (Room air)    Weight: 129 lbs 6.56 oz  Unable to examine.     Data   Recent Labs   Lab 07/07/21  0550 07/06/21  0515 07/05/21  0530 07/04/21  1511 07/04/21  1511   WBC 8.8 7.5 7.3  --  6.9   HGB 8.5* 8.2* 6.9*  --  7.7*   MCV 88 87 89  --  88    322 318  --  375   INR 1.61* 2.53*  --   --  2.39*    140 142  --  143   POTASSIUM 4.0 3.8 3.5   < > 3.1*   CHLORIDE 110* 110* 109*  --  109*   CO2 22 24 24  --  26   BUN 17 19 17  --  19   CR 0.64 0.76 0.64  --  0.62   ANIONGAP 9 6 9  --  8   DARIANA 7.6* 7.5* 7.8*  --  8.6   GLC 77 107* 64*  --  73   ALBUMIN  --   --   --   --  2.3*   PROTTOTAL  --   --   --   --  4.8*   BILITOTAL  --   --   --   --  0.8   ALKPHOS  --   --   --   --  217*   ALT  --   --   --   --  16   AST  --   --   --   --  25    < > = values in this interval not displayed.     Medications     0.9% sodium chloride + KCl 20 mEq/L 75 mL/hr at 07/07/21 0405       ciprofloxacin  400 mg Intravenous Q12H     clobetasol   Topical BID     metroNIDAZOLE  500 mg Intravenous Q12H     pantoprazole  40 mg Oral Daily     sodium chloride (PF)  3 mL Intracatheter Q8H

## 2021-07-07 NOTE — PROGRESS NOTES
SAFETY CHECKLIST  ID Bands and Risk clasps correct and in place (DNR, Fall risk, Allergy, Latex, Limb):  Yes  All Lines Reconciled and labeled correctly: Yes  Whiteboard updated:Yes  Environmental interventions (bed/chair alarm on, call light, side rails, restraints, sitter....): Yes    Kaylin Murphy RN on 7/6/2021 at 7:09 PM

## 2021-07-07 NOTE — PROGRESS NOTES
"Patient uncooperative of vitals, assessments of IV, wound, colostomy and REMI drain. Patient grabbed IV line and would not let go. Patient stated \" if it goes, I go\".  Patient attempted to grab and hit staff. Patient attempted to kick staff.  Attempted to bite staff.    Sharla mejia vela mitts applied after IV line was carefully removed from her hands. Mitts were on for 30minutes as patient removed them.  Mitts remained off.   Lucero Castillo RN on 7/7/2021 at 10:23 AM    Notified Dr. Pickett of patients status and an PRN  order of Zyprexa was placed.    Zyprexa 2.5mg, IM was given in left thigh @ 0944.   Lucero Castillo RN on 7/7/2021 at 10:27 AM      "

## 2021-07-07 NOTE — PROGRESS NOTES
"Surgical Progress Note     POD# 23/8/9 s/p colectomy with intra-abdominal abscess    Subjective: Patient did not want me to examine her. Stated \" you would be committing murder\". RN present. Wanted me to leave.     Objective:  /42   Pulse 77   Temp 96.4  F (35.8  C) (Tympanic)   Resp 16   Wt 58.7 kg (129 lb 6.6 oz)   SpO2 97%   BMI 22.56 kg/m      Patient angry and in bed covered with blanket.    LABS  Recent Labs   Lab Test 07/07/21  0550 07/06/21  0515 08/30/16  1622 08/30/16  1622 08/20/16  0012   WBC 8.8 7.5   < >  --   --    RBC 3.11* 2.93*   < >  --   --    HGB 8.5* 8.2*   < > 14.8 14.4   HCT 27.2* 25.4*   < > 47.5 42.0   MCV 88 87   < > 91 90   MCH 27.3 28.0   < > 28.3 30.9   MCHC 31.3* 32.3   < > 31.1* 34.2    322   < > 237 220   MPV  --   --   --  9.1 8.8    < > = values in this interval not displayed.       Recent Labs   Lab Test 07/07/21  0550 07/06/21  0515   POTASSIUM 4.0 3.8   CHLORIDE 110* 110*   BUN 17 19       Recent Labs   Lab Test 07/04/21  1511 06/26/21  0445 06/16/21  1424 06/16/21  1424 05/28/21  1219 05/28/21  1219   PROTEIN  --   --   --  Negative  --  Negative   BILITOTAL 0.8 2.0*   < >  --    < >  --    AST 25 37   < >  --    < >  --    ALT 16 15   < >  --    < >  --     < > = values in this interval not displayed.           ASSESSMENT  Patient refused my care. Mental status , altered. Unable to assess surgically.    PLAN  Dr Pickett will need to assess mental status  Per RN, CT scheduled for tomorrow with drainage      Afshin Lakhani MD      "

## 2021-07-07 NOTE — PROGRESS NOTES
SAFETY CHECKLIST  ID Bands and Risk clasps correct and in place (DNR, Fall risk, Allergy, Latex, Limb):  Yes  All Lines Reconciled and labeled correctly: Yes  Whiteboard updated:Yes  Environmental interventions (bed/chair alarm on, call light, side rails, restraints, sitter....): Yes  Verify Tele #:   Lore Shaikh RN on 7/7/2021 at 7:15 AM

## 2021-07-07 NOTE — PROGRESS NOTES
MD RESTRAINTNOTE:  FACE TO FACE ASSESSMENT    Restraints for Non-Violent or Non-Self Destructive / Violent or Self Destructive Patient.     Patient s Immediate Situation:  Currently in restraints:Yes:     Least restrictive measures attempted:  Family involvement:Yes:   Move patient closer to nurses station: Yes:   1:1 staffing:Yes:   Decrease stimulation: Yes:   Diversional activities:Yes:   Secure lines/tubes: yes      Patient demonstrated the following behaviors:  Delirious: Yes:   Confused: No  Disoriented: Yes:   Impulsive: Yes:   Striking:Yes:   Pulling at lines/tubes/dressings: Yes:   Attempting to get out of bed: Yes:   Lacks decision making capacity: Yes:   Falling:No  Imminent risk of harm to self or others: Yes:     Comprehensive Assessment:  Time patient was examined: 1710     Is there anyevidence of:   Temperature elevations:  No  Hypoxia: No  Hypoglycemia: No  Electrolyte inbalances:  No  Drug interactions:  Yes:   Drug/Alcohol side effects:  No    Physical Exam:  General: awake  Pulmonary: clear to auscultation   Cardiovascular: regular  Neurological: nonfocal    Isthere any evidence of compromise of:  Skin integrity:  Yes:   Respiratory:  Yes:   Cardiovascular:  Yes:   Musculoskeletal:  Yes:   Hydration:  Yes:   Elimination:  Yes:     Action:  1) Physical Restraints: Right wrist and Left wrist       Which limb(s) are being restrained? wrists    2) Are side rails being used as a restraint?Yes:     3) Have chemical restraints been used? Yes:     Plan:  Does the benefit of restraints outweigh the risks? Yes:  The reasonand benefit has been explained to the patient/family.    Is there a need tocontinue the restraints? Yes: .  If no, then the restraint order is discontinued.     Patient will continue to be monitored and assessed.  Seenursing restraint flow sheet for restraint application documentation and additional nursing assessments.     Order for restraints has been entered.     Richard Pikcett M.D.  7/7/2021  5:20 PM  [phone]

## 2021-07-08 NOTE — PROGRESS NOTES
SAFETY CHECKLIST  ID Bands and Risk clasps correct and in place (DNR, Fall risk, Allergy, Latex, Limb):  Yes  All Lines Reconciled and labeled correctly: Yes  Whiteboard updated:Yes  Environmental interventions (bed/chair alarm on, call light, side rails, restraints, sitter....): Yes    Kaylin Murphy RN on 7/7/2021 at 7:15 PM

## 2021-07-08 NOTE — PROGRESS NOTES
Prior to the start of the procedure and with procedural staff participation, I verbally confirmed the patient s identity using two indicators, relevant allergies, that the procedure was appropriate and matched the consent or emergent situation, and that the correct equipment/implants were available. Immediately prior to starting the procedure I conducted the Time Out with the procedural staff and re-confirmed the patient s name, procedure, and site/side. (The Joint Commission universal protocol was followed.)  Yes    Sedation (Moderate or Deep): None]- patient had ativan prior to procedure      CT guided REMI drain placement completed with no complications. Patient tolerated the procedure well, sterile field maintained, abscess fluid aspirated and sent to lab. Patient brought back to [facility].

## 2021-07-08 NOTE — PROGRESS NOTES
Patient refusing to take anything orally including water. Unable to given scheduled Seroquel. MD Pickett notified.  Orders for IV Ativan every 6 hours PRN. Order placed and verified by pharmacy. Kaylin PHILLIPS will give this to help with patient's current agitation. If this medication does not work, MD OK'd to order IM Haldol 2 mg 1X dose. Will continue to monitor and treat as needed. Patient currently in soft wrist restraints per MD order due to patient agitation and trying to pull at lines. Hourly checks being performed. Skin intact. Sitter at bedside. Jossie Deng RN on 7/7/2021 at 9:38 PM  Jossie Deng RN on 7/7/2021 at 9:37 PM      Seroquel tablet was opened by Kaylin PHILLIPS. Pt refused medication and medication was wasted. Double checked and witnessed by this RN. Jossie Deng RN on 7/7/2021 at 9:54 PM     General

## 2021-07-08 NOTE — PROGRESS NOTES
SAFETY CHECKLIST  ID Bands and Risk clasps correct and in place (DNR, Fall risk, Allergy, Latex, Limb):  Yes  All Lines Reconciled and labeled correctly: Yes  Whiteboard updated:Yes  Environmental interventions (bed/chair alarm on, call light, side rails, restraints, sitter....): Yes  Verify Tele #: na  Lucero Castillo RN on 7/8/2021 at 7:13 AM

## 2021-07-08 NOTE — PROGRESS NOTES
PROGRESS NOTE    Patient was readmitted on 07/04/2021 with clinical presentation consistent with postoperative intra-abdominal abscess. Her recent surgical history includes extended right hemicolectomy with small bowel resection on 06/14, anastomotic leak leading to transverse colectomy and anastomosis with diverting loop ileostomy on 06/22, and washout and closure of midline incision on 06/28.    SUBJECTIVE:    Past 24 hours: Patient was extremely agitated yesterday and was refusing all care including dressing changes, food/water, vital assessments. She was given olanzapine and ativan. Per nursing report, she was able to sleep well overnight and changed her midline abdominal dressing x2. She remains in soft restraints due to trying to pull out her REMI tube and ostomy bag. Upon awakening her this morning, she seemed quite pleasant. She allowed me to perform a physical exam and change her dressings. She denied any pain. She has continued complaints of dry mouth.    OBJECTIVE:    Intake/Output Summary (Last 24 hours) at 7/8/2021 0655  Last data filed at 7/8/2021 0211  Gross per 24 hour   Intake 1352 ml   Output 230 ml   Net 1122 ml        Vitals:    07/07/21 2211 07/07/21 2300 07/08/21 0034 07/08/21 0130   BP:    (!) 148/55   BP Location:    Right arm   Pulse:  74  85   Resp: 16 18  18   Temp:    96.9  F (36.1  C)   TempSrc:    Tympanic   SpO2:  99%  95%   Weight:   61 kg (134 lb 7.7 oz)       GENERAL: NAD, at baseline confusion  CV: RRR, no murmurs noted  RESPIRATORY: no dyspnea, equal chest expansion  ABDOMEN: non-distended, soft, appropriately tender, midline incision draining purulent malodorous fluid. Wet to dry dressing change. REMI drain intact at the RUQ with minimal orange tinged drainage. Ostomy bag intact with brown liquid output.  EXTREMITY: left LE edema, no right LE edema, no peripheral cyanosis  SKIN: warm and dry, no jaundice   NEURO: cognition at baseline, no focal deficit    LABS  Recent Labs   Lab  Test 07/08/21  0604 07/07/21  0550 08/30/16  1622 08/30/16  1622 08/20/16  0012   WBC 8.1 8.8   < >  --   --    RBC 3.10* 3.11*   < >  --   --    HGB 8.5* 8.5*   < > 14.8 14.4   HCT 27.0* 27.2*   < > 47.5 42.0   MCV 87 88   < > 91 90   MCH 27.4 27.3   < > 28.3 30.9   MCHC 31.5 31.3*   < > 31.1* 34.2    335   < > 237 220   MPV  --   --   --  9.1 8.8    < > = values in this interval not displayed.       Recent Labs   Lab Test 07/07/21  0550 07/06/21  0515   POTASSIUM 4.0 3.8   CHLORIDE 110* 110*   BUN 17 19       Recent Labs   Lab Test 07/08/21  0210 07/04/21  1511 06/26/21  0445 06/16/21  1424 06/16/21  1424   PROTEIN 10*  --   --   --  Negative   BILITOTAL  --  0.8 2.0*   < >  --    AST  --  25 37   < >  --    ALT  --  16 15   < >  --     < > = values in this interval not displayed.     Color Urine (no units)   Date Value   07/08/2021 Yellow     Appearance Urine (no units)   Date Value   07/08/2021 Clear     Glucose Urine (mg/dL)   Date Value   07/08/2021 Negative     Bilirubin Urine (no units)   Date Value   07/08/2021 Negative     Ketones Urine (mg/dL)   Date Value   07/08/2021 5 (A)     Specific Gravity Urine (no units)   Date Value   07/08/2021 1.018     pH Urine (pH)   Date Value   07/08/2021 5.0     Protein Albumin Urine (mg/dL)   Date Value   07/08/2021 10 (A)     Urobilinogen Urine (EU/dL)   Date Value   06/16/2021 0.2     Nitrite Urine (no units)   Date Value   07/08/2021 Negative     Leukocyte Esterase Urine (no units)   Date Value   07/08/2021 Negative     ASSESSMENT  Patient is an 86 year old female with a clinical presentation consistent with postoperative intra-abdominal abscess with improved delirium this morning.     PLAN  Will plan on re-imaging today with repeat ab/pel CT with contrast. Repeat INR this a.m. Depending on imaging results and labs, will consider interventional radiology placing a drain if there is significant remaining fluid and abscess. Continue changing the midline dressing  PRN if becomes saturated.  Will continue to monitor clinically and closely  Pain: May use tylenol or dilaudid PRN for pain  Nutrition: Dysphagia diet level 2, nectar thickened liquids. Encourage liquids.  DVT prophylaxis: SCDs  ID: Antibiotics flagyl and cipro. Frequency per orders.  REMI drain: Output minimal. May consider removal or placement of new REMI drain pending CT imaging results.  Oakley: Periwick in place.    Cristina Barnes, MS4    May need PICC if prolonged IV abx needed. Polyp microbial on new gram stain.     Del Pang MD on 7/8/2021 at 3:40 PM

## 2021-07-08 NOTE — PLAN OF CARE
Patient alert to self. VSS. Denies pain. LS clear and dim, cardiac WNL. Abdominal dressing changed twice (ABD and 4x4s only, deeper packing left alone) due to copious amount of serosanguinous/brown & purulent drainage. REMI drain patent with a very small amount of purulent, pink-tinged drainage. Slightly pink at drain insertion site. Ostomy intact and draining well, 100mL of yellow/green liquid stool emptied this shift, stoma site remains red and moist. Patient had sips of water but declined food. Patient checked Q1 throughout shift due to soft restraints, no skin breakdown.    Patients behaviors remained during the beginning of the shift, able to get cares/vitals done due to soft restraints being in place, though she was semi-cooperative, just verbally abusive toward staff with some spitting. Did get left restraint off for short period but was quickly reapplied. PRN Ativan 1mg given at 2200 and patient fell asleep immediately, rested comfortably for the rest of the shift. In speaking with patient the last few hours, she appears to be returning to her normal self, cooperative and calm, becoming more pleasant with staff.     BP (!) 148/55 (BP Location: Right arm)   Pulse 85   Temp 96.9  F (36.1  C) (Tympanic)   Resp 18   Wt 61 kg (134 lb 7.7 oz)   SpO2 95%   BMI 23.45 kg/m       Kaylin Murphy RN on 7/8/2021 at 7:08 AM

## 2021-07-08 NOTE — PROGRESS NOTES
[facility]    Medicine Progress Note - Hospitalist Service       Date of Admission:  7/4/2021    Assessment & Plan                      Encephalopathy  Assessment: acute, not present on admission, now resolved. New med was tramadol and likely the cause.   Plan:  IV dilaudid for pain control. Stop seroquel during day, only night dosing.        Postoperative intra-abdominal abscess  Assessment: blood culture positive for gram negative rods. Spoke with Dr. Pang about plan to re-image with a possible drain.   Plan: cipro/flagyl. Management per surgery    Bacteremia  Assessment: gram negative rods   Plan: cipro/flagyl, await culture results       Acute on chronic blood loss anemia  Assessment: secondary to illness, surgery. Improved with blood. Hemoglobin stable after transfusion.   Plan: monitor     Active Problems:    Malignant neoplasm of transverse colon (H)  Assessment: status post right hemicolectomy and transverse colectomy.       Severe protein-calorie malnutrition (Schumacher: less than 60% of standard weight) (H)  Assessment: chronic, present on admission   Plan: encourage intake       Abdominal pain, generalized           Diet: Dysphagia Diet Level 2 [facility] Altered Nectar Thickened Liquids (pre-thickened or use instant food thickener)  Snacks/Supplements Adult: Ensure Enlive; With Meals    DVT Prophylaxis: Pneumatic Compression Devices  Oakley Catheter: Not present  Central Lines: None  Code Status: Full Code      Disposition Plan   Expected discharge: 2 - 3 days, recommended to transitional care unit once antibiotic plan established.     The patient's care was discussed with the Patient.    Richard Pickett MD  Hospitalist Service  [facility]  Securely message with the Vocera Web Console (learn more here)  Text page via [facility] Paging/Directory      Risk Factors Present on Admission                 ______________________________________________________________________    Interval History   Awake, pleasant. Confused but not exhibiting any behaviors.     Data reviewed today: I reviewed all medications, new labs and imaging results over the last 24 hours. I personally reviewed no images or EKG's today.    Physical Exam   Vital Signs: Temp: 98.4  F (36.9  C) Temp src: Tympanic BP: 131/52 Pulse: 87   Resp: 16 SpO2: 96 % O2 Device: None (Room air)    Weight: 134 lbs 7.69 oz  GENERAL: Comfortable, no apparent distress.  CARDIOVASCULAR: regular rate and rhythm, no murmur. No lower extremity edema   RESPIRATORY: Clear to auscultation bilaterally, no wheezes or crackles.  GI: non-tender, non-distended, normal bowel sounds.   SKIN: warm periphery, no rashes      Data   Recent Labs   Lab 07/08/21  0712 07/08/21  0604 07/07/21  0550 07/06/21  0515 07/04/21  1511 07/04/21  1511   WBC  --  8.1 8.8 7.5   < > 6.9   HGB  --  8.5* 8.5* 8.2*   < > 7.7*   MCV  --  87 88 87   < > 88   PLT  --  328 335 322   < > 375   INR 1.29*  --  1.61* 2.53*  --  2.39*   NA  --  141 141 140   < > 143   POTASSIUM  --  4.0 4.0 3.8   < > 3.1*   CHLORIDE  --  111* 110* 110*   < > 109*   CO2  --  21 22 24   < > 26   BUN  --  14 17 19   < > 19   CR  --  0.55* 0.64 0.76   < > 0.62   ANIONGAP  --  9 9 6   < > 8   DARIANA  --  7.5* 7.6* 7.5*   < > 8.6   GLC  --  60* 77 107*   < > 73   ALBUMIN  --  2.0*  --   --   --  2.3*   PROTTOTAL  --  3.6*  --   --   --  4.8*   BILITOTAL  --  0.6  --   --   --  0.8   ALKPHOS  --  150*  --   --   --  217*   ALT  --  12  --   --   --  16   AST  --  18  --   --   --  25    < > = values in this interval not displayed.     Medications     0.9% sodium chloride + KCl 20 mEq/L 75 mL/hr at 07/08/21 0328       ciprofloxacin  400 mg Intravenous Q12H     clobetasol   Topical BID     iopamidol (ISOVUE-370)  77 mL Intravenous Once     lidocaine  10 mL Infiltration Once     metroNIDAZOLE  500 mg Intravenous Q12H     pantoprazole  40  mg Oral Daily     QUEtiapine  12.5 mg Oral BID     sodium chloride (PF)  3 mL Intracatheter Q8H

## 2021-07-08 NOTE — PROGRESS NOTES
Patient cooperative this morning, mentation improved. Okay per MD to remove. Restraints removed at 0700. Tolerating well. Sitter remains at bedside.    Kaylin Murphy RN on 7/8/2021 at 7:26 AM

## 2021-07-08 NOTE — PLAN OF CARE
Patient alert and confused to time and situation. Patient appropriate this shift. Pain managed with medication see Mar.    LS diminished and clear. +3 edema BLLE  BS normoactive. x2 REMI drains intact with minimal drainage. Pure wick in place. Ostomy intact, liquid green stool. Wound dressing clean, dry and intact. Repo q 2 hours.       /47 (BP Location: Right arm)   Pulse 89   Temp 97.6  F (36.4  C) (Tympanic)   Resp 16   Wt 61 kg (134 lb 7.7 oz)   SpO2 94%   BMI 23.45 kg/m         Lucero Castillo RN on 7/8/2021 at 6:04 PM

## 2021-07-08 NOTE — PROGRESS NOTES
Called pharmacy to ask about rescheduling antibiotics. Cipro was given at 1430 this afternoon and  flagyl given at 1030 (both scheduled for 1000). Pharmacist stated it was okay to give these doses at scheduled 2200.    Kaylin Murphy RN on 7/7/2021 at 9:06 PM

## 2021-07-08 NOTE — PROGRESS NOTES
IV ativan given. Patient sleeping at this time. RR 16. Sitter at bedside.  Jossie Deng RN on 7/7/2021 at 10:12 PM    Patient slept most of shift. Awake this morning and smiling. Pt asking if it is warm outside and if she can have some orange juice. Pt confused X3. Reoriented. Will discuss restraint discontinuation with MD this morning.   Jossie Deng RN on 7/8/2021 at 6:52 AM

## 2021-07-09 NOTE — PROGRESS NOTES
[facility]  Hospitalist Progress Note    Assessment & Plan   Melissa Quinteros is a 86 year old female who was admitted on 7/4/2021  Extended right hemicolectomy with small bowel resection x2 6/14     Return to the OR for anastomotic leak with segmental colon resection and anastomosis and diverting loop ileostomy 6/22  [place] and closure of midline laparotomy 6/28    Encephalopathy  Assessment: acute, not present on admission, now resolved. New med was tramadol and likely the cause.   Plan:  IV dilaudid for pain control. Stop seroquel during day, only night dosing.      Postoperative intra-abdominal abscess  Assessment: blood culture positive for gram negative rods, re-imaging showed fluid collection. Drain placed 7/8.21.   Plan: cipro/flagyl. Management per surgery     Bacteremia   Assessment: gram negative rods   Plan: cipro/flagyl, await culture results     Acute on chronic blood loss anemia  Assessment: secondary to illness, surgery. Improved with blood. Hemoglobin stable after transfusion.   Plan: monitor     Active Problems:  Malignant neoplasm of transverse colon (H)  Assessment: status post right hemicolectomy and transverse colectomy.     Severe protein-calorie malnutrition (Schumacher: less than 60% of standard weight) (H)  Assessment: chronic, present on admission   Plan: encourage intake, supplements. NDD Diet.        Abdominal pain, generalized, improving.     Will have more in-depth conversation with family about goals of care tomorrow.        DVT Prophylaxis: on hold due to need for transfusion/anemia.     Code Status: Full Code    Dee Grajeda    Interval History   Patient is sleeping when I entered the room but awakens easily.  Generally feeling a little bit improved.  Mouth is dry.  Denies abdominal pain.  No fevers or chills.    -Data reviewed today: I reviewed all new labs and imaging results over the last 24 hours. I personally reviewed the abdominal CT image(s) showing Fluid  collection.  Drain placed..    Physical Exam   Temp: 97.1  F (36.2  C) Temp src: Tympanic BP: 131/55 Pulse: 91   Resp: 18 SpO2: 97 % O2 Device: None (Room air)    Vitals:    07/07/21 0024 07/08/21 0034 07/09/21 0418   Weight: 58.7 kg (129 lb 6.6 oz) 61 kg (134 lb 7.7 oz) 64.6 kg (142 lb 6.7 oz)     Vital Signs with Ranges  Temp:  [96.4  F (35.8  C)-97.6  F (36.4  C)] 97.1  F (36.2  C)  Pulse:  [89-95] 91  Resp:  [16-18] 18  BP: (112-132)/(47-56) 131/55  SpO2:  [94 %-98 %] 97 %  I/O last 3 completed shifts:  In: 1420 [P.O.:130; I.V.:1290]  Out: 1565 [Urine:600; Drains:15; Stool:950]    Constitutional: Awake and alert.  appears frail weak and deconditioned.  Respiratory: Clear to auscultation  Cardiovascular: Regular rate.  Significant bilateral lower extremity edema, anasarca.  GI: Ostomy in place on left side.  Drain in place on right side.  Bandage in place midline.  No drainage.  Nontender.  Skin/Integumen: Warm and dry.  No rashes.  Other:      Medications     0.9% sodium chloride + KCl 20 mEq/L 75 mL/hr at 07/08/21 2038       ciprofloxacin  400 mg Intravenous Q12H     clobetasol   Topical BID     magnesium sulfate  2 g Intravenous Once     metroNIDAZOLE  500 mg Intravenous Q12H     pantoprazole  40 mg Oral Daily     QUEtiapine  12.5 mg Oral At Bedtime     sodium chloride (PF)  3 mL Intracatheter Q8H       Data   Recent Labs   Lab 07/09/21  0553 07/08/21  0712 07/08/21  0604 07/07/21  0550 07/06/21  0515 07/04/21  1511 07/04/21  1511   WBC 9.3  --  8.1 8.8 7.5   < > 6.9   HGB 8.9*  --  8.5* 8.5* 8.2*   < > 7.7*   MCV 87  --  87 88 87   < > 88     --  328 335 322   < > 375   INR  --  1.29*  --  1.61* 2.53*  --  2.39*     --  141 141 140   < > 143   POTASSIUM 4.1  --  4.0 4.0 3.8   < > 3.1*   CHLORIDE 111*  --  111* 110* 110*   < > 109*   CO2 22  --  21 22 24   < > 26   BUN 13  --  14 17 19   < > 19   CR 0.55*  --  0.55* 0.64 0.76   < > 0.62   ANIONGAP 6  --  9 9 6   < > 8   DARIANA 7.6*  --  7.5* 7.6*  7.5*   < > 8.6   GLC 89  --  60* 77 107*   < > 73   ALBUMIN  --   --  2.0*  --   --   --  2.3*   PROTTOTAL  --   --  3.6*  --   --   --  4.8*   BILITOTAL  --   --  0.6  --   --   --  0.8   ALKPHOS  --   --  150*  --   --   --  217*   ALT  --   --  12  --   --   --  16   AST  --   --  18  --   --   --  25    < > = values in this interval not displayed.       Recent Results (from the past 24 hour(s))   CT Abdomen pelvis w contrast*    Narrative    EXAMINATION: CT ABDOMEN PELVIS W CONTRAST, 7/8/2021 10:56 AM    TECHNIQUE:  Helical CT images from the lung bases through the  symphysis pubis were obtained  with IV contrast. Contrast dose: 71 ml  isovue 370    COMPARISON: July 4, 2021    HISTORY: Abdominal abscess/infection suspected    FINDINGS:    There are moderate bilateral pleural effusions that have worsened as  compared to July 4. This compressive atelectasis in both lungs.    The liver is free of masses or biliary ductal enlargement. No  calcified gallstones are seen.    There is irregularity of the upper portion of the spleen stable from  previous examination. The pancreas appears normal. The adrenal glands  are normal. The right and left kidneys are free of masses or  hydronephrosis    The periaortic lymph nodes are normal in caliber.    There is some mild free fluid seen in adjacent to the liver and spleen  with some droplets of postoperative gas lesions are stable from  previous examination. There is a drain seen in the pelvis overlying  the dome of the liver. There is a loculated fluid collection on the  right side of the abdomen. There is some edema and some free fluid in  the pelvis. An ostomy is seen in the left lower quadrant. Degenerative  changes are seen in the thoracic and lumbar spine. There is  spondylolisthesis of L4 on L5. Extensive edema is seen in the  subcutaneous fat of the abdomen and pelvis.      Impression    IMPRESSION: The loculated low-density collection in the right side of  the abdomen  is again noted stable and unchanged from previous  examination. Irregularity of the upper spleen is again noted also  stable.    Enlarging bilateral pleural effusions     CATHERINE CAMACHO MD         SYSTEM ID:  [id number]   CT Abd Peritoneum Abscess Drain w Cath Place    Narrative    PROCEDURE: CT ABDOMEN PERITONEUM ABSCESS DRAIN W CATH PLACE 7/8/2021  11:25 AM    HISTORY: abdominal fluid collections post op    COMPARISONS: None.    Meds/Dose Given:    TECHNIQUE: Patient's right abdominal wall is prepped and draped in a  sterile manner. Using lidocaine anesthesia and CT guidance a 22 ga  needle was placed in the fluid collection in the right side of the  abdomen. An 018 guidewire was advanced into the fluid collection. Over  this guidewire a 5 Citizen of the [place] catheter was placed within the fluid  collection right side the abdomen. The 018 guidewire was exchanged for  035 guidewire. Over this guidewire an 8 Citizen of the [place] dilating catheter was  placed in the fluid collection. Over the guidewire a 8 Citizen of the [place] cope  loop type drainage catheter was placed within the collection. Some  serosanguineous fluid was obtained and sent for Gram stain and culture  and sensitivity. The catheter was connected to bulb suction. The  catheter was secured to the skin.    CATHERINE CAMACHO MD         SYSTEM ID:  [id number]

## 2021-07-09 NOTE — PROGRESS NOTES
"Clinical Nutrition / Reassessment     Assessment:   Client History:  Pt transitioned today to comfort cares. Has been eating very poorly, refusing at times.   Diet Order:  NDD II, nectar thick fluids   Supplement Intake:  Boost Plus to trays TID    Weight:   Wt Readings from Last 10 Encounters:   07/09/21 64.6 kg (142 lb 6.7 oz)   07/02/21 61.7 kg (136 lb 1.6 oz)   06/08/21 53.7 kg (118 lb 6.4 oz)   06/03/21 57.2 kg (126 lb)   06/01/21 57.2 kg (126 lb)   05/28/21 57.2 kg (126 lb)   05/14/21 55.6 kg (122 lb 9.6 oz)   10/09/19 59.7 kg (131 lb 9.6 oz)   06/12/19 62.8 kg (138 lb 6.4 oz)   09/05/18 66.2 kg (146 lb)   Height:   Ht Readings from Last 2 Encounters:   06/14/21 1.613 m (5' 3.5\")   06/08/21 1.613 m (5' 3.5\")     BMI: Body mass index is 24.83 kg/m .    Estimated nutritional needs based on:  current body weight  61 kg / 134 lbs   Estimated energy needs:  2672-6819 kcal/day (25-30 kcal/kg)  Estimated protein needs:  73-92 gm/day (1.2-1.5 g/kg)  Estimated fluid needs:  2648-6945 ml/day (1 ml/kcal)    Malnutrition Criteria:  (Need to have 2 indicators to qualify recommendation)  Energy Intake: Acute Severe: < 50% of estimated energy requirement for > 7days  Interpretation of Weight Loss:  No significant weight loss  Physical Findings:  Acute Fluid Accumulation:  mild   Reduced  Strength:  likely reduced-appears weak     Recommended Nutrition Diagnosis:              Severe Malnutrition in the context of acute illness or injury - based on AND/ASPEN Clinical Characterstics of Malnutrition May 2012     Nutrition Education: Nutrition education will be provided as appropriate.    Nutrition Diagnosis: Oral or Nutrition Support Intake:  inadequate energy intakes related to decreased appetite as evidenced by intakes during previous hospitalization very poor--now on comfort cares    Intervention:  Nutrition Prescription:     Nutrition Intervention(s):NDD II diet with nectar thick fluids  1. Meals and Snacks: small, " frequent meals/snacks when able to tolerate   2. Medical Food Supplement: Boost Plus on trays     Nutrition Goal(s):  1. Pt will tolerate diet as ordered- on going   2. Pt will consume 50% or more at meals and supplements- no met, on comfort cares at this time   3. Pt will not have unplanned wt loss during hospitalization- on going, however on comfort cares now    Monitoring and Evaluation:   Food Intake, diet tolerance, weight, labs     Discharge Recommendation:   Nutrition Discharge Planning  Now on comfort cares, recommend food and fluids as desired and tolerated. Supplements as she desires.   Follow up weekly unless needed sooner. If so, please order consult.     Shamika Rose RD on 7/9/2021 at 3:05 PM

## 2021-07-09 NOTE — PROGRESS NOTES
PROGRESS NOTE    Patient was readmitted on 07/04/2021 with clinical presentation consistent with postoperative intra-abdominal abscess. Her recent surgical history includes extended right hemicolectomy with small bowel resection on 06/14, anastomotic leak leading to transverse colectomy and anastomosis with diverting loop ileostomy on 06/22, and washout and closure of midline incision on 06/28.    SUBJECTIVE:    Past 24 hours: She was brought for CT yesterday and interventional radiology drained her RUQ fluid collection. Fluid obtained was sent for gram stain and culture. Patient slept well overnight. She was cooperative with the staff yesterday, so the soft restraints were removed. She denies any pain, fevers, chills, difficulty breathing, chest pain.     OBJECTIVE:    Date 07/09/21 0700 - 07/10/21 0659   Shift 2122-1481 [id number] 24 Hour Total   INTAKE   Shift Total(mL/kg)       OUTPUT   Drains 200   200   Stool 100   100   Shift Total(mL/kg) 300(4.64)   300(4.64)   Weight (kg) 64.6 64.6 64.6 64.6             Vitals:    07/08/21 1615 07/08/21 1945 07/09/21 0243 07/09/21 0418   BP: 132/47 128/56 117/53    BP Location: Right arm Right arm Right arm    Pulse: 89 93 95    Resp: 16 16 16    Temp: 97.6  F (36.4  C) 96.7  F (35.9  C) 96.7  F (35.9  C)    TempSrc: Tympanic Tympanic Tympanic    SpO2: 94% 98% 95%    Weight:    64.6 kg (142 lb 6.7 oz)      GENERAL: NAD, at baseline confusion  CV: RRR, no murmurs noted  RESPIRATORY: no dyspnea, CTA bilaterally, equal chest expansion  ABDOMEN: non-distended, soft, appropriately tender, midline incision draining purulent malodorous fluid. Ostomy bag intact with brown liquid output.  EXTREMITY: +2 pitting edema in left LE, trace edema in right LE, no peripheral cyanosis  SKIN: warm and dry, no jaundice   NEURO: cognition at baseline, no focal deficit    LABS  Recent Labs   Lab Test 07/09/21  0553 07/08/21  0604 08/30/16  1622 08/30/16  1622 08/20/16  0012   WBC 9.3 8.1    < >  --   --    RBC 3.19* 3.10*   < >  --   --    HGB 8.9* 8.5*   < > 14.8 14.4   HCT 27.6* 27.0*   < > 47.5 42.0   MCV 87 87   < > 91 90   MCH 27.9 27.4   < > 28.3 30.9   MCHC 32.2 31.5   < > 31.1* 34.2    328   < > 237 220   MPV  --   --   --  9.1 8.8    < > = values in this interval not displayed.     Recent Labs   Lab Test 07/09/21  0553 07/08/21  0604   POTASSIUM 4.1 4.0   CHLORIDE 111* 111*   BUN 13 14       Recent Labs   Lab Test 07/08/21  0604 07/08/21  0210 07/04/21  1511 06/16/21  1424 06/16/21  1424   PROTEIN  --  10*  --   --  Negative   BILITOTAL 0.6  --  0.8   < >  --    AST 18  --  25   < >  --    ALT 12  --  16   < >  --     < > = values in this interval not displayed.     IMAGING  CT ABDOMEN PELVIS W CONTRAST  FINDINGS:  There are moderate bilateral pleural effusions that have worsened as  compared to July 4. This compressive atelectasis in both lungs.     The liver is free of masses or biliary ductal enlargement. No  calcified gallstones are seen.     There is irregularity of the upper portion of the spleen stable from  previous examination. The pancreas appears normal. The adrenal glands  are normal. The right and left kidneys are free of masses or  hydronephrosis     The periaortic lymph nodes are normal in caliber.     There is some mild free fluid seen in adjacent to the liver and spleen  with some droplets of postoperative gas lesions are stable from  previous examination. There is a drain seen in the pelvis overlying  the dome of the liver. There is a loculated fluid collection on the  right side of the abdomen. There is some edema and some free fluid in  the pelvis. An ostomy is seen in the left lower quadrant. Degenerative  changes are seen in the thoracic and lumbar spine. There is  spondylolisthesis of L4 on L5. Extensive edema is seen in the  subcutaneous fat of the abdomen and pelvis.                                                                      IMPRESSION: The loculated  low-density collection in the right side of  the abdomen is again noted stable and unchanged from previous  examination. Irregularity of the upper spleen is again noted also  stable.    ASSESSMENT  Patient is an 86 year old female with a clinical presentation consistent with postoperative intra-abdominal abscess which has continued to drain.     PLAN  Continue changing the midline dressing PRN if becomes saturated.  Will continue to monitor clinically and closely  Pain: May use tylenol or dilaudid PRN for pain  Nutrition: Dysphagia diet level 2, nectar thickened liquids. Encourage liquids.  DVT prophylaxis: SCDs  ID: Antibiotics flagyl and cipro. Frequency per orders. May need PICC if prolonged IV abx needed. Will continue to follow the results of culture and sensitivities  Electrolytes: Mg low. Medicine service managing, will replace per protocol.  REMI drain: Output minimal. May consider removal today  Oakley: Periwick in place.  Disposition: Plan for hospice consult  Cristina Barnes, MS4    Del Pang MD on 7/9/2021 at 5:39 PM

## 2021-07-09 NOTE — PLAN OF CARE
Patient is alert and confused. Restless and anxious; PRN Ativan administered. Patient has been sleeping comfortably. Midline surgical incision outer dressing changed. Moderate amount of drainage. Colostomy on left side is intact with adequate output, dark green-brown in color. Two REMI drains on right side; intact with 15 ml total output. Purewick in place for incontinence. 450 ml out, lele colored. +2-3 edema BLE. CCRq2. Will continue to monitor.    /53 (BP Location: Right arm)   Pulse 95   Temp 96.7  F (35.9  C) (Tympanic)   Resp 16   Wt 61 kg (134 lb 7.7 oz)   SpO2 95%   BMI 23.45 kg/m

## 2021-07-09 NOTE — PLAN OF CARE
Patient has been changed to comfort cares.  Will continue dressings/drains/colostomy and antibiotics.  IVFs decreased to 10/ml hr.  Patient has denied pain or need for anything for pain.  Remained calm and cooperative with intermittent confusion.  Ostomy is pink/red with liquid greenish, brown stool.  Abdominal incision has moderate purulent drainage with dressing changed x 2.  REMI drains to bulb suction with minimal drainage, light brown in color.  Patient has low urine output, MD aware no new orders.   VSS, afebrile.

## 2021-07-09 NOTE — PROGRESS NOTES
:     MD planning to have goals of care discussion tomorrow.  It is noted that patient did not hold a bed at [facility] and that they cannot meet her needs if she is confused.     AUGUST Lieberman on 7/9/2021 at 2:25 PM

## 2021-07-10 NOTE — PROGRESS NOTES
VSS. Patient has slept the majority of the night. Disoriented to time and situation. Having some trouble swallowing nectar thick liquids. Used honey thick liquids overnight. Complains of generalized pain Crushed tylenol in applesauce. Continues to have yellow/green loose, watery output from colostomy. Dressings CDI. Minimal output from REMI drains. Continues on comfort cares. PIV infusing at TKO. Continue ABX. Had heelbos off overnight and just elevated on pillows. +3 pitting edema in BLE. Will continue to monitor.    Shilo Ignacio RN on 7/10/2021 at 5:00 AM

## 2021-07-10 NOTE — PROGRESS NOTES
[facility] hospice was notified of patient and family requesting their services. Waiting for call back. Nona Ryder RN 7/10/2021 10:43 AM    Hospice returned call and will do intake with primary nurse, all info requested has been faxed to them. Nona Ryder RN 7/10/2021 11:12 AM

## 2021-07-10 NOTE — PROGRESS NOTES
SAFETY CHECKLIST  ID Bands and Risk clasps correct and in place (DNR, Fall risk, Allergy, Latex, Limb):  Yes  All Lines Reconciled and labeled correctly: Yes  Whiteboard updated:Yes  Environmental interventions (bed/chair alarm on, call light, side rails, restraints, sitter....): Yes    Shilo Ignacio RN on 7/9/2021 at 7:19 PM

## 2021-07-10 NOTE — PROGRESS NOTES
PROGRESS NOTE    Patient was readmitted on 07/04/2021 with clinical presentation consistent with postoperative intra-abdominal abscess. Her recent surgical history includes extended right hemicolectomy with small bowel resection on 06/14, anastomotic leak leading to transverse colectomy and anastomosis with diverting loop ileostomy on 06/22, and washout and closure of midline incision on 06/28.    SUBJECTIVE:    Past 24 hours: Patient and  had an extensive conversation yesterday about switching cares to comfort cares and anticipate hospice upon discharge. Patient has been switch to comfort cares. She slept well overnight and denies pain. Dressing changes throughout the night.     EXAM:    Intake/Output Summary (Last 24 hours) at 7/10/2021 1007  Last data filed at 7/10/2021 0748  Gross per 24 hour   Intake 207.67 ml   Output 1139 ml   Net -931.33 ml        Vitals:    07/09/21 1712 07/09/21 1919 07/10/21 0127 07/10/21 0748   BP: 135/60 105/52  (!) 143/80   BP Location: Right arm Right arm  Right arm   Pulse: 98 95  92   Resp: 16 18  18   Temp: 97  F (36.1  C) 97.7  F (36.5  C)  97.7  F (36.5  C)   TempSrc: Tympanic Tympanic  Tympanic   SpO2: 97% 97%  96%   Weight:   61.2 kg (134 lb 14.7 oz)       GENERAL: NAD  ABDOMEN: non-distended, soft, appropriately tender, midline incision draining purulent malodorous fluid. Ostomy bag intact with liquid output.  EXTREMITY: no edema bilaterally  SKIN: warm and dry, no jaundice   NEURO: cognition at baseline, no focal deficit    LABS  Recent Labs   Lab Test 07/09/21  0553 07/08/21  0604 08/30/16  1622 08/30/16  1622 08/20/16  0012   WBC 9.3 8.1   < >  --   --    RBC 3.19* 3.10*   < >  --   --    HGB 8.9* 8.5*   < > 14.8 14.4   HCT 27.6* 27.0*   < > 47.5 42.0   MCV 87 87   < > 91 90   MCH 27.9 27.4   < > 28.3 30.9   MCHC 32.2 31.5   < > 31.1* 34.2    328   < > 237 220   MPV  --   --   --  9.1 8.8    < > = values in this interval not displayed.       Recent Labs   Lab  Test 07/10/21  0525 07/09/21  0553 07/08/21  0604   POTASSIUM 3.8 4.1 4.0   CHLORIDE  --  111* 111*   BUN  --  13 14       Recent Labs   Lab Test 07/08/21  0604 07/08/21  0210 07/04/21  1511 06/16/21  1424 06/16/21  1424   PROTEIN  --  10*  --   --  Negative   BILITOTAL 0.6  --  0.8   < >  --    AST 18  --  25   < >  --    ALT 12  --  16   < >  --     < > = values in this interval not displayed.     ASSESSMENT  Patient is an 86 year female post op s/p extended right hemicolectomy with small bowel resection on 06/14, anastomotic leak leading to transverse colectomy and anastomosis with diverting loop ileostomy on 06/22, and washout and closure of midline incision on 06/28. She developed a postoperative intra-abdominal abscess which has continued to drain.    PLAN  Continue changing the midline dressing PRN if becomes saturated.  Will continue to monitor clinically and closely  Pain: May use tylenol or dilaudid PRN for pain. Do not use tramadol due to delirium side effect.  Nutrition: Dysphagia diet level 2, nectar thickened liquids. Encourage liquids.  DVT prophylaxis: Discontinue orders for SCDs due to switch to comfort cares  ID: Antibiotics flagyl and cipro. Continue to follow the results of culture and sensitivities, still no results as of 07/10/2021.  Electrolytes: Mg low. Medicine service managing, will replace per protocol.  Drains: Continue to monitor output  Oakley: Periwick in place.  Disposition: Plan for hospice consult on Monday.    Cristina Barnes MS4    Prognosis is guarded based on poor nutritional intake.  If patient continues to have poor nutritional intake this would be the cause of her demise.  Agree with hospice in the setting of guarded outlook.  May end up surviving longer than 9 months if her intake improves.  Hospice will be welcome regardless.    Del Pang MD on 7/10/2021 at 10:39 AM

## 2021-07-10 NOTE — PROGRESS NOTES
SAFETY CHECKLIST  ID Bands and Risk clasps correct and in place (DNR, Fall risk, Allergy, Latex, Limb):  Yes  All Lines Reconciled and labeled correctly: Yes  Whiteboard updated:Yes  Environmental interventions (bed/chair alarm on, call light, side rails, restraints, sitter....): Yes              1 vial into the lungs every 4 hours as needed       glucose monitoring kit (FREESTYLE) monitoring kit DX: Diabetes  Dispense store brand or whatever brand insurance will cover. Glucometer, lancets and testing strips for tid testing 30 days 1 kit 0     No current facility-administered medications for this visit. Allergies   Allergen Reactions    Aspirin     Penicillins        Review of Systems   Constitutional: Negative. HENT: Negative. Eyes: Negative. Respiratory: Negative. Cardiovascular: Negative. Gastrointestinal: Negative. Endocrine: Negative. Genitourinary: Negative. Musculoskeletal: Negative. Skin: Negative. Allergic/Immunologic: Negative. Neurological: Negative. Hematological: Negative. Psychiatric/Behavioral: Negative. OBJECTIVE:    Physical Exam  Vitals signs reviewed. Constitutional:       Appearance: Normal appearance. He is well-developed. He is obese. HENT:      Head: Normocephalic and atraumatic. Right Ear: Tympanic membrane, ear canal and external ear normal. There is no impacted cerumen. Left Ear: Tympanic membrane, ear canal and external ear normal. There is no impacted cerumen. Nose: Nose normal.      Mouth/Throat:      Lips: Pink. Mouth: Mucous membranes are moist.      Dentition: Normal dentition. Tongue: No lesions. Pharynx: Oropharynx is clear. Uvula midline. Tonsils: No tonsillar exudate or tonsillar abscesses. Eyes:      General: Lids are normal.         Right eye: No discharge. Left eye: No discharge. Extraocular Movements:      Right eye: Normal extraocular motion. Left eye: Normal extraocular motion. Conjunctiva/sclera: Conjunctivae normal.      Right eye: Right conjunctiva is not injected. Left eye: Left conjunctiva is not injected. Pupils: Pupils are equal, round, and reactive to light. Neck:      Musculoskeletal: Normal range of motion and neck supple. Thyroid: No thyromegaly. Vascular: No carotid bruit or JVD. Cardiovascular:      Rate and Rhythm: Normal rate and regular rhythm. Pulses:           Carotid pulses are 2+ on the right side and 2+ on the left side. Radial pulses are 2+ on the right side and 2+ on the left side. Heart sounds: Normal heart sounds, S1 normal and S2 normal. No murmur. Pulmonary:      Effort: Pulmonary effort is normal. No accessory muscle usage. Breath sounds: Normal breath sounds. Abdominal:      General: Bowel sounds are normal. There is no distension or abdominal bruit. Palpations: Abdomen is soft. There is no mass. Tenderness: There is no abdominal tenderness. Hernia: No hernia is present. Musculoskeletal: Normal range of motion. Right lower leg: No edema. Left lower leg: No edema. Lymphadenopathy:      Cervical:      Right cervical: No superficial cervical adenopathy. Left cervical: No superficial cervical adenopathy. Skin:     General: Skin is warm and dry. Coloration: Skin is not jaundiced or pale. Findings: No lesion or rash. Nails: There is no clubbing. Neurological:      Mental Status: He is alert and oriented to person, place, and time. Cranial Nerves: No facial asymmetry. Motor: No weakness or tremor. Coordination: Coordination normal.      Gait: Gait normal.      Deep Tendon Reflexes: Reflexes are normal and symmetric. Psychiatric:         Attention and Perception: Attention normal.         Mood and Affect: Mood normal.         Speech: Speech normal.         Behavior: Behavior normal.         Thought Content: Thought content normal.         Cognition and Memory: Memory normal.         Judgment: Judgment normal.        /60   Pulse 88   Temp 99.7 °F (37.6 °C) (Temporal)   Wt 291 lb (132 kg)   SpO2 95%   BMI 39.47 kg/m²      ASSESSMENT:     Diagnosis Orders   1.  Type 2 diabetes mellitus with diabetic neuropathy, without long-term current use of insulin (HCC)-overdue for blood work glipiZIDE (GLUCOTROL) 10 MG tablet    canagliflozin (INVOKANA) 100 MG TABS tablet    Comprehensive Metabolic Panel    CBC    Hemoglobin A1C    MICROALBUMIN, RANDOM URINE (W/O CREATININE)    Lipid Panel    Urinalysis   2. Neuropathy-stable gabapentin (NEURONTIN) 600 MG tablet    ibuprofen (ADVIL;MOTRIN) 800 MG tablet   3. Restless leg syndrome-stable gabapentin (NEURONTIN) 600 MG tablet   4. Benign essential HTN-well-controlled lisinopril-hydroCHLOROthiazide (PRINZIDE;ZESTORETIC) 20-25 MG per tablet    amLODIPine (NORVASC) 5 MG tablet   5. Anxiety and depression-well-controlled sertraline (ZOLOFT) 50 MG tablet   6. Need for tetanus booster  Tetanus vaccine IM        PLAN:    1. Monofilament test.  Return for fasting blood work. Blood work. Refill medications. 2/3. Continue medication  4. Continue medication  5. Continue medication  6.   Tetanus vaccine  Follow-up 3 months

## 2021-07-10 NOTE — PLAN OF CARE
Patient dressing changed with copious amount of tan/brown purulent drainage.  Ostomy device also changed d/t liquid stool leaking underneath.  REMI drains to bulb suction with minimal to no drainage.  Patient is alert, occasional confusion and easily redirectable.  Continues to have low urine output, lele urine with purewik in place, MD aware.  VSS, afebrile.  Given tylenol for pain with some relief.  Given ativan for pain d/t patient having increased confusion/agitation from tramadol, no side effect besides drowsiness to previous ativan.  Given ativan and patient slept on and off since.   has been at bedside for most of day.  Patient turned and repositioned every two hours or as needed throughout shift.  No new skin breakdown.  Will continue to monitor.

## 2021-07-10 NOTE — PROGRESS NOTES
[facility]  Hospitalist Progress Note    Assessment & Plan   Melissa Quinteros is a 86 year old female who was admitted on 7/4/2021  Extended right hemicolectomy with small bowel resection x2 6/14     Return to the OR for anastomotic leak with segmental colon resection and anastomosis and diverting loop ileostomy 6/22  Washout and closure of midline laparotomy 6/28    Encephalopathy  Assessment: acute, not present on admission, now resolved. New med was tramadol and likely the cause. stable now.   Plan:  morphine prn pain control. Stop seroquel during day, only night dosing.      Postoperative intra-abdominal abscess  Assessment: blood culture positive for gram negative rods, re-imaging showed fluid collection. Drain placed 7/8.21.   Plan: cipro/flagyl. Continue for now. Plan is for discharge home on hospice on Monday     Bacteremia   Assessment: gram negative rods-anaerobic gram neg bacilli.   Plan: cipro/flagyl,      Acute on chronic blood loss anemia  Assessment: secondary to illness, surgery. Improved with blood transfusion..      Active Problems:  Malignant neoplasm of transverse colon (H)  Assessment: status post right hemicolectomy and transverse colectomy.     Severe protein-calorie malnutrition (Schumacher: less than 60% of standard weight) (H)  Assessment: chronic, present on admission   Plan: encourage intake, supplements. NDD Diet.        Abdominal pain, generalized, improving    After long conversation with patient and her spouse as noted above, plan is now for hospice.  Given the weight loss prior to the surgery, the new diagnosis of malignancy and her poor nutritional status she will likely have difficult time healing from recent surgery.  Has not had much of an appetite at all and prefers to be made comfortable.   is on board with taking her home on hospice on Monday with other support in place.  Social work consult.  [facility] preferred.       DVT Prophylaxis: n/a as  CMO    Code Status: No CPR- Do NOT Intubate    Dee Grajeda    Interval History     I informed patient she may be able to go home on Monday and she is very excited.  Mouth is dry but abdominal pain is improving.  Does not have any appetite and does not want to eat.   is at bedside and supportive.  No other new acute concerns today.    -Data reviewed today: I reviewed all new labs and imaging results over the last 24 hours. I personally reviewed the abdominal CT image(s) showing Fluid collection.  Drain placed..    Physical Exam   Temp: 97.7  F (36.5  C) Temp src: Tympanic BP: (!) 143/80 Pulse: 92   Resp: 18 SpO2: 96 % O2 Device: None (Room air)    Vitals:    07/08/21 0034 07/09/21 0418 07/10/21 0127   Weight: 61 kg (134 lb 7.7 oz) 64.6 kg (142 lb 6.7 oz) 61.2 kg (134 lb 14.7 oz)     Vital Signs with Ranges  Temp:  [97  F (36.1  C)-97.7  F (36.5  C)] 97.7  F (36.5  C)  Pulse:  [92-99] 92  Resp:  [16-18] 18  BP: (105-143)/(51-80) 143/80  SpO2:  [96 %-98 %] 96 %  I/O last 3 completed shifts:  In: 207.67 [P.O.:50; I.V.:157.67]  Out: 1089 [Urine:250; Drains:214; Stool:625]    Constitutional: Awake and alert.  appears frail weak and deconditioned.  Respiratory: Clear to auscultation  Cardiovascular: Regular rate.  Significant bilateral lower extremity edema, anasarca.  GI: Ostomy in place on left side.  Drain in place on right side.  Bandage in place midline.  No drainage.  Nontender.  Skin/Integumen: Warm and dry.  No rashes.  Other:      Medications     sodium chloride 10 mL/hr at 07/09/21 2047       lidocaine visc 2% & maalox/mylanta w/simethicone & diphenhydramine  10 mL Swish & Swallow 4x Daily AC & HS     ciprofloxacin  400 mg Intravenous Q12H     clobetasol   Topical BID     metroNIDAZOLE  500 mg Intravenous Q12H     pantoprazole  40 mg Oral Daily     QUEtiapine  12.5 mg Oral At Bedtime     sodium chloride (PF)  3 mL Intracatheter Q8H       Data   Recent Labs   Lab 07/10/21  0525 07/09/21  0563  07/08/21  0712 07/08/21  0604 07/07/21  0550 07/06/21  0515 07/04/21  1511 07/04/21  1511   WBC  --  9.3  --  8.1 8.8 7.5   < > 6.9   HGB  --  8.9*  --  8.5* 8.5* 8.2*   < > 7.7*   MCV  --  87  --  87 88 87   < > 88   PLT  --  328  --  328 335 322   < > 375   INR  --   --  1.29*  --  1.61* 2.53*  --  2.39*   NA  --  139  --  141 141 140   < > 143   POTASSIUM 3.8 4.1  --  4.0 4.0 3.8   < > 3.1*   CHLORIDE  --  111*  --  111* 110* 110*   < > 109*   CO2  --  22  --  21 22 24   < > 26   BUN  --  13  --  14 17 19   < > 19   CR  --  0.55*  --  0.55* 0.64 0.76   < > 0.62   ANIONGAP  --  6  --  9 9 6   < > 8   DARIANA  --  7.6*  --  7.5* 7.6* 7.5*   < > 8.6   GLC  --  89  --  60* 77 107*   < > 73   ALBUMIN  --   --   --  2.0*  --   --   --  2.3*   PROTTOTAL  --   --   --  3.6*  --   --   --  4.8*   BILITOTAL  --   --   --  0.6  --   --   --  0.8   ALKPHOS  --   --   --  150*  --   --   --  217*   ALT  --   --   --  12  --   --   --  16   AST  --   --   --  18  --   --   --  25    < > = values in this interval not displayed.       No results found for this or any previous visit (from the past 24 hour(s)).

## 2021-07-11 NOTE — PROGRESS NOTES
SAFETY CHECKLIST  ID Bands and Risk clasps correct and in place (DNR, Fall risk, Allergy, Latex, Limb):  Yes  All Lines Reconciled and labeled correctly: Yes  Whiteboard updated:Yes  Environmental interventions (bed/chair alarm on, call light, side rails, restraints, sitter....): Yes    Shilo Ignacio RN on 7/10/2021 at 7:04 PM

## 2021-07-11 NOTE — PROGRESS NOTES
PROGRESS NOTE    Patient was readmitted on 07/04/2021 with clinical presentation consistent with postoperative intra-abdominal abscess. Her recent surgical history includes extended right hemicolectomy with small bowel resection on 06/14, anastomotic leak leading to transverse colectomy and anastomosis with diverting loop ileostomy on 06/22, and washout and closure of midline incision on 06/28.    SUBJECTIVE:     Past 24 hours: Continues to pursue comfort cares. Dressing changes throughout the night. She is pleasant and awake. Nursing staff report minimal output from drains. She does not have an appetite.    EXAM:    Intake/Output Summary (Last 24 hours) at 7/11/2021 1207  Last data filed at 7/11/2021 1100  Gross per 24 hour   Intake 250 ml   Output 1250 ml   Net -1000 ml        Vitals:    07/10/21 2050 07/11/21 0230 07/11/21 0415 07/11/21 0719   BP: 133/60   132/65   BP Location: Right arm   Left arm   Pulse: 78   84   Resp: 20  18 18   Temp: 97.2  F (36.2  C)   97.6  F (36.4  C)   TempSrc: Tympanic   Tympanic   SpO2: 94%  94% 97%   Weight:  60.3 kg (132 lb 15 oz)        GENERAL: alert, NAD  ABDOMEN: non-distended, soft, appropriately tender, incision draining malodorous purulent fluid. Pulled REMI drain. Drained RUQ Pashto catheter and verified patency. Colostomy bag intact.  EXTREMITY: no edema bilaterally, no peripheral cyanosis  SKIN: warm and dry, no jaundice   NEURO: cognition at baseline, no focal deficit    LABS  Recent Labs   Lab Test 07/09/21  0553 07/08/21  0604 08/30/16  1622 08/20/16  0012 08/20/16  0012   WBC 9.3 8.1  --    < >  --    RBC 3.19* 3.10*  --    < >  --    HGB 8.9* 8.5* 14.8  --  14.4   HCT 27.6* 27.0* 47.5  --  42.0   MCV 87 87 91  --  90   MCH 27.9 27.4 28.3  --  30.9   MCHC 32.2 31.5 31.1*  --  34.2    328 237  --  220   MPV  --   --  9.1  --  8.8    < > = values in this interval not displayed.       Recent Labs   Lab Test 07/11/21  0510 07/10/21  0525 07/09/21  0553 07/08/21  0604    POTASSIUM 3.5 3.8 4.1 4.0   CHLORIDE  --   --  111* 111*   BUN  --   --  13 14       Recent Labs   Lab Test 07/08/21  0604 07/08/21  0210 07/04/21  1511 06/16/21  1424   PROTEIN  --  10*  --  Negative   BILITOTAL 0.6  --  0.8  --    AST 18  --  25  --    ALT 12  --  16  --      ASSESSMENT  Patient is an 86 year female post op s/p extended right hemicolectomy with small bowel resection on 06/14, anastomotic leak leading to transverse colectomy and anastomosis with diverting loop ileostomy on 06/22, and washout and closure of midline incision on 06/28. She developed a postoperative intra-abdominal abscess which has continued to drain purulent malodorous discharge through the midline incision. Drains have had minimal to no drainage. Patient has had poor nutritional input.    PLAN  Continue changing the midline dressing PRN if becomes saturated.  Drains: Removed REMI drain today. RUQ Welsh catheter (pigtaoin IR) flushed with saline and obtained patency. Plan to re-flush catheter q8h.  Pain: May use tylenol or dilaudid PRN for pain. Do not use tramadol due to delirium side effect.  Nutrition: Dysphagia diet level 2, nectar thickened liquids. Encourage liquids.  DVT prophylaxis: None due to comfort cares  ID: Antibiotics flagyl and cipro. Recent wound culture from intrabdominal fluid on 07/08/21 revealed normal skin lux.  Electrolytes: Mg low. Medicine service managing, will replace per protocol.  Burrows: Continue burrows cares.  Disposition: Plan for hospice consult on Monday.     Cristina Barnes, MS4

## 2021-07-11 NOTE — PROGRESS NOTES
[facility]  Hospitalist Progress Note    Assessment & Plan   Melissa Quinteros is a 86 year old female who was admitted on 7/4/2021  Extended right hemicolectomy with small bowel resection x2 6/14     Return to the OR for anastomotic leak with segmental colon resection and anastomosis and diverting loop ileostomy 6/22  Washout and closure of midline laparotomy 6/28    Encephalopathy, resolved.  Assessment: acute, not present on admission, now resolved. New med was tramadol and likely the cause. stable now.   Plan:  morphine prn pain control. Stop seroquel during day, only night dosing.      Postoperative intra-abdominal abscess  Assessment: blood culture positive for gram negative rods, re-imaging showed fluid collection. Drain placed 7/8.21.   Plan: cipro/flagyl. Continue for now. Plan is for discharge home on hospice on Monday, family inquiring about PT/OT. Discussed that insurance only covers one service. SW to help with final discharge planning   -ostomy cares  -drain care  -burrows placed    Bacteremia   Assessment: gram negative rods-anaerobic gram neg bacilli.   Plan: cipro/flagyl,      Acute on chronic blood loss anemia  Assessment: secondary to illness, surgery. Improved with blood transfusion..      Active Problems:  Malignant neoplasm of transverse colon (H)  Assessment: status post right hemicolectomy and transverse colectomy.     Severe protein-calorie malnutrition (Schumacher: less than 60% of standard weight) (H)  Assessment: chronic, present on admission   Plan: encourage intake, supplements. NDD Diet.        Abdominal pain, generalized, improving    After long conversation with patient and her spouse as noted above, plan is now for hospice.  Given the weight loss prior to the surgery, the new diagnosis of malignancy and her poor nutritional status she will likely have difficult time healing from recent surgery.  Has not had much of an appetite at all and prefers to be made comfortable.    is on board with taking her home on hospice on Monday with other support in place.  Social work consult.  asking about therapies today. Does not want SNF. Discussed how insurance will only cover hospice or HHC. Will defer to  for AM.        DVT Prophylaxis: n/a as CMO    Code Status: No CPR- Do NOT Intubate    Dee Grajeda    Interval History   Did very well overnight.  Has had a better appetite no more throat pain or difficulty swallowing.  Thrush is improved in her throat.   is at bedside and supportive.  Asking several questions about discharge planning initially wanted hospice but not asking about therapy I informed him how insurance only cover one.  He is willing to pay out-of-pocket for whatever services she needs.  Will defer to social work for the a.m. for final discharge planning.  No shortness of breath or chest pain.  Ostomy draining liquid brown.    -Data reviewed today: I reviewed all new labs and imaging results over the last 24 hours. I personally reviewed the abdominal CT image(s) showing Fluid collection.  Drain placed..    Physical Exam   Temp: 97.6  F (36.4  C) Temp src: Tympanic BP: 132/65 Pulse: 84   Resp: 18 SpO2: 97 % O2 Device: None (Room air)    Vitals:    07/09/21 0418 07/10/21 0127 07/11/21 0230   Weight: 64.6 kg (142 lb 6.7 oz) 61.2 kg (134 lb 14.7 oz) 60.3 kg (132 lb 15 oz)     Vital Signs with Ranges  Temp:  [97.2  F (36.2  C)-97.8  F (36.6  C)] 97.6  F (36.4  C)  Pulse:  [78-88] 84  Resp:  [18-20] 18  BP: (125-133)/(57-65) 132/65  SpO2:  [94 %-97 %] 97 %  I/O last 3 completed shifts:  In: 250 [P.O.:250]  Out: 1400 [Urine:200; Stool:1200]    Constitutional: Awake and alert.  Brighter affect today.  Appears stronger trying to sit up in bed.  Respiratory: Clear to auscultation  Cardiovascular: Regular rate.  Lower extremity edema has improved today.  GI: Ostomy in place on left side.  Drain in place on right side.  Bandage in place midline.  No drainage.   Nontender.  Skin/Integumen: Warm and dry.  No rashes.  Other:      Medications     sodium chloride 10 mL/hr at 07/09/21 2047       lidocaine visc 2% & maalox/mylanta w/simethicone & diphenhydramine  10 mL Swish & Swallow 4x Daily AC & HS     ciprofloxacin  400 mg Intravenous Q12H     clobetasol   Topical BID     magnesium sulfate  2 g Intravenous Once     metroNIDAZOLE  500 mg Intravenous Q12H     pantoprazole  40 mg Oral Daily     QUEtiapine  12.5 mg Oral At Bedtime     sodium chloride (PF)  3 mL Intracatheter Q8H       Data   Recent Labs   Lab 07/11/21  0510 07/10/21  0525 07/09/21  0553 07/08/21  0712 07/08/21  0604 07/07/21  0550 07/06/21  0515 07/04/21 2127 07/04/21  1511   WBC  --   --  9.3  --  8.1 8.8 7.5   < > 6.9   HGB  --   --  8.9*  --  8.5* 8.5* 8.2*   < > 7.7*   MCV  --   --  87  --  87 88 87   < > 88   PLT  --   --  328  --  328 335 322   < > 375   INR  --   --   --  1.29*  --  1.61* 2.53*  --  2.39*   NA  --   --  139  --  141 141 140   < > 143   POTASSIUM 3.5 3.8 4.1  --  4.0 4.0 3.8  --  3.1*   CHLORIDE  --   --  111*  --  111* 110* 110*   < > 109*   CO2  --   --  22  --  21 22 24   < > 26   BUN  --   --  13  --  14 17 19   < > 19   CR  --   --  0.55*  --  0.55* 0.64 0.76   < > 0.62   ANIONGAP  --   --  6  --  9 9 6   < > 8   DARIANA  --   --  7.6*  --  7.5* 7.6* 7.5*   < > 8.6   GLC  --   --  89  --  60* 77 107*   < > 73   ALBUMIN  --   --   --   --  2.0*  --   --   --  2.3*   PROTTOTAL  --   --   --   --  3.6*  --   --   --  4.8*   BILITOTAL  --   --   --   --  0.6  --   --   --  0.8   ALKPHOS  --   --   --   --  150*  --   --   --  217*   ALT  --   --   --   --  12  --   --   --  16   AST  --   --   --   --  18  --   --   --  25    < > = values in this interval not displayed.       No results found for this or any previous visit (from the past 24 hour(s)).

## 2021-07-11 NOTE — PROGRESS NOTES
SAFETY CHECKLIST  ID Bands and Risk clasps correct and in place (DNR, Fall risk, Allergy, Latex, Limb):  Yes  All Lines Reconciled and labeled correctly: Yes  Whiteboard updated:Yes  Environmental interventions (bed/chair alarm on, call light, side rails, restraints, sitter....): Yes

## 2021-07-11 NOTE — PROGRESS NOTES
VSS. Afebrile. Midline abdominal dressing changed twice. Continues to have purulent/tan drainage. Colostomy bag/dressing also changed twice. Brown/green watery stool. Patient complains of generalized pain- given tylenol twice. Patient refused maalox. Patient pulled out IV. Was replaced in the right lower forearm. Continues on flagyl and cipro. IV at TKO. CCRQ2H. Purewick in place.One PO dose of ativan given at 0415. Will continue to monitor.     /60 (BP Location: Right arm)   Pulse 78   Temp 97.2  F (36.2  C) (Tympanic)   Resp 18   Wt 60.3 kg (132 lb 15 oz)   SpO2 94%   BMI 23.18 kg/m      Shilo Ignacio RN on 7/11/2021 at 6:56 AM

## 2021-07-11 NOTE — PLAN OF CARE
Patient more alert today, having good conversations with staff and .  Patient denies any pain, discomfort or nausea.  Oakley placed for end of life cares at home.  Dressing changed and new ostomy appliance.  Wound has copious amounts of thick, brown, purulent drainage.  New IV placed d/t other one being accidentally pulled while repositioning.  Patient did drink some fluids, receiving IV antibiotics and magnesium replaced per protocol.  REMI drains continue with minimal drainage and flushed twice this shift.     BP 98/52 (BP Location: Right arm)   Pulse 88   Temp 97.3  F (36.3  C) (Tympanic)   Resp 18   Wt 60.3 kg (132 lb 15 oz)   SpO2 96%   BMI 23.18 kg/m

## 2021-07-12 NOTE — PHARMACY - DISCHARGE MEDICATION RECONCILIATION
Pharmacy:  Discharge Counseling and Medication Reconciliation    Melissajayesh Quinteros  [address] [facility] [id number]  [phone] (home)   86 year old female  PCP: Bret Jimenez    Allergies: Tramadol and Lovastatin    Discharge Counseling:    Pharmacist met with patient (and/or family) today to review the medication portion of the After Visit Summary (with an emphasis on NEW medications) and to address patient's questions/concerns.    Summary of Education: Patient discharging to [facility] with Hospice. Hospice to write orders at .     Materials Provided:  MedCounselor sheets printed from Clinical Pharmacology on: NA    Discharge Medication Reconciliation:    It has been determined that the patient has an adequate supply of medications available or which can be obtained from the patient's preferred pharmacy.    Thank you for the consult.    Parker Gaines RPH........July 12, 2021 11:42 AM

## 2021-07-12 NOTE — DISCHARGE SUMMARY
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 Treated and resolved.     Severe protein-calorie malnutrition (Schumacher: less than 60% of standard weight) (H) (6/25/2021)    Abdominal pain, generalized (7/4/2021)    Wound drainage (7/4/2021)    Acute on chronic blood loss anemia (7/5/2021), POA    Postoperative state (7/7/2021), POA      History of Present Illness   Melissa Quinteros is an 86 year old female who presented for return to the OR for MS Dermotic leak with segmental colon resection and diverting loop ileostomy and washout and closure of midline laparotomy.    Hospital Course   Melissa Quinteros was admitted on 7/4/2021.  The following problems were addressed during her hospitalization:     Encephalopathy, resolved.  Assessment: acute, not present on admission, now resolved. New med was tramadol and likely the cause. stable now.   morphine prn pain control. Stop seroquel during day, only night dosing. Plan is to discharge on hospice.       Postoperative intra-abdominal abscess  Assessment: blood culture positive for gram negative rods, re-imaging showed fluid collection. Drain placed 7/8.21.   She was treated with cipro/flagyl. Given comorbidities, patient is likely to have a long healing process.  Due to this her she and her  elected for discharge home on hospice.  [facility] placed this admission for ease of care.     Bacteremia, gram negative rods-anaerobic gram neg bacilli.  She was treated with Cipro and Flagyl.  Family electing for discharge home on hospice.  Discontinue  antibiotics at this time.  Will need ostomy and drain care per surgery prior to discharge.     Acute on chronic blood loss anemia, secondary to illness, surgery. Improved with blood transfusion..      Active Problems:  Malignant neoplasm of transverse colon (H), status post right hemicolectomy and transverse colectomy.     Severe protein-calorie malnutrition (Schumacher: less than 60% of standard weight) (H), chronic, present on admission intake as tolerated, supplements. NDD Diet.       Dee Grajeda,     Significant Results and Procedures     Extended right hemicolectomy with small bowel resection x2 6/14    Return to the OR for anastomotic leak with segmental colon resection and anastomosis and diverting loop ileostomy 6/22  Washout and closure of midline laparotomy 6/28      Pending Results   These results will be followed up by n/a  Unresulted Labs Ordered in the Past 30 Days of this Admission     No orders found from 6/4/2021 to 7/5/2021.          Code Status   DNR / DNI       Primary Care Physician   DERRICK PASCUAL    Physical Exam   Temp: 97.5  F (36.4  C) Temp src: Tympanic BP: 124/50 Pulse: 89   Resp: 17 SpO2: 96 % O2 Device: None (Room air)    Vitals:    07/11/21 0230 07/12/21 0006 07/12/21 0413   Weight: 60.3 kg (132 lb 15 oz) 60.2 kg (132 lb 11.5 oz) 60.3 kg (132 lb 15 oz)     Vital Signs with Ranges  Temp:  [97.3  F (36.3  C)-97.5  F (36.4  C)] 97.5  F (36.4  C)  Pulse:  [88-89] 89  Resp:  [17-18] 17  BP: ()/(50-52) 124/50  SpO2:  [96 %] 96 %  I/O last 3 completed shifts:  In: -   Out: 680 [Urine:300; Drains:10; Stool:370]     Constitutional: Awake and alert.  Brighter affect today.  Appears stronger trying to sit up in bed.  Respiratory:     Clear to auscultation  Cardiovascular: Regular rate.  Lower extremity edema has improved today.  GI: Ostomy in place on left side.  Drain in place on right side.  Bandage in place midline.  No drainage.  Nontender.  Skin/Integumen: Warm and dry.  No  brennan    Discharge Disposition   Discharged to home  Condition at discharge: on hospice    Consultations This Hospital Stay   HOSPITALIST IP CONSULT  SOCIAL WORK IP CONSULT  SOCIAL WORK IP CONSULT    Time Spent on this Encounter   I, Dee Grajeda DO, personally saw the patient today and spent greater than 30 minutes discharging this patient.    Discharge Orders      Reason for your hospital stay    Colon abscess     Follow-up and recommended labs and tests     Follow up with primary care provider, DERRICK PASCUAL, as needed.  No follow up labs or test are needed.    Follow up with Dr. Pang , at (location with clinic name or city) as scheduled,     Activity    Your activity upon discharge: activity as tolerated     Diet    Follow this diet upon discharge: Orders Placed This Encounter      Snacks/Supplements Adult: Ensure Enlive; With Meals      Dysphagia Diet Level 2 [facility] Altered Nectar Thickened Liquids (pre-thickened or use instant food thickener)     Discharge Medications   Current Discharge Medication List      CONTINUE these medications which have NOT CHANGED    Details   clobetasol (TEMOVATE) 0.05 % external cream Apply topically 2 times daily  Qty: 45 g, Refills: 1    Associated Diagnoses: Other atopic dermatitis      acetaminophen (TYLENOL) 325 MG tablet Take 2 tablets (650 mg) by mouth every 4 hours as needed for other (For optimal non-opioid multimodal pain management to improve pain control.)  Qty: 90 tablet, Refills: 3    Associated Diagnoses: Post-operative state      doxepin (ZONALON) 5 % external cream Apply topically 3 times daily  Qty: 45 g, Refills: 1    Associated Diagnoses: Other atopic dermatitis      magnesium oxide (MAG-OX) 400 MG tablet Take 400 mg by mouth daily   Qty: 7 tablet      omeprazole (PRILOSEC) 20 MG CR capsule Take 1 capsule (20 mg) by mouth daily  Qty: 90 capsule, Refills: 3    Associated Diagnoses: Gastroesophageal reflux disease without esophagitis      oxyCODONE (ROXICODONE)  5 MG tablet Take 5 mg by mouth every 4 hours as needed for severe pain      triamcinolone (KENALOG) 0.1 % external ointment Apply topically 2 times daily         STOP taking these medications       ibuprofen (ADVIL/MOTRIN) 600 MG tablet Comments:   Reason for Stopping:             Allergies   Allergies   Allergen Reactions     Tramadol Anxiety     Aggressive behaviors, delirium     Lovastatin      Other reaction(s): Arthralgia     Data   Most Recent 3 CBC's:  Recent Labs   Lab Test 07/09/21  0553 07/08/21  0604 07/07/21  0550   WBC 9.3 8.1 8.8   HGB 8.9* 8.5* 8.5*   MCV 87 87 88    328 335      Most Recent 3 BMP's:  Recent Labs   Lab Test 07/12/21  0548 07/11/21  0510 07/10/21  0525 07/09/21  0553 07/08/21  0604 07/07/21  0550   NA  --   --   --  139 141 141   POTASSIUM 3.5 3.5 3.8 4.1 4.0 4.0   CHLORIDE  --   --   --  111* 111* 110*   CO2  --   --   --  22 21 22   BUN  --   --   --  13 14 17   CR  --   --   --  0.55* 0.55* 0.64   ANIONGAP  --   --   --  6 9 9   DARIANA  --   --   --  7.6* 7.5* 7.6*   GLC  --   --   --  89 60* 77     Most Recent 2 LFT's:  Recent Labs   Lab Test 07/08/21  0604 07/04/21  1511   AST 18 25   ALT 12 16   ALKPHOS 150* 217*   BILITOTAL 0.6 0.8     Most Recent INR's and Anticoagulation Dosing History:  Anticoagulation Dose History     Recent Dosing and Labs Latest Ref Rng & Units 6/27/2021 6/28/2021 7/2/2021 7/4/2021 7/6/2021 7/7/2021 7/8/2021    INR 0.86 - 1.14 1.25(H) 1.06 2.02(H) 2.39(H) 2.53(H) 1.61(H) 1.29(H)        Most Recent 3 Troponin's:No lab results found.  Most Recent Cholesterol Panel:  Recent Labs   Lab Test 06/19/18  1010   CHOL 216*   *   HDL 48   TRIG 168*     Most Recent 6 Bacteria Isolates From Any Culture (See EPIC Reports for Culture Details):  Recent Labs   Lab Test 07/08/21  1122 07/04/21 1949 07/04/21 1948 06/25/21  1313 05/28/21  1257   CULT Normal skin lux Anaerobic gram negative bacilli  No further identification or sensitivity done  *  1 OF 4  BOTTLES POSITIVE  Critical Value called to and read back by  JAYCE LENZ RN AT 2017 ON 07.05.2021, [facility]   No growth after 6 days Light growth  Enterococcus faecalis  * >100,000 colonies/mL  mixed urogenital lux       Most Recent TSH, T4 and A1c Labs:  Recent Labs   Lab Test 08/20/20  0000   TSH 0.94   A1C 5.6     Results for orders placed or performed during the hospital encounter of 07/04/21   CT Abdomen Pelvis w Contrast    Narrative    PROCEDURE INFORMATION:   Exam: CT Abdomen And Pelvis With Contrast   Exam date and time: 7/4/2021 3:15 PM   Age: 86 years old   Clinical indication: Abdominal tenderness and other: Drainage from surgical   site; Prior surgery; Surgery date: <1 month; Surgery type: Sigmoid colon   resection; Additional info: 86 year old female that presents to triage   ambulance from [facility]. Patient had a sigmoid colon   resection several weeks ago. Patient has been having increased drainage from   abdominal incisional site. Staff state the drainage from her maurisio drain has been   green but now brown. Staff stated no drainage from her maurisio drain but there is   currently brown drainage in it. Patient states tenderness to her umbilical   area. Blood noted to be in ostomy.     TECHNIQUE:   Imaging protocol: Computed tomography of the abdomen and pelvis with contrast.   Total images: 373   Radiation optimization: All CT scans at this facility use at least one of these   dose optimization techniques: automated exposure control; mA and/or kV   adjustment per patient size (includes targeted exams where dose is matched to   clinical indication); or iterative reconstruction.   Contrast material: ISOVIEW 370; Contrast volume: 75 ml; Contrast route:   INTRAVENOUS (IV);    Other contrast: Oral, omnipaque 9mg, 1000 mL;     COMPARISON:   CT ABDOMEN PELVIS W/O CONTRAST 6/22/2021 9:21 AM     FINDINGS:   Tubes, catheters and devices: Drainage catheter in the deep pelvis.     Pleural spaces: Large  bilateral pleural effusions remain.     Liver: Normal. No mass.   Gallbladder and bile ducts: Normal. No calcified stones. No ductal dilation.   Pancreas: Normal. No ductal dilation.   Spleen: Normal. No splenomegaly.   Adrenal glands: Bilateral thickened adrenal glands.   Kidneys and ureters: Normal. No hydronephrosis.   Stomach and bowel: Left-sided bowel ostomy. Postsurgical changes of bowel in   the left abdomen. Some high attenuation within bowel in the left abdomen/colon   may represent contrast mixing with fluid.   Appendix: No evidence of appendicitis.     Intraperitoneal space: Moderate to large amount of ascites/intraperitoneal   fluid. Associated perceptible enhancing wall throughout. Numerous gas bubbles   within the intraperitoneal fluid/cavity as well. Moderate distension of   remaining small bowel loops.   Vasculature: Advanced atherosclerotic changes of the abdominal aorta and   branches. Prominent peripheral thrombus.   Lymph nodes: Unremarkable. No enlarged lymph nodes.   Urinary bladder: Small amount of gas within the bladder. Correlate for recent   instrumentation.   Reproductive: Unremarkable as visualized.   Bones/joints: Degenerative changes lumbar spine. Associated anterolisthesis of   L4 on L5.   Soft tissues: Diffuse subcutaneous/soft tissue edema. Soft tissue stranding   with some bubbles of gas and fluid along the midline anterior abdominal wall.   Small rim enhancing fluid collection along the left anterior abdominal wall   musculature.     Other findings: Presacral fluid/soft tissue stranding.       Impression    IMPRESSION:   1. Moderate to large amount of intraperitoneal fluid with enhancing perceptible   wall. Some associated gas bubbles within. Cannot exclude infected peritoneal   fluid/peritonitis. The presence of gas within could indicate underlying   infection although the possibility of an underlying bowel perforation/leak   cannot entirely be excluded.   2. Soft tissue  stranding with strandy fluid and gas bubbles along the midline   anterior abdominal wall. Possibly postsurgical however the possibility of   underlying abdominal wall infection cannot be excluded. There is an additional   small rim enhancing fluid collection along the left anterior abdominal wall   musculature measuring approximately 1.5 cm in transverse dimension.   3. Large bilateral pleural effusions with associated bilateral lower lobe   atelectasis and or infiltrate.   4. Extensive postsurgical changes of bowel with left-sided ostomy. There is   some relative diffuse distension of remaining small bowel loops nonspecific.   5. Small amount of gas within the bladder. Nonspecific. Correlate for recent   instrumentation.   6. See above for other details.     THIS DOCUMENT HAS BEEN ELECTRONICALLY SIGNED BY NAVYA TONG MD   CT Abd Peritoneum Abscess Drain w Cath Place    Narrative    PROCEDURE: CT ABDOMEN PERITONEUM ABSCESS DRAIN W CATH PLACE 7/8/2021  11:25 AM    HISTORY: abdominal fluid collections post op    COMPARISONS: None.    Meds/Dose Given:    TECHNIQUE: Patient's right abdominal wall is prepped and draped in a  sterile manner. Using lidocaine anesthesia and CT guidance a 22 ga  needle was placed in the fluid collection in the right side of the  abdomen. An 018 guidewire was advanced into the fluid collection. Over  this guidewire a 5 [place]uran catheter was placed within the fluid  collection right side the abdomen. The 018 guidewire was exchanged for  035 guidewire. Over this guidewire an 8 [place]uran dilating catheter was  placed in the fluid collection. Over the guidewire a 8 [place]uran cope  loop type drainage catheter was placed within the collection. Some  serosanguineous fluid was obtained and sent for Gram stain and culture  and sensitivity. The catheter was connected to bulb suction. The  catheter was secured to the skin.    CATHERINE CAMACHO MD         SYSTEM ID:  [id number]   CT Abdomen pelvis w contrast*     Narrative    EXAMINATION: CT ABDOMEN PELVIS W CONTRAST, 7/8/2021 10:56 AM    TECHNIQUE:  Helical CT images from the lung bases through the  symphysis pubis were obtained  with IV contrast. Contrast dose: 71 ml  isovue 370    COMPARISON: July 4, 2021    HISTORY: Abdominal abscess/infection suspected    FINDINGS:    There are moderate bilateral pleural effusions that have worsened as  compared to July 4. This compressive atelectasis in both lungs.    The liver is free of masses or biliary ductal enlargement. No  calcified gallstones are seen.    There is irregularity of the upper portion of the spleen stable from  previous examination. The pancreas appears normal. The adrenal glands  are normal. The right and left kidneys are free of masses or  hydronephrosis    The periaortic lymph nodes are normal in caliber.    There is some mild free fluid seen in adjacent to the liver and spleen  with some droplets of postoperative gas lesions are stable from  previous examination. There is a drain seen in the pelvis overlying  the dome of the liver. There is a loculated fluid collection on the  right side of the abdomen. There is some edema and some free fluid in  the pelvis. An ostomy is seen in the left lower quadrant. Degenerative  changes are seen in the thoracic and lumbar spine. There is  spondylolisthesis of L4 on L5. Extensive edema is seen in the  subcutaneous fat of the abdomen and pelvis.      Impression    IMPRESSION: The loculated low-density collection in the right side of  the abdomen is again noted stable and unchanged from previous  examination. Irregularity of the upper spleen is again noted also  stable.    Enlarging bilateral pleural effusions     CATHERINE CAMACHO MD         SYSTEM ID:  [id number]

## 2021-07-12 NOTE — PROGRESS NOTES
:    Patient is being discharged home with [facility].  Spoke with Jenni from [facility] and they plan to admit patient this date.  Per Jenni, DME is arranged to arrive at patient's address at 1200.  Spoke with patient's spouse, Dhruv, who stated their daughter is at the house for the hospice DME delivery.  [facility] nurse will meet family at patient's home to admit around 1300.  [facility] will provide transportation at 1300 and will borrow [employer]'s reclining wheelchair.  Per [facility], cost for transport is $70.50.  Gave information to patient's spouse and he plans to call in payment prior to the transport.  Left a message with Jenni to confirm discharge plan.  No further  needs at this time.     AUGUST Nguyen on 7/12/2021 at 8:45 AM

## 2021-07-12 NOTE — PROGRESS NOTES
PROGRESS NOTE    Patient was readmitted on 07/04/2021 with clinical presentation consistent with postoperative intra-abdominal abscess. Her recent surgical history includes extended right hemicolectomy with small bowel resection on 06/14, anastomotic leak leading to transverse colectomy and anastomosis with diverting loop ileostomy on 06/22, and washout and closure of midline incision on 06/28.    SUBJECTIVE:     No acute events.  Pleasant. Hospice cons today.     EXAM:       Intake/Output Summary (Last 24 hours) at 7/12/2021 0746  Last data filed at 7/12/2021 0230  Gross per 24 hour   Intake --   Output 680 ml   Net -680 ml          Vitals:    07/11/21 1608 07/11/21 2030 07/12/21 0006 07/12/21 0413   BP: 98/52 124/50     BP Location: Right arm Right arm     Pulse: 88 89     Resp: 18 17     Temp: 97.3  F (36.3  C) 97.5  F (36.4  C)     TempSrc: Tympanic Tympanic     SpO2: 96% 96%     Weight:   60.2 kg (132 lb 11.5 oz) 60.3 kg (132 lb 15 oz)      GENERAL: alert, NAD  ABDOMEN: non-distended, soft, appropriately tender, incision draining malodorous purulent fluid. Pulled REMI drain. Drained RUQ IR catheter and verified patency. Colostomy bag intact.  EXTREMITY: no edema bilaterally, no peripheral cyanosis  SKIN: warm and dry, no jaundice   NEURO: cognition at baseline, no focal deficit    LABS  Recent Labs   Lab Test 07/09/21  0553 07/08/21  0604 08/30/16  1622 08/20/16  0012 08/20/16  0012   WBC 9.3 8.1  --    < >  --    RBC 3.19* 3.10*  --    < >  --    HGB 8.9* 8.5* 14.8  --  14.4   HCT 27.6* 27.0* 47.5  --  42.0   MCV 87 87 91  --  90   MCH 27.9 27.4 28.3  --  30.9   MCHC 32.2 31.5 31.1*  --  34.2    328 237  --  220   MPV  --   --  9.1  --  8.8    < > = values in this interval not displayed.       Recent Labs   Lab Test 07/12/21  0548 07/11/21  0510 07/09/21  0553 07/08/21  0604   POTASSIUM 3.5 3.5 4.1 4.0   CHLORIDE  --   --  111* 111*   BUN  --   --  13 14       Recent Labs   Lab Test 07/08/21  0604  07/08/21  0210 07/04/21  1511 06/16/21  1424   PROTEIN  --  10*  --  Negative   BILITOTAL 0.6  --  0.8  --    AST 18  --  25  --    ALT 12  --  16  --      ASSESSMENT  Patient is an 86 year female post op s/p extended right hemicolectomy with small bowel resection on 06/14, anastomotic leak leading to transverse colectomy and anastomosis with diverting loop ileostomy on 06/22, and washout and closure of midline incision on 06/28. She developed a postoperative intra-abdominal abscess which has continued to drain purulent malodorous discharge through the midline incision. Drains have had minimal to no drainage. Patient has had poor nutritional input.    PLAN  Continue changing the midline dressing PRN if becomes saturated.  Drains: Possible removal of Pigtail prior to discharge  Pain: May use tylenol or dilaudid PRN for pain. Do not use tramadol due to delirium side effect.  Nutrition: Dysphagia diet level 2, nectar thickened liquids. Encourage liquids.  DVT prophylaxis: None due to comfort cares  ID: Antibiotics- can transition to Augmentin.   Electrolytes: Mg low. Medicine service managing, will replace per protocol.  Burrows: Continue burrows cares.  Disposition: Plan for hospice consult on Monday.     Cristina Barnes, MS4      Del Pang MD on 7/12/2021 at 7:48 AM

## 2021-07-12 NOTE — PROGRESS NOTES
SAFETY CHECKLIST  ID Bands and Risk clasps correct and in place (DNR, Fall risk, Allergy, Latex, Limb):  Yes  All Lines Reconciled and labeled correctly: Yes  Whiteboard updated:Yes  Environmental interventions (bed/chair alarm on, call light, side rails, restraints, sitter....): Yes  Verify Tele #: jayesh Castillo RN on 7/12/2021 at 7:07 AM

## 2021-07-12 NOTE — PROGRESS NOTES
VSS. Patient denies pain, nausea, etc,. Has no complaints. Has midline abdominal incision that is being packed with gauze. Approximately golf ball size. Changed overnight. Flushing RUQ REMI drain Q8H per Dr. Pang. Having copious, thick, brown, purulent drainage out of wound and REMI drain. Colostomy LUQ is CDI. Patient is on comfort cares, but still desires some interventions. Continuing IV ABX, flagyl and cipro. IV in right hand infusing at TKO. Burrows patent. Will discharge with burrows. Will discharge today on [facility]. Will continue to monitor.    Shilo Ignacio RN on 7/12/2021 at 6:04 AM

## 2021-07-12 NOTE — PROGRESS NOTES
[facility] DISCHARGE NOTE    Patient discharged to home at 3:21 PM via wheel chair. Accompanied by spouse and significant other and staff. Discharge instructions reviewed with spouse, opportunity offered to ask questions. Prescriptions sent to patients preferred pharmacy. All belongings sent with patient.  /50 (BP Location: Right arm)   Pulse 96   Temp 97.9  F (36.6  C) (Tympanic)   Resp 17   Wt 60.3 kg (132 lb 15 oz)   SpO2 96%   BMI 23.18 kg/m    Lucero Castillo RN

## 2021-07-13 NOTE — PROGRESS NOTES
Patient hospitalized from 7-4-2021 to 7-.  Discharged home to family with [facility] hospice admitting.No TCM call required.  Anna Collins RN on 7/13/2021 at 8:48 AM     normal range of motion

## 2021-07-19 PROBLEM — R62.7 FAILURE TO THRIVE IN ADULT: Status: ACTIVE | Noted: 2021-01-01

## 2021-07-19 PROBLEM — E87.6 HYPOKALEMIA: Status: ACTIVE | Noted: 2021-01-01

## 2021-07-19 NOTE — ED PROVIDER NOTES
History     Chief Complaint   Patient presents with     Generalized Weakness     Failure to thrive     HPI  Melissa Quinteros is a 86 year old female who is here with her  who states that she is just not doing well at home.  She had a long extended hospital stay recently, please see recent discharge summary for more details.  In short she had what looks like a colon cancer which was resected.  This was complicated with a postoperative anastomotic leak and repeat surgery.  She was in the hospital for approximately a month.  She went home 1 week ago with hospice as it was felt that she was just not going to do well with this.  She states she has no pain, however she just has no appetite.  She is not eating anything.  He says he can only get about 2 glasses of water in her per day.  She states that soon as she brings anything to her lips she just has overwhelming nausea.  No fevers or chills.  She is getting normal ostomy contents.  Urine is flowing from her Oakley catheter but it is somewhat dark and not a lot.    Allergies:  Allergies   Allergen Reactions     Tramadol Anxiety     Aggressive behaviors, delirium     Lovastatin      Other reaction(s): Arthralgia       Problem List:    Patient Active Problem List    Diagnosis Date Noted     Hypokalemia 07/19/2021     Priority: Medium     Failure to thrive in adult 07/19/2021     Priority: Medium     Postoperative state 07/07/2021     Priority: Medium     Acute on chronic blood loss anemia 07/05/2021     Priority: Medium     Postoperative intra-abdominal abscess 07/05/2021     Priority: Medium     Abdominal pain, generalized 07/04/2021     Priority: Medium     Wound drainage 07/04/2021     Priority: Medium     Metabolic acidosis 06/25/2021     Priority: Medium     Anemia 06/25/2021     Priority: Medium     Severe protein-calorie malnutrition (Schumacher: less than 60% of standard weight) (H) 06/25/2021     Priority: Medium     Elevated troponin 06/25/2021     Priority:  Medium     Hypervolemia 06/25/2021     Priority: Medium     Altered mental status 06/19/2021     Priority: Medium     Acute kidney failure with tubular necrosis (H) 06/16/2021     Priority: Medium     Hypomagnesemia 06/16/2021     Priority: Medium     Malignant neoplasm of transverse colon (H) 06/04/2021     Priority: Medium     Added automatically from request for surgery [id number]       Hallux valgus, acquired, right 10/09/2019     Priority: Medium     Hyperlipidemia 02/26/2018     Priority: Medium     SCC (squamous cell carcinoma), face 08/08/2017     Priority: Medium     Chronic nasal congestion 08/30/2016     Priority: Medium     Essential hypertension 08/30/2016     Priority: Medium     GERD with stricture 08/30/2016     Priority: Medium     Disorder of bursae and tendons in shoulder region 01/12/2011     Priority: Medium        Past Medical History:    Past Medical History:   Diagnosis Date     Esophageal obstruction      Essential (primary) hypertension      Hyperlipidemia      Zoster without complications        Past Surgical History:    Past Surgical History:   Procedure Laterality Date     APPENDECTOMY       ARTHROPLASTY KNEE Right 2017    [place], [place]     COLONOSCOPY      No Comments Provided     COLONOSCOPY N/A 6/3/2021    Procedure: COLONOSCOPY, WITH POLYPECTOMY AND BIOPSY with endoscopic tattoo;  Surgeon: Del Pang MD;  Location:  OR     ESOPHAGOGASTRODUODENOSCOPY       HYSTERECTOMY TOTAL ABDOMINAL      1982,incidental appendectomy     INCISION AND DRAINAGE ABSCESS PELVIS, COMBINED N/A 6/28/2021    Procedure: Washout and closure of midline laparotomy.;  Surgeon: Del Pang MD;  Location:  OR     LAPAROSCOPIC ASSISTED COLECTOMY N/A 6/14/2021    Procedure: COLECTOMY, LAPAROSCOPIC, small bowel resection X2, extended Right hemicolectomy, biopsy of the mesenteric node;  Surgeon: Del Pang MD;  Location:  OR     LAPAROTOMY EXPLORATORY N/A 6/22/2021    Procedure: Exploratory  "LAPAROTOMY with transverse colon Resection, and ileostomy;  Surgeon: Del Pang MD;  Location: [facility] OR     TONSILLECTOMY, ADENOIDECTOMY, COMBINED      childhood       Family History:    Family History   Problem Relation Age of Onset     Other - See Comments Father         Old age     Lung Cancer Mother      Brain Cancer Brother        Social History:  Marital Status:   [2]  Social History     Tobacco Use     Smoking status: Never Smoker     Smokeless tobacco: Never Used   Substance Use Topics     Alcohol use: Not Currently     Comment: Alcoholic Drinks/day: Seldom     Drug use: No     Comment: Drug use: No        Medications:    acetaminophen (TYLENOL) 325 MG tablet  clobetasol (TEMOVATE) 0.05 % external cream  doxepin (ZONALON) 5 % external cream  magnesium oxide (MAG-OX) 400 MG tablet  omeprazole (PRILOSEC) 20 MG CR capsule  QUEtiapine (SEROQUEL) 25 MG tablet  triamcinolone (KENALOG) 0.1 % external ointment          Review of Systems   Constitutional: Negative for chills and fever.   HENT: Negative for congestion.    Eyes: Negative for visual disturbance.   Respiratory: Negative for chest tightness and shortness of breath.    Cardiovascular: Negative for chest pain.   Gastrointestinal: Positive for nausea. Negative for abdominal pain, constipation and diarrhea.   Genitourinary: Positive for decreased urine volume.   Musculoskeletal: Negative for arthralgias.   Skin: Negative for rash.   Neurological: Negative for headaches.   Psychiatric/Behavioral: Negative for agitation.       Physical Exam   BP: 109/63  Pulse: 89  Temp: 96.9  F (36.1  C)  Resp: 18  Height: 162.6 cm (5' 4\")  Weight: 58.1 kg (128 lb)  SpO2: 95 %      Physical Exam  Vitals and nursing note reviewed.   Constitutional:       Appearance: She is cachectic. She is ill-appearing.   HENT:      Head: Normocephalic and atraumatic.      Mouth/Throat:      Mouth: Mucous membranes are dry.   Eyes:      Conjunctiva/sclera: Conjunctivae normal. "   Cardiovascular:      Rate and Rhythm: Normal rate and regular rhythm.      Heart sounds: Murmur heard.     Pulmonary:      Effort: Pulmonary effort is normal.      Breath sounds: Normal breath sounds.   Abdominal:      General: Abdomen is flat. Bowel sounds are normal.   Skin:     General: Skin is warm and dry.   Neurological:      Mental Status: She is alert and oriented to person, place, and time.   Psychiatric:         Behavior: Behavior normal.         ED Course        Procedures                  Results for orders placed or performed during the hospital encounter of 07/19/21 (from the past 24 hour(s))   CBC with platelets differential    Narrative    The following orders were created for panel order CBC with platelets differential.  Procedure                               Abnormality         Status                     ---------                               -----------         ------                     CBC with platelets and d...[[id number]]  Abnormal            Final result                 Please view results for these tests on the individual orders.   Comprehensive metabolic panel   Result Value Ref Range    Sodium 139 134 - 144 mmol/L    Potassium 2.8 (L) 3.5 - 5.1 mmol/L    Chloride 95 (L) 98 - 107 mmol/L    Carbon Dioxide (CO2) 36 (H) 21 - 31 mmol/L    Anion Gap 8 3 - 14 mmol/L    Urea Nitrogen 22 7 - 25 mg/dL    Creatinine 0.60 0.60 - 1.20 mg/dL    Calcium 9.4 8.6 - 10.3 mg/dL    Glucose 89 70 - 105 mg/dL    Alkaline Phosphatase 230 (H) 34 - 104 U/L    AST 17 13 - 39 U/L    ALT 11 7 - 52 U/L    Protein Total 5.1 (L) 6.4 - 8.9 g/dL    Albumin 2.7 (L) 3.5 - 5.7 g/dL    Bilirubin Total 0.6 0.3 - 1.0 mg/dL    GFR Estimate 83 >60 mL/min/1.73m2   CBC with platelets and differential   Result Value Ref Range    WBC Count 8.7 4.0 - 11.0 10e3/uL    RBC Count 3.93 3.80 - 5.20 10e6/uL    Hemoglobin 11.0 (L) 11.7 - 15.7 g/dL    Hematocrit 34.2 (L) 35.0 - 47.0 %    MCV 87 78 - 100 fL    MCH 28.0 26.5 - 33.0 pg     MCHC 32.2 31.5 - 36.5 g/dL    RDW 16.0 (H) 10.0 - 15.0 %    Platelet Count 414 150 - 450 10e3/uL    % Neutrophils 64 %    % Lymphocytes 25 %    % Monocytes 8 %    % Eosinophils 1 %    % Basophils 1 %    % Immature Granulocytes 1 %    NRBCs per 100 WBC 0 <1 /100    Absolute Neutrophils 5.7 1.6 - 8.3 10e3/uL    Absolute Lymphocytes 2.2 0.8 - 5.3 10e3/uL    Absolute Monocytes 0.7 0.0 - 1.3 10e3/uL    Absolute Eosinophils 0.0 0.0 - 0.7 10e3/uL    Absolute Basophils 0.1 0.0 - 0.2 10e3/uL    Absolute Immature Granulocytes 0.0 <=0.0 10e3/uL    Absolute NRBCs 0.0 10e3/uL   Lactic acid whole blood   Result Value Ref Range    Lactic Acid 1.5 0.7 - 2.0 mmol/L   Magnesium   Result Value Ref Range    Magnesium 1.5 (L) 1.9 - 2.7 mg/dL   Phosphorus   Result Value Ref Range    Phosphorus 2.9 2.5 - 5.0 mg/dL   UA with Microscopic reflex to Culture    Specimen: Urine, Oakley Catheter   Result Value Ref Range    Color Urine Yellow Colorless, Straw, Light Yellow, Yellow    Appearance Urine Slightly Cloudy (A) Clear    Glucose Urine Negative Negative, 1000  mg/dL    Bilirubin Urine Negative Negative    Ketones Urine 10  (A) Negative mg/dL    Specific Gravity Urine 1.023 1.000 - 1.030    Blood Urine Large (A) Negative    pH Urine 6.5 5.0 - 9.0    Protein Albumin Urine 70  (A) Negative mg/dL    Urobilinogen Urine Normal Normal, 2.0 mg/dL    Nitrite Urine Positive (A) Negative    Leukocyte Esterase Urine Negative Negative    Bacteria Urine Few (A) None Seen /HPF    Mucus Urine Present (A) None Seen /LPF    RBC Urine >182 (H) <=2 /HPF    WBC Urine 14 (H) <=5 /HPF    Hyaline Casts Urine 1 <=2 /LPF    Narrative    Urine Culture ordered based on laboratory criteria   Asymptomatic COVID-19 Virus (Coronavirus) by PCR Nasopharyngeal    Specimen: Nasopharyngeal; Swab    Narrative    The following orders were created for panel order Asymptomatic COVID-19 Virus (Coronavirus) by PCR Nasopharyngeal.  Procedure                               Abnormality          Status                     ---------                               -----------         ------                     SARS-COV2 (COVID-19) Vir...[[id number]]  Normal              Final result                 Please view results for these tests on the individual orders.   SARS-COV2 (COVID-19) Virus RT-PCR    Specimen: Nasopharyngeal; Swab   Result Value Ref Range    SARS CoV2 PCR Negative Negative    Narrative    Testing was performed using the Xpert Xpress SARS-CoV-2 Assay on the   Cepheid Gene-Xpert Instrument Systems. Additional information about   this Emergency Use Authorization (EUA) assay can be found via the Lab   Guide. This test should be ordered for the detection of SARS-CoV-2 in   individuals who meet SARS-CoV-2 clinical and/or epidemiological   criteria. Test performance is unknown in asymptomatic patients. This   test is for in vitro diagnostic use under the FDA EUA for   laboratories certified under CLIA to perform high complexity testing.   This test has not been FDA cleared or approved. A negative result   does not rule out the presence of PCR inhibitors in the specimen or   target RNA in concentration below the limit of detection for the   assay. The possibility of a false negative should be considered if   the patient's recent exposure or clinical presentation suggests   COVID-19. This test was validated by [facility]. This laboratory is certified under the [facility]  al Laboratory Improvement Amendments (CLIA) as qualified to perform high complex  ity clinical laboratory testing.     Wound culture from 7/8/2021 is reviewed.  This grew out normal skin lux.    Medications   0.9% sodium chloride BOLUS (500 mLs Intravenous New Bag 7/19/21 0925)     Followed by   sodium chloride 0.9% infusion ( Intravenous New Bag 7/19/21 1132)   potassium chloride 10 mEq in 100 mL sterile water intermittent infusion (premix) (10 mEq Intravenous New Bag 7/19/21  1618)   ciprofloxacin (CIPRO) infusion 400 mg (has no administration in time range)   metroNIDAZOLE (FLAGYL) infusion 500 mg (500 mg Intravenous New Bag 7/19/21 1617)   magnesium sulfate 2 g in water intermittent infusion (2 g Intravenous New Bag 7/19/21 1612)       Assessments & Plan (with Medical Decision Making)     I have reviewed the nursing notes.    I have reviewed the findings, diagnosis, plan and need for follow up with the patient.  Patient here with some hypokalemia, clinically looks dehydrated, failure to thrive.  Had gone home recently on hospice, however  is stating that he does not wish to do hospice any longer.  There hoping to have a second opinion at some point as to her prognosis.  He was initially concerned about possible infection, however she is afebrile, normal white blood count and normal lactic acid.  She has a indwelling Oakley catheter and some nitrates in the urine, however very low white blood cell count so this is probably not an acute infection.  I did  to speak to the hospitalist, Dr. Grajeda, who has accepted the patient for admission.      1450  Patient seen by Dr. Grajeda who consulted general surgery.,  They have discussed the case and have decided that patient would best be served by transfer to a higher level of care as she has had multiple admissions for this.  Appears to have complication of recent surgery with wound dehiscence and possible infection.  I called [facility] at 2:25 PM to try to initiate transfer.      1530  received a phone call from [facility] hospitalist at 1515, approximately 50 minutes after initial phone call.  Hospitalist Dr. Crane has accepted the patient in admission, however he would like me to speak with colorectal surgery first.  After he hung up the line went dead and I am waiting a call back to discuss with colorectal surgery.    1636  shortly after my last addition to this note, I received a call from Dr. Leach,  colorectal surgery at [facility].  He said he would be able to accept the patient, however he wanted a little more information regarding the wound dehiscence.  I then called Dr. Parisi, general surgery here at [facility], who came to the emergency department to evaluate the patient.  Dr. Parisi then spoke to Dr. Leach as well.  Patient has been accepted at [facility] and will be transferred via ground ambulance.            New Prescriptions    No medications on file       Final diagnoses:   Hypokalemia   Failure to thrive in adult   Postoperative wound dehiscence, initial encounter       7/19/2021   [facility]     Rito Montoya MD  07/19/21 1240       Rito Montoya MD  07/19/21 1444       Rito Montoya MD  07/19/21 1451       Rito Montoya MD  07/19/21 1640

## 2021-07-19 NOTE — ED NOTES
Pt has a stage 1 pressure ulcers on Left heel and coccyx. Pillow placed under heels to float heals and pt placed on right side to relieve pressure from Coccyx. Barrier cream applied.

## 2021-07-19 NOTE — ED NOTES
07/19/21 1139   Vital Signs   /70   BP - Mean 85   Pulse (!) 154       Potassium was burning pt, K+ rate decreased. HR noted to be in the 150s. MD notified and EKG ordered.

## 2021-07-19 NOTE — ED TRIAGE NOTES
"Pt has hx colon cancer, per family, pt \"hasnt ate in months\" Pt family called ems today for transport to [place] for infectious disease, ems informed family has to come to er per protocol. Pt had recent colotomy surgery, wound / colostomy bag leaking.    "

## 2021-07-20 NOTE — PROGRESS NOTES
"[facility]    Medicine Progress Note - Hospitalist Service       Date of Admission:  7/19/2021    Assessment & Plan           Melissa Quinteros is a 86 year old female who presents with failure to thrive, weakness    Failure to thrive  Weakness  Recent colon cancer resection 6/4/2021  Postoperative intra-abdominal abscess 7/5   Gram negative bacteremia 7/4  *Underwent extended R hemicolectomy and small bowel resection x 2 on 6/14. Taken back to OR 6/22 2/2 anastomotic leak and underwent ex lap, washout and diverting loop ileostomy  *Readmitted 7/4/21 after prolonged hospitalization for colon cancer resection with draining wound. Found toha ve larger purulent fluid collection that was draining. Positive blood cultures for GNR. Fluid collection seen and s/p drain 7/8. Treated with cipro/flagyl.  * Was discharged with plans for hospice but pt/ family felt not doing well with this. No pain but no appetite, only getting 2 glasses of water in a day, has \"overwhelming nausea\".  Getting normal ostomy outputs. Afebrile, vitals stable. Very frail. Ostomy looks ok, drain in place R abdomen, dressings c/d/i.  Labs normal except low K. Lactic and WBC normal.   - ct IV fluids  - abx were discontinued 2/2 entering hospice so didn't receive full course. Will continue cipro/ flagyl for now  - CRS consult appreciated.  We will get palliative for a discussion of goals of care  - WOC consult appreciated  - PT  - IV PPI   -Discussed with patient and she wants DNR/DNI    Hypokalemia  Hypomagnesemia  K 2.8 on presentation   - replace per protocol  - telemetry until improved    Abnormal UA  UA on presentation with 14 WBC, >182 RBC's, few bacteria  -urine culture pending  -abx as above    Acute on chronic blood loss anemia  S/p transfusion 7/5. hgb 11.0 on presentation (suspect in part 2/2 hemoconcentration)  - monitor for now    Severe malnutrition  - nutrition consult  - suspect may need feeding tube if unable to " take adequate PO if pt/ family agree    Dementia  Recent encephalopathy during previous hospitalizations  Dementia mentioned in previous notes but don't see prior to admission documentation of this. For me thought June, 2022, couldn't remember president, knew was in hospital. Didn't recall recent hospital events.   - monitor for now  - prn seroquel available if agitation    COVID-19 negative on admission       Diet: NPO for Medical/Clinical Reasons Except for: Meds, Ice Chips    DVT Prophylaxis: Pneumatic Compression Devices  Oakley Catheter: PRESENT, indication:    Central Lines: None  Code Status: Full Code      Disposition Plan   Expected discharge: 07/22/2021 recommended to Unclear once goals of care.     The patient's care was discussed with the Bedside Nurse, Patient and Patient's Family.    Pepe Andres MD, MD  Hospitalist Service  [facility]  Securely message with the Vocera Web Console (learn more here)  Text page via [email or web address] Paging/Directory      Risk Factors Present on Admission               # Severe Malnutrition, POA: based on Weight loss;Reduced intake;Subcutaneous fat loss;Muscle loss (07/20/21 1000)     ______________________________________________________________________    Interval History   History reviewed.  Last discharge home with hospice.  Patient no showed that when she presented to the hospital but admits to feeling weak and finding it hard to go back home.   Denies any specific new complaints of any chills or rigors or fever.  Denies any cough/ cold  Denies any new bowel/bladder problems     4 point ROS done and negative unless mentioned     Data reviewed today: I reviewed all medications, new labs and imaging results over the last 24 hours. I personally reviewed no images or EKG's today.    Physical Exam   BP 96/51 (BP Location: Left arm)   Pulse 76   Temp 98.8  F (37.1  C) (Oral)   Resp 14   SpO2 99%   Gen- pleasant elderly lying in bed  HEENT- NAD, JC,  mmm  Neck- supple  CVS- I+II+ no m/r/g  RS- CTAB  Abdo- soft, no tenderness . No g/r/r, colostomy in place.  Right-sided drain in place  Dressing in place (further as per colorectal surgery)  Ext- no edema   CNS-can count from 10-1 backwards.  Knows president is, and she is in hospital  Says year 2022    Data    BMPRecent Labs   Lab 07/20/21  0726 07/19/21  2243 07/19/21 0918     --  139   POTASSIUM 3.3* 3.0* 2.8*   CHLORIDE 98  --  95*   DARIANA 8.7  --  9.4   CO2 31  --  36*   BUN 20  --  22   CR 0.72  --  0.60   GLC 88  --  89     CBC  Recent Labs   Lab 07/20/21 0726 07/19/21 0918   WBC 9.3 8.7   RBC 3.39* 3.93   HGB 9.4* 11.0*   HCT 29.4* 34.2*   MCV 87 87   MCH 27.7 28.0   MCHC 32.0 32.2   RDW 16.1* 16.0*    414     INRNo lab results found in last 7 days.  LFTs  Recent Labs   Lab 07/19/21 0918   ALKPHOS 230*   AST 17   ALT 11   BILITOTAL 0.6   PROTTOTAL 5.1*   ALBUMIN 2.7*      PANCNo lab results found in last 7 days.    No results found for this or any previous visit (from the past 24 hour(s)).

## 2021-07-20 NOTE — PLAN OF CARE
A/O x2-4, orientation waxes and wanes. Intermittently disoriented to place and situation. Intermittently frustrated with cares but pleasant. Tele SR with PVCs. VSS ex. Tachy HR sustained in 140s for about an hour, MD aware, see note, see EKG changes, HR resolved to 70s. 3/5 strength to all extremities. Abd wound with moderate milky, purulent, gray, thick drainage, dressing CDI. Colostomy with approx 1L watery bile output, dressing changed due to leaking. IVF, IV abx given. Denies pain. NPO ex meds, oral swabs given. Oakley with low urine output, approx 150ml overnight. REMI drain with scant milky, gray output. Preexsiting redness to Coccyx and R heel, foam applied. K+ and Mg rechecks in for this morning. Plan for Colorectal Surgery, WOC, PT and Nutrition consults today. Plan for  and family to visit today. Discharge pending 3-5 days.

## 2021-07-20 NOTE — CONSULTS
"[facility] Nurse Inpatient Ostomy Assessment      Initial Assessment of ostomy and needs for: diverting loop ileostomy Abd. Wound  Surgery date/procedure:  right hemicolectomy with small bowel resection on 06/14, anastomotic leak leading to transverse colectomy and anastomosis with diverting loop ileostomy on 06/22, and washout and closure of midline incision on 06/28.  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           [facility] Focused Assessment:     Subjective/Objective:  at bedside spoke extensively about pouching challenges,  states pouch has leaked daily in nursing home for over a month and her abdomen was \"full of fluid\" until recently. Nurse reports high output. However,not accurately measured due to leakage\                Type of ostomy:  Ileostomy    Last pouch change/reason: leaking    Stoma assessment:   ? Ileostomy LUQ,  Moist red barely protruding, noted retraction and shape change with position change from oval to round, peristomal skin fold at 10 and 2 o'clock  ? Lumin empties toward 5 o'clock  ? A bridge in place?: No  ? Stent(s) in place?: YES, No  ? Catheter secured? Not applicable    Mucocutaneous Junction (MCJ):  intact    Peristomal skin:  creases or folds present at 10 and 2 o'clock up to 1 cm partial thickness skin loss from, 3 to 8 o'clock    Abdominal assessment: soft  ? N/G still in place?:  No    Output:   watery, thin, liquid brown-green,   Nurse reports high output although not accurately measured due to leakage    I/O last 3 completed shifts:    In: 765 [I.V.:765]    Out: 500 [Urine:150; Stool:350]     Current pouching system:  Flat cut to fit/barrier ring,    Effectiveness of current pouching/ supply plan: ineffective. Changed to Mehul 70mm (blue) Convex cut to fit " skin barrier with high output pouch and barrier ring (crusted for this pouch change only)    Pain:  Tender with peristomal skin cleansing     Diet:         Orders Placed This Encounter        NPO for Medical/Clinical Reasons Except for: Meds, Ice Chips        DATA   Documented Allergies: Tramadol and Lovastatin     Recent Labs   Lab Test 21  0726 21  0918 21  0553 21  0712 21  1121 20  0000   ALBUMIN  --  2.7*  --   --   --   --    HGB 9.4* 11.0*   < >  --   --   --    INR  --   --   --  1.29*   < >  --    WBC 9.3 8.7   < >  --   --   --    A1C  --   --   --   --   --  5.6    < > = values in this interval not displayed.     Recent Labs   Lab 21  0726 21  0918   GLC 88 89       Temperature: Temp (24hrs), Av.1  F (36.7  C), Min:97.4  F (36.3  C), Max:98.8  F (37.1  C)      Containment of urine/stool: Indwelling catheter and Ileostomy pouch   Medical Devices:Oakley Catheter: PRESENT, indication:      Catheter securement Yes    Pressure Injury Risk Assessment (Stefano Scale):  Sensory Perception: 4-->no impairment    Moisture: 3-->occasionally moist   Activity: 1-->bedfast     Mobility: 2--> limited   Nutrition: 1-->very poor   Friction and Shear: 2-->potential problem  Stefano Score: 13        Interventions:     Patient's chart evaluated.     Pouch changed today 2021    Current support surface: Standard  Atmos Air mattress    Current off-loading measures in bed: reposition side to side with use of Pillows    Current off-loading heels: Pillows under calves ordered offloading boots    Current off-loading measures chair: chair cushion ordered    Assessments completed today:  social support and environment, pouching system effectiveness, abdomin, stoma, MCJ, peristomal skin, abd. wound     Education completed with : purpose of convexity and barrier ring, frequency of pouch empty and pouch change    All husbands questions answered:  YES    Prepared for discharge  by: No discharge preparations started         Plan:     Pouching system:  Changed to Mehul 70mm (blue) Convex cut to fit skin barrier #[id number] with high output pouch #[id number] and barrier ring #[id number]    Education yet to complete/reinforce: TBD.     Continue to prepare for discharge: No discharge preparations started,       [facility] Nursing will follow daily M-F until reliable pouching system established    Yaritza Russell MS RN CWOCN

## 2021-07-20 NOTE — CONSULTS
"CLINICAL NUTRITION SERVICES  -  ASSESSMENT NOTE      RECOMMENDATIONS FOR MD/PROVIDER TO ORDER: If aggressive cares desired, recommend FT or TPN    Future/Additional Recommendations: Once diet advanced add Ensure with meals   Malnutrition: % Weight Loss:  > 2% in 1 week (severe malnutrition)  % Intake:  </= 50% for >/= 5 days (severe malnutrition)  Subcutaneous Fat Loss:  Orbital region moderate depletion and Upper arm region moderate depletion  Muscle Loss:  Temporal region severe depletion, Clavicle bone region severe depletion and Acromion bone region severe depletion  Fluid Retention:  None noted    Malnutrition Diagnosis: Severe malnutrition  In Context of:  Acute illness or injury  Chronic illness or disease        REASON FOR ASSESSMENT  Melissa Quinteros is a 86 year old female seen by Registered Dietitian for Provider Order - Severe malnutrition     Patient admitted with FTT s/p colon cancer resection 6/4/21      NUTRITION HISTORY  - Information obtained from patient and Epic records.  She was recently discharged from another  hospital in early July with Hospice services.  Patient tells me today that she has not eaten any food since then - \"I've been afraid that I'm going to throw up\".  She also added \"I think I'm losing my mind too\".  Patient was receiving Boost per the RD notes from early July (and was taking) but she notes today she is not sure what Boost is and does not remember taking it.    - Noted per RD eval on 7/9/21 that she was on a dysphagia level 2 diet with nectar thick liquids.      CURRENT NUTRITION ORDERS  Diet Order:     NPO     Current Intake/Tolerance:  N/A      NUTRITION FOCUSED PHYSICAL ASSESSMENT FOR DIAGNOSING MALNUTRITION)  Yes (visual upper extremities only as WOCN nurse was cleaning her ostomy)            Observed:    Muscle wasting (refer to documentation in Malnutrition section) and Subcutaneous fat loss (refer to documentation in Malnutrition section)    Obtained from " "Chart/Interdisciplinary Team:  \"Very frail\"    ANTHROPOMETRICS  Height: 5'4\"  Weight: 58.1 kg (128#)(7/19)  BMI: 21.9 kg/m^2  Weight Status:  Normal BMI  IBW: 54.5 kg   % IBW: 107%  Weight History:   Noted that patient was 142# on 7/9/21 RD eval --> down 14# or 10% over the last 10-11 days     LABS  Labs reviewed    MEDICATIONS  Medications reviewed      ASSESSED NUTRITION NEEDS PER APPROVED PRACTICE GUIDELINES:    Dosing Weight 58.1 kg   Estimated Energy Needs: 3255-2881 kcals (30-35 Kcal/Kg)  Justification: repletion  Estimated Protein Needs:  grams protein (1.3-1.8 g pro/Kg)  Justification: repletion, post-op and hypercatabolism with acute illness  Estimated Fluid Needs: 3084-2814 mL (1 mL/Kcal)  Justification: maintenance    MALNUTRITION:  % Weight Loss:  > 2% in 1 week (severe malnutrition)  % Intake:  </= 50% for >/= 5 days (severe malnutrition)  Subcutaneous Fat Loss:  Orbital region moderate depletion and Upper arm region moderate depletion  Muscle Loss:  Temporal region severe depletion, Clavicle bone region severe depletion and Acromion bone region severe depletion  Fluid Retention:  None noted    Malnutrition Diagnosis: Severe malnutrition  In Context of:  Acute illness or injury  Chronic illness or disease    NUTRITION DIAGNOSIS:  Inadequate protein-energy intake related to NPO with ongoing nausea and vomiting as evidenced by meeting 0% needs       NUTRITION INTERVENTIONS  Recommendations / Nutrition Prescription  If aggressive cares desired, recommend FT or TPN     Once diet advanced add Ensure with meals     Implementation  Nutrition education: Not appropriate at this time due to patient condition    Nutrition Goals  Nutrition plan will be established within the next 24-48 hours     MONITORING AND EVALUATION:  Progress towards goals will be monitored and evaluated per protocol and Practice Guidelines    Zeenat Case, RD, LD, CNSC   Clinical Dietitian - [facility] "

## 2021-07-20 NOTE — CONSULTS
[facility] Nurse Inpatient Wound Assessment   Reason for consultation: Abd wound (see previous note for ostomy wound assessment    Assessment  abd wound due to Surgical Wound  Status: initial assessment    Treatment Plan:   1. Reliable pouching system to prevent ileostomy leakage into wound and water proof outer dressing to prevent wound from ileostomy output  Abd. wound: BID  And PRN   1. Cleanse wound with Vashe wound cleaser #[id number]   2.   Dry and protect surrounding skin with no sting barrier film wipe #[id number]  3.   Cut 1 foot of kerlix and moisten  with vashe  4.   Use a cotton tip applicator to gently fill depth and tunnel at 11 0'clock (use only 1 piece of gauze)  5.   Cover with silicone foam dressing Mepilex  4x4 #[id number]    Wound Care Rationale Protect periwound skin, Provide selective debridement (autolysis) of nonviable tissue , Gently fill dead space to prevent abscess formation  and Decrease bacterial load.   Orders Written  Recommended provider order: Surgical consult colorectal evaluation of abd. Wound with attention to tunnel at 11 o'clock  WOC Nurse follow-up plan while in the hospital:weekly if colorectal wants WO Nursing to follow abd. Wound along with ostomy  Nursing to notify the Provider(s) and re-consult the WOC Nurse if wound(s) deteriorates or new skin concern.    Patient History  According to provider note(s):right hemicolectomy with small bowel resection on 06/14, anastomotic leak leading to transverse colectomy and anastomosis with diverting loop ileostomy on 06/22, and washout and closure of midline incision on 06/28.  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      Data  Documented Allergies: Tramadol and Lovastatin     Recent Labs   Lab Test 07/20/21  0726 07/19/21  0918 07/09/21  0553  21  0712 21  1121 20  0000   ALBUMIN  --  2.7*  --   --   --   --    HGB 9.4* 11.0*   < >  --   --   --    INR  --   --   --  1.29*   < >  --    WBC 9.3 8.7   < >  --   --   --    A1C  --   --   --   --   --  5.6    < > = values in this interval not displayed.     Recent Labs   Lab 21  0726 21  0918   GLC 88 89       Temperature: Temp (24hrs), Av.1  F (36.7  C), Min:97.4  F (36.3  C), Max:98.8  F (37.1  C)      Intake/Output Summary (Last 24 hours) at 2021 1410  Last data filed at 2021 1045  Gross per 24 hour   Intake 765 ml   Output 1380 ml   Net -615 ml       Orders Placed This Encounter      NPO for Medical/Clinical Reasons Except for: Meds, Ice Chips      Containment of urine/stool: Indwelling catheter and Ileostomy pouch   Medical Devices:Oakley Catheter: PRESENT, indication:      Catheter securement Yes    Pressure Injury Risk Assessment (Stefano Scale):  Sensory Perception: 4-->no impairment    Moisture: 3-->occasionally moist   Activity: 1-->bedfast     Mobility: 2-->very limited   Nutrition: 1-->very poor   Friction and Shear: 2-->potential problem  Stefano Score: 13    Focused [facility] Nurse Skin/Wound Exam:   Subjective/Objective: Patient has been in nursing home.  states he is with her every day and the pouch leaked daily for the last month. Patient has sever malnutrition   Wound History: right hemicolectomy with small bowel resection on , anastomotic leak leading to transverse colectomy and anastomosis with diverting loop ileostomy on , and washout and closure of midline incision on .      Date of last photo 2021 Wound Location: Abd    Measurements (length x width x depth, in cm) 4  x 3  x  2 cm   Wound Base: 50 % red tissue and 50 % adherent yellow slough  Tunneling up to >15 cm at 11 o'clock  Undermining up to 2 cm from 6-12 o'clock  Palpation of the wound bed: boggy   Periwound skin: intact  Periwound Temperature: normal   Drainage:,  small  Description of drainage: yellow  Odor: none  Pain: during dressing change,   Local signs of infection: none however likely biofil and very high risk for infection due to chronicity of wound    Interventions  Visual inspection and assessment completed   Wound care completed  Pressure redistribution Support surface: Standard  Atmos Air mattress    Off-loading measures in bed: reposition side to side with use of Pillows  Off-loading measures for heels: Pillows under calves, heel lift boots ordered  Off-loading measures for chair: Chair cushion ordered  Orders with supply numbers placed in orders for nursing staff  Education provided: importance of repositioning and plan of care  Discussed plan of care with  and nurse    Yaritza Russell MS RN CWOCN

## 2021-07-20 NOTE — H&P
"[facility]    History and Physical  Hospitalist       Date of Admission:  7/19/2021  Date of Service (when I saw the patient): 07/19/21    Assessment & Plan   Melissa Quinteros is a 86 year old female who presents with failure to thrive, weakness    Failure to thrive  Weakness  Recent colon cancer resection 6/4/2021  Postoperative intra-abdominal abscess 7/5   Gram negative bacteremia 7/4  *Underwent extended R hemicolectomy and small bowel resection x 2 on 6/14. Taken back to OR 6/22 2/2 anastomotic leak and underwent ex lap, washout and diverting loop ileostomy  *Readmitted 7/4/21 after prolonged hospitalization for colon cancer resection with draining wound. Found toha ve larger purulent fluid collection that was draining. Positive blood cultures for GNR. Fluid collection seen and s/p drain 7/8. Treated with cipro/flagyl.  * Was discharged with plans for hospice but pt/ family felt not doing well with this. No pain but no appetite, only getting 2 glasses of water in a day, has \"overwhelming nausea\".  Getting normal ostomy outputs. Afebrile, vitals stable. Very frail. Ostomy looks ok, drain in place R abdomen, dressings c/d/i.  Labs normal except low K. Lactic and WBC normal.   - IV fluids, NPO at midnight  - abx were discontinued 2/2 entering hospice so didn't receive full course. Will continue cipro/ flagyl for now  - CRS consult  - WOC consult  - PT  - IV PPI   - will need to d/w family re: palliative consult, would be very appropriate    Hypokalemia  Hypomagnesemia  K 2.8 on presentation   - replace per protocol  - telemetry until improved    Abnormal UA  UA on presentation with 14 WBC, >182 RBC's, few bacteria  -urine culture pending  -abx as above    Acute on chronic blood loss anemia  S/p transfusion 7/5. hgb 11.0 on presentation (suspect in part 2/2 hemoconcentration)  - monitor for now    Severe malnutrition  - nutrition consult  - suspect may need feeding tube if unable to take " adequate PO if pt/ family agree    Dementia  Recent encephalopathy during previous hospitalizations  Dementia mentioned in previous notes but don't see prior to admission documentation of this. For me thought June, 2022, couldn't remember president, knew was in hospital. Didn't recall recent hospital events.   - monitor for now  - prn seroquel available if agitation    COVID-19 negative on admission    DVT Prophylaxis: Pneumatic Compression Devices  Code Status: Full Code for now, will need to clarify with family    Disposition: Expected discharge in 3-5 days     Desean Rehman MD  [phone] (P)  Text Page     Primary Care Physician   DERRICK PASCUAL    Chief Complaint   Failure to thrive    History is obtained from the patient and medical records    History of Present Illness   Melissa Quinteros is a 86 year old female who presents with failure to thrive.  She was diagnosed with colon cancer and underwent colectomy in June of this year.  Course was complicated and she ultimately needed to have an ostomy placed.  She was discharged home then returned with with an intra-abdominal abscess.  She had gram-negative bacteremia.  During her second hospital stay it was ultimately decided that she should go on hospice.  Antibiotics were discontinued and she went home on hospice.  However, it sounds like she was not doing well at home and she had no appetite.  She reports that she can drink some water but anytime she brings any food or substance to her mouth she gets very nauseous.   apparently did not want her to be on hospice any more and he wanted a second opinion given her prognosis.  At the time my visit with her she denies chest pain or shortness of breath.  Denies any abdominal pain.  She denies nausea at this time.    Past Medical History    I have reviewed this patient's medical history and updated it with pertinent information if needed.   Past Medical History:   Diagnosis Date     Esophageal obstruction      8/30/2016     Essential (primary) hypertension     8/30/2016     Hyperlipidemia     No Comments Provided     Zoster without complications     X2       Past Surgical History   I have reviewed this patient's surgical history and updated it with pertinent information if needed.  Past Surgical History:   Procedure Laterality Date     APPENDECTOMY       ARTHROPLASTY KNEE Right 2017    HANG Bowles     COLONOSCOPY      No Comments Provided     COLONOSCOPY N/A 6/3/2021    Procedure: COLONOSCOPY, WITH POLYPECTOMY AND BIOPSY with endoscopic tattoo;  Surgeon: Del Pang MD;  Location:  OR     ESOPHAGOGASTRODUODENOSCOPY       HYSTERECTOMY TOTAL ABDOMINAL      1982,incidental appendectomy     INCISION AND DRAINAGE ABSCESS PELVIS, COMBINED N/A 6/28/2021    Procedure: Washout and closure of midline laparotomy.;  Surgeon: Del Pang MD;  Location:  OR     LAPAROSCOPIC ASSISTED COLECTOMY N/A 6/14/2021    Procedure: COLECTOMY, LAPAROSCOPIC, small bowel resection X2, extended Right hemicolectomy, biopsy of the mesenteric node;  Surgeon: Del Pang MD;  Location:  OR     LAPAROTOMY EXPLORATORY N/A 6/22/2021    Procedure: Exploratory LAPAROTOMY with transverse colon Resection, and ileostomy;  Surgeon: Del Pang MD;  Location:  OR     TONSILLECTOMY, ADENOIDECTOMY, COMBINED      childhood       Prior to Admission Medications   Prior to Admission Medications   Prescriptions Last Dose Informant Patient Reported? Taking?   QUEtiapine (SEROQUEL) 25 MG tablet   No No   Sig: Take 0.5 tablets (12.5 mg) by mouth At Bedtime   acetaminophen (TYLENOL) 325 MG tablet   No No   Sig: Take 2 tablets (650 mg) by mouth every 4 hours as needed for other (For optimal non-opioid multimodal pain management to improve pain control.)   clobetasol (TEMOVATE) 0.05 % external cream  Spouse/Significant Other No No   Sig: Apply topically 2 times daily   doxepin (ZONALON) 5 % external cream  Spouse/Significant Other No No   Sig:  Apply topically 3 times daily   magnesium oxide (MAG-OX) 400 MG tablet  Spouse/Significant Other Yes No   Sig: Take 400 mg by mouth daily    omeprazole (PRILOSEC) 20 MG CR capsule  Spouse/Significant Other No No   Sig: Take 1 capsule (20 mg) by mouth daily   triamcinolone (KENALOG) 0.1 % external ointment  Spouse/Significant Other Yes No   Sig: Apply topically 2 times daily      Facility-Administered Medications: None     Allergies   Allergies   Allergen Reactions     Tramadol Anxiety     Aggressive behaviors, delirium     Lovastatin      Other reaction(s): Arthralgia       Social History   I have reviewed this patient's social history and updated it with pertinent information if needed. Melissa Quinteros  reports that she has never smoked. She has never used smokeless tobacco. She reports previous alcohol use. She reports that she does not use drugs.    Family History   I have reviewed this patient's family history and updated it with pertinent information if needed.   Family History   Problem Relation Age of Onset     Other - See Comments Father         Old age     Lung Cancer Mother      Brain Cancer Brother        Review of Systems   The 10 point Review of Systems is negative other than noted in the HPI or here.     Physical Exam   Temp: 97.4  F (36.3  C) Temp src: Oral BP: 120/58 Pulse: 95   Resp: 12        Vital Signs with Ranges  0 lbs 0 oz    Constitutional: alert, very frail, no distress  Eyes: EOMI, PERRL  HEENT: OP clear, MM dry  Respiratory: CTA B without w/c  Cardiovascular: RRR no murmur. no edema.  GI: soft, has drain in R upper quadrant area, bandages across abdomen c/d/i. Ostomy looks good with normal output. BS present  Lymph/Hematologic: no cervical LAD  Genitourinary: deferred  Skin: no rashes or lesions grossly  Musculoskeletal: no deformities or arthritis. Markedly cachectic  Neurologic: CN II-XII, BACA  Psychiatric: year 2022, month June, knew in hospital but not which one, couldn't remember  president    Data   Data reviewed today:  I personally reviewed no images or EKG's today.  Recent Labs   Lab 07/19/21  0918   WBC 8.7   HGB 11.0*   MCV 87         POTASSIUM 2.8*   CHLORIDE 95*   CO2 36*   BUN 22   CR 0.60   ANIONGAP 8   DARIANA 9.4   GLC 89   ALBUMIN 2.7*   PROTTOTAL 5.1*   BILITOTAL 0.6   ALKPHOS 230*   ALT 11   AST 17       No results found for this or any previous visit (from the past 24 hour(s)).

## 2021-07-20 NOTE — PHARMACY-ADMISSION MEDICATION HISTORY
Pharmacy Medication History  Admission medication history interview status for the 7/19/2021  admission is complete. See EPIC admission navigator for prior to admission medications     Location of Interview: Patient room  Medication history sources: Patient's family/friend ( Ilya Quinteros)    In the past week, patient estimated taking medication this percent of the time: greater than 90%    Medication reconciliation completed by provider prior to medication history? Yes    Time spent in this activity: 20 minutes    Prior to Admission medications    Medication Sig Last Dose Taking? Auth Provider   acetaminophen (TYLENOL) 325 MG tablet Take 2 tablets (650 mg) by mouth every 4 hours as needed for other (For optimal non-opioid multimodal pain management to improve pain control.) unknown Yes Del Pang MD   clobetasol (TEMOVATE) 0.05 % external cream Apply topically 2 times daily unknown Yes Russ Renee PA   doxepin (ZONALON) 5 % external cream Apply topically 3 times daily unknown Yes Russ Renee PA   magnesium oxide (MAG-OX) 400 MG tablet Take 400 mg by mouth daily  7/19/2021 at Unknown time Yes Sofie Jay APRN CNP   omeprazole (PRILOSEC) 20 MG CR capsule Take 1 capsule (20 mg) by mouth daily 7/19/2021 at Unknown time Yes Bret Jimenez MD   triamcinolone (KENALOG) 0.1 % external ointment Apply topically 2 times daily as needed  More than a month at Unknown time  Reported, Patient       The information provided in this note is only as accurate as the sources available at the time of update(s)

## 2021-07-20 NOTE — PROGRESS NOTES
"   07/20/21 1500   Quick Adds   Type of Visit Initial PT Evaluation       Present no   Language English   Living Environment   People in home spouse   Current Living Arrangements house   Transportation Anticipated family or friend will provide   Living Environment Comments Pt recently from hospice care at home with spouse. Hard to gather information on living environment and PLOF d/t patient being poor historian and  busy talking on phone.    Self-Care   Usual Activity Tolerance fair   Current Activity Tolerance poor   Equipment Currently Used at Home   (Unclear - pt was on hospice care at home)   Activity/Exercise/Self-Care Comment Spouse endorsing pt on hospice for 8 days at home, only 1x sitting EOB.   Disability/Function   Fall history within last six months no   Change in Functional Status Since Onset of Current Illness/Injury yes   General Information   Onset of Illness/Injury or Date of Surgery 07/19/21   Referring Physician Rito Montoya MD   Patient/Family Therapy Goals Statement (PT) Pt want to be comfortable, endorsing \"Nothing that causes me pain.\" Spouse endorsing \"I want her to do exercises, and get moving again. She can tell you too, she wants to do things.\"   Pertinent History of Current Problem (include personal factors and/or comorbidities that impact the POC) Pt admitted from home hospice care per spouse request, pt failing to thrive and weak. *Underwent extended R hemicolectomy and small bowel resection x 2 on 6/14. Taken back to OR 6/22 2/2 anastomotic leak and underwent ex lap, washout and diverting loop ileostomy. Readmitted 7/4/21 after prolonged hospitalization for colon cancer resection with draining wound. Found to have larger purulent fluid collection that was draining. Positive blood cultures for GNR. Fluid collection seen and s/p drain 7/8. Treated with cipro/flagyl. Was discharged with plans for hospice but pt/ family felt not doing well with this. No pain " "but no appetite, only getting 2 glasses of water in a day, has \"overwhelming nausea\". Very frail.    General Observations Appearing unwell, frail, painful with all movements, grimaces    Cognition   Orientation Status (Cognition) oriented to;person   Affect/Mental Status (Cognition) unable/difficult to assess;sad/depressed affect   Follows Commands (Cognition) follows two-step commands;delayed response/completion;increased processing time needed;initiation impaired;physical/tactile prompts required;repetition of directions required   Cognitive Status Comments Grimaces with pain, does not verbalize.   Integumentary/Edema   Integumentary/Edema Comments per chart review, L heel ulcer (stage 1). Not visualized due to wrapping. Pt endorsing R foot pain, R midle toe sitting above all other toes, painful to touch/ROM.    Posture    Posture Comments Only assessed in supine.   Range of Motion (ROM)   ROM Quick Adds ROM deficits secondary to pain;ROM deficits secondary to weakness   ROM Comment Not formally assessed, B LE impaired by pain    Strength   Manual Muscle Testing Quick Adds Deficits observed during functional mobility   Strength Comments Gorssly deconditioned, strength <4/5 B LE. Unable to perform SLR with either extremity, see fn mob assessment    Bed Mobility   Comment (Bed Mobility) mod A rolling to L/R, painful/grimacing, unable to describe pain   Transfers   Transfer Safety Comments Sit<>Stand. Deferred per pt request as well as low BP likely to decrease further with sitting EOB. BP 96/76, O2 96^, HR 75bpm   Gait/Stairs (Locomotion)   Comment (Gait/Stairs) Deferred 2/2 medical status   Balance   Balance Comments Not assessed    Sensory Examination   Sensory Perception Comments Not assessed   Coordination   Coordination Comments Not assessed   Clinical Impression   Criteria for Skilled Therapeutic Intervention yes, treatment indicated   PT Diagnosis (PT) Impaired fn mobility   Influenced by the following " "impairments Strength, ROM, balance   Functional limitations due to impairments Impaired bed mob, transfers,  amb   Clinical Presentation Unstable/Unpredictable   Clinical Presentation Rationale Pt presenting with multiple comorbidities affecting presentation, assessment including family wishes/preferences, cognition, tolerance to activity, medical stability, bed mobility. ROM.    Clinical Decision Making (Complexity) high complexity   Therapy Frequency (PT) 3x/week   Predicted Duration of Therapy Intervention (days/wks) 3 days   Planned Therapy Interventions (PT) bed mobility training;gait training;home exercise program;strengthening;ROM (range of motion);transfer training   Risk & Benefits of therapy have been explained evaluation/treatment results reviewed;care plan/treatment goals reviewed;current/potential barriers reviewed;participants included;patient;spouse/significant other   Clinical Impression Comments Spouse appearing to have decr understanding of hospice care, medical course for pt; pt and spouse wishes are not in agreement - pt endorsing she would like not be in pain, declined OOB. Spouse endorsing pt would like to get out of bed and \"it would be good for her. It would do her a world of difference.\"   PT Discharge Planning    PT Discharge Recommendation (DC Rec) Transitional Care Facility   PT Rationale for DC Rec Unclear mobility goals, at this time they appear to be restorative. Recommend TCU to promote patient's highest level of functional, safe and independent mobility; pt has significant decline in functional ability requiring moderate assist for bed mobility; pt declined mobility outside of bed    PT Brief overview of current status  mod A with bed mob   Total Evaluation Time   Total Evaluation Time (Minutes) 15   Coping Strategies   Trust Relationship/Rapport care explained;choices provided;empathic listening provided;questions answered;questions encouraged;reassurance provided;thoughts/feelings " acknowledged

## 2021-07-20 NOTE — PROVIDER NOTIFICATION
Dr. Rehman paged for HR>120. Pt HR 140s sustained for approx 45mins. 40 mEqs Potassium PO given approx 20 mins prior, with Similar experience with previous K+ replacement in [place]. See new EKG results. Pt Tele strip from 0216 was SR with PVCs. Pt Asymptomatic otherwise.     Update 0332: Pt HR now 77    Update 0344: MD notified, no new orders. Nursing to continue to monitor at this time.

## 2021-07-20 NOTE — CONSULTS
[facility]  Colon and Rectal Surgery Consult Note  Name: Melissa Quinteros    MRN: [id number]  YOB: 1935    Age: 86 year old  Date of admission: 7/19/2021  Primary care provider: Bret Jimenez     Requesting Physician:  Dr. Rehman  Reason for consult:  Failure to thrive, recent colon cancer diagnosis with resection complicated by anastomotic leak requiring re-operation x2           History of Present Illness:   Melissa Quinteros is a 86 year old female with recent complicated medical history, seen at the request of Dr. Rehman, who was transferred from outside hospital with weakness and failure to thrive. She was diagnosed with colon cancer recently and underwent a laparoscopic extended right colectomy with small bowel resection x2, enterolysis and biopsy of mesenteric lymph node by Dr. Del Pang on June 14.  Pathology revealed a T4bN0 cancer.  Her post-operative course was complicated by an anastomotic leak and she returned to the operating room on 6/22 for an exploratory laparotomy, transverse colon resection with re-creation of an anastomosis and diverting loop ileostomy.  She had a third surgery on 6/28 for a washout and closure of the midline incision. She was discharged on 7/2 but returned to the ED two days later on 7/4 with increased abdominal pain and drainage from the midline wound.  She was discharged home on hospice on 7/12.  She had been on antibiotics during that hospital stay but these were discontinued when she was discharged on hospice. She apparently has not been doing well at home and presented yesterday to the ER with failure to thrive and weakness.  She cannot give me much history and was unaware she was in the hospital.  From the notes, she did not have any appetite at home and gets nauseated if she ties to eat.  She denies abdominal pain and the stoma seems to be functioning.  From the notes, her  does not want her on hospice any more and wants a second  opinion on prognosis.      Potassium 2.8 in the ED, up to 3.3 this morning  Magnesium 1.5 in the ED, up to 2.0 this morning   Albumin 2.7  Alkaline Phosphatase 230  Hgb 11.0 in the ER, 9.4 this morning    Urinalysis showed slightly cloudy urine positive for ketones, protein, nitrites, large amount of blood, WBCs, RBCs, few bacteria, and mucus.  Culture pending    SARS-CoV2 negative    Cipro/Flagyl have been started.  Drain with no output recorded.  Stoma with 350mL recorded up until 7am and another 730mL recorded since 7am.  She denies chest pain, shortness of breath, abdominal pain. Reports nausea.  Currently thought she was at home.     Colonoscopy History:  6/3/2021     Surgical History: lap extended right colectomy with small bowel resection x 2 and enterolysis on 6/14, ex lap with transverse colon resection with re-creation of anastomosis and diverting loop ileostomy on 6/22 for anastomotic leak, abdominal washout and closure of midline incision on 6/28 (all done by Dr. Pang)            Past Medical History:     Past Medical History:   Diagnosis Date     Esophageal obstruction     8/30/2016     Essential (primary) hypertension     8/30/2016     Hyperlipidemia     No Comments Provided     Zoster without complications     X2             Past Surgical History:     Past Surgical History:   Procedure Laterality Date     APPENDECTOMY       ARTHROPLASTY KNEE Right 2017    [place], [place]     COLONOSCOPY      No Comments Provided     COLONOSCOPY N/A 6/3/2021    Procedure: COLONOSCOPY, WITH POLYPECTOMY AND BIOPSY with endoscopic tattoo;  Surgeon: Del Pang MD;  Location:  OR     ESOPHAGOGASTRODUODENOSCOPY       HYSTERECTOMY TOTAL ABDOMINAL      1982,incidental appendectomy     INCISION AND DRAINAGE ABSCESS PELVIS, COMBINED N/A 6/28/2021    Procedure: Washout and closure of midline laparotomy.;  Surgeon: Del Pang MD;  Location:  OR     LAPAROSCOPIC ASSISTED COLECTOMY N/A 6/14/2021    Procedure:  COLECTOMY, LAPAROSCOPIC, small bowel resection X2, extended Right hemicolectomy, biopsy of the mesenteric node;  Surgeon: Del Pang MD;  Location: [facility] OR     LAPAROTOMY EXPLORATORY N/A 6/22/2021    Procedure: Exploratory LAPAROTOMY with transverse colon Resection, and ileostomy;  Surgeon: Del Pang MD;  Location: [facility] OR     TONSILLECTOMY, ADENOIDECTOMY, COMBINED      childhood               Social History:     Social History     Tobacco Use     Smoking status: Never Smoker     Smokeless tobacco: Never Used   Substance Use Topics     Alcohol use: Not Currently     Comment: Alcoholic Drinks/day: Seldom             Family History:     Family History   Problem Relation Age of Onset     Other - See Comments Father         Old age     Lung Cancer Mother      Brain Cancer Brother              Allergies:     Allergies   Allergen Reactions     Tramadol Anxiety     Aggressive behaviors, delirium     Lovastatin      Other reaction(s): Arthralgia             Medications:       ciprofloxacin  400 mg Intravenous Q12H     metroNIDAZOLE  500 mg Intravenous Q12H     pantoprazole (PROTONIX) IV  40 mg Intravenous Daily with breakfast     sodium chloride (PF)  3 mL Intracatheter Q8H             Review of Systems:   A comprehensive greater than 10 system review of systems was carried out.  Pertinent positives and negatives are noted above.  Otherwise negative for contributory info.            Physical Exam:     Blood pressure 96/51, pulse 76, temperature 98.8  F (37.1  C), temperature source Oral, resp. rate 14, SpO2 99 %, not currently breastfeeding.    Intake/Output Summary (Last 24 hours) at 7/20/2021 1036  Last data filed at 7/20/2021 0830  Gross per 24 hour   Intake 765 ml   Output 1230 ml   Net -465 ml       EXAM:  GEN: Awake and alert, appears stated age  EYES: pupils equal and round, sclerae anicteric   PULM: Non-labored breathing with normal respiratory effort  CVS: reg rate and rhythm, no peripheral edema  ABD:  Soft, non-tender, non-distended, right sided drain in place, dressings appear dry.  Stoma pink with clear yellow liquid in bag   NEURO: CN II-XII grossly intact  MSK: extremeties with no clubbing, cyanosis or edema  PSYCH: responsive, alert, cooperative; appropriate mood and affect  EXT/SKIN: inspection reveals no rashes, lesions or ulcers, normal coloring         Data Reviewed:       Recent Labs   Lab 07/20/21  0726 07/19/21  0918   WBC 9.3 8.7   HGB 9.4* 11.0*   HCT 29.4* 34.2*   MCV 87 87    414     Recent Labs   Lab 07/20/21  0726 07/19/21  2243 07/19/21  0918     --  139   POTASSIUM 3.3* 3.0* 2.8*   CHLORIDE 98  --  95*   CO2 31  --  36*   ANIONGAP 8  --  8   GLC 88  --  89   BUN 20  --  22   CR 0.72  --  0.60   GFRESTIMATED 76  --  83   DARIANA 8.7  --  9.4   MAG 2.0 2.2 1.5*   PHOS  --   --  2.9   PROTTOTAL  --   --  5.1*   ALBUMIN  --   --  2.7*   BILITOTAL  --   --  0.6   ALKPHOS  --   --  230*   AST  --   --  17   ALT  --   --  11         Assessment and Plan:   Melissa is an 86 year old female with recent colon cancer diagnosis s/p resection with anastomosis on 6/14 complicated by leak requiring re-operation with re-anastomosis and diverting ileosotmy on 6/22 and washout and closure of midline wound on 6/28 complicated by readmission for wound infection on 7/4 all at outside hospital.  Discharged home on hospice 7/12.  Now admitted with failure to thrive and weakness.  Family wanting second opinion.  Recommend palliative care consult for goals of care discussion.  No plans for surgery.  Dr. Noriega will see patient later today.     Plan:  1. Surgery: No emergent surgery indicated  2. IV Fluids: continue  3. Antibiotics:  Continue Cipro and Flagyl   4. Medications: Continue home meds  5. I&O s:  strict I&O s  6. Labs:   - Reviewed: by myself and Dr. Noriega   - Ordered: none   7. Activity: ambulate as tolerated, encourage OOB  8. DVT prophylaxis: SCD s  9. This plan has been discussed with   Noriega     Patient specific identified risk factors considered as part of today s evaluation include: recent complex surgical history with 3 surgeries since 6/14    Additional history obtained from chart review.  Time spent on consultation: 35 minutes, greater than 50 percent of the total encounter time is spent in counseling and/or coordination of care          Namrata Crowe PA-C  [facility]  Phone:  [phone]

## 2021-07-21 NOTE — CONSULTS
[facility]  Palliative Care Consultation Note    Patient: Melissa Quinteros  Date of Admission:  7/19/2021    Requesting Clinician / Team: Dr Andres  Reason for consult: Goals of care    Recommendations:    Goals of Care (see in depth discussion below): Melissa and  Dhruv seeking second opinion regarding her cancer and post op care. Melissa had been on hospice for a few days but ultimately felt the need for more information/input regarding options for her cancer/post op care and treatment. She will be having diagnostic testing today with CT scan.    Patient would be agreeable to artificial nutrition such as feeding tube or TPN.    Code status: No CPR / No Intubation    POLST form is available and correct (same as current code status)    Advanced Care Planning:    Patient has a completed Health Care Directive: No.       Ilya Quinteros is surrogate decision maker: [phone]    Symptom Management:  -Weakness and fatigue: recommend PT/OT as tolerated. Assist with tranfers and bed  Mobility as needed.   -Nausea: encourage small frequent meals and assess for symptoms, utilizing as needed medications to for nausea.  Malnutrition: Recommend nutritional consult to assess and optimize nutritional intake. Patient states that she would agree to a artificial nutrition by tube if needed    Discussed patient case with Dr Andres and RN    Natasha Lockwood, [facility]  Contact information available via [facility] Paging/Directory        Thank you for the opportunity to participate in the care of this patient and family. Our team: will continue to follow.     During regular M-F work hours ([phone]) -- if you are not sure who specifically to contact -- please contact us on [facility] Smart Web.     After regular work hours and on weekends/holidays, you can call our answering service at [phone].     Attestation:  Total time on the floor involved in the patient's care: 80  minutes  Total time spent in counseling/care coordination: >50% meeting in room with Melissa and her  Dhruv reviewing history of past and present illnesses, discussing goals of care, plan of care, hospice services, review or records and documentation as well as discussion with hospitalist and RN.    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 Post op complications include anastomotic leak, s/p redo ileocolic resection with DLI and abdominal washout and closure. Due to failure to thrive, patient was sent home on hospice for several days.   Dhruv and his daughter provided care for Melissa with daily visits from hospice. Visiting neighbors, who are in the medical field expressed concern and expressed possibility of getting a second opinion. Dhruv felt that he would be more comfortable if he knew that they had more information regarding prognosis and options. Patient was transferred from a [facility] to [facility] for second opinion regarding prognosis and post op cares.     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 She  her  Dhruv in  at age 73 and moved to [place] to be with him (she grew up with Dhruv's  wife.) Melissa and Dhruv had a very full lifestyle and enjoyed many years of world travel as well as around the [place]. Melissa had no previous marriages nor did she have any children. She has 2 stepchildren (of Dhruv's) whom she reportedly has good relations with.     Today, the patient was seen for:  Goals of care:    Introduced palliative care services and our multidisciplinary team. That we offer an extra layer of support for  "patient's with serious, life threatening illnesses, symptom management, emotional and spiritual support as well as assistance with complex medical decision making.  Dhruv states that they had worked with palliative care in the hospital [facility]. It has been a long journey since mid June for Melissa with her complications post-op and difficulty maintaining nutrition. Dhruv explained that he was told that Melissa had a large tumor of the colon where the small bowel connects to the large bowel; \"the surgeon thought he got it all.\" Dhruv also said that he was told the main tumor was cancerous but the other body tissues that were removed were not cancerous. The couple had not discussed treatment for the cancer as the post op complications were the most pressing issues.   Dhruv felt that Melissa should have been offered nutritional supplementation such as TPN while she was unable to eat and thought this is why she did not recover well. They were told that there were no other options to help patient improve and hospice was recommended. Dhruv took Melissa home for hospice and he and his daughter helped care for her. This proved to be very difficult due to complications with the Oakley catheter getting clogged and the ileostomy pouch leaking many times per day. The hospice was very responsive, at times coming to the home a few times per day. Many neighbors who are in the medical field, stopped by to visit and felt that patient may benefit from a second opinion on her treatment options. Dhruv felt that to be at peace with their decisions for hospice, a second opinion in a large metro area hospital would be helpful.    Melissa is agreeable to having the second opinion for her cancer and treatment options. She will be undergoing a CT scan later today. If TPN or feeding tube for artificial nutrition is recommended she would agree to having this placed. So far they have spoken with Dr Centeno of oncology.  They understand that there may not " be anything that can be done for Melissa than was previously offered but they are grateful for the chance to have the second opinion.      Prognosis, Goals, & Planning:        Prognosis, Goals, and/or Advance Care Planning were addressed today: Yes      Functional Status just prior to hospitalization: 4 (Completely disabled and dependent on others for selfcare; bedbound)          Patient has decision-making capacity today for complex decisions: Partial (needs assistance with complex decisions)      Patient's decision making preferences: shared with support from loved ones          I have concerns about the patient/family's health literacy today: No           Coping, Meaning, & Spirituality:   Mood, coping, and/or meaning in the context of serious illness were addressed today: Yes  Summary/Comments:     Social:     Living situation: was living with  Dhruv in [facility]    Loo family / caregivers:  Dhruv; they also have a large group of neighbors who are very supportive.    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    History of Present Illness:   History gathered today from: patient, family/loved ones, medical chart, medical team members, unit team members  Adopted from H&P:  See above.    Key Palliative Symptom Data:  # Pain severity the last 12 hours: low  # Dyspnea severity the last 12 hours: none  # Nausea severity the last 12 hours: low  # Anxiety severity the last 12 hours: none    ROS:  Comprehensive ROS is reviewed and is negative except as here & per HPI: Melissa endorses severe fatigue and weakness as well as poor appetite.     Past Medical History:  Past Medical History:   Diagnosis Date     Esophageal obstruction     8/30/2016     Essential (primary) hypertension     8/30/2016     Hyperlipidemia     No Comments Provided     Zoster without complications     X2        Past Surgical History:  Past Surgical History:    Procedure Laterality Date     APPENDECTOMY       ARTHROPLASTY KNEE Right 2017    [place]     COLONOSCOPY      No Comments Provided     COLONOSCOPY N/A 6/3/2021    Procedure: COLONOSCOPY, WITH POLYPECTOMY AND BIOPSY with endoscopic tattoo;  Surgeon: Del Pang MD;  Location:  OR     ESOPHAGOGASTRODUODENOSCOPY       HYSTERECTOMY TOTAL ABDOMINAL      1982,incidental appendectomy     INCISION AND DRAINAGE ABSCESS PELVIS, COMBINED N/A 6/28/2021    Procedure: Washout and closure of midline laparotomy.;  Surgeon: Del Pang MD;  Location:  OR     LAPAROSCOPIC ASSISTED COLECTOMY N/A 6/14/2021    Procedure: COLECTOMY, LAPAROSCOPIC, small bowel resection X2, extended Right hemicolectomy, biopsy of the mesenteric node;  Surgeon: Del Pang MD;  Location:  OR     LAPAROTOMY EXPLORATORY N/A 6/22/2021    Procedure: Exploratory LAPAROTOMY with transverse colon Resection, and ileostomy;  Surgeon: Del Pang MD;  Location:  OR     TONSILLECTOMY, ADENOIDECTOMY, COMBINED      childhood         Family History:  Family History   Problem Relation Age of Onset     Other - See Comments Father         Old age     Lung Cancer Mother      Brain Cancer Brother          Allergies:  Allergies   Allergen Reactions     Tramadol Anxiety     Aggressive behaviors, delirium     Lovastatin      Other reaction(s): Arthralgia        Medications:  I have reviewed this patient's medication profile and medications from this hospitalization.     Noted scheduled meds are:    ciprofloxacin  400 mg Intravenous Q12H     metroNIDAZOLE  500 mg Intravenous Q12H     pantoprazole (PROTONIX) IV  40 mg Intravenous Daily with breakfast     potassium chloride  20 mEq Oral or Feeding Tube Once     sodium chloride (PF)  3 mL Intracatheter Q8H       Noted PRN meds are:  acetaminophen **OR** acetaminophen, HYDROmorphone, lidocaine 4%, lidocaine (buffered or not buffered), magnesium hydroxide, melatonin, naloxone **OR** naloxone **OR**  "naloxone **OR** naloxone, ondansetron **OR** ondansetron, prochlorperazine **OR** prochlorperazine **OR** prochlorperazine, QUEtiapine, senna-docusate **OR** senna-docusate, sodium chloride (PF)    Current condition: Melissa is very fatigued and dozing off in bed intermittently. She endorses occasional pain of abdomen and back that responds well to Tylenol. She has some nausea with meals but has not used medications for nausea- encouraged her to do so if sx persist. She declines having a physical exam as \"too many people have been in here examining me already.\"     Physical Exam:  Vital Signs: Temp: 97.2  F (36.2  C) Temp src: Oral BP: 118/55 Pulse: 77   Resp: 16 SpO2: 97 % O2 Device: None (Room air)    Weight: 0 lbs 0 oz    GENERAL:  Alert, no acute distress, fatigued, calm, cooperative.  HEAD: Normocephalic atraumatic  NECK: Supple  SCLERA: Anicteric  RESPIRATORY: NL respiratory effort on room air.  MUSCULOSKELETAL: weakness of all major muscle groups.   SKIN: Warm and intact. No concerning lesions or rashes on exposed skin surfaces   NEUROLOGIC: Appropriately responsive during interview  PSYCH: Affect engaged in conversation but tires easily.    Data reviewed:  Recent imaging reviewed, my comments on pertinents:   No results found for this visit on 07/19/21.      Lab Results   Component Value Date    WBC 7.9 07/21/2021    WBC 9.3 07/20/2021    WBC 8.7 07/19/2021    HGB 8.8 (L) 07/21/2021    HGB 9.4 (L) 07/20/2021    HGB 11.0 (L) 07/19/2021    HCT 28.2 (L) 07/21/2021    HCT 29.4 (L) 07/20/2021    HCT 34.2 (L) 07/19/2021     07/21/2021     07/20/2021     07/19/2021     07/20/2021     07/19/2021     07/09/2021    POTASSIUM 3.0 (L) 07/20/2021    POTASSIUM 3.3 (L) 07/20/2021    POTASSIUM 3.0 (L) 07/19/2021    CHLORIDE 98 07/20/2021    CHLORIDE 95 (L) 07/19/2021    CHLORIDE 111 (H) 07/09/2021    CO2 31 07/20/2021    CO2 36 (H) 07/19/2021    CO2 22 07/09/2021    BUN 20 07/20/2021    " BUN 22 07/19/2021    BUN 13 07/09/2021    CR 0.72 07/20/2021    CR 0.60 07/19/2021    CR 0.55 (L) 07/09/2021    GLC 88 07/20/2021    GLC 89 07/19/2021    GLC 89 07/09/2021    NTBNPI 403 (H) 06/25/2021    AST 17 07/19/2021    AST 18 07/08/2021    AST 25 07/04/2021    ALT 11 07/19/2021    ALT 12 07/08/2021    ALT 16 07/04/2021    ALKPHOS 230 (H) 07/19/2021    ALKPHOS 150 (H) 07/08/2021    ALKPHOS 217 (H) 07/04/2021    BILITOTAL 0.6 07/19/2021    BILITOTAL 0.6 07/08/2021    BILITOTAL 0.8 07/04/2021    INR 1.29 (H) 07/08/2021    INR 1.61 (H) 07/07/2021    INR 2.53 (H) 07/06/2021      Lab Results   Component Value Date    ALBUMIN 2.7 07/19/2021    ALBUMIN 2.0 07/08/2021

## 2021-07-21 NOTE — PROGRESS NOTES
"[facility]    Medicine Progress Note - Hospitalist Service       Date of Admission:  7/19/2021    Assessment & Plan           Melissa Quinteros is a 86 year old female who presents with failure to thrive, weakness    Failure to thrive  Weakness  Recent colon cancer resection 6/4/2021  Postoperative intra-abdominal abscess 7/5   Gram negative bacteremia 7/4  *Underwent extended R hemicolectomy and small bowel resection x 2 on 6/14. Taken back to OR 6/22 2/2 anastomotic leak and underwent ex lap, washout and diverting loop ileostomy  *Readmitted 7/4/21 after prolonged hospitalization for colon cancer resection with draining wound. Found toha ve larger purulent fluid collection that was draining. Positive blood cultures for GNR. Fluid collection seen and s/p drain 7/8. Treated with cipro/flagyl.  * Was discharged with plans for hospice but pt/ family felt not doing well with this. No pain but no appetite, only getting 2 glasses of water in a day, has \"overwhelming nausea\".  Getting normal ostomy outputs. Afebrile, vitals stable. Very frail. Ostomy looks ok, drain in place R abdomen, dressings c/d/i.  Labs normal except low K. Lactic and WBC normal.   - ct IV fluids  - abx were discontinued 2/2 entering hospice so didn't receive full course. Will continue cipro/ flagyl for now  - CRS consult appreciated.  Await repeat CT scan of the abdomen and possible CT sinogram to evaluate for any leak.  If there is no leak may need to consider NG feeding tube depending on goals of care ).  Paged CRS to clarify diet in the interim  - WOC consult appreciated  - palliative consult.      Hypokalemia  Hypomagnesemia  K 2.8 on presentation   - replace per protocol  - telemetry until improved    Abnormal UA  UA on presentation with 14 WBC, >182 RBC's, few bacteria  -urine culture pending  -abx as above    Acute on chronic blood loss anemia  S/p transfusion 7/5. hgb 11.0 on presentation (suspect in part 2/2 " hemoconcentration)  - monitor for now    Severe malnutrition  - nutrition consult  - suspect may need feeding tube if unable to take adequate PO if pt/ family agree    Dementia  Recent encephalopathy during previous hospitalizations  Dementia mentioned in previous notes but don't see prior to admission documentation of this. For me thought June, 2022, couldn't remember president, knew was in hospital. Didn't recall recent hospital events.   - monitor for now  - prn seroquel available if agitation    COVID-19 negative on admission       Diet: Mechanical/Dental Soft Diet    DVT Prophylaxis: Pneumatic Compression Devices  Oakley Catheter: PRESENT, indication:    Central Lines: None  Code Status: No CPR- Do NOT Intubate      Disposition Plan   Expected discharge: 07/27/2021 recommended to Unclear once goals of care.     The patient's care was discussed with the Bedside Nurse, Patient and Patient's Family.    Pepe Andres MD, MD  Hospitalist Service  [facility]  Securely message with the Vocera Web Console (learn more here)  Text page via [employer] Paging/Directory      Risk Factors Present on Admission   a         # Severe Malnutrition, POA: based on Weight loss;Reduced intake;Subcutaneous fat loss;Muscle loss (07/20/21 1000)     ______________________________________________________________________    Interval History   Feels relatively better this morning.  Denies any fever/chills  Denies any abdominal pain.     4 point ROS done and negative unless mentioned     Data reviewed today: I reviewed all medications, new labs and imaging results over the last 24 hours. I personally reviewed no images or EKG's today.    Physical Exam   /55 (BP Location: Left arm)   Pulse 77   Temp 97.2  F (36.2  C) (Oral)   Resp 16   SpO2 97%   Gen- pleasant elderly lying in bed  HEENT- NAD, JC, mmm  Neck- supple  CVS- I+II+ no m/r/g  RS- CTAB  Abdo- soft, no tenderness . No g/r/r, colostomy in place.   Right-sided drain in place  Dressing in place (further as per colorectal surgery)  Ext- no edema     Data    BMP  Recent Labs   Lab 07/21/21  0802 07/20/21  2352 07/20/21 0726 07/19/21  2243 07/19/21  0918     --  137  --  139   POTASSIUM 3.2* 3.0* 3.3* 3.0* 2.8*   CHLORIDE 103  --  98  --  95*   DARIANA  --   --  8.7  --  9.4   CO2  --   --  31  --  36*   BUN  --   --  20  --  22   CR  --   --  0.72  --  0.60   GLC  --   --  88  --  89     CBC  Recent Labs   Lab 07/21/21  0802 07/20/21 0726 07/19/21 0918   WBC 7.9 9.3 8.7   RBC 3.20* 3.39* 3.93   HGB 8.8* 9.4* 11.0*   HCT 28.2* 29.4* 34.2*   MCV 88 87 87   MCH 27.5 27.7 28.0   MCHC 31.2* 32.0 32.2   RDW 16.3* 16.1* 16.0*    346 414     INRNo lab results found in last 7 days.  LFTs  Recent Labs   Lab 07/19/21 0918   ALKPHOS 230*   AST 17   ALT 11   BILITOTAL 0.6   PROTTOTAL 5.1*   ALBUMIN 2.7*      PANCNo lab results found in last 7 days.    No results found for this or any previous visit (from the past 24 hour(s)).

## 2021-07-21 NOTE — PLAN OF CARE
A/O x1-2, orientation waxes and wanes. Intermittently disoriented to place, time, and situation. Intermittently frustrated with cares but pleasant. Tele SR with PVCs. VSS ex. 3/5 strength to all extremities. Abd wound with moderate milky, purulent, gray, thick drainage, dressing CDI. Colostomy with watery bile output, dressing changed due to leaking. IVF, IV abx given. Denies pain. Mechanical soft diet, oral swabs given. Oakley with low urine output. REMI drain with scant milky, gray output. Preexsiting redness to Coccyx and R heel, foam applied. K+ replaced, unable to take full dose. Colorectal Surgery, WOC, PT and Nutrition consults today. Discharge pending 3-5 days.

## 2021-07-21 NOTE — PLAN OF CARE
Pt is A&Ox1 (self only), VSS on RA ex. soft BPs. Lung sounds diminished. Tele SR. Pt requested to stand and transfer to chair this shift, pt able to sit on edge of bed with assist of 1 but unable to stand with assist of 2. Voiding via Oakley catheter, ileostomy appliance connected to Oakley bag d/t high volume output. Thin, green/brown discharge from ileostomy, stoma red and protruding. Appliance intact with small amount of drainage around seal of appliance to skin. Soft diet, pt refused oral intake other than sips of water. CMS intact, abdominal wound dressing changed this shift d/t saturation, copious thick, purulent, brown discharge from wound. No PRN pain medications given this shift. IV running NS, scheduled IV antibiotics given. R hand PIV extravasated while infusing potassium, extravasation protocol followed, cold compress to hand. Pt turned and repositioned every 2 hours to prevent pressure injuries. Will continue to follow plan of care.

## 2021-07-21 NOTE — PLAN OF CARE
Pt. Alert and oriented to self, disoriented to place, time and situation. Vital signs stable on RA. Assist of 2 with lift. Tolerating sips of liquids, pt has very poor appetite, encouraged to order meals and  at bedside encouraging as well. Pt did have some difficulty with thin liquids, coughing on water. Per dietician, pt was on thickened liquids and had SLP following last hospitalization, requested speech consult. Lung sounds dim. Bowel sounds hypoactive, + flatus. BM per ostomy, low urine output via burrows. Midline incision dressing changed per orders and PRN, after CT scan pt dumping copious amounts of serous drainage. Dr. Noriega updated on drainage from midline incision, requested just to change dressing PRN and expects this to slow down. Redness to heel and coccyx, dressings intact and pt repositioned q2h, on pulsate mattress now as well. Denies pain. Denies nausea. Tele NSR.

## 2021-07-21 NOTE — PROVIDER NOTIFICATION
"Paged on-call hospitalist MD Eason, \"FYI, pt LW rm [phone] had IV KCl infiltrate, currently following protocol. Let me know if there's anything else you'd like done. Thanks! -ALAN Booth *[id number]\". Will continue to follow plan of care.  "

## 2021-07-21 NOTE — PROGRESS NOTES
COLON & RECTAL SURGERY  PROGRESS NOTE    July 21, 2021    SUBJECTIVE:  Patient denies pain or nausea.  States she is feeling better now that her  is here.  Patient and  aware of plan for CT today.    OBJECTIVE:  Temp:  [97.1  F (36.2  C)-97.4  F (36.3  C)] 97.2  F (36.2  C)  Pulse:  [71-77] 77  Resp:  [14-16] 16  BP: ()/(45-55) 118/55  SpO2:  [95 %-99 %] 97 %    Intake/Output Summary (Last 24 hours) at 7/21/2021 0939  Last data filed at 7/21/2021 0657  Gross per 24 hour   Intake 801.25 ml   Output 475 ml   Net 326.25 ml       GENERAL:  Awake, alert, no acute distress, lying in bed.  HEAD: Nomocephalic atraumatic  SCLERA: anicteric  EXTREMITIES: warm and well perfused  ABDOMEN:  Soft, non tender, non-distended, no rebound or guarding, no peritoneal signs.  Stoma viable with liquid stool in bag.  INCISION:  Midline wound packed with dressing    LABS:  Lab Results   Component Value Date    WBC 7.9 07/21/2021    WBC 9.3 07/09/2021     Lab Results   Component Value Date    HGB 8.8 07/21/2021    HGB 8.9 07/09/2021     Lab Results   Component Value Date    HCT 28.2 07/21/2021    HCT 27.6 07/09/2021     Lab Results   Component Value Date     07/21/2021     07/09/2021     Last Basic Metabolic Panel:  Lab Results   Component Value Date     07/20/2021     07/09/2021      Lab Results   Component Value Date    POTASSIUM 3.0 07/20/2021    POTASSIUM 3.5 07/11/2021     Lab Results   Component Value Date    CHLORIDE 98 07/20/2021    CHLORIDE 111 07/09/2021     Lab Results   Component Value Date    DARIANA 8.7 07/20/2021    DARIANA 7.6 07/09/2021     Lab Results   Component Value Date    CO2 31 07/20/2021    CO2 22 07/09/2021     Lab Results   Component Value Date    BUN 20 07/20/2021    BUN 13 07/09/2021     Lab Results   Component Value Date    CR 0.72 07/20/2021    CR 0.55 07/09/2021     Lab Results   Component Value Date    GLC 88 07/20/2021    GLC 89 07/09/2021       ASSESSMENT/PLAN:  85 yo  female with history of right sided colon cancer s/p right colectomy and small bowel resection complicated by anastomotic leak, s/p redo ileocolic resection with DLI and abdominal washout and closure.  Patient now with failure to thrive and weakness.  Transferred for second opinion regarding prognosis and post op cares.  Afebrile, vitals stable.    - CT today to evaluate for anastomotic leak.  If no evidence of leak, recommend NJ feeding tube to start tube feeds.  - Soft diet  - Continue IVF  - Continue antibiotics  - No plans for surgery at this time    Discussed with Dr. Noriega.      For questions/paging, please contact the CRS office at [phone].    Tiki Amor PA-C  [facility]  Phone: [phone]

## 2021-07-21 NOTE — PROGRESS NOTES
"[facility] Nurse Inpatient Ostomy Assessment      Assessment of ostomy and needs for: diverting loop ileostomy Abd. Wound  Surgery date/procedure:  right hemicolectomy with small bowel resection on 06/14, anastomotic leak leading to transverse colectomy and anastomosis with diverting loop ileostomy on 06/22, and washout and closure of midline incision on 06/28.  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           [facility] Focused Assessment:       Subjective/Objective:  at bedside spoke extensively about pouching challenges,  states pouch has leaked daily in nursing home for over a month and her abdomen was \"full of fluid\" until recently.  Pouch now connected to bedside bag.  Barrier and back of pouch were wet with small amount of stool output today 7/21, but it appears this was from the pouch not being full snapped onto the barrier at 6 o'clock, and not from leaking underneath the barrier.       Date of last photo: 7-20-21        Type of ostomy:  Ileostomy    Last pouch change/reason: 7/20 - leaking; appears intact today 7/21    Stoma assessment:   ? Ileostomy LUQ,  Moist red barely protruding, noted retraction and shape change with position change from oval to round, peristomal skin fold at 10 and 2 o'clock  ? Lumen empties toward 5 o'clock    Mucocutaneous Junction (MCJ): 7/20: intact    Peristomal skin: 7/20: creases or folds present at 10 and 2 o'clock up to 1 cm partial thickness skin loss from, 3 to 8 o'clock    Abdominal assessment: soft  ? N/G still in place?:  No    Output:   watery, thin, liquid brown-green,   Nurse reports high output although not accurately measured due to leakage  I/O last 3 completed shifts:  In: 801.25 [P.O.:50; I.V.:751.25]    Out: 1205 [Urine:175; Stool:1030]     Current " pouching system:  Convex 70mm with ring and high-output pouch.     Effectiveness of current pouching/ supply plan: appears still intact for now    Pain:  Tender with peristomal skin cleansing     Diet:       Orders Placed This Encounter      Mechanical/Dental Soft Diet      DATA     Documented Allergies: Tramadol and Lovastatin     Recent Labs   Lab Test 07/22/21  0656 07/19/21  0918 07/09/21  0553 07/08/21  0712 05/28/21  1121 08/20/20  0000   ALBUMIN  --  2.7*  --   --   --   --    HGB 8.9* 11.0*   < >  --   --   --    INR  --   --   --  1.29*   < >  --    WBC 7.5 8.7   < >  --   --   --    A1C  --   --   --   --   --  5.6    < > = values in this interval not displayed.     Containment of urine/stool: Indwelling catheter and Ileostomy pouch   Medical Devices:Oakley Catheter: PRESENT, indication:      Catheter securement Yes    Stefano Risk Assessment  Sensory Perception: 3-->slightly limited    Moisture: 4-->rarely moist   Activity: 2-->chairfast     Mobility: 2-->very limited   Nutrition: 1-->very poor   Friction and Shear: 2-->potential problem  Stefano Score: 14           Interventions:       Patient's chart evaluated.     Pouch left in place, but ensured barrier and pouch fully snapped together    Current support surface: Pulsate    Current off-loading measures in bed: reposition side to side with use of Pillows    Current off-loading heels: Pillows under calves ordered offloading boots    Current off-loading measures chair: chair cushion      Assessments completed today:  social support and environment, pouching system effectiveness, abdomen, stoma, abd wound     Education completed with :  frequency of pouch empty and pouch change    All husbands questions answered:  YES    Prepared for discharge by: No discharge preparations started         Plan:       Pouching system:   [place] 70mm (blue) Convex cut to fit skin barrier #[id number] with high output pouch #[id number] and barrier ring #[id number]    Education yet  to complete/reinforce: TBD     Continue to prepare for discharge: No discharge preparations started       WOC Nursing will follow daily M-F until reliable pouching system established - plan to likely change pouch tomorrow Th 7-22 to ensure no breakdown of peristomal skin    Nori Olson RN CWOCN

## 2021-07-22 NOTE — PROGRESS NOTES
"Clinical Swallow Evaluation     07/22/21 1035   General Information   Onset of Illness/Injury or Date of Surgery 07/19/21   Referring Physician Pepe Andres MD   Patient/Family Therapy Goal Statement (SLP) Pt did not state;  reported plan for NG and hopefully PEG tube pending MD rounds - reported pt enjoys pudding and ice cream   Pertinent History of Current Problem Per H&P, \"Melissa Quinteros is a 86 year old female who presents with failure to thrive.  She was diagnosed with colon cancer and underwent colectomy in June of this year.  Course was complicated and she ultimately needed to have an ostomy placed.  She was discharged home then returned with with an intra-abdominal abscess.  She had gram-negative bacteremia.  During her second hospital stay it was ultimately decided that she should go on hospice.  Antibiotics were discontinued and she went home on hospice.  However, it sounds like she was not doing well at home and she had no appetite.  She reports that she can drink some water but anytime she brings any food or substance to her mouth she gets very nauseous.   apparently did not want her to be on hospice any more and he wanted a second opinion given her prognosis.  At the time my visit with her she denies chest pain or shortness of breath.  Denies any abdominal pain.  She denies nausea at this time.\" Per Surgery, \"85 yo female with history of right sided colon cancer s/p right colectomy and small bowel resection complicated by anastomotic leak, s/p redo ileocolic resection with DLI and abdominal washout and closure.  Patient now with failure to thrive and weakness.  Transferred for second opinion regarding prognosis and post op cares.  Afebrile, vitals stable. CT negative for leak. Plan to place NJ tube today and start tube feeds.\" SLP consulted with RN noting cough with water.    General Observations Pt alert in bed and agreeable to sit upright; pleasantly confused and unable to provide " history   Past History of Dysphagia Pt followed briefly by SLP during previous hospitalization. Last note on 7/1/21 recommended DD2 and nectar thick liquids. No hx of Video Swallow Study   Oral Motor   Oral Musculature unable to assess due to poor participation/comprehension   General Swallowing Observations   Current Diet/Method of Nutritional Intake (General Swallowing Observations, NIS) easy to chew (level 7);thin liquids (level 0)   Respiratory Support (General Swallowing Observations) none   Comment, Secretions/Suctioning managing independently   Clinical Swallow Evaluation   Feeding Assistance minimal assistance required   Clinical Swallow Eval: Thin Liquid Texture Trial   Diagnostic Statement No overt s/sx of aspiration on limited trials by cup   Mode of Presentation, Thin Liquids spoon;self-fed   Oral Phase of Swallow WFL   Pharyngeal Phase of Swallow intact   Clinical Swallow Evaluation: Puree Solid Texture Trial   Mode of Presentation, Puree fed by clinician   Diagnostic Statement Slow, but functional   Oral Phase, Puree WFL   Pharyngeal Phase, Puree intact   Swallowing Recommendations   Diet Consistency Recommendations thin liquids (level 0);easy to chew (level 7)   Comment, Swallowing Recommendations SLP: Limited evaluation completed d/t limited intake and request to rest and be warmer. Pt appeared alert, however, unable to provide hx. Pt's  present and reported prior to pt going home with hospice care, he was able to get her to drink a few ensure shakes. His primary concern is nutrition at this time.  denied any concern for aspiration/coughing with PO intake. Pt accepted 3 bites of vanilla pudding with slow, but functional swallow with no oral residue or overt s/sx of aspiration. Pt was able to self feed thin coffee by the cup with small sips x3 taken. Good timing and no overt s/sx of aspiration. Pt denied any additional intake, but was agreeable to ordering oatmeal for breakfast. Reviewed  with pt and . Okay to continue diet per Surgery (mechanical soft) and thin liquids with 1:1 assist for all intake. Would encourage pt to feed herself and drink by cup herself (avoid straws). If coughing, consider downgrading to nectar thick liquids. Pt unable to participate in a Video Swallow Study at this time. Will continue to follow clinically.    Supervision Level for Intake close supervision needed   Mode of Delivery Recommendations bolus size, small;slow rate of intake   Swallowing Maneuver Recommendations alternate food and liquid intake;extra swallow;double dry swallow   Monitoring/Assistance Required (Eating/Swallowing) check mouth frequently for oral residue/pocketing;cue for finger/lingual sweep if oral pocketing present;stop eating activities when fatigue is present;monitor for cough or change in vocal quality with intake;optimize oral intake to minimize need for tube feeding   Recommended Feeding/Eating Techniques (Swallow Eval) maintain upright sitting position for eating;maintain upright posture during/after eating for 30 minutes;moisten oral mucosa prior to intake;provide 6 smaller meals throughout day;schedule meals prior to therapy to avoid therapy fatigue   Medication Administration Recommendations, Swallowing (SLP) per pt tolerance   Instrumental Assessment Recommendations reassess via non-instrumental clinical swallow evaluation;VFSS (videofluroscopic swallowing study)  (VFSS if pt can tolerate/participate)   General Therapy Interventions   Planned Therapy Interventions Dysphagia Treatment   Dysphagia treatment Modified diet education;Instruction of safe swallow strategies   SLP Therapy Assessment/Plan   Criteria for Skilled Therapeutic Interventions Met (SLP Eval) yes   SLP Diagnosis Mild dysphagia   Rehab Potential (SLP Eval) fair, will monitor progress closely   Therapy Frequency (SLP Eval) 5 times/wk   Predicted Duration of Therapy Intervention (SLP Eval) 1 week   Therapy Plan  Review/Discharge Plan (SLP)   Therapy Plan Review (SLP) evaluation/treatment results reviewed;care plan/treatment goals reviewed;current/potential barriers reviewed;risks/benefits reviewed;participants voiced agreement with care plan;participants included;patient;spouse/significant other   SLP Discharge Planning    SLP Discharge Recommendation (DC Rec) Transitional Care Facility   SLP Rationale for DC Rec Pt below baseline swallow function; continue to follow for dysphagia;  wanting TCU that accepts NG tube near [place]   SLP Brief overview of current status  Okay to continue diet per Surgery (mechanical soft) and thin liquids with 1:1 assist for all intake. Would encourage pt to feed herself and drink by cup herself (avoid straws). If coughing, consider downgrading to nectar thick liquids.     Total Evaluation Time   Total Evaluation Time (Minutes) 20

## 2021-07-22 NOTE — CONSULTS
CLINICAL NUTRITION SERVICES - REASSESSMENT NOTE      Recommendations Ordered by Registered Dietitian (RD):   Updated weight as able     TF orders placed --   Once PEG tube placed and OK to use, recommend:   Osmolite 1.5 Leandro @ goal of  50 ml/hr (1200ml/day) will provide: 1800 kcals (31 kcal/kg), 75 g PRO (1.3 g/kg), 914 ml free H20, 244 g CHO, and 0 g fiber daily.  - Initiate at 10 ml/hr and adv by 10 ml q 12 hrs as tolerated to goal   - Do not advance unless K+ > 3, Mg++ > 1.5 and Phos > 1.9. If lytes trend below parameters, hold TF at current rate and replace.    - HOB > 30 degrees with gastric feeding  - FWF: 60 ml q 4 hrs for tube patency    Future/Additional Recommendations:   Diet per SLP  Continue magic cup orders per pt preference   Monitor PO intake and need for additional water flushes to meet hydration needs    Malnutrition:   % Weight Loss:  > 2% in 1 week (severe malnutrition)  % Intake:  </= 50% for >/= 5 days (severe malnutrition)  Subcutaneous Fat Loss:  Orbital region moderate depletion and Upper arm region moderate depletion  Muscle Loss:  Temporal region severe depletion, Clavicle bone region severe depletion and Acromion bone region severe depletion  Fluid Retention:  None noted     Malnutrition Diagnosis: Severe malnutrition  In Context of:  Acute illness or injury  Chronic illness or disease       EVALUATION OF PROGRESS TOWARD GOALS   Diet:  Mechanical/Dental Soft Diet + Nectar Thickened Liquids (per SLP recommendations 7/22)    - Note currently NPO for PEG placement     Supplements: Magic Cup TID with meals     Intake/Tolerance:  Pt reports her appetite was better this morning. Ate a few bites of oatmeal and pudding and ~1/2 of the magic cup. Intakes consistently documented at 25% over admission. Pt states she enjoys the magic cups.       ASSESSED NUTRITION NEEDS:  Dosing Weight 58.1 kg   Estimated Energy Needs: 1006-7465 kcals (30-35 Kcal/Kg)  Justification: repletion  Estimated Protein Needs:  " grams protein (1.3-1.8 g pro/Kg)  Justification: repletion, post-op and hypercatabolism with acute illness  Estimated Fluid Needs: 1393-1169 mL (1 mL/Kcal)  Justification: maintenance      NEW FINDINGS:   - Pt went to IR this morning for NJ placement, but patient's  decided he wanted to proceed with PEG placement instead -- placement pending   - Likely discharge to rehab facility in [place] (only accepts PEG, no nasal tubes)  - WOCN : assessment of abdominal wound d/t surgery -- \"Wound looks concerning and has copious amount purulent-looking tan creamy drainage, loose sutures in base, and a deep tunnel near 11 o'clock\"  - Stoolin ml out yesterday and 955 ml on  via colostomy   - No updated weight this admission   - Labs: K+ 3.3 (L), Mg++ 1.5 (L), Phos 2.2 (L),  -- replacement protocols in place   - Meds: Cipro q 12 hrs (BID) via IV    Previous Goals:   Nutrition plan will be established within the next 24-48 hours   Evaluation: Met - TF to be initiated     Previous Nutrition Diagnosis:   Inadequate protein-energy intake related to NPO with ongoing nausea and vomiting as evidenced by meeting 0% needs   Evaluation: Improving but ongoing       CURRENT NUTRITION DIAGNOSIS  Inadequate protein-energy intake related to decreased appetite as evidenced by PO intakes consistently documented at 25%, minimal PO intake x 1 month and plan for PEG placement for TF to meet full nutrition needs     INTERVENTIONS  Recommendations / Nutrition Prescription  Diet per SLP  Continue magic cup orders per pt preference     TF orders placed --   Once PEG tube placed and OK to use, recommend:   Osmolite 1.5 Leandro @ goal of  50 ml/hr (1200ml/day) will provide: 1800 kcals (31 kcal/kg), 75 g PRO (1.3 g/kg), 914 ml free H20, 244 g CHO, and 0 g fiber daily.  - Initiate at 10 ml/hr and adv by 10 ml q 12 hrs as tolerated to goal   - Do not advance unless K+ > 3, Mg++ > 1.5 and Phos > 1.9. If lytes trend below " parameters, hold TF at current rate and replace.    - HOB > 30 degrees with gastric feeding  - FWF: 60 ml q 4 hrs for tube patency       Implementation  EN Composition, EN Schedule and Feeding Tube Flush    Goals  EN will meet % of assessed nutrition needs       MONITORING AND EVALUATION:  Progress towards goals will be monitored and evaluated per protocol and Practice Guidelines      Katharine Garcia RD, LD  Pager: [phone]

## 2021-07-22 NOTE — PROGRESS NOTES
[facility] WOC Nurse Inpatient Wound Assessment   Reason for consultation: Abd wound (see previous note for ostomy wound assessment    Assessment    Abdominal wound due to Surgical Wound  Status: follow-up assessment. Wound continues with moderate amount purulent-looking tan creamy drainage, loose sutures in base, and a deep tunnel near 11 o'clock.  Pt had only mild discomfort with dressing change.  Needs at least BID dressing changes due to amount of drainage.    Ostomy appliance to bedside bag remains intact with moderate watery yellow drainage with food particles.    anxious to arrange for pt to be transported to a care facility near their home ASAP. Per  he has found a facility.   Treatment Plan:     Establish reliable pouching system to prevent ileostomy leakage into wound, and waterproof outer dressing to prevent wound contamination from ileostomy output    Abdominal wound: BID and PRN  1. Cleanse wound with Vashe wound cleanser (#[id number]), and mop out any old drainage; dry the periwound skin  2.   Protect surrounding skin with Cavilon no-sting barrier film wipe as needed, ie if skin becoming irritated (do not have to use every dressing change)  3.   Cut approx 1 foot of kerlix and moisten with vashe  4.   Use a cotton tip applicator to gently fill depth and tunnel at 11 o'clock (use only 1 piece of gauze, to not lose any in tunnel)  5.   Cover with silicone foam dressing (ie Mepilex 4x4)    Wound Care Rationale Protect periwound skin, Provide selective debridement (autolysis) of nonviable tissue , Gently fill dead space to prevent abscess formation  and Decrease bacterial load.   Orders Updated  Per surgery, no surgical intervention @ this time. REC conservative dressing changes   WOC Nurse follow-up plan while in the hospital:weekly and prn until pouching plan stable   Nursing to notify the Provider(s) and re-consult the WOC Nurse if wound(s) deteriorates or new skin  concern.    Patient History    According to provider note(s):right hemicolectomy with small bowel resection on 06/14, anastomotic leak leading to transverse colectomy and anastomosis with diverting loop ileostomy on 06/22, and washout and closure of midline incision on 06/28.  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      Data    Documented Allergies: Tramadol and Lovastatin     Recent Labs   Lab Test 07/22/21  0656 07/19/21  0918 07/09/21  0553 07/08/21  0712 05/28/21  1121 08/20/20  0000   ALBUMIN  --  2.7*  --   --   --   --    HGB 8.9* 11.0*   < >  --   --   --    INR  --   --   --  1.29*   < >  --    WBC 7.5 8.7   < >  --   --   --    A1C  --   --   --   --   --  5.6    < > = values in this interval not displayed.     I/O last 3 completed shifts:  In: 1050 [P.O.:150; I.V.:900]  Out: 825 [Urine:525; Stool:300]    Orders Placed This Encounter      NPO per Anesthesia Guidelines for Procedure/Surgery Except for: Meds    Containment of urine/stool: Indwelling catheter and Ileostomy pouch   Medical Devices:Oakley Catheter: PRESENT, indication: /GI/GYN Pelvic Procedure    Catheter securement Yes    Pressure Injury Risk Assessment (Stefano Scale):  Sensory Perception: 3-->slightly limited    Moisture: 4-->rarely moist   Activity: 2-->chairfast     Mobility: 2-->very limited   Nutrition: 1-->very poor   Friction and Shear: 2-->potential problem  Stefano Score: 14       Focused WOC Nurse Skin/Wound Exam:     Subjective/Objective: Patient has been in nursing home.  states he is with her every day and the pouch leaked daily for the last month. Patient has sever malnutrition   Wound History: right hemicolectomy with small bowel resection on 06/14, anastomotic leak leading to transverse colectomy and anastomosis with diverting loop ileostomy on  06/22, and washout and closure of midline incision on 06/28.                                                                                [facility] photo: 7-22-21 open abd incision               Date of last photo 7/20/2021 Wound Location: Abd    Measurements (length x width x depth, in cm) 4  x 3  x  2 cm   Wound Base: 50 % red tissue and 50 % adherent yellow slough; loose sutures noted that is visible   Tunneling up to >15 cm at 11 o'clock  Undermining up to 2 cm from 6-12 o'clock  Palpation of the wound bed: boggy   Periwound skin: intact, no erythema  Periwound Temperature: normal   Drainage: copious  Description of drainage: tan creamy  Odor: minimal  Pain: during dressing change, mild   Local signs of infection: + purulent/creamy drainage; minimal periwound erythema, minimal pain to wound    Interventions    Visual inspection and assessment completed   Wound care completed  Pressure redistribution Support surface: Pulsate  Off-loading measures in bed: reposition side to side with use of Pillows  Off-loading measures for heels: Pillows under calves, heel lift boots ordered  Off-loading measures for chair: Chair cushion ordered  Orders updated  Education provided: importance of repositioning and plan of care  Discussed plan of care with  and nurse

## 2021-07-22 NOTE — PROGRESS NOTES
Radiology Note:    Patient came down to XR dept for NJ feeding tube placement. As patient arrived to our dept, nurse called down to let us know that patient's  has decided he now wants to proceed with PEG tube placement instead. Patient was sent back upstairs without NJ tube placement.    Sophia Toledo PA-C

## 2021-07-22 NOTE — PLAN OF CARE
A&O to self only. VSS on RA. Tele NSR. Lung sounds dim. Dd2 diet, tolerating well. Bowel sounds hypo, denies flatus. Ileostomy in place with liquid output. Low urine output per [place]. Abdominal dressing CDI, changed x1. Denies pain. Up with lift. Turn and repo q2.

## 2021-07-22 NOTE — PROGRESS NOTES
COLON & RECTAL SURGERY  PROGRESS NOTE    July 22, 2021    SUBJECTIVE:  Reviewed CT results with patient and  this am. Patient tearful that she does not know where she is or who anybody is.     OBJECTIVE:  Temp:  [97.3  F (36.3  C)-97.9  F (36.6  C)] 97.6  F (36.4  C)  Pulse:  [73-78] 76  Resp:  [16-20] 20  BP: (101-120)/(44-58) 112/58  SpO2:  [96 %-99 %] 97 %    Intake/Output Summary (Last 24 hours) at 7/22/2021 0939  Last data filed at 7/22/2021 0600  Gross per 24 hour   Intake 975 ml   Output 825 ml   Net 150 ml       GENERAL:  Awake, alert, no acute distress  HEAD: Normocephalic atraumatic  SCLERA: Anicteric  EXTREMITIES: Warm and well perfused  ABDOMEN:  Soft, non-tender, non-distended. No guarding, rigidity, or peritoneal signs. REMI drain in place with minimal serous output  INCISION:  C/d/i    LABS:  Lab Results   Component Value Date    WBC 7.5 07/22/2021    WBC 9.3 07/09/2021     Lab Results   Component Value Date    HGB 8.9 07/22/2021    HGB 8.9 07/09/2021     Lab Results   Component Value Date    HCT 27.9 07/22/2021    HCT 27.6 07/09/2021     Lab Results   Component Value Date     07/22/2021     07/09/2021     Last Basic Metabolic Panel:  Lab Results   Component Value Date     07/22/2021     07/09/2021      Lab Results   Component Value Date    POTASSIUM 3.3 07/22/2021    POTASSIUM 3.5 07/11/2021     Lab Results   Component Value Date    CHLORIDE 104 07/22/2021    CHLORIDE 111 07/09/2021     Lab Results   Component Value Date    DARIANA 8.3 07/22/2021    DARIANA 7.6 07/09/2021     Lab Results   Component Value Date    CO2 27 07/22/2021    CO2 22 07/09/2021     Lab Results   Component Value Date    BUN 14 07/22/2021    BUN 13 07/09/2021     Lab Results   Component Value Date    CR 0.64 07/22/2021    CR 0.55 07/09/2021     Lab Results   Component Value Date     07/22/2021    GLC 89 07/09/2021       ASSESSMENT/PLAN: 85 yo female with history of right sided colon cancer s/p right  colectomy and small bowel resection complicated by anastomotic leak, s/p redo ileocolic resection with DLI and abdominal washout and closure.  Patient now with failure to thrive and weakness.  Transferred for second opinion regarding prognosis and post op cares.  Afebrile, vitals stable. CT negative for leak. Plan to place NJ tube today and start tube feeds.    1. Okay for soft diet  2. Placement of NJ tube and initiation of tube feeds  3. Continue IVF and Abx  4. WOCN following for wound cares  5. Remainder of cares per hospitalist, CRS will continue to follow.     For questions/paging, please contact the CRS office at [phone].    Sherrie Bee PA-C ([place])  Colorectal Physician Assistant    [facility]  [address]  [place] [address]  T: [phone]  F: [phone]      CRS Staff.  Seen and examined independently.  Agree with above.    CT reviewed.  NO EC fistula.  Needs improved nutrition.  Discussed NJ vs transabdominal G tube.   reports a rehab place in [place] can take her with a g tube but not an NJ.  Likely will need this long term, so G tube is appropriate.    Has been NPO since 10:30.  Orders placed.    I performed a history and physical examination of the patient and discussed their management with the physician assistant. I reviewed the physician assistants note and agree with the documented findings and plan of care.     Bret Noriega MD FACS FASCRS  Colorectal Surgeon       [facility]  [address], [address]  [place] [address]  T: [phone]  F: [phone]  Pager: [phone]  [email or web address]

## 2021-07-22 NOTE — PROGRESS NOTES
Radiology Note:  Received a call from nurse regarding drain management. Patient had abdominal abscess drain placed at outside facility on 7/8/21. Per nursing, CRS team has requested to add flushing to drain cares, but there is no stopcock in place. Current drain mechanism only has two way stopcock and is currently in closed position. Was able to add 3-way stopcock to drain, confirmed that it flushes and aspirates back easily. Drain left open to REMI bulb suction. Flushing orders as per CRS.     Sophia Toledo PA-C  Radiology  Pager #: [phone]

## 2021-07-22 NOTE — PROGRESS NOTES
07/22/21 1100   Quick Adds   Type of Visit Initial Occupational Therapy Evaluation   Living Environment   People in home spouse   Current Living Arrangements house   Living Environment Comments Per spouse, bathroome at home is well eqqipped w/ DME--has RTS w/ armrests, grab bar by toilet, large walk-in shower w/ grab bars, shower chair   Self-Care   Usual Activity Tolerance poor   Current Activity Tolerance fair   Activity/Exercise/Self-Care Comment Spouse endorsing pt on hospice for 8 days at home, only 1x sitting EOB, has catheter   Disability/Function   Wear Glasses or Blind yes   Vision Management reading glasses   Concentrating, Remembering or Making Decisions Difficulty yes   Concentration Management impaired STM per pt.   Walking or Climbing Stairs Difficulty yes   Walking or Climbing Stairs other (see comments)   Mobility Management not ambulating PTA--on hospice   Dressing/Bathing Difficulty yes   Toileting issues yes  (catheter)   Change in Functional Status Since Onset of Current Illness/Injury yes   General Information   Onset of Illness/Injury or Date of Surgery 07/19/21   Referring Physician Pepe Andres MD    Patient/Family Therapy Goal Statement (OT) Per spouse, goal to get pt. to rehab in [place]   Additional Occupational Profile Info/Pertinent History of Current Problem OT: Pt admitted from home hospice care per spouse request, pt failing to thrive and weak. *Underwent extended R hemicolectomy and small bowel resection x 2 on 6/14. Taken back to OR 6/22 2/2 anastomotic leak and underwent ex lap, washout and diverting loop ileostomy. Readmitted 7/4/21 after prolonged hospitalization for colon cancer resection with draining wound. Found to have larger purulent fluid collection that was draining. Positive blood cultures for GNR. Fluid collection seen and s/p drain 7/8. Treated with cipro/flagyl. Was discharged with plans for hospice but pt/ family felt not doing well with this. No pain but  "no appetite, only getting 2 glasses of water in a day, has \"overwhelming nausea\". Very frail.    Performance Patterns (Routines, Roles, Habits) limited activity PTA/on hospice   Existing Precautions/Restrictions abdominal;fall   General Observations and Info Pt. lying in bed, ileostomy bag, drain, catheter; no pain reported at rest   Cognitive Status Examination   Orientation Status person  (month as June, year as 2022)   Follows Commands follows one-step commands;50-74% accuracy   Memory Deficit   (impaired)   Cognitive Status Comments impaired STM per spouse--has progressed since her sx.'s   Visual Perception   Impact of Vision Impairment on Function (Vision) reading glasses   Sensory   Sensory Comments no numbness/tingling reported   Pain Assessment   Patient Currently in Pain No  (0 at rest)   Posture   Posture forward head position;protracted shoulders;kyphosis   Range of Motion Comprehensive   Comment, General Range of Motion BUE WFL   Strength Comprehensive (MMT)   Comment, General Manual Muscle Testing (MMT) Assessment BUE WFL, grossly 4/5 B shoulder, elbow strength;gen.weakness, decreased activity dawn.   Coordination   Upper Extremity Coordination No deficits were identified   Bed Mobility   Comment (Bed Mobility) mod. -max. A   Balance   Balance Comments CGA-min. A for sitting balance EOB   Grooming Assessment   [place] Level (Grooming) set up;supervision  (SBA )   Toileting   Comment (Toileting) catheter, ileostomy   Clinical Impression   Criteria for Skilled Therapeutic Interventions Met (OT) yes   OT Diagnosis Decline in ADL performance   OT Problem List-Impairments impacting ADL problems related to;activity tolerance impaired;balance;cognition;flexibility;mobility;strength;pain;post-surgical precautions   Assessment of Occupational Performance 5 or more Performance Deficits   Identified Performance Deficits dressing, bathing, toileting, grooming, transfers, mobility   Planned Therapy Interventions " (OT) ADL retraining;cognition;strengthening;progressive activity/exercise   Clinical Decision Making Complexity (OT) moderate complexity   Therapy Frequency (OT) 5x/week   Predicted Duration of Therapy 5-7 days   Anticipated Equipment Needs Upon Discharge (OT)   (defer to rehab)   Risk & Benefits of therapy have been explained evaluation/treatment results reviewed;care plan/treatment goals reviewed;risks/benefits reviewed;current/potential barriers reviewed;participants voiced agreement with care plan;participants included;patient;spouse/significant other   OT Discharge Planning    OT Discharge Recommendation (DC Rec) Transitional Care Facility   OT Rationale for DC Rec OT:Per spouse/chart, goals of care appear restorative, spouse wanting pt. to discharge to TCU in [place];pt. currently very weak and would beenfit from skilled OT intervention to maximize stregth/indep. w/ ADL's/transfers.   OT Brief overview of current status  mod.-max. A for rolling in bed, mod. A supnie-sit, max. A  sit-supine;tolerated sitting EOB for approx. 2 min. w/ CGA   Total Evaluation Time (Minutes)   Total Evaluation Time (Minutes) 11

## 2021-07-22 NOTE — PROGRESS NOTES
FOLLOW UP:     Events during the day were noted.    Patient plan for GJ tube that could not be done today.  Sometime ago she went into tachycardia with heart rates in the 150s that appeared to be supraventricular in nature.  As per nurse when she started magnesium replacement she reverted back to sinus.    Patient denies any chest pain/shortness of breath/palpitations.  Denies any new complaints    /65   Pulse (!) 154   Temp 97.6  F (36.4  C) (Oral)   Resp 20   SpO2 96%   Gen- pleasant  lying in bed  HEENT- NAD, JC  Neck- supple, no JVD elevation, no thyromegaly  CVS- I+II+ no m/r/g.  Regular  RS- CTAB      BMPRecent Labs   Lab 07/22/21  1550 07/22/21  0656 07/21/21  1555 07/21/21  0802 07/20/21  2352 07/20/21  0726 07/19/21  0918   NA  --  137  --  140  --  137 139   POTASSIUM  --  3.3* 3.3* 3.2* 3.0* 3.3* 2.8*   CHLORIDE  --  104  --  103  --  98 95*   DARIANA  --  8.3*  --  8.5  --  8.7 9.4   CO2  --  27  --  29  --  31 36*   BUN  --  14  --  18  --  20 22   CR  --  0.64  --  0.66  --  0.72 0.60   GLC 99 101*  --  88  --  88 89     CBC  Recent Labs   Lab 07/22/21  0656 07/21/21  0802 07/20/21  0726 07/19/21  0918   WBC 7.5 7.9 9.3 8.7   RBC 3.15* 3.20* 3.39* 3.93   HGB 8.9* 8.8* 9.4* 11.0*   HCT 27.9* 28.2* 29.4* 34.2*   MCV 89 88 87 87   MCH 28.3 27.5 27.7 28.0   MCHC 31.9 31.2* 32.0 32.2   RDW 16.6* 16.3* 16.1* 16.0*    299 346 414     INRNo lab results found in last 7 days.  LFTs  Recent Labs   Lab 07/19/21  0918   ALKPHOS 230*   AST 17   ALT 11   BILITOTAL 0.6   PROTTOTAL 5.1*   ALBUMIN 2.7*      PANCNo lab results found in last 7 days.    Assessment: Supraventricular tachycardia likely related to electrolyte abnormalities.  She has hypokalemia, hypomagnesemia, hypophosphatemia.  -We will continue close monitoring while replacing electrolytes.  -Continue 100 cc of Ringer lactate per hour for now.  As blood pressures are stable and she is asymptomatic we will discontinue the bolus  -Continue  telemetry    Pepe Andres MD, FACP   Internal Medicine/ Hospitalist (Pager- [phone])

## 2021-07-22 NOTE — PLAN OF CARE
Pt. Alert and oriented to self. Vital signs stable on RA, except HR. Assist of 2 with lift. NPO for PEG tube tomorrow AM. Lung sounds dim. Bowel sounds hypo, - flatus. BM via ileostomy adequate, low urine output at 150ml for shift- notified MD of this. WOC changed midline incision. Unblanchable redness to coccyx and heel dressings changed. Denies pain. Denies nausea. Pt had episode of SVT maintaining 150s for approx 1 hour, no intervention exc electrolytes replaced. Mag, K+ and phos replaced.

## 2021-07-22 NOTE — PROGRESS NOTES
"[facility]    Medicine Progress Note - Hospitalist Service       Date of Admission:  7/19/2021    Assessment & Plan           Melissa Quinteros is a 86 year old female who presents with failure to thrive, weakness    Failure to thrive  Weakness  Recent colon cancer resection 6/4/2021  Postoperative intra-abdominal abscess 7/5   Gram negative bacteremia 7/4  *Underwent extended R hemicolectomy and small bowel resection x 2 on 6/14. Taken back to OR 6/22 2/2 anastomotic leak and underwent ex lap, washout and diverting loop ileostomy  *Readmitted 7/4/21 after prolonged hospitalization for colon cancer resection with draining wound. Found toha ve larger purulent fluid collection that was draining. Positive blood cultures for GNR. Fluid collection seen and s/p drain 7/8. Treated with cipro/flagyl.  * Was discharged with plans for hospice but pt/ family felt not doing well with this. No pain but no appetite, only getting 2 glasses of water in a day, has \"overwhelming nausea\".  Getting normal ostomy outputs. Afebrile, vitals stable. Very frail. Ostomy looks ok, drain in place R abdomen, dressings c/d/i. Labs normal except low K. Lactic and WBC normal.   - ct IV fluids  - abx were discontinued 2/2 entering hospice so didn't receive full course. Will continue cipro/ flagyl for now  - CRS consult appreciated.  CT scan of the abdomen reviewed. Continue WOCN for wound cares    - palliative consult.      Hypokalemia  Hypomagnesemia  K 2.8 on presentation   - replace per protocol  - telemetry until improved    Abnormal UA  UA on presentation with 14 WBC, >182 RBC's, few bacteria  -urine culture pending  -abx as above    Acute on chronic blood loss anemia  S/p transfusion 7/5. hgb 11.0 on presentation (suspect in part 2/2 hemoconcentration)  - monitor for now    Severe malnutrition  - nutrition consult appreciated  -07/22: Detailed discussion with the .  We will start tube feeding and will monitor for " refeeding syndrome before likely discharge to rehab (will need to have further discussion about GJ) at the time    Dementia  Recent encephalopathy during previous hospitalizations  Dementia mentioned in previous notes but don't see prior to admission documentation of this. For me thought June, 2022, couldn't remember president, knew was in hospital. Didn't recall recent hospital events.   - monitor for now  - prn seroquel available if agitation    COVID-19 negative on admission     Diet: Mechanical/Dental Soft Diet  Snacks/Supplements Adult: Magic Cup; With Meals    DVT Prophylaxis: Pneumatic Compression Devices  Oakley Catheter: PRESENT, indication: /GI/GYN Pelvic Procedure  Central Lines: None  Code Status: No CPR- Do NOT Intubate      Disposition Plan   Expected discharge: 07/27/2021 recommended to Unclear once goals of care.     The patient's care was discussed with the Bedside Nurse, Patient and Patient's Family.    Pepe Andres MD, MD  Hospitalist Service  [facility]  Securely message with the Vocera Web Console (learn more here)  Text page via [employer] Paging/Directory      Risk Factors Present on Admission   a         # Severe Malnutrition, POA: based on Weight loss;Reduced intake;Subcutaneous fat loss;Muscle loss (07/20/21 1000)     ______________________________________________________________________    Interval History   Denies any new complaints and has been glad to hear will proceed CT abdomen.  Denies any fever/chills  Denies any pain   4 point ROS done and negative unless mentioned     Data reviewed today: I reviewed all medications, new labs and imaging results over the last 24 hours. I personally reviewed the abdominal CT image(s) showing As below.    Physical Exam   /58 (BP Location: Left arm)   Pulse 76   Temp 97.6  F (36.4  C) (Oral)   Resp 20   SpO2 97%   Gen- pleasant elderly lying in bed  HEENT- NAD, JC, mmm  Neck- supple  CVS- I+II+ no m/r/g  RS-  CTAB  Abdo- soft, no tenderness . No g/r/r, colostomy in place.  Right-sided drain in place  Dressing in place (further as per colorectal surgery)  Ext- no edema     Data    BMP  Recent Labs   Lab 07/22/21  0656 07/21/21  1555 07/21/21  0802 07/20/21  2352 07/20/21  0726 07/19/21  0918     --  140  --  137 139   POTASSIUM 3.3* 3.3* 3.2* 3.0* 3.3* 2.8*   CHLORIDE 104  --  103  --  98 95*   DARIANA 8.3*  --  8.5  --  8.7 9.4   CO2 27  --  29  --  31 36*   BUN 14  --  18  --  20 22   CR 0.64  --  0.66  --  0.72 0.60   *  --  88  --  88 89     CBC  Recent Labs   Lab 07/22/21  0656 07/21/21  0802 07/20/21  0726 07/19/21  0918   WBC 7.5 7.9 9.3 8.7   RBC 3.15* 3.20* 3.39* 3.93   HGB 8.9* 8.8* 9.4* 11.0*   HCT 27.9* 28.2* 29.4* 34.2*   MCV 89 88 87 87   MCH 28.3 27.5 27.7 28.0   MCHC 31.9 31.2* 32.0 32.2   RDW 16.6* 16.3* 16.1* 16.0*    299 346 414     INRNo lab results found in last 7 days.  LFTs  Recent Labs   Lab 07/19/21  0918   ALKPHOS 230*   AST 17   ALT 11   BILITOTAL 0.6   PROTTOTAL 5.1*   ALBUMIN 2.7*      PANCNo lab results found in last 7 days.    Recent Results (from the past 24 hour(s))   CT Abdomen Pelvis w Contrast    Narrative    CT ABDOMEN AND PELVIS WITH CONTRAST 7/21/2021 3:30 PM    CLINICAL HISTORY: Abdominal abscess/infection suspected.    TECHNIQUE: CT scan of the abdomen and pelvis was performed following  injection of IV contrast. Pre and post injection of contrast into the  drain. Multiplanar reformats were obtained. Dose reduction techniques  were used.    CONTRAST: 62 mL Isovue-370 IV, and 25 mL of Omnipaque mixed with  saline was injected into the drain.    COMPARISON: 7/8/2021    FINDINGS:   LOWER CHEST: Moderate-sized bilateral pleural effusions with  underlying compressive atelectasis, similar to previous.    HEPATOBILIARY: Normal.    PANCREAS: Normal.    SPLEEN: Irregularity of the anterior spleen adjacent to the  perisplenic fluid collection, similar to previous.    ADRENAL  GLANDS: Normal.    KIDNEYS/BLADDER: Normal.    BOWEL: Partial colectomy with ileocolic anastomosis as well as a left  lower quadrant ileostomy.    Thin fluid collection in the anterior peritoneal cavity has nearly  resolved, status post placement of percutaneous drainage catheter.  Minimal fluid remains inferior to the catheter. After injection of  contrast through the drain, contrast extends along the anterior margin  of the liver and into an open midline anterior abdominal wound. No  evidence of a fistula to bowel. A small perisplenic fluid collection  has decreased in size from previous, measuring 1.4 cm in thickness  compared to previous 2 cm. A small fluid collection in the pelvis  previously contained a surgical drain. This collection is also smaller  than previous measuring 1.7 cm in thickness.    PELVIC ORGANS: Hysterectomy. Oakley catheter in the bladder.    ADDITIONAL FINDINGS: None.    MUSCULOSKELETAL: Normal.      Impression    IMPRESSION:   1.  Right-sided abscess has nearly completely resolved, status post  drainage catheter placement.  2.  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 No fistula to bowel.  3.  Additional fluid collections in the pelvis and left upper quadrant  have decreased in size and are not large enough to place new drains  into.  4.  Moderate bilateral pleural effusions are similar to previous.    OBI SILVA MD         SYSTEM ID:  [id number]

## 2021-07-22 NOTE — PROGRESS NOTES
[facility] WOC Nurse Inpatient Wound Assessment   Reason for consultation: Abd wound (see previous note for ostomy wound assessment    Assessment    Abdominal wound due to Surgical Wound  Status: follow-up assessment.  Wound looks concerning and has copious amount purulent-looking tan creamy drainage, loose sutures in base, and a deep tunnel near 11 o'clock.  Pt had only mild discomfort with dressing change.  Needs at least BID dressing changes due to amount of drainage.      Treatment Plan:     Establish reliable pouching system to prevent ileostomy leakage into wound, and waterproof outer dressing to prevent wound contamination from ileostomy output    Abdominal wound: BID and PRN  1. Cleanse wound with Vashe wound cleanser (#[id number]), and mop out any old drainage; dry the periwound skin  2.   Protect surrounding skin with Cavilon no-sting barrier film wipe as needed, ie if skin becoming irritated (do not have to use every dressing change)  3.   Cut approx 1 foot of kerlix and moisten with vashe  4.   Use a cotton tip applicator to gently fill depth and tunnel at 11 o'clock (use only 1 piece of gauze, to not lose any in tunnel)  5.   Cover with silicone foam dressing (ie Mepilex 4x4)    Wound Care Rationale Protect periwound skin, Provide selective debridement (autolysis) of nonviable tissue , Gently fill dead space to prevent abscess formation  and Decrease bacterial load.   Orders Updated  Recommended provider order: Surgical consult colorectal evaluation of abd wound with attention to tunnel at 11 o'clock  WOC Nurse follow-up plan while in the hospital:weekly if colorectal wants WOC Nursing to follow abd wound along with ostomy  Nursing to notify the Provider(s) and re-consult the WOC Nurse if wound(s) deteriorates or new skin concern.    Patient History    According to provider note(s):right hemicolectomy with small bowel resection on 06/14, anastomotic leak leading to transverse  colectomy and anastomosis with diverting loop ileostomy on 06/22, and washout and closure of midline incision on 06/28.  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      Data    Documented Allergies: Tramadol and Lovastatin     Recent Labs   Lab Test 07/22/21  0656 07/19/21  0918 07/09/21  0553 07/08/21  0712 05/28/21  1121 08/20/20  0000   ALBUMIN  --  2.7*  --   --   --   --    HGB 8.9* 11.0*   < >  --   --   --    INR  --   --   --  1.29*   < >  --    WBC 7.5 8.7   < >  --   --   --    A1C  --   --   --   --   --  5.6    < > = values in this interval not displayed.     I/O last 3 completed shifts:  In: 1050 [P.O.:150; I.V.:900]  Out: 825 [Urine:525; Stool:300]    Orders Placed This Encounter      Mechanical/Dental Soft Diet    Containment of urine/stool: Indwelling catheter and Ileostomy pouch   Medical Devices:Oakley Catheter: PRESENT, indication:      Catheter securement Yes    Pressure Injury Risk Assessment (Stefano Scale):  Sensory Perception: 3-->slightly limited    Moisture: 4-->rarely moist   Activity: 2-->chairfast     Mobility: 2-->very limited   Nutrition: 1-->very poor   Friction and Shear: 2-->potential problem  Stefano Score: 14       Focused [facility] Nurse Skin/Wound Exam:     Subjective/Objective: Patient has been in nursing home.  states he is with her every day and the pouch leaked daily for the last month. Patient has sever malnutrition   Wound History: right hemicolectomy with small bowel resection on 06/14, anastomotic leak leading to transverse colectomy and anastomosis with diverting loop ileostomy on 06/22, and washout and closure of midline incision on 06/28.      Date of last photo 7/20/2021 Wound Location: Abd    Measurements (length x width x depth, in cm) 4  x 3  x  2 cm   Wound Base: 50 % red tissue and 50 % adherent  yellow slough; loose sutures noted  Tunneling up to >15 cm at 11 o'clock  Undermining up to 2 cm from 6-12 o'clock  Palpation of the wound bed: boggy   Periwound skin: intact  Periwound Temperature: normal   Drainage: copious  Description of drainage: tan creamy  Odor: minimal  Pain: during dressing change, mild   Local signs of infection: + purulent/creamy drainage; minimal periwound erythema, minimal pain to wound    Interventions    Visual inspection and assessment completed   Wound care completed  Pressure redistribution Support surface: Pulsate  Off-loading measures in bed: reposition side to side with use of Pillows  Off-loading measures for heels: Pillows under calves, heel lift boots ordered  Off-loading measures for chair: Chair cushion ordered  Orders updated  Education provided: importance of repositioning and plan of care  Discussed plan of care with  and nurse    Nori Olson RN CWOCN

## 2021-07-22 NOTE — CONSULTS
Care Management Initial Consult    General Information  Assessment completed with: Family, MARIA ELENA-chart reviewIlya   Type of CM/SW Visit: Initial Assessment    Primary Care Provider verified and updated as needed: Yes   Readmission within the last 30 days:        Reason for Consult: discharge planning  Advance Care Planning: Advance Care Planning Reviewed: other (comment) (Has POLST and no HCD)          Communication Assessment  Patient's communication style: spoken language (English or Bilingual)        Wear Glasses or Blind: yes    Cognitive  Cognitive/Neuro/Behavioral: .WDL except  Level of Consciousness: confused  Arousal Level: opens eyes spontaneously  Orientation: disoriented to, place, time, situation  Mood/Behavior: calm, cooperative  Best Language: 0 - No aphasia  Speech: logical, clear    Living Environment:   People in home: spouse  Ilya  Current living Arrangements: house      Able to return to prior arrangements:         Family/Social Support:  Care provided by: spouse/significant other  Provides care for: no one, unable/limited ability to care for self  Marital Status:             Description of Support System: Involved, Supportive    Support Assessment: Adequate family and caregiver support    Current Resources:   Patient receiving home care services: No     Community Resources: None  Equipment currently used at home: none (Pt was on hospice prior to hospital admission)  Supplies currently used at home: None ([facility] supplied at the time)    Employment/Financial:  Employment Status: retired     Employment/ Comments: No  Financial Concerns: No concerns identified           Lifestyle & Psychosocial Needs:  Social Determinants of Health     Tobacco Use: Low Risk      Smoking Tobacco Use: Never Smoker     Smokeless Tobacco Use: Never Used   Alcohol Use:      Frequency of Alcohol Consumption:      Average Number of Drinks:      Frequency of Binge Drinking:    Financial  Resource Strain:      Difficulty of Paying Living Expenses:    Food Insecurity:      Worried About Running Out of Food in the Last Year:      Ran Out of Food in the Last Year:    Transportation Needs:      Lack of Transportation (Medical):      Lack of Transportation (Non-Medical):    Physical Activity:      Days of Exercise per Week:      Minutes of Exercise per Session:    Stress:      Feeling of Stress :    Social Connections:      Frequency of Communication with Friends and Family:      Frequency of Social Gatherings with Friends and Family:      Attends Mandaeism Services:      Active Member of Clubs or Organizations:      Attends Club or Organization Meetings:      Marital Status:    Intimate Partner Violence:      Fear of Current or Ex-Partner:      Emotionally Abused:      Physically Abused:      Sexually Abused:    Depression: Not at risk     PHQ-2 Score: 0   Housing Stability:      Unable to Pay for Housing in the Last Year:      Number of Places Lived in the Last Year:      Unstable Housing in the Last Year:        Functional Status:  Prior to admission patient needed assistance:              Mental Health Status:          Chemical Dependency Status:                Values/Beliefs:  Spiritual, Cultural Beliefs, Mandaeism Practices, Values that affect care: no               Additional Information:  ALYSSA met with pt and her  in pt's room. ALYSSA was informed by pt's  Ilya, that he would like to see pt at [facility] in [place] at discharge. Pt was there prior to admission for one day. Ilya reports pt was on hospice because he was told pt had a terminal illness. Ilya reports after a few weeks on hospice pt was not eating. Ilya reports he called for an ambulance and had pt transferred via stretcher. Ilya reports that his first wife was also at [facility] for almost a month, so he is very familiar with the facility. Iyla gave ALYSSA the main number for [facility] which is  [phone]. Ilya reports at discharge he would like pt to have [facility] transport because it would be hard on pt to travel via wheelchair. Ilya reports pt will have to have her feeding tube when she discharges. Ilya can be best reach via cell at [phone]. ALYSSA placed a call to [facility] to introduce self. The number for [facility] is incorrect and went to a different staff at the facility. ALYSSA did not leave a VM. ALYSSA called the facility directly and did speak to [facility]. [facility] gave ALYSSA the fax number for the referral which is [phone].    AUGUST Mcleod

## 2021-07-22 NOTE — PROVIDER NOTIFICATION
Paged colorectal re: low urine output, 15cc/hr for last 8 hours. And currently in SVT, maintaining in 150s. Asked to page hospitalist instead

## 2021-07-22 NOTE — PRE-PROCEDURE
GENERAL PRE-PROCEDURE:   Procedure:  Percutaneous gastrostomy tube placement with imaging guidance. Moderate sedation  Date/Time:  7/22/2021 2:13 PM    Written consent obtained?: Yes    Risks and benefits: Risks, benefits and alternatives were discussed    Consent given by:  Spouse  Patient states understanding of procedure being performed: Yes    Patient's understanding of procedure matches consent: Yes    Procedure consent matches procedure scheduled: Yes    Expected level of sedation:  Moderate  Appropriately NPO:  Yes  ASA Class:  Class 3- Severe systemic disease, definite functional limitations  Mallampati  :  Grade 3- soft palate visible, posterior pharyngeal wall not visible  Lungs:  Lungs clear with good breath sounds bilaterally  Heart:  Normal heart sounds and rate  History & Physical reviewed:  History and physical reviewed and no updates needed  Statement of review:  I have reviewed the lab findings, diagnostic data, medications, and the plan for sedation

## 2021-07-22 NOTE — CONSULTS
[facility]    Infectious Disease Consultation     Date of Admission:  7/19/2021  Date of Consult (When I saw the patient): 07/22/21    Assessment & Plan   Melissa Quinteros is a 86 year old female who was admitted on 7/19/2021.     Impression:  1. 86 y.o with very complicated recent surgical history.   2. She was diagnosed with colon cancer recently and underwent a laparoscopic extended right colectomy with small bowel resection x2, enterolysis and biopsy of mesenteric lymph node by Dr. Del Pang on June 14.    3. Pathology revealed a T4bN0 cancer.    4. Her post-operative course was complicated by an anastomotic leak and she returned to the operating room on 6/22 for an exploratory laparotomy, transverse colon resection with re-creation of an anastomosis and diverting loop ileostomy.    5. She had a third surgery on 6/28 for a washout and closure of the midline incision.   6. She was discharged on 7/2 but returned to the ED two days later on 7/4 with increased abdominal pain and drainage from the midline wound.    7. She was discharged home on hospice on 7/12.  She had been on antibiotics during that hospital stay but these were discontinued when she was discharged on hospice.   8. She apparently has not been doing well at home and presented yesterday to the ER with failure to thrive and weakness.   9. Has new wound infection.  Left sided drain that is putting out purulent material. Her midline wound is partially opened.    10. History of anaerobic GNR in the blood not identified.   11. GNR and GPC in the wound cultures from a few days ago, further identified as normal lux.   12. On cipro and flagyl, no fever.   13. CT scan today:                                                                  IMPRESSION:   1.  Right-sided abscess has nearly completely resolved, status post  drainage catheter placement.  2.  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 No fistula to bowel.  3.  Additional fluid collections in the pelvis and left upper quadrant  have decreased in size and are not large enough to place new drains  into.  4.  Moderate bilateral pleural effusions are similar to previous.  15. No recent cultures from the purulent material seen previously in the CT scan.     Recommendations:   1. Without recent data on the abscess cultures hard to make recommendations on possible antibiotics.   2. Seems like most of the recent fluid collection is drained with the drain others appear smaller.   3. Since improving on the current antibiotics will continue with the same Cipro and flagyl   4. Course about 2- 4 weeks based on improvement.   Discussed with Dr. Noriega.     Bari Bueno MD    Reason for Consult   Reason for consult: I was asked to evaluate this patient for intraabdominal infection    Primary Care Physician   DERRICK PASCUAL    Chief Complaint   Failure to thrive     History is obtained from the patient and medical records    History of Present Illness   Melissa Quinteros is a 86 year old female who was transferred from outside hospital with weakness and failure to thrive. She was diagnosed with colon cancer recently and underwent a laparoscopic extended right colectomy with small bowel resection x2, enterolysis and biopsy of mesenteric lymph node by Dr. Del Pang on June 14.  Pathology revealed a T4bN0 cancer.  Her post-operative course was complicated by an anastomotic leak and she returned to the operating room on 6/22 for an exploratory laparotomy, transverse colon resection with re-creation of an anastomosis and diverting loop ileostomy.  She had a third surgery on 6/28 for a washout and closure of the midline incision. She was discharged on 7/2 but returned to the ED two days later on 7/4 with increased abdominal pain and drainage from the midline wound. She was discharged home on hospice on 7/12.  She had been on antibiotics during  that hospital stay but these were discontinued when she was discharged on hospice. She apparently has not been doing well at home and presented yesterday to the ER with failure to thrive and weakness    Past Medical History   I have reviewed this patient's medical history and updated it with pertinent information if needed.   Past Medical History:   Diagnosis Date     Esophageal obstruction     8/30/2016     Essential (primary) hypertension     8/30/2016     Hyperlipidemia     No Comments Provided     Zoster without complications     X2       Past Surgical History   I have reviewed this patient's surgical history and updated it with pertinent information if needed.  Past Surgical History:   Procedure Laterality Date     APPENDECTOMY       ARTHROPLASTY KNEE Right 2017    [place], [place]     COLONOSCOPY      No Comments Provided     COLONOSCOPY N/A 6/3/2021    Procedure: COLONOSCOPY, WITH POLYPECTOMY AND BIOPSY with endoscopic tattoo;  Surgeon: Del Pang MD;  Location:  OR     ESOPHAGOGASTRODUODENOSCOPY       HYSTERECTOMY TOTAL ABDOMINAL      1982,incidental appendectomy     INCISION AND DRAINAGE ABSCESS PELVIS, COMBINED N/A 6/28/2021    Procedure: Washout and closure of midline laparotomy.;  Surgeon: Del Pang MD;  Location:  OR     LAPAROSCOPIC ASSISTED COLECTOMY N/A 6/14/2021    Procedure: COLECTOMY, LAPAROSCOPIC, small bowel resection X2, extended Right hemicolectomy, biopsy of the mesenteric node;  Surgeon: Del Pang MD;  Location:  OR     LAPAROTOMY EXPLORATORY N/A 6/22/2021    Procedure: Exploratory LAPAROTOMY with transverse colon Resection, and ileostomy;  Surgeon: Del Pang MD;  Location:  OR     TONSILLECTOMY, ADENOIDECTOMY, COMBINED      childhood       Prior to Admission Medications   Prior to Admission Medications   Prescriptions Last Dose Informant Patient Reported? Taking?   acetaminophen (TYLENOL) 325 MG tablet unknown Spouse/Significant Other No Yes   Sig: Take 2  tablets (650 mg) by mouth every 4 hours as needed for other (For optimal non-opioid multimodal pain management to improve pain control.)   clobetasol (TEMOVATE) 0.05 % external cream unknown Spouse/Significant Other No Yes   Sig: Apply topically 2 times daily   doxepin (ZONALON) 5 % external cream unknown Spouse/Significant Other No Yes   Sig: Apply topically 3 times daily   magnesium oxide (MAG-OX) 400 MG tablet 7/19/2021 at Unknown time Spouse/Significant Other Yes Yes   Sig: Take 400 mg by mouth daily    omeprazole (PRILOSEC) 20 MG CR capsule 7/19/2021 at Unknown time Spouse/Significant Other No Yes   Sig: Take 1 capsule (20 mg) by mouth daily   triamcinolone (KENALOG) 0.1 % external ointment More than a month at Unknown time Spouse/Significant Other Yes No   Sig: Apply topically 2 times daily as needed       Facility-Administered Medications: None     Allergies   Allergies   Allergen Reactions     Tramadol Anxiety     Aggressive behaviors, delirium     Lovastatin      Other reaction(s): Arthralgia       Immunization History   Immunization History   Administered Date(s) Administered     COVID-19,PF,Pfizer 04/08/2021, 04/29/2021     [id number] Novel Flu 01/12/2010     Influenza (IIV3) PF 10/03/2011, 09/18/2012     Influenza Vaccine IM > 6 months Valent IIV4 09/25/2014, 10/05/2015, 10/14/2016, 11/10/2017     Influenza Vaccine IM Ages 6-35 Months 4 Valent (PF) 10/31/2013       Social History   I have reviewed this patient's social history and updated it with pertinent information if needed. Melissa Quinteros  reports that she has never smoked. She has never used smokeless tobacco. She reports previous alcohol use. She reports that she does not use drugs.    Family History   I have reviewed this patient's family history and updated it with pertinent information if needed.   Family History   Problem Relation Age of Onset     Other - See Comments Father         Old age     Lung Cancer Mother      Brain Cancer Brother         Review of Systems   The 10 point Review of Systems is negative other than noted in the HPI or here.     Physical Exam   Temp: 97.6  F (36.4  C) Temp src: Oral BP: 112/58 Pulse: 76   Resp: 20 SpO2: 97 % O2 Device: None (Room air)    Vital Signs with Ranges  Temp:  [97.3  F (36.3  C)-97.9  F (36.6  C)] 97.6  F (36.4  C)  Pulse:  [73-78] 76  Resp:  [16-20] 20  BP: (112-120)/(56-58) 112/58  SpO2:  [97 %-99 %] 97 %  0 lbs 0 oz  There is no height or weight on file to calculate BMI.    GENERAL APPEARANCE:  awake  EYES: Eyes grossly normal to inspection, PERRL and conjunctivae and sclerae normal  HENT: ear canals and TM's normal and nose and mouth without ulcers or lesions  NECK: no adenopathy, no asymmetry, masses, or scars and thyroid normal to palpation  RESP: lungs clear to auscultation - no rales, rhonchi or wheezes  CV: regular rates and rhythm, normal S1 S2, no S3 or S4 and no murmur, click or rub  LYMPHATICS: normal ant/post cervical and supraclavicular nodes  ABDOMEN: drain in place colostomy connected to a bag, midline incision, stomach hard, not palpated as patient afraid of pain   MS: extremities normal- no gross deformities noted  SKIN: no suspicious lesions or rashes      Data   Lab Results   Component Value Date    WBC 7.5 07/22/2021    HGB 8.9 (L) 07/22/2021    HCT 27.9 (L) 07/22/2021     07/22/2021     07/22/2021    POTASSIUM 3.3 (L) 07/22/2021    CHLORIDE 104 07/22/2021    CO2 27 07/22/2021    BUN 14 07/22/2021    CR 0.64 07/22/2021     (H) 07/22/2021    NTBNPI 403 (H) 06/25/2021    AST 17 07/19/2021    ALT 11 07/19/2021    ALKPHOS 230 (H) 07/19/2021    BILITOTAL 0.6 07/19/2021    INR 1.29 (H) 07/08/2021     No results for input(s): CULT in the last 168 hours.  Recent Labs   Lab Test 07/08/21  1122 07/04/21  1949 07/04/21 1948 06/25/21  1313 05/28/21  1257   CULT Normal skin lux Anaerobic gram negative bacilli  No further identification or sensitivity done  *  1 OF 4 BOTTLES  POSITIVE  Critical Value called to and read back by  JAYCE LENZ RN AT 2017 ON 07.05.2021, [facility]   No growth after 6 days Light growth  Enterococcus faecalis  * >100,000 colonies/mL  mixed urogenital lux

## 2021-07-23 NOTE — PROGRESS NOTES
"[facility] Nurse Inpatient Ostomy Assessment      Assessment of ostomy and needs for: diverting loop ileostomy Abd. Wound  Surgery date/procedure:  right hemicolectomy with small bowel resection on 06/14, anastomotic leak leading to transverse colectomy and anastomosis with diverting loop ileostomy on 06/22, and washout and closure of midline incision on 06/28.  Surgeon:  Del Pang MD, [facility]    Related diagnosis:  Malignant mass in the transverse colon, adenocarcinoma  MD note 7/19/2021: 86 year old female who presents with failure to thrive.           [facility] Focused Assessment:       Subjective/Objective:  at bedside spoke extensively about pouching challenges,  states pouch has leaked daily in nursing home for over a month and her abdomen was \"full of fluid\" until recently.  Pouch now connected to bedside bag.  Barrier and back of pouch were wet with small amount of stool output today 7/21, but it appears this was from the pouch not being full snapped onto the barrier at 6 o'clock, and not from leaking underneath the barrier.       Date of last photo: 7-20-21                                        Type of ostomy:  Ileostomy    Last pouch change/reason: determine wear time ostomy appliance    Stoma assessment:   ? Ileostomy LUQ,  Moist red barely protruding, noted retraction and shape change with position change from oval to round, peristomal skin fold at 10 and 2 o'clock  ? Lumen empties toward 8 o'clock    Mucocutaneous Junction (MCJ): small separation from 7-11 o'clock    Peristomal skin: Healed today except for small area @ 8 o'clock where stoma empties     Abdominal assessment: soft  ? N/G still in place?: FT placed today   Output:   watery, thin, liquid brown-green,  Monitor for high output when TF initiated.     Oral cavity and back of throat:  Pt c/o oral pain, and difficulty swallowing today. On exam tongue covered with thick white " dry coating with white patches extending to back of throat. Pt states swallowing is difficult and she feels like choking/throwing up when food placed in mouth.    Concern for thrush.      I/O last 3 completed shifts:  In: 1200 [I.V.:1200]    Out: 550 [Urine:350; Stool:200]       Current pouching system:  [employer]          -Convex 70mm with ring and high-output pouch.       Pain:  Denies today       Diet:       Orders Placed This Encounter      NPO per Anesthesia Guidelines for Procedure/Surgery Except for: Meds      DATA     Documented Allergies: Tramadol and Lovastatin     Recent Labs   Lab Test 07/22/21  0656 07/19/21  0918 07/09/21  0553 07/08/21  0712 05/28/21  1121 08/20/20  0000   ALBUMIN  --  2.7*  --   --   --   --    HGB 8.9* 11.0*   < >  --   --   --    INR  --   --   --  1.29*   < >  --    WBC 7.5 8.7   < >  --   --   --    A1C  --   --   --   --   --  5.6    < > = values in this interval not displayed.     Containment of urine/stool: Indwelling catheter and Ileostomy pouch   Medical Devices:Oakley Catheter: PRESENT, indication: /GI/GYN Pelvic Procedure    Catheter securement Yes    Stefano Risk Assessment  Sensory Perception: 3-->slightly limited    Moisture: 4-->rarely moist   Activity: 2-->chairfast     Mobility: 2-->very limited   Nutrition: 1-->very poor   Friction and Shear: 2-->potential problem  Stefano Score: 14           Interventions:       Patient's chart evaluated.     Appliance changed     Dr Andres text paged re: concern for oral thrush     Current support surface: Pulsate    Current off-loading measures in bed: reposition side to side with use of Pillows    Current off-loading heels: Pillows under calves ordered offloading boots    Current off-loading measures chair: chair cushion      Assessments completed today:  social support and environment, pouching system effectiveness, abdomen, stoma, abd wound. Mouth and throat      Education completed with :  frequency of pouch empty and  pouch change    All husbands questions answered:  YES    Prepared for discharge by: Pt to discharge to TCU in [place]          Plan:       Pouching system:   Mehul 70mm (blue) Convex cut to fit skin barrier #[id number] with high output pouch #[id number] and barrier ring #[id number]    Education yet to complete/reinforce: TBD     Continue to prepare for discharge: No discharge preparations started       WOC Nursing will follow daily M-F until reliable pouching system established - plan to likely change pouch Monday 7-26-21  to ensure no breakdown of peristomal skin/leakage of pouching system     Holly Valdes RN CWOCN

## 2021-07-23 NOTE — PLAN OF CARE
A&O to self only. VSS on RA. Lung sounds dim. NPO. Bowel sounds hypo, minimal  flatus. Ileostomy in place with low output. Minimal urine output per Oakley. Abdominal dressing CDI, changed x1. Denies pain. Up with lift.

## 2021-07-23 NOTE — PROGRESS NOTES
"[facility]    Medicine Progress Note - Hospitalist Service       Date of Admission:  7/19/2021    Assessment & Plan           Melissa Quinteros is a 86 year old female who presents with failure to thrive, weakness    Failure to thrive  Weakness  Recent colon cancer resection 6/4/2021  Postoperative intra-abdominal abscess 7/5   Gram negative bacteremia 7/4  *Underwent extended R hemicolectomy and small bowel resection x 2 on 6/14. Taken back to OR 6/22 2/2 anastomotic leak and underwent ex lap, washout and diverting loop ileostomy  *Readmitted 7/4/21 after prolonged hospitalization for colon cancer resection with draining wound. Found toha ve larger purulent fluid collection that was draining. Positive blood cultures for GNR. Fluid collection seen and s/p drain 7/8. Treated with cipro/flagyl.  * Was discharged with plans for hospice but pt/ family felt not doing well with this. No pain but no appetite, only getting 2 glasses of water in a day, has \"overwhelming nausea\".  Getting normal ostomy outputs. Afebrile, vitals stable. Very frail. Ostomy looks ok, drain in place R abdomen, dressings c/d/i. Labs normal except low K. Lactic and WBC normal.   - ct IV fluids  - abx were discontinued 2/2 entering hospice so didn't receive full course. Will continue cipro/ flagyl for now  - CRS consult appreciated.  CT scan of the abdomen reviewed. Continue WOCN for wound cares.    - palliative consult.    -Appreciate infectious disease review today.  Plan to continue on current Cipro and Flagyl with a course about 2-4 weeks based on improvement.    Hypokalemia  Hypomagnesemia  Hypophosphatemia  K 2.8 on presentation   - replace per protocol  - telemetry until improved (episode of arrhythmia on 7/22)     Abnormal UA  UA on presentation with 14 WBC, >182 RBC's, few bacteria  -urine culture     Acute on chronic blood loss anemia  S/p transfusion 7/5. hgb 11.0 on presentation (suspect in part 2/2 " hemoconcentration)  - monitor for now    Severe malnutrition  - nutrition consult appreciated  -Continue tube feeds via NG tube daily.  Await further plan by CRS surgery for placement of GJ tube.  Monitor for refeeding syndrome    Dementia  Recent encephalopathy during previous hospitalizations  Dementia mentioned in previous notes but don't see prior to admission documentation of this. For me thought June, 2022, couldn't remember president, knew was in hospital. Didn't recall recent hospital events.   - monitor for now  - prn seroquel available if agitation    COVID-19 negative on admission     Diet: Snacks/Supplements Adult: Magic Cup; With Meals  NPO per Anesthesia Guidelines for Procedure/Surgery Except for: Meds  Adult Formula Drip Feeding: Continuous Osmolite 1.5; Gastrostomy; Goal Rate: 50; mL/hr; Medication - Feeding Tube Flush Frequency: At least 15-30 mL water before and after medication administration and with tube clogging; Amount to Send (Nutrition...    DVT Prophylaxis: Pneumatic Compression Devices  Oakley Catheter: PRESENT, indication: /GI/GYN Pelvic Procedure  Central Lines: None  Code Status: No CPR- Do NOT Intubate      Disposition Plan   Expected discharge: 07/27/2021 recommended to Unclear once goals of care.     The patient's care was discussed with the Bedside Nurse, Patient and Patient's Family.    Pepe Andres MD, MD  Hospitalist Service  [facility]  Securely message with the Vocera Web Console (learn more here)  Text page via [facility] Paging/Directory      Risk Factors Present on Admission   a         # Severe Malnutrition, POA: based on Weight loss;Reduced intake;Subcutaneous fat loss;Muscle loss (07/20/21 1000)     ______________________________________________________________________    Interval History   Last 24-hour events noted.  There is no further arrhythmia.    She denies any new complaints and denies any fever/chills or any pain.    Had trial of GT  placement this morning which was not successful and had NG tube placed.  She is tolerating initial trial of tube feeds     4 point ROS done and negative unless mentioned     Data reviewed today: I reviewed all medications, new labs and imaging results over the last 24 hours. I personally reviewed no images or EKG's today.    Physical Exam   /55 (BP Location: Right arm)   Pulse 86   Temp 97.6  F (36.4  C) (Axillary)   Resp 20   Wt 47.9 kg (105 lb 9.6 oz)   SpO2 96%   BMI 18.13 kg/m    Gen- pleasant elderly lying in bed  HEENT- NAD, JC, mmm  Neck- supple  CVS- I+II+ no m/r/g  RS- CTAB  Abdo- soft, no tenderness . No g/r/r, colostomy in place.  Right-sided drain in place  Dressing in place (further as per colorectal surgery)  Ext- no edema     Data    BMP  Recent Labs   Lab 07/23/21  0705 07/22/21 1955 07/22/21  1550 07/22/21  0656 07/21/21  1555 07/21/21  0802 07/20/21  0726     --   --  137  --  140 137   POTASSIUM 3.5 3.7  --  3.3* 3.3* 3.2* 3.3*   CHLORIDE 105  --   --  104  --  103 98   DARIANA 8.4*  --   --  8.3*  --  8.5 8.7   CO2 29  --   --  27  --  29 31   BUN 12  --   --  14  --  18 20   CR 0.61  --   --  0.64  --  0.66 0.72   *  --  99 101*  --  88 88     CBC  Recent Labs   Lab 07/23/21  0705 07/22/21  0656 07/21/21  0802 07/20/21  0726   WBC 6.1 7.5 7.9 9.3   RBC 3.02* 3.15* 3.20* 3.39*   HGB 8.3* 8.9* 8.8* 9.4*   HCT 26.5* 27.9* 28.2* 29.4*   MCV 88 89 88 87   MCH 27.5 28.3 27.5 27.7   MCHC 31.3* 31.9 31.2* 32.0   RDW 16.4* 16.6* 16.3* 16.1*    294 299 346     INRNo lab results found in last 7 days.  LFTs  Recent Labs   Lab 07/19/21  0918   ALKPHOS 230*   AST 17   ALT 11   BILITOTAL 0.6   PROTTOTAL 5.1*   ALBUMIN 2.7*      PANCNo lab results found in last 7 days.    No results found for this or any previous visit (from the past 24 hour(s)).

## 2021-07-23 NOTE — IR NOTE
Interventional Radiology Intra-procedural Nursing Note    Patient Name: Melissa Quinteros  Medical Record Number: [id number]  Today's Date: July 23, 2021    Start Time: 943  End of procedure time: 1032  Procedure:  Percutaneous gastrostomy tube placement with imaging guidance. Moderate sedation  Report given to: Sravanthi Trujillo RN  Time pt departs:  1042    Other Notes: Pt into IR suite 2 via cart. Pt awake and alert. To table in supine position. Monitoring equipment applied. VSS. Tele SR with PAC's. NG-eddie feed placed at bedside. Dr. Luz in room. Time out and procedure started. 17 x 60 ml syringes of air placed in stomach with no stomach filling. Pt tolerated procedure OK. Debrief with Dr. Luz. Unable to place G-tube, NG left in. Pt transferred back to Station [address].    Medications:    Versed 0.5 mg  Fentanyl 25 mcg  Zofran 4 mg  Glucagon 1 mg    Camilo Evans RN

## 2021-07-23 NOTE — PROGRESS NOTES
COLON & RECTAL SURGERY  PROGRESS NOTE    July 23, 2021    SUBJECTIVE:  No new changes overnight. Talkative and appears to be in a good mood this am.  at bedside.     OBJECTIVE:  Temp:  [97.4  F (36.3  C)-97.5  F (36.4  C)] 97.5  F (36.4  C)  Pulse:  [] 70  Resp:  [16-20] 20  BP: (103-114)/(55-65) 110/55  SpO2:  [94 %-98 %] 94 %    Intake/Output Summary (Last 24 hours) at 7/23/2021 0926  Last data filed at 7/23/2021 0600  Gross per 24 hour   Intake 1200 ml   Output 1450 ml   Net -250 ml       GENERAL:  Awake, alert, no acute distress  HEAD: Normocephalic atraumatic  SCLERA: Anicteric  EXTREMITIES: Warm and well perfused  ABDOMEN:  Soft, non-tender, non-distended. No guarding, rigidity, or peritoneal signs. REMI drain with minimal serous output. Stoma with liquid stool output.   INCISION:  C/d/i, packing in place, did not remove    LABS:  Lab Results   Component Value Date    WBC 6.1 07/23/2021    WBC 9.3 07/09/2021     Lab Results   Component Value Date    HGB 8.3 07/23/2021    HGB 8.9 07/09/2021     Lab Results   Component Value Date    HCT 26.5 07/23/2021    HCT 27.6 07/09/2021     Lab Results   Component Value Date     07/23/2021     07/09/2021     Last Basic Metabolic Panel:  Lab Results   Component Value Date     07/23/2021     07/09/2021      Lab Results   Component Value Date    POTASSIUM 3.5 07/23/2021    POTASSIUM 3.5 07/11/2021     Lab Results   Component Value Date    CHLORIDE 105 07/23/2021    CHLORIDE 111 07/09/2021     Lab Results   Component Value Date    DARIANA 8.4 07/23/2021    DARIANA 7.6 07/09/2021     Lab Results   Component Value Date    CO2 29 07/23/2021    CO2 22 07/09/2021     Lab Results   Component Value Date    BUN 12 07/23/2021    BUN 13 07/09/2021     Lab Results   Component Value Date    CR 0.61 07/23/2021    CR 0.55 07/09/2021     Lab Results   Component Value Date     07/23/2021    GLC 89 07/09/2021       ASSESSMENT/PLAN: 87 yo female with history  of right sided colon cancer s/p right colectomy and small bowel resection complicated by anastomotic leak, s/p redo ileocolic resection with DLI and abdominal washout and closure.  Patient now with failure to thrive and weakness.  Transferred for second opinion regarding prognosis and post op cares.  Afebrile, vitals stable.     1. NPO for G tube placement this am in IR  2. Start tube feeds after procedure  3. Continue IVF and Abx  4. WOCN following for wound cares    For questions/paging, please contact the CRS office at [phone].    Sherrie Ortiz), PAJamelC  Colorectal Physician Assistant    [facility]  [address] [address]  T: [phone]  F: [phone]

## 2021-07-23 NOTE — PROGRESS NOTES
[facility]    Infectious Disease Progress Note    Date of Service (when I saw the patient): 07/23/2021     Assessment & Plan   Melissa Quinteros is a 86 year old female who was admitted on 7/19/2021.     Impression:  1. 86 y.o with very complicated recent surgical history.   2. She was diagnosed with colon cancer recently and underwent a laparoscopic extended right colectomy with small bowel resection x2, enterolysis and biopsy of mesenteric lymph node by Dr. Del Pang on June 14.    3. Pathology revealed a T4bN0 cancer.    4. Her post-operative course was complicated by an anastomotic leak and she returned to the operating room on 6/22 for an exploratory laparotomy, transverse colon resection with re-creation of an anastomosis and diverting loop ileostomy.    5. She had a third surgery on 6/28 for a washout and closure of the midline incision.   6. She was discharged on 7/2 but returned to the ED two days later on 7/4 with increased abdominal pain and drainage from the midline wound.    7. She was discharged home on hospice on 7/12.  She had been on antibiotics during that hospital stay but these were discontinued when she was discharged on hospice.   8. She apparently has not been doing well at home and presented yesterday to the ER with failure to thrive and weakness.   9. Has new wound infection.  Left sided drain that is putting out purulent material. Her midline wound is partially opened.    10. History of anaerobic GNR in the blood not identified.   11. GNR and GPC in the wound cultures from a few days ago, further identified as normal lux.   12. On cipro and flagyl, no fever.   13. CT scan :                                                                  IMPRESSION:   1.  Right-sided abscess has nearly completely resolved, status post  drainage catheter placement.  2.  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 No fistula to  bowel.  3.  Additional fluid collections in the pelvis and left upper quadrant  have decreased in size and are not large enough to place new drains  into.  4.  Moderate bilateral pleural effusions are similar to previous.  15. No recent cultures from the purulent material seen previously in the CT scan.   16. Staph epi in the UC noted, no treatment, colonizer.      Recommendations:   1. Without recent data on the abscess cultures hard to make recommendations on possible antibiotics.   2. Seems like most of the recent fluid collection is drained with the drain others appear smaller.   3. Since improving on the current antibiotics will continue with the same Cipro and flagyl   4. Course about 2- 4 weeks based on improvement.   Discussed with Dr. Noriega.     Will sign off please call if ques         Bari Bueno MD    Interval History   Weak and tiered   Afebrile   No new complaints     Physical Exam   Temp: 97.8  F (36.6  C) Temp src: Axillary BP: 108/58 Pulse: 73   Resp: 20 SpO2: 95 % O2 Device: None (Room air)    Vitals:    07/23/21 0600   Weight: 47.9 kg (105 lb 9.6 oz)     Vital Signs with Ranges  Temp:  [97.4  F (36.3  C)-97.8  F (36.6  C)] 97.8  F (36.6  C)  Pulse:  [] 73  Resp:  [16-20] 20  BP: (103-114)/(52-68) 108/58  SpO2:  [94 %-99 %] 95 %    Constitutional: Awake, alert, cooperative, no apparent distress  Lungs: Clear to auscultation bilaterally, no crackles or wheezing  Cardiovascular: Regular rate and rhythm, normal S1 and S2, and no murmur noted  Abdomen: Normal bowel sounds, soft, non-distended, non-tender  Skin: No rashes, no cyanosis, no edema  Other:    Medications     dextrose       lactated ringers 100 mL/hr at 07/23/21 0208       ciprofloxacin  400 mg Intravenous Q12H     metroNIDAZOLE  500 mg Intravenous Q12H     pantoprazole (PROTONIX) IV  40 mg Intravenous Daily with breakfast     potassium chloride  20 mEq Oral or Feeding Tube Once     sodium chloride (PF)  10 mL Irrigation Q8H      sodium chloride (PF)  3 mL Intracatheter Q8H     sodium phosphate  9 mmol Intravenous Once       Data   All microbiology laboratory data reviewed.  Recent Labs   Lab Test 07/23/21  0705 07/22/21  0656 07/21/21  0802   WBC 6.1 7.5 7.9   HGB 8.3* 8.9* 8.8*   HCT 26.5* 27.9* 28.2*   MCV 88 89 88    294 299     Recent Labs   Lab Test 07/23/21  0705 07/22/21  0656 07/21/21  0802   CR 0.61 0.64 0.66     No lab results found.  Recent Labs   Lab Test 07/08/21  1122 07/04/21  1949 07/04/21  1948 06/25/21  1313 05/28/21  1257   CULT Normal skin lux Anaerobic gram negative bacilli  No further identification or sensitivity done  *  1 OF 4 BOTTLES POSITIVE  Critical Value called to and read back by  JAYCE LENZ RN AT 2017 ON 07.05.2021, CRB   No growth after 6 days Light growth  Enterococcus faecalis  * >100,000 colonies/mL  mixed urogenital lux

## 2021-07-23 NOTE — PLAN OF CARE
A/Ox1. VSS. RA. Ax2; lift team. Rt REMI drain; intact; scant-no drainage. Oakley in place, minimal urine output. NPO; NG tube placed in IR and verified ok to use and start tube feedings per report; they were unable to place gtube and pt will need surgery to do it. TF continuous at 10ml/ hr to start. On Phos replacement; recheck for all electrolytes in the AM. IVF  LR @100. Midline incision dressing changed and repacked. Coccyx and heel blanchable red with foam mepilex coverings. Ileostomy patent with liquid brown output.

## 2021-07-23 NOTE — PROCEDURES
[facility]    Procedure: Attempted G tube placement    Date/Time: 7/23/2021 10:29 AM  Performed by: Nicci Luz DO  Authorized by: Nicci Luz DO     UNIVERSAL PROTOCOL   Site Marked: Yes  Prior Images Obtained and Reviewed:  Yes  Required items: Required blood products, implants, devices and special equipment available    Patient identity confirmed:  Verbally with patient, arm band, provided demographic data and hospital-assigned identification number  Patient was reevaluated immediately before administering moderate or deep sedation or anesthesia  Confirmation Checklist:  Patient's identity using two indicators, relevant allergies, procedure was appropriate and matched the consent or emergent situation and correct equipment/implants were available  Time out: Immediately prior to the procedure a time out was called    Universal Protocol: the Joint Commission Universal Protocol was followed    Preparation: Patient was prepped and draped in usual sterile fashion           ANESTHESIA    Anesthesia: Local infiltration  Local Anesthetic:  Lidocaine 1% without epinephrine      SEDATION    Patient Sedated: Yes    Sedation Type:  Moderate (conscious) sedation  Vital signs: Vital signs monitored during sedation    See dictated procedure note for full details.  Findings: Attempted g tube placement. 17  60 mL syringes of air were inflated into the stomach. However, even with IV glucagon the stomach could not be insufflated. The procedure was terminated.    Specimens: none    Complications: None    Condition: Stable    Plan: Consider surgical placement of a g tube.    PROCEDURE   Patient Tolerance:  Patient tolerated the procedure well with no immediate complications    Length of time physician/provider present for 1:1 monitoring during sedation: 10

## 2021-07-24 NOTE — PLAN OF CARE
2300h-0700h: Alert. Disoriented to time & place. Tele: NSR. VSS. O2Sat 96% on RA. Diminished breath sound. Denies SOB. NPO. NJT pulled out by patient in previous shift, MD was notified by AM RN. Oakley F16 in place, output is less and dark orange in color, hypoactive BS x4. Ileostomy pink & protruding, w/ brown green output. x1 REMI to bulb suction. Patient dependent; Repositioned every 2h. Dressing on midline abdomen, CDI. Pedal pulses palpable, sensation intact and no edema on BLE.  ml/hr  Infusing on L forearm.

## 2021-07-24 NOTE — PROVIDER NOTIFICATION
Paged on call hospitalist regarding pt having pulled NJ tube at 4902. MD stated OK to leave NJ out tonight and inform oncoming hospitalist in AM.

## 2021-07-24 NOTE — CONSULTS
CLINICAL NUTRITION SERVICES - REASSESSMENT NOTE      Recommendations Ordered by Registered Dietitian (RD): D5 AA4.25 at 100 mL/hr + Lipid 250 mL 20% daily to provide:  1316 kcal (23 kcal/kg and 75% needs), 102 g protein (1.8 g/kg), 120 g CHO (GIR 1.4), 38% fat    Total  mL/hr   Malnutrition: (7/20)  % Weight Loss:  > 2% in 1 week (severe malnutrition)  % Intake:  </= 50% for >/= 5 days (severe malnutrition)  Subcutaneous Fat Loss:  Orbital region moderate depletion and Upper arm region moderate depletion  Muscle Loss:  Temporal region severe depletion, Clavicle bone region severe depletion and Acromion bone region severe depletion  Fluid Retention:  None noted     Malnutrition Diagnosis: Severe malnutrition  In Context of:  Acute illness or injury  Chronic illness or disease       EVALUATION OF PROGRESS TOWARD GOALS   Diet:  NPO    Nutrition Support:  Patient started on trophic TF yesterday afternoon (Osmolite 1.5 at 10 mL/hr) but patient pulled FT out last evening.  Now plan is to run a peripheral PN formula through a PICC line until surgical FT can be placed (D/W Dr. Velez).    Intake/Tolerance:  Patient has not received adequate nutrition since admit (day #5)  She was also not eating prior to admit so limited intake x 1 month       ASSESSED NUTRITION NEEDS:  Dosing Weight 58.1 kg   Estimated Energy Needs: 6131-6361 kcals (30-35 Kcal/Kg)  Justification: repletion  Estimated Protein Needs:  grams protein (1.3-1.8 g pro/Kg)  Justification: repletion, post-op and hypercatabolism with acute illness      NEW FINDINGS:   Patient at risk for refeeding syndrome   K 3.2 (L), Mg 1.5 (L), Phos 1.7 (L) - Getting replacements     LR at 100 mL/hr    7/23:  Attempted G-tube placement in IR: Even with IV glucagon the stomach could not be insufflated. The procedure was terminated. NG left in.   Patient will eventually need a surgically placed FT.    Previous Goals (7/20):   Nutrition plan will be established within  the next 24-48 hours   Evaluation: Met    Previous Nutrition Diagnosis (7/20):   Inadequate protein-energy intake related to NPO with ongoing nausea and vomiting as evidenced by meeting 0% needs  Evaluation: No change      CURRENT NUTRITION DIAGNOSIS  Inadequate protein-energy intake related to NPO with plans to start parenteral nutrition today as evidenced by meeting 0% needs     INTERVENTIONS  Recommendations / Nutrition Prescription  D5 AA4.25 at 100 mL/hr + Lipid 250 mL 20% daily to provide:  1316 kcal (23 kcal/kg and 75% needs), 102 g protein (1.8 g/kg), 120 g CHO (GIR 1.4), 38% fat    Total  mL/hr    Implementation  PN Composition, PN Schedule:  Ordered above PPN regimen   Collaboration and Referral of Nutrition care:  Discussed plan with Pharmacist and Hospitalist     Goals  Patient will meet at least 75% needs via parenteral nutrition until TF initiated     MONITORING AND EVALUATION:  Progress towards goals will be monitored and evaluated per protocol and Practice Guidelines    Zeenat Case, RD, LD, CNSC   Clinical Dietitian - [facility]

## 2021-07-24 NOTE — PROGRESS NOTES
"SPIRITUAL HEALTH SERVICES Progress Note  [facility]    Visited pt due to \"yes\" in SH column of pt chart. Unit nurse also made verbal request for visit. I introduced myself and SH services to pt.    Melissa became tearful and expressed concern that she \"is all alone\" and that she isn't sure where she is and \"I don't know how I got here\". I affirmed that she is in the hospital and that her family knows where she is (after she expressed concern that they don't know). I brought her blanket as she said she is cold and offered more assurance that she is in a safe place and being cared for. She welcomed prayer and I offered prayer and words of our care and support for her and her family.    I checked in with pt nurse following our visit and gave her update that I saw pt.    SHS remains available.      Emmy Gregory  Chaplain Resident    "

## 2021-07-24 NOTE — PLAN OF CARE
Pt. Alert and oriented to self and sometimes place. Vital signs stable on RA. Assist of 2 with lift. NPO. Lung sounds clear. Bowel sounds hypoactive, - flatus. BM via ostomy, low urine output via burrows. Dressing changed to abd wound x1. Dressings intact to L heel and coccyx. Repositioned q2h. Denies pain. Denies nausea. Tele SR. Pt does not have feeding tube yet. PICC unable to be placed until tomorrow. TPN ordered, but unable to start until PICC placed by IV team.

## 2021-07-24 NOTE — PLAN OF CARE
Pt. Alert and oriented to self and intermittently place and situation. Vital signs stable on RA. Assist of 2 with lift. NPO, TF via NJ at 10ml/hr. Lung sounds clear. Bowel sounds active. BM adequate via ileostomy, low urine output 100ml for shift. Dressing changed to distal end of midline incision now CDI. Denies pain. Denies nausea. Tele SR. At 2245, found pt in bed with gown off, blankets and pillows on floor and IV/NJ tube pulled. Paged hospitalist regarding NJ out. IV replaced by resource RN and IVF resumed

## 2021-07-24 NOTE — PROGRESS NOTES
"[facility]  Hospitalist Progress Note for 7/24/2021       Assessment and Plan:   Melissa Quinteros is a 86 year old female who presents with failure to thrive, weakness     Failure to thrive  Weakness  Recent colon cancer resection 6/4/2021  Postoperative intra-abdominal abscess 7/5   Gram negative bacteremia 7/4  *Underwent extended R hemicolectomy and small bowel resection x 2 on 6/14. Taken back to OR 6/22 2/2 anastomotic leak and underwent ex lap, washout and diverting loop ileostomy  *Readmitted 7/4/21 after prolonged hospitalization for colon cancer resection with draining wound. Found toha ve larger purulent fluid collection that was draining. Positive blood cultures for GNR. Fluid collection seen and s/p drain 7/8. Treated with cipro/flagyl.  * Was discharged with plans for hospice but pt/ family felt not doing well with this. No pain but no appetite, only getting 2 glasses of water in a day, has \"overwhelming nausea\".  Getting normal ostomy outputs. Afebrile, vitals stable. Very frail. Ostomy looks ok, drain in place R abdomen, dressings c/d/i. Labs normal except low K. Lactic and WBC normal.   - ct IV fluids  - abx were discontinued 2/2 entering hospice so didn't receive full course. Will continue cipro/ flagyl for now  * CT abd 7/21 showed Right sided abscess nearly completely resolved.No fistula to bowel.    - CRS consult appreciated  - continue WOCN for wound cares.  - pt was receiving TF via NJ - pt pull it out last night. CRS  recommended peripheral TPN while waiting for G tube placement by general surgery on Monday.     - palliative consult.      -Appreciate infectious disease recommendations   - Plan to continue on current Cipro and Flagyl with a course about 2-4 weeks based on improvement.     Hypokalemia  Hypomagnesemia  Hypophosphatemia  K 2.8 on presentation   - replace per protocol  - telemetry until improved (episode of arrhythmia on 7/22)      Abnormal UA  UA on presentation " with 14 WBC, >182 RBC's, few bacteria  -urine culture      Acute on chronic blood loss anemia  S/p transfusion 7/5. hgb 11.0 on presentation (suspect in part 2/2 hemoconcentration)  - monitor for now     Severe malnutrition  - nutrition consult appreciated  - pt was receiving TF via NJ - pt pull it out last night. CRS  recommended peripheral TPN while waiting for G tube placement by general surgery on Monday.      Dementia  Recent encephalopathy during previous hospitalizations  Dementia mentioned in previous notes but don't see prior to admission documentation of this. For me thought June, 2022, couldn't remember president, knew was in hospital. Didn't recall recent hospital events.   - monitor for now  - pt more confused last night, pull ou NJ tube, may need to place mittens on to prevent pt pulling out tubes  - prn seroquel available if agitation     COVID-19 negative on admission  Diet: Snacks/Supplements Adult: Magic Cup; With Meals  NPO per Anesthesia Guidelines for Procedure/Surgery Except for: Meds  -was getting TF Continuous Osmolite 1.5; Gastrostomy; Goal Rate: 50; mL/hr; Medication - Feeding Tube Flush Frequency: At least 15-30 mL water before and after medication administration and with tube clogging; Amount to Send (Nutrition until 7/23- NJ tube pulled out  - 7/24 will start on peripheral TPN + IVF  DVT Prophylaxis: Pneumatic Compression Devices  Oakley Catheter: PRESENT, indication: /GI/GYN Pelvic Procedure  Central Lines: None  Code Status: No CPR- Do NOT Intubate       Disposition Plan   Expected discharge: 07/27/2021 recommended to Unclear once goals of care.  The patient's care was discussed with the Bedside Nurse    Aye Velez MD.  Hospitalist [phone] (7am -6 pm)                 Interval History:   Per RN- CR surgery recommended peripheral nutrition till more permanent plan for nutrition in place.  Last evening pt pulled out NJ tube ( she was more confused)               Medications:        ciprofloxacin  400 mg Intravenous Q12H     metroNIDAZOLE  500 mg Intravenous Q12H     pantoprazole (PROTONIX) IV  40 mg Intravenous Daily with breakfast     potassium chloride  20 mEq Oral or Feeding Tube Once     sodium chloride (PF)  10 mL Irrigation Q8H     sodium chloride (PF)  3 mL Intracatheter Q8H     sodium phosphate  9 mmol Intravenous Once     acetaminophen **OR** acetaminophen, dextrose, HYDROmorphone, lidocaine 4%, lidocaine (buffered or not buffered), magnesium hydroxide, melatonin, metoprolol, naloxone **OR** naloxone **OR** naloxone **OR** naloxone, ondansetron **OR** ondansetron, prochlorperazine **OR** prochlorperazine **OR** prochlorperazine, QUEtiapine, senna-docusate **OR** senna-docusate, sodium chloride (PF)               Physical Exam:   Blood pressure 108/53, pulse 79, temperature 97.5  F (36.4  C), temperature source Oral, resp. rate 14, weight 44.4 kg (97 lb 14.2 oz), SpO2 98 %, not currently breastfeeding.  Wt Readings from Last 4 Encounters:   21 44.4 kg (97 lb 14.2 oz)   21 58.1 kg (128 lb)   21 60.3 kg (132 lb 15 oz)   21 61.7 kg (136 lb 1.6 oz)         Vital Sign Ranges  Temperature Temp  Av.6  F (36.4  C)  Min: 97.4  F (36.3  C)  Max: 97.7  F (36.5  C)   Blood pressure Systolic (24hrs), Av , Min:102 , Max:118        Diastolic (24hrs), Av, Min:52, Max:59      Pulse Pulse  Av  Min: 74  Max: 86   Respirations Resp  Avg: 15  Min: 14  Max: 16   Pulse oximetry SpO2  Av.7 %  Min: 96 %  Max: 98 %         Intake/Output Summary (Last 24 hours) at 2021 1233  Last data filed at 2021 0600  Gross per 24 hour   Intake 3 ml   Output 850 ml   Net -847 ml       Constitutional: Cachectic, pleasantly confused, awake, alert, cooperative, no apparent distress   Lungs: Clear to auscultation bilaterally, no crackles or wheezing   Cardiovascular: Regular rate and rhythm, normal S1 and S2, and no murmur noted   Abdomen: Non distended, drain &  dressing in place, normal bowel sounds, soft,   Skin: No rashes, no cyanosis, no edema               Data:   All laboratory data reviewed

## 2021-07-24 NOTE — PROGRESS NOTES
COLON & RECTAL SURGERY  PROGRESS NOTE    July 24, 2021    SUBJECTIVE:  NJ tube removed by the patient, colostomy functional    OBJECTIVE:  Temp:  [97.4  F (36.3  C)-97.7  F (36.5  C)] 97.5  F (36.4  C)  Pulse:  [74-86] 79  Resp:  [14-16] 14  BP: (102-118)/(52-59) 108/53  SpO2:  [96 %-98 %] 98 %    Intake/Output Summary (Last 24 hours) at 7/23/2021 0926  Last data filed at 7/23/2021 0600  Gross per 24 hour   Intake 1200 ml   Output 1450 ml   Net -250 ml       GENERAL:  Awake, alert, no acute distress  HEAD: Normocephalic atraumatic  SCLERA: Anicteric  EXTREMITIES: Warm and well perfused  ABDOMEN:  Soft, non-tender, non-distended. No guarding, rigidity, or peritoneal signs. REMI drain with minimal serous output. Stoma with liquid stool output.   INCISION:  C/d/i, packing in place, did not remove    LABS:  Lab Results   Component Value Date    WBC 8.3 07/24/2021    WBC 9.3 07/09/2021     Lab Results   Component Value Date    RBC 3.11 07/24/2021    RBC 3.19 07/09/2021     Lab Results   Component Value Date    HGB 8.8 07/24/2021    HGB 8.9 07/09/2021     Lab Results   Component Value Date    HCT 27.2 07/24/2021    HCT 27.6 07/09/2021     No components found for: MCT  Lab Results   Component Value Date    MCV 88 07/24/2021    MCV 87 07/09/2021     Lab Results   Component Value Date    MCH 28.3 07/24/2021    MCH 27.9 07/09/2021     Lab Results   Component Value Date    MCHC 32.4 07/24/2021    MCHC 32.2 07/09/2021     Lab Results   Component Value Date    RDW 16.6 07/24/2021    RDW 15.9 07/09/2021     Lab Results   Component Value Date     07/24/2021     07/09/2021       ASSESSMENT/PLAN: 85 yo female with history of right sided colon cancer s/p right colectomy and small bowel resection complicated by anastomotic leak, s/p redo ileocolic resection with DLI and abdominal washout and closure.  Patient now with failure to thrive and weakness.  Transferred for second opinion regarding prognosis and post op  cares.  Afebrile, vitals stable.       - G-tube placement by IR no successful  - patient removed NJ tube   - plan for replacement of NJ tube by IR  - plan for TF via NJ tube  - IV antibiotics per hospitalist team  - appreciate hospitalist team input    Ashleigh Castañeda MD

## 2021-07-24 NOTE — PROGRESS NOTES
Received picc order for TPN.  Communicated to bedside RN that the picc line will be placed in early morning of 7-25.

## 2021-07-25 NOTE — PROGRESS NOTES
"[facility]  Hospitalist Progress Note for 7/25/2021       Assessment and Plan:   Melissa Quinteros is a 86 year old female who presents with failure to thrive, weakness     Failure to thrive  Weakness  Recent colon cancer resection 6/4/2021  Postoperative intra-abdominal abscess 7/5   Gram negative bacteremia 7/4  *Underwent extended R hemicolectomy and small bowel resection x 2 on 6/14. Taken back to OR 6/22 2/2 anastomotic leak and underwent ex lap, washout and diverting loop ileostomy  *Readmitted 7/4/21 after prolonged hospitalization for colon cancer resection with draining wound. Found toha ve larger purulent fluid collection that was draining. Positive blood cultures for GNR. Fluid collection seen and s/p drain 7/8. Treated with cipro/flagyl.  * Was discharged with plans for hospice but pt/ family felt not doing well with this. No pain but no appetite, only getting 2 glasses of water in a day, has \"overwhelming nausea\".  Getting normal ostomy outputs. Afebrile, vitals stable. Very frail. Ostomy looks ok, drain in place R abdomen, dressings c/d/i. Labs normal except low K. Lactic and WBC normal.   - abx were discontinued 2/2 entering hospice so didn't receive full course. Will continue cipro/ flagyl for now  * CT abd 7/21 showed Right sided abscess nearly completely resolved.No fistula to bowel.     - CRS consult appreciated  - continue WOCN for wound cares.  - Palliative care consulted - goal was to cont restorative cares.    Poor po intake so Tube feeds via G-tube was planned. On 7/23 IR attempted & failed to place a G-tube so a NJ tube was places & TF started but the tube was pulled out the same night.  Plan was to request general surgery to place a G tube on Monday.   CRS  recommended peripheral TPN.  - on 7/24 IV team consulted to place PICC- unable to place   - on 7/25 General surgery consulted for G tube placement- they do not feel it would be successful due to pt many recent procedures " with scarring especially with high chance that the pt may pull these tubes out.  - discussed with pt/her  reg feeding tubes - pt declines another NJ placement.   - pt lost her peripheral IV access as the day progressed & as  IV team unable place a picc.   - IV antibiotics changed to po & encourage po diet (oked by speech)  - requested palliative care consult in AM -readdress goals of care as we are faced with fewer & fewer options.      -Appreciate infectious disease recommendations   - Plan to continue on current Cipro and Flagyl with a course about 2-4 weeks based on improvement.   -changed to po after loss if IV access    Hypokalemia  Hypomagnesemia  Hypophosphatemia  K 2.8 on presentation   - gave extra po KCL today  - cont telemetry for now     Abnormal UA  UA on presentation with 14 WBC, >182 RBC's, few bacteria  -urine culture likely contaminant- staph epidermis     Acute on chronic blood loss anemia  S/p transfusion 7/5. hgb 11.0 on presentation (suspect in part 2/2 hemoconcentration)  - monitor for now     Severe malnutrition  - nutrition consult & pharmacy help with TPN appreciated  - IR unable to place G-tube on 7/23 2/2 diff to insufflate the stomach.IR then placed NJ & TF started.  pt pull it out night of 7/23.   -  Plan as above      Dementia  Recent encephalopathy during previous hospitalizations  Dementia mentioned in previous notes but don't see prior to admission documentation of this. For me thought June, 2022, couldn't remember president, knew was in hospital. Didn't recall recent hospital events.   - monitor for now  - pt more confused on night of 7/23, pull ou NJ tube.   - Awake /allert this AM 7/25 - declining tube placement  to feed  - prn seroquel available if agitation     COVID-19 negative on admission  Diet: peripheral TPN started on 7/24  DVT Prophylaxis: Pneumatic Compression Devices, consider starting Lovenox after G tube placement   Oakley Catheter: PRESENT, indication:  /GI/GYN Pelvic Procedure  Central Lines: None  Code Status: No CPR- Do NOT Intubate       Disposition Plan   Expected discharge: 2021 recommended to Unclear once goals of care- pending rec consult from palliative care in AM.  The patient's care discussed with CR surgery /general surgery / pt/ her  & the Bedside Nurse.     Aye Velez MD.  Hospitalist [phone] (7am -6 pm)               Interval History:   Poor IV acess              Medications:       ciprofloxacin  400 mg Intravenous Q12H     insulin aspart  1-12 Units Subcutaneous Q4H     lipids  250 mL Intravenous Q24H     metroNIDAZOLE  500 mg Intravenous Q12H     pantoprazole (PROTONIX) IV  40 mg Intravenous Daily with breakfast     potassium chloride  20 mEq Oral or Feeding Tube Once     sodium chloride (PF)  10 mL Irrigation Q8H     sodium chloride (PF)  3 mL Intracatheter Q8H     acetaminophen **OR** acetaminophen, dextrose, glucose **OR** dextrose **OR** glucagon, HYDROmorphone, lidocaine 4%, lidocaine 4%, lidocaine (buffered or not buffered), lidocaine (buffered or not buffered), magnesium hydroxide, melatonin, metoprolol, naloxone **OR** naloxone **OR** naloxone **OR** naloxone, ondansetron **OR** ondansetron, prochlorperazine **OR** prochlorperazine **OR** prochlorperazine, QUEtiapine, senna-docusate **OR** senna-docusate, sodium chloride (PF), sodium chloride (PF)               Physical Exam:   Blood pressure 118/63, pulse 80, temperature 97.4  F (36.3  C), resp. rate 16, weight 45.7 kg (100 lb 12 oz), SpO2 95 %, not currently breastfeeding.  Wt Readings from Last 4 Encounters:   21 45.7 kg (100 lb 12 oz)   21 58.1 kg (128 lb)   21 60.3 kg (132 lb 15 oz)   21 61.7 kg (136 lb 1.6 oz)         Vital Sign Ranges  Temperature Temp  Av.5  F (36.4  C)  Min: 97.4  F (36.3  C)  Max: 97.7  F (36.5  C)   Blood pressure Systolic (24hrs), Av , Min:102 , Max:123        Diastolic (24hrs), Av, Min:49, Max:63       Pulse Pulse  Av.2  Min: 68  Max: 80   Respirations Resp  Avg: 15.3  Min: 14  Max: 16   Pulse oximetry SpO2  Av %  Min: 95 %  Max: 95 %         Intake/Output Summary (Last 24 hours) at 2021 0836  Last data filed at 2021 0610  Gross per 24 hour   Intake 0 ml   Output 1198 ml   Net -1198 ml       Constitutional: Cachectic delightful female awake, alert, cooperative, no apparent distress   Lungs: Clear to auscultation bilaterally, no crackles or wheezing   Cardiovascular: Regular rate and rhythm, normal S1 and S2, and no murmur noted   Abdomen: Normal bowel sounds, soft,dressing over abd wound , drain in place    Skin: No rashes, no cyanosis, no edema               Data:   All laboratory data reviewed

## 2021-07-25 NOTE — PLAN OF CARE
"Pt. Alert and oriented to self, often questioning that she's in a hospital and why, when reported to her. Able to indicate what purpose of call light is, but yells out when needing help from staff. Vital signs stable on RA. Assist of 2 with lift, q2h repo. Tolerating NDD5 diet, although appetite is very very minimal. Tried many different menu options for patient and tried encouraging pt to eat frequently once NPO status was discontinued. Pt has very minimal interest in eating and drinking, only accepted one bite of potatoes and orange juice. Lung sounds dim. Bowel sounds active, + flatus. Adequate BM output from stoma, increasing urine output. Dressing change to midline done x1, continues to have slough and purulent drainage from wound. Throughout dressing change pt continued yelling \"NO! Would you get out of there??\" and covering self back up with gown frequently. Denies pain. Denies nausea. IV team attempted PICC and PIV and were unsuccessful with both attempts. Pt had PIV in L arm that became painful and red with IV maintenance fluids. All IV meds were stopped and transitioned to orals. Pt is supposed to maintain IVF, but without access hospitalist said it was OK to stop attempting line insertion for today since pt becomes agitated and upset with frequent attempts.  at bedside most of the day, requesting frequent \"second opinions\" of G tube placement and nutritional supplement. Re-educated pt on conversations with Dr Velez and Dr Molina from this afternoon and agreeance to palliative seeing pt, but  is now again requesting a second opinion for G tube.    "

## 2021-07-25 NOTE — PROGRESS NOTES
COLON & RECTAL SURGERY  PROGRESS NOTE    July 25, 2021    SUBJECTIVE:  Colostomy remains functional    OBJECTIVE:  Temp:  [97.4  F (36.3  C)-97.7  F (36.5  C)] 97.4  F (36.3  C)  Pulse:  [68-80] 80  Resp:  [16] 16  BP: (102-123)/(49-63) 118/63  SpO2:  [95 %] 95 %      Intake/Output Summary (Last 24 hours) at 7/25/2021 1243  Last data filed at 7/25/2021 0610  Gross per 24 hour   Intake 0 ml   Output 1198 ml   Net -1198 ml         GENERAL:  Awake, alert, no acute distress  HEAD: Normocephalic atraumatic  SCLERA: Anicteric  EXTREMITIES: Warm and well perfused  ABDOMEN:  Soft, non-tender, non-distended. No guarding, rigidity, or peritoneal signs. REMI drain with minimal serous output. Stoma with liquid stool output.   INCISION:  C/d/i, packing in place, did not remove    LABS:  Lab Results   Component Value Date    WBC 8.3 07/24/2021    WBC 9.3 07/09/2021     Lab Results   Component Value Date    RBC 3.11 07/24/2021    RBC 3.19 07/09/2021     Lab Results   Component Value Date    HGB 8.8 07/24/2021    HGB 8.9 07/09/2021     Lab Results   Component Value Date    HCT 27.2 07/24/2021    HCT 27.6 07/09/2021     No components found for: MCT  Lab Results   Component Value Date    MCV 88 07/24/2021    MCV 87 07/09/2021     Lab Results   Component Value Date    MCH 28.3 07/24/2021    MCH 27.9 07/09/2021     Lab Results   Component Value Date    MCHC 32.4 07/24/2021    MCHC 32.2 07/09/2021     Lab Results   Component Value Date    RDW 16.6 07/24/2021    RDW 15.9 07/09/2021     Lab Results   Component Value Date     07/24/2021     07/09/2021       ASSESSMENT/PLAN: 85 yo female with history of right sided colon cancer s/p right colectomy and small bowel resection complicated by anastomotic leak, s/p redo ileocolic resection with DLI and abdominal washout and closure.  Patient now with failure to thrive and weakness.  Transferred for second opinion regarding prognosis and post op cares.  Afebrile, vitals stable.       -  Plan for replacement of NJ tube by IR vs. g-tube placement by general surgery  - In the mean time patient will likely benefit from peripheral nutrition to avoid further malnutrition   - IV antibiotics per hospitalist team  - appreciate hospitalist team input    Ashleigh Castañeda MD

## 2021-07-25 NOTE — CONSULTS
[facility] General Surgery Consultation    Melissa Quinteros MRN# [id number]   YOB: 1935 Age: 86 year old      Date of Admission:  7/19/2021  Date of Consult: 7/25/2021         Assessment and Plan:   Patient is a 86 year old female who is s/p right hemicolectomy on 6/4/2021 due to colon cancer who had an anastomotic leak and was taken back to OR on 6/22 for ex lap, washout and diverting loop ileostomy and readmitted on 7/4 with purulent fluid collection which was draining who is unable to tolerate PO intake and was recieiving tube feed via NJ placement which she has removed. IR attempted percutaneous gastrostomy on 7/24 but was unable to obtain adequate insufflation of stomach for distention and could not place.     General surgery is consulted to consider percutaneous endoscopic gastrostomy vs surgical gastrostomy. We could attempt a PEG placement; however, this may not be successful due to pt many recent procedures with scarring and on review of recent CT, there is a small window where a PEG could be placed but is in close proximity to her open wound and is high risk and with her thin abdominal wall, risk of liliya tube leakage is also high. I would not recommend a surgical gastrostomy given her recent laparotomies as this would be a very high risk procedure. I discussed all these concerns with the patient and her  at bedside with Dr. Velez as well. At this time, plan to have SLP evaluate patient to see if she can tolerate a diet and avoid any further surgical procedures.     PLAN:  SLP evaluation  Possible NJ tube placement, though suspect pt will pull tube again  Palliative care consultation  Last option, consideration of attempt at PEG if no progress with oral intake.          Requesting Physician:      Dr. Velez        Chief Complaint:   Inability to tolerate PO         History of Present Illness:   Melissa Quinteros is a 86 year old female who was seen in consultation at the  "request of Dr. Velez who presented with history as listed above.       Today, pt tells me she has no abdominal pain. She denies nausea. When asked why she is not eating she reports she is too worried she will be nauseated and have emesis. Her ostomy is functioning.     From chart, surgical history is listed below.    \"She was diagnosed with colon cancer recently and underwent a laparoscopic extended right colectomy with small bowel resection x2, enterolysis and biopsy of mesenteric lymph node by Dr. Del Pang on June 14.  Pathology revealed a T4bN0 cancer.  Her post-operative course was complicated by an anastomotic leak and she returned to the operating room on 6/22 for an exploratory laparotomy, transverse colon resection with re-creation of an anastomosis and diverting loop ileostomy.  She had a third surgery on 6/28 for a washout and closure of the midline incision. She was discharged on 7/2 but returned to the ED two days later on 7/4 with increased abdominal pain and drainage from the midline wound.  She was discharged home on hospice on 7/12.  She had been on antibiotics during that hospital stay but these were discontinued when she was discharged on hospice. She apparently has not been doing well at home and presented yesterday to the ER with failure to thrive and weakness.\"          Physical Exam:   Blood pressure 118/63, pulse 80, temperature 97.4  F (36.3  C), resp. rate 16, weight 45.7 kg (100 lb 12 oz), SpO2 95 %, not currently breastfeeding.  100 lbs 12 oz  General: lying in bed, appears comfortable  Psych: oriented x2, at times very hostile.   Neurological: grossly intact  Eyes: Sclera clear  Respiratory:  nonlabored breathing  Cardiovascular:  Regular Rate and Rhythm   GI: soft, thin, midline wound with saturated packing gauze with purulent fluid, ostomy LLQ is patent with succus in bag. Small window under costal margin above ileostomy   Lymphatic/Hematologic/Immune:  No femoral or cervical " lymphadenopathy.  Integumentary:  No rashes         Past Medical History:     Past Medical History:   Diagnosis Date     Esophageal obstruction     8/30/2016     Essential (primary) hypertension     8/30/2016     Hyperlipidemia     No Comments Provided     Zoster without complications     X2            Past Surgical History:     Past Surgical History:   Procedure Laterality Date     APPENDECTOMY       ARTHROPLASTY KNEE Right 2017    [place]     COLONOSCOPY      No Comments Provided     COLONOSCOPY N/A 6/3/2021    Procedure: COLONOSCOPY, WITH POLYPECTOMY AND BIOPSY with endoscopic tattoo;  Surgeon: Del Pang MD;  Location:  OR     ESOPHAGOGASTRODUODENOSCOPY       HYSTERECTOMY TOTAL ABDOMINAL      1982,incidental appendectomy     INCISION AND DRAINAGE ABSCESS PELVIS, COMBINED N/A 6/28/2021    Procedure: Washout and closure of midline laparotomy.;  Surgeon: Del Pang MD;  Location:  OR     LAPAROSCOPIC ASSISTED COLECTOMY N/A 6/14/2021    Procedure: COLECTOMY, LAPAROSCOPIC, small bowel resection X2, extended Right hemicolectomy, biopsy of the mesenteric node;  Surgeon: Del Pang MD;  Location:  OR     LAPAROTOMY EXPLORATORY N/A 6/22/2021    Procedure: Exploratory LAPAROTOMY with transverse colon Resection, and ileostomy;  Surgeon: Del Pang MD;  Location:  OR     TONSILLECTOMY, ADENOIDECTOMY, COMBINED      childhood            Current Medications:           ciprofloxacin  400 mg Intravenous Q12H     insulin aspart  1-12 Units Subcutaneous Q4H     lipids  250 mL Intravenous Q24H     metroNIDAZOLE  500 mg Intravenous Q12H     pantoprazole (PROTONIX) IV  40 mg Intravenous Daily with breakfast     potassium chloride  20 mEq Oral or Feeding Tube Once     potassium chloride  10 mEq Intravenous Q1H     sodium chloride (PF)  10 mL Irrigation Q8H     sodium chloride (PF)  3 mL Intracatheter Q8H     sodium phosphate  9 mmol Intravenous Once       acetaminophen **OR** acetaminophen,  dextrose, glucose **OR** dextrose **OR** glucagon, HYDROmorphone, lidocaine 4%, lidocaine (buffered or not buffered), lidocaine (buffered or not buffered), magnesium hydroxide, melatonin, metoprolol, naloxone **OR** naloxone **OR** naloxone **OR** naloxone, ondansetron **OR** ondansetron, prochlorperazine **OR** prochlorperazine **OR** prochlorperazine, QUEtiapine, senna-docusate **OR** senna-docusate, sodium chloride (PF), sodium chloride (PF)         Home Medications:     Prior to Admission medications    Medication Sig Last Dose Taking? Auth Provider   acetaminophen (TYLENOL) 325 MG tablet Take 2 tablets (650 mg) by mouth every 4 hours as needed for other (For optimal non-opioid multimodal pain management to improve pain control.) unknown Yes Del Pang MD   clobetasol (TEMOVATE) 0.05 % external cream Apply topically 2 times daily unknown Yes Russ Renee PA   doxepin (ZONALON) 5 % external cream Apply topically 3 times daily unknown Yes Russ Renee PA   magnesium oxide (MAG-OX) 400 MG tablet Take 400 mg by mouth daily  7/19/2021 at Unknown time Yes Sofie Jay APRN CNP   omeprazole (PRILOSEC) 20 MG CR capsule Take 1 capsule (20 mg) by mouth daily 7/19/2021 at Unknown time Yes Bret Jimenez MD   triamcinolone (KENALOG) 0.1 % external ointment Apply topically 2 times daily as needed  More than a month at Unknown time  Reported, Patient            Allergies:     Allergies   Allergen Reactions     Tramadol Anxiety     Aggressive behaviors, delirium     Lovastatin      Other reaction(s): Arthralgia            Family History:     Family History   Problem Relation Age of Onset     Other - See Comments Father         Old age     Lung Cancer Mother      Brain Cancer Brother            Social History:   Melissa Quinteros  reports that she has never smoked. She has never used smokeless tobacco. She reports previous alcohol use. She reports that she does not use drugs.          Review of Systems:   The 10  point Review of Systems is negative other than noted in the HPI.         Labs/Imaging   All new lab and imaging data was reviewed.   I have personally reviewed the imaging studies including her CT        Gilda Molina MD

## 2021-07-25 NOTE — PLAN OF CARE
A&O to self only. VSS on RA. Assist of 2, lift. Turn/repo. NPO. Lung sounds diminished. Bowel sounds active. Ostomy intact, pink, protruding. REMI to bulb suction, irrigated per orders. Incision WDL. Oakley patent. Dextrose given x2 for low BG. Denies pain.

## 2021-07-25 NOTE — PLAN OF CARE
SLP:  re-consulted. Initial evaluation by this SLP on 7/22 with mild dysphagia with evaluation limited by pt's acceptance.     Pt alert with  present today. Pt required great encouragement from SLP and  to accept two bites of ice cream and 3oz of thin liquids by straw. No overt s/sx of aspiration and pt appears to be at baseline with mild delay/oral dysphagia. Would continue to recommended soft diet (minced and moist IDDSI level 5) with thin liquids; assisted with ordering lunch options and ordering.     Discussed with MD. Continue encouraging PO intake - Dietician consult for nutrition. Will continue to follow peripherally if any swallowing concerns.

## 2021-07-26 NOTE — PROGRESS NOTES
[facility]  Palliative Care Daily Progress Note       Recommendations & Counseling       Would give trial of zofran 4 mg ODT TID prior to meals to prevent nausea when eating    Restorative goals, are in agreement for trial of picc placement by IR    Continue up out of bed during the day, encourage regular mealtimes, day/night cycle        Assessments          Melissa Quinteros is an 85yo F with recent R colectomy and small bowel resection finding T4bN0 colon cancer  6/14, with post-op course complicated by anastomotic leak, further resection, poor PO intake, and was briefly home with hospice, admitted for 2nd opinion and failure to thrive, eating very little PO.  Has had difficulty with delirium since first operation, and here has pulled out IV access and NG tube.  Had G tube attempted by IR and unable to place due to inability to insufflate the stomach, has been seen by colorectal surgery and gen surgery and thought not a window for safe placement.  Attempted picc placement but unable to place, ordered for IR placement.  Seen by ID and recommended for 2-4 week antibiotic course for wound infection with L sided drain in place.      Today, the patient was seen for:  Nausea  PO intolerance  Post-op complications  Goals of care    Prognosis, Goals, or Advance Care Planning was addressed today with: Yes.  Mood, coping, and/or meaning in the context of serious illness were addressed today: No.  Summary/Comments:            Interval History:     Chart review/discussion with unit or clinical team members:   Lost  IV access over weekend and picc unable to be placed.  Has been seen by Gen Surg, recommended continued PO intake.     Per patient or family/caregivers today:  Melissa seen sitting up in chair sleeping with  Ilya at bedside - Melissa dozed off intermittently during visit, but able to engage and answer some direct questions. Melissa denies any pain.  She says she starts to eat and  immediately will feel like vomiting but doesn't. Having output from stoma.  Not sure where she is or why, recognizes  and able to describe past events.     Key Palliative Symptoms:  # Pain severity the last 12 hours: none  # Dyspnea severity the last 12 hours: none  # Nausea severity the last 12 hours: low  # Anxiety severity the last 12 hours: none    Patient is on opioids: assessed and bowels ok/no needed changes to plan of care today.           Review of Systems:     Besides above, an additional 3 system ROS was reviewed and is unremarkable          Medications:     I have reviewed this patient's medication profile and medications during this hospitalization.    Noted meds:    Zofran PRN x0  Tylenol PRN x0           Physical Exam:   Vitals were reviewed  Temp: 97.5  F (36.4  C) Temp src: Oral BP: 98/51 Pulse: 100   Resp: 16 SpO2: 99 % O2 Device: None (Room air)      Constitutional: Sitting up in chair, thin in the face and frail-appearing, in no distress   Respiratory:  Normal respiratory effort on RA  GI:  Soft,  Midline incision with clean dressing, ostomy with liquid output  Musculoskeletal: Soft LE edema.   Neuro: Able to answer simple questions, oriented to self only  Psych: Alert, appropriately interactive, full affect, clouded sensorium.   Skin: Warm, no rash             Data Reviewed:     Reviewed recent pertinent imaging, comments:   No new imaging    Reviewed recent labs, comments:   Cre 0.61, GFR 82  Alb 1.5        45 minutes unit time spent in reviewing record, patient examination and counseling, discussion with bedside RN and colorectal surgery SHERI, and documentation.  >50% spent in counseling re: symptom management, plan of care and coordinating care with team.     Candida Penaloza MD  Palliative Medicine  Pager [phone]

## 2021-07-26 NOTE — PROGRESS NOTES
"[facility]  Hospitalist Progress Note  Cristóbal Ndiaye MD  07/26/2021    Assessment & Plan   Melissa Quinteros is a 86 year old female who presents with failure to thrive, weakness     Failure to thrive  Weakness  Recent colon cancer resection 6/4/2021  Postoperative intra-abdominal abscess 7/5   Gram negative bacteremia 7/4  *Underwent extended R hemicolectomy and small bowel resection x 2 on 6/14. Taken back to OR 6/22 2/2 anastomotic leak and underwent ex lap, washout and diverting loop ileostomy  *Readmitted 7/4/21 after prolonged hospitalization for colon cancer resection with draining wound. Found toha ve larger purulent fluid collection that was draining. Positive blood cultures for GNR. Fluid collection seen and s/p drain 7/8. Treated with cipro/flagyl.  * Was discharged with plans for hospice but pt/ family felt not doing well with this. No pain but no appetite, only getting 2 glasses of water in a day, has \"overwhelming nausea\".  Getting normal ostomy outputs. Afebrile, vitals stable. Very frail. Ostomy looks ok, drain in place R abdomen, dressings c/d/i. Labs normal except low K. Lactic and WBC normal.   - abx were discontinued 2/2 entering hospice so didn't receive full course. Will continue cipro/ flagyl for now  * CT abd 7/21 showed Right sided abscess nearly completely resolved.No fistula to bowel.  - CRS consult appreciated, plan for PICC and TPN  - continue WOCN for wound cares.  - Palliative care consulted - goal was to cont restorative cares.     Poor po intake so Tube feeds via G-tube was planned. On 7/23 IR attempted & failed to place a G-tube so a NJ tube was places & TF started but the tube General surgery feel they are unable to safely place G tube.  CRS  recommended peripheral TPN.  - on 7/24 IV team consulted to place PICC- unable to place   - on 7/25 General surgery consulted for G tube placement- they do not feel it would be successful due to pt many recent " procedures with scarring especially with high chance that the pt may pull these tubes out.  - discussed with pt/her  reg feeding tubes - pt declines another NJ placement.   - pt lost her peripheral IV access as the day progressed & as  IV team unable place a picc.   - IV antibiotics changed to po & encourage po diet (oked by speech)  - requested palliative care consult in AM -readdress goals of care as we are faced with fewer & fewer options.   - Plan to continue on current Cipro and Flagyl with a course about 2-4 weeks based on improvement.  - changed to po after loss if IV access  - encourage oral intake  - will monitor calorie counts     Hypokalemia  Hypomagnesemia  Hypophosphatemia  K 2.8 on presentation   - continue IVF with K and K replacement protocol  - cont telemetry for now  - will add Mg replacement protocol.     Abnormal UA  UA on presentation with 14 WBC, >182 RBC's, few bacteria  -urine culture likely contaminant- staph epidermis     Acute on chronic blood loss anemia  S/p transfusion 7/5. hgb 11.0 on presentation (suspect in part 2/2 hemoconcentration)  - monitor for now     Severe malnutrition  - nutrition consult & pharmacy help with TPN appreciated  - IR unable to place G-tube on 7/23 2/2 diff to insufflate the stomach.IR then placed NJ & TF started.  pt pull it out night of 7/23.   -  Plan as above       Dementia  Recent encephalopathy during previous hospitalizations  Dementia mentioned in previous notes but don't see prior to admission documentation of this. For me thought June, 2022, couldn't remember president, knew was in hospital. Didn't recall recent hospital events.   - monitor for now  - pt more confused on night of 7/23, pull ou NJ tube.   - declining tube placement  to feed  - prn seroquel available if agitation     COVID-19 negative on admission  Diet: peripheral TPN started on 7/24  DVT Prophylaxis: Pneumatic Compression Devices, consider starting Lovenox after G tube placement    Oakley Catheter: PRESENT, indication: /GI/GYN Pelvic Procedure  Central Lines: None  Code Status: No CPR- Do NOT Intubate       Disposition Plan   Expected discharge: 07/28/2021 recommended to Unclear once goals of care- pending rec consult from palliative care in AM.      Interval History   -- chart reviewed  -- noted plans for picc and TPN  -- she ate the best to date today    -Data reviewed today: I reviewed all new labs and imaging over the last 24 hours. I personally reviewed no images or EKG's today.    Physical Exam    , Blood pressure 98/51, pulse 100, temperature 97.5  F (36.4  C), temperature source Oral, resp. rate 16, weight 45.7 kg (100 lb 12 oz), SpO2 99 %, not currently breastfeeding.  Vitals:    07/23/21 0600 07/24/21 0600 07/25/21 0610   Weight: 47.9 kg (105 lb 9.6 oz) 44.4 kg (97 lb 14.2 oz) 45.7 kg (100 lb 12 oz)     Vital Signs with Ranges  Temp:  [97.3  F (36.3  C)-97.5  F (36.4  C)] 97.5  F (36.4  C)  Pulse:  [] 100  Resp:  [16] 16  BP: ()/(51-66) 98/51  SpO2:  [95 %-99 %] 99 %  I/O's Last 24 hours  I/O last 3 completed shifts:  In: 100 [P.O.:100]  Out: 1910 [Urine:800; Drains:10; Stool:1100]    Constitutional: Awake, alert, cooperative, cachectic  Respiratory: Clear to auscultation bilaterally, no crackles or wheezing  Cardiovascular: Regular rate and rhythm, normal S1 and S2,    GI: Normal bowel sounds, soft, non-distended, non-tender  Skin/Integumen: No rashes, no cyanosis, no edema, cachexia  Other:      Medications   All medications were reviewed.    dextrose 5% and 0.45% NaCl + KCl 20 mEq/L 100 mL/hr at 07/25/21 0955       ciprofloxacin  500 mg Oral Q12H [employer]ICO     metroNIDAZOLE  500 mg Oral BID     pantoprazole  40 mg Oral QAM AC     sodium chloride (PF)  10 mL Irrigation Q8H     sodium chloride (PF)  3 mL Intracatheter Q8H        Data   Recent Labs   Lab 07/26/21  0613 07/25/21  0657 07/25/21  0542 07/24/21  1903 07/24/21  0717 07/23/21  0705 07/23/21  0705 07/22/21  1550  07/22/21  0656   WBC  --   --   --   --  8.3  --  6.1  --  7.5   HGB  --   --   --   --  8.8*  --  8.3*  --  8.9*   MCV  --   --   --   --  88  --  88  --  89   PLT  --   --   --   --  259  --  265  --  294   INR  --  1.33*  --   --   --   --   --   --   --    NA  --  136  --   --  135  --  137  --  137   POTASSIUM  --  3.0*  --  3.4 3.2*  --  3.5   < > 3.3*   CHLORIDE  --  105  --   --  103  --  105  --  104   CO2  --  30  --   --  29  --  29  --  27   BUN  --  8  --   --  10  --  12  --  14   CR  --  0.61  --   --  0.66  --  0.61  --  0.64   ANIONGAP  --  1*  --   --  3  --  3  --  6   DARIANA  --  7.9*  --   --  8.3*  --  8.4*  --  8.3*   GLC 81 140* 133*  --  102*   < > 107*  --  101*   ALBUMIN  --  1.5*  --   --   --   --   --   --   --    PROTTOTAL  --  4.0*  --   --   --   --   --   --   --    BILITOTAL  --  0.7  --   --   --   --   --   --   --    ALKPHOS  --  183*  --   --   --   --   --   --   --    ALT  --  13  --   --   --   --   --   --   --    AST  --  18  --   --   --   --   --   --   --     < > = values in this interval not displayed.       No results found for this or any previous visit (from the past 24 hour(s)).    Cristóbal Ndiaye MD  Text Page  (7am to 6pm)

## 2021-07-26 NOTE — PROGRESS NOTES
"[facility] Nurse Inpatient Ostomy Assessment      Assessment of ostomy and needs for: diverting loop ileostomy Abd. Wound  Surgery date/procedure:  right hemicolectomy with small bowel resection on 06/14, anastomotic leak leading to transverse colectomy and anastomosis with diverting loop ileostomy on 06/22, and washout and closure of midline incision on 06/28.  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           [facility] Focused Assessment:     Subjective/Objective:  at bedside spoke extensively about pouching challenges,  states pouch has leaked daily in nursing home for over a month and her abdomen was \"full of fluid\" until recently.  Pouch now connected to bedside bag.  Appliance leaking on assessment today. Pt has a visitor from out of town, open abdominal wound not assessed today     Date of last photo: 7-20-21                                        Type of ostomy:  Ileostomy    Last pouch change/reason: 7-23-21 and appliance leaking on 7-26-21 @ 17:00pm    Stoma assessment:   ? Ileostomy LUQ,  Moist red barely protruding, noted retraction and shape change with position change from oval to round, peristomal skin fold at 10 and 2 o'clock  ? Lumen empties toward 8 o'clock    Mucocutaneous Junction (MCJ): small separation from 7-11 o'clock    Peristomal skin: Healed today except for small area @ 8 o'clock where stoma empties. This area with slight denudement    Abdominal assessment: soft. Rounded   ? N/G still in place?: FT pulled out by Patient   Output:   watery, thin, liquid brown-green,  Monitor for high output     Oral cavity and back of throat:  Pt c/o oral pain, and difficulty swallowing today. On exam tongue covered with thick white dry coating with white patches extending to back of throat. Pt " states swallowing is difficult and she feels like choking/throwing up when food placed in mouth.    Concern for thrush.       I/O last 3 completed shifts:  In: 100 [P.O.:100]    Out: 1910 [Urine:800; Drains:10; Stool:1100]       Current pouching system:  Mehul          -Convex 70mm with ring and high-output pouch.       Pain:  Denies today       Diet:       Orders Placed This Encounter      Combination Diet Minced and Moist Diet (level 5); Thin Liquids (level 0) (straws ok)      DATA     Documented Allergies: Tramadol and Lovastatin     Recent Labs   Lab Test 07/22/21  0656 07/19/21  0918 07/09/21  0553 07/08/21  0712 05/28/21  1121 08/20/20  0000   ALBUMIN  --  2.7*  --   --   --   --    HGB 8.9* 11.0*   < >  --   --   --    INR  --   --   --  1.29*   < >  --    WBC 7.5 8.7   < >  --   --   --    A1C  --   --   --   --   --  5.6    < > = values in this interval not displayed.     Containment of urine/stool: Indwelling catheter and Ileostomy pouch. Oakley: very dark lele urine    Medical Devices:Oakley Catheter: PRESENT, indication: /GI/GYN Pelvic Procedure    Catheter securement Yes    Stefano Risk Assessment  Sensory Perception: 3-->slightly limited    Moisture: 4-->rarely moist   Activity: 2-->chairfast     Mobility: 2-->very limited   Nutrition: 1-->very poor   Friction and Shear: 2-->potential problem  Stefano Score: 14           Interventions:       Patient's chart evaluated.     Appliance changed     Dr Andres text paged re: concern for oral thrush     Current support surface: Pulsate    Current off-loading measures in bed: reposition side to side with use of Pillows    Current off-loading heels: Pillows under calves ordered offloading boots    Current off-loading measures chair: chair cushion      Assessments completed today:  social support and environment, pouching system effectiveness, abdomen, stoma, abd wound. Mouth and throat      Education completed with :  frequency of pouch empty and pouch  change    All husbands questions answered:  YES    Prepared for discharge by: Pt to discharge to TCU in [place]          Plan:       Pouching system:   Mehul 70mm (blue) Convex cut to fit skin barrier #[id number] with high output pouch #[id number] and barrier ring #[id number]    Education yet to complete/reinforce: TBD     Continue to prepare for discharge: No discharge preparations started       WOC Nursing will follow daily M-F until reliable pouching system established -hope to change pouch Wed 7-28-21  to ensure no breakdown of peristomal skin/leakage of pouching system     Holly Valdes RN CWOCN

## 2021-07-26 NOTE — PROGRESS NOTES
COLON & RECTAL SURGERY  PROGRESS NOTE    July 26, 2021    SUBJECTIVE:  NJ pulled over weekend, unable to obtain vascular access via PICC or PIV placement.  requesting PEG placement. Sitting up in chair this morning.     OBJECTIVE:  Temp:  [97.3  F (36.3  C)-97.4  F (36.3  C)] 97.3  F (36.3  C)  Pulse:  [] 87  Resp:  [16] 16  BP: ()/(55-66) 136/66  SpO2:  [95 %-98 %] 98 %    Intake/Output Summary (Last 24 hours) at 7/26/2021 0915  Last data filed at 7/26/2021 0600  Gross per 24 hour   Intake 100 ml   Output 1585 ml   Net -1485 ml       GENERAL:  Awake, alert, no acute distress  HEAD: Normocephalic atraumatic  SCLERA: Anicteric  EXTREMITIES: Warm and well perfused  ABDOMEN:  Soft, non-tender, non-distended. No guarding, rigidity, or peritoneal signs. Stoma pink and healthy, soft stool output present. REMI drain with 10cc/24hrs serous drainage.   INCISION:  C/d/i, did not remove bandage    LABS:  Lab Results   Component Value Date    WBC 8.3 07/24/2021    WBC 9.3 07/09/2021     Lab Results   Component Value Date    HGB 8.8 07/24/2021    HGB 8.9 07/09/2021     Lab Results   Component Value Date    HCT 27.2 07/24/2021    HCT 27.6 07/09/2021     Lab Results   Component Value Date     07/24/2021     07/09/2021     Last Basic Metabolic Panel:  Lab Results   Component Value Date     07/25/2021     07/09/2021      Lab Results   Component Value Date    POTASSIUM 3.0 07/25/2021    POTASSIUM 3.5 07/11/2021     Lab Results   Component Value Date    CHLORIDE 105 07/25/2021    CHLORIDE 111 07/09/2021     Lab Results   Component Value Date    DARIANA 7.9 07/25/2021    DARIANA 7.6 07/09/2021     Lab Results   Component Value Date    CO2 30 07/25/2021    CO2 22 07/09/2021     Lab Results   Component Value Date    BUN 8 07/25/2021    BUN 13 07/09/2021     Lab Results   Component Value Date    CR 0.61 07/25/2021    CR 0.55 07/09/2021     Lab Results   Component Value Date    GLC 81 07/26/2021    GLC  140 07/25/2021    GLC 89 07/09/2021       ASSESSMENT/PLAN: 87 yo female with history of right sided colon cancer s/p right colectomy and small bowel resection complicated by anastomotic leak, s/p redo ileocolic resection with DLI and abdominal washout and closure.  Patient now with failure to thrive and weakness.  Transferred for second opinion regarding prognosis and post op cares.  Afebrile, vitals stable. General surgery unable to offer surgical G-tube placement.     Needs vascular access, IR consult for placement of PICC.     1. Soft diet  2. Peripheral nutrition once line placed  3. IV abx per hospitalist  4. Agree with palliative consult  5. OOB, ambulate    Discussed with Dr. Noriega.     For questions/paging, please contact the CRS office at [phone].    Sherrie Ortiz), PAJamelC  Colorectal Physician Assistant    [facility]  [address]  [place] [address]  T: [phone]  F: [phone]      CRS Staff.  Seen and examined independently.  Agree with above.    Appreciate general surgery assistance.  No good options for G tube.  Not tolerating NJ tube.  Will likely need some access for hydration given her poor oral intake and proximal loop ileostomy.  Recommend getting better medium to long term access (picc line or tunneled catheter by IR).  This would at least allow for some nutrition IV if not able to get adequate calories PO.    Will remove right sided drain.    I performed a history and physical examination of the patient and discussed their management with the physician assistant. I reviewed the physician assistants note and agree with the documented findings and plan of care.     Bret Noriega MD FACS FASCRS  Colorectal Surgeon       [facility]  [address], [address]  [place] [address]  T: [phone]  F: [phone]  Pager: [phone]  [email or web address]

## 2021-07-26 NOTE — PLAN OF CARE
A&O to self only. VSS on RA. Assist of 2, lift. Turn/repo. NPO. Lung sounds diminished. Bowel sounds active. Ostomy intact, pink, protruding. REMI to bulb suction, irrigated per orders. Incision WDL. Oakley patent. Denies pain.

## 2021-07-26 NOTE — PROGRESS NOTES
Visited, examined, discussed with patient and .  I can not see a straightforward, safe way to put in a feeding tube that would not be likely to be pulled out.   We should make a full court press towards eating by mouth. She says she likes hamburgers.   I understand she has described nausea with the presence of food, but she was eating heartily prior to her operations and she has no physical reason she can not eat.   I linked getting home to trying to eat, and she said she would try.   I will follow with you.

## 2021-07-27 NOTE — PLAN OF CARE
Pt. Alert and oriented x4. Vital signs stable on RA, except slight tachycardia. Assist of 2 with lift. Tolerating NDD5 diet. Lung sounds dim. Bowel sounds active, + flatus. Adequate liquid brown output from stoma, adequate urine output. WOC changed dressing to distal midline incision. Dressing changed to coccyx pressure wound. Denies pain. Denies nausea. Tele sinus tach

## 2021-07-27 NOTE — CONSULTS
Care Management Follow Up    Length of Stay (days): 8    Expected Discharge Date: 07/28/2021     Concerns to be Addressed: discharge planning  Pt's  would like to see pt at [facility] in [place], [place]. SW given their main line which is [phone]. Pt's  also gave SW the number for Jayleen who is in admissions at [phone]  Patient plan of care discussed at interdisciplinary rounds: Yes    Anticipated Discharge Disposition: Transitional Care  Disposition Comments: Plan is to get pt to [facility] TCU in [place], [place] for care  Anticipated Discharge Services: None  Anticipated Discharge DME: None    Patient/family educated on Medicare website which has current facility and service quality ratings: no  Education Provided on the Discharge Plan:    Patient/Family in Agreement with the Plan: yes    Referrals Placed by CM/SW: Senior Linkage Line, Post Acute Facilities, Transportation  Private pay costs discussed: Not applicable    Additional Information:  Informed by provider pt is ready for hospice. Pt and family would still like pt to attend [facility] in [place]. ALYSSA sent a new referral and left a VM with Sophia who works in LTC at [facility]. SW to update family, who would need a hospice agency. Call back from [facility] reporting they do not have available beds on their LTC and memory care. ALYSSA did have pt placed on the waitlist at [facility] by Sophia. ALYSSA would need to acquire more options from family.     ALYSSA spoke with pt's , Ilya, and daughter. SW went over hospice options. Ilya wanted pt in a residential hospice placement, but here were none located close to [place]. Ilya and daughter did not want pt at any SNF but [facility]. ALYSSA informed them pt was put on the waitlist. Ilya and daughter decided they would re-enroll pt into [facility] and take pt home. Ilya and daughter would pay for 24/7 nursing. ALYSSA printed off a home care list for Ilya and  daughter to call to hire nursing staff. ALYSSA called [facility] liaRosanne blanc at [phone]. ALYSSA realized Rosanne may onl service the [facility] area. ALYSSA called a Jenni with [facility] and left a VM for her at [phone]. Jenni's fax is [phone]. Rosanne called back and reported ALYSSA could coordinate with her and she would coordinate with Jenni. ALYSSA gace Rosanne information to re-enroll pt since the hospice equipment never left pt's home. ALYSSA also gave Ilya's cell at [phone].        AUGUST Mcleod

## 2021-07-27 NOTE — PROGRESS NOTES
COLON & RECTAL SURGERY  PROGRESS NOTE    July 27, 2021    SUBJECTIVE:  Surgery unable to offer G-tube, PICC placed by IR but pulled out by patient overnight, nurse reports delirium episodes    OBJECTIVE:  Temp:  [97.2  F (36.2  C)-97.9  F (36.6  C)] 97.3  F (36.3  C)  Pulse:  [] 89  Resp:  [12-16] 16  BP: ()/(48-65) 116/56  SpO2:  [98 %-100 %] 99 %      Intake/Output Summary (Last 24 hours) at 7/27/2021 0746  Last data filed at 7/27/2021 0000  Gross per 24 hour   Intake 420 ml   Output 1125 ml   Net -705 ml         GENERAL:  Awake, alert, no acute distress  HEAD: Normocephalic atraumatic  SCLERA: Anicteric  EXTREMITIES: Warm and well perfused  ABDOMEN:  Soft, non-tender, non-distended. No guarding, rigidity, or peritoneal signs. Stoma pink and healthy, soft stool output present.  INCISION:  C/d/i, did not remove bandage    LABS:  Lab Results   Component Value Date    WBC 8.3 07/24/2021    WBC 9.3 07/09/2021     Lab Results   Component Value Date    HGB 8.8 07/24/2021    HGB 8.9 07/09/2021     Lab Results   Component Value Date    HCT 27.2 07/24/2021    HCT 27.6 07/09/2021     Lab Results   Component Value Date     07/24/2021     07/09/2021     Last Basic Metabolic Panel:  Lab Results   Component Value Date     07/25/2021     07/09/2021      Lab Results   Component Value Date    POTASSIUM 3.0 07/25/2021    POTASSIUM 3.5 07/11/2021     Lab Results   Component Value Date    CHLORIDE 105 07/25/2021    CHLORIDE 111 07/09/2021     Lab Results   Component Value Date    DARIANA 7.9 07/25/2021    DARIANA 7.6 07/09/2021     Lab Results   Component Value Date    CO2 30 07/25/2021    CO2 22 07/09/2021     Lab Results   Component Value Date    BUN 8 07/25/2021    BUN 13 07/09/2021     Lab Results   Component Value Date    CR 0.61 07/25/2021    CR 0.55 07/09/2021     Lab Results   Component Value Date    GLC 81 07/26/2021     07/25/2021    GLC 89 07/09/2021       ASSESSMENT/PLAN: 87 yo female  with history of right sided colon cancer s/p right colectomy and small bowel resection complicated by anastomotic leak, s/p redo ileocolic resection with DLI and abdominal washout and closure.  Patient now with failure to thrive and weakness.  Transferred for second opinion regarding prognosis and post op cares.  Afebrile, vitals stable.    Needs vascular access, IR consult for placement of PICC.     - PICC pulled out overnight. Patient without IV access  - UOP reported as only 200 for the past 24 hours. Cr 0.71. No IV access for fluid resuscitation  - soft diet, aspiration precautions  - IV abx per hospitalist  - agree with palliative consult  - out of bed, ambulate    Discussed with Dr. Kelly Castañeda MD    CRS staff  Seen and examined.  Unable to place G tube.  Has pulled out NJ and PICC line.  Unable to offer any further aggressive cares.  Would recommend discharge with best supportive cares.  Will update .    Bret Noriega MD FACS FASCRS  Colorectal Surgeon       [facility]  [address], [address]  [place] [address]  T: [phone]  F: [phone]  Pager: [phone]  [email or web address]

## 2021-07-27 NOTE — PROGRESS NOTES
"CLINICAL NUTRITION SERVICES - REASSESSMENT NOTE      RECOMMENDATIONS FOR MD/PROVIDER TO ORDER:   Continue GOC discussions as TF/PN attempts unsuccessful thus far   ?Appropriatness for Remeron/Megace trial for appetite stimulant    Recommendations Ordered by Registered Dietitian (RD):   Calorie counts will start today per MD  Supplements: 8oz Ensure Enlive w/ all meals   Agree with Zofran TID before meals   PO encouragement provided & assisted in meal orders for the remainder of the day    Malnutrition: (7/20)  % Weight Loss:  > 2% in 1 week (severe malnutrition)  % Intake:  </= 50% for >/= 5 days (severe malnutrition)  Subcutaneous Fat Loss:  Orbital region moderate depletion and Upper arm region moderate depletion  Muscle Loss:  Temporal region severe depletion, Clavicle bone region severe depletion and Acromion bone region severe depletion  Fluid Retention:  None noted     Malnutrition Diagnosis: Severe malnutrition  In Context of:  Acute illness or injury  Chronic illness or disease       EVALUATION OF PROGRESS TOWARD GOALS   Diet:  Minced and Moist (level 5); thin liquids   4 oz Vanilla Ensure Enlive w/ meals TID    Nutrition Support:  TF pulled by patient on 7/23. PPN ordered on 7/24 but was never started given lack of correct access     Intake/Tolerance:  Patient has been without adequate nutrition since admission (8 days) between nutrition support and oral intake. She was also not eating prior to admit so appears to have had limited intake >1 month     Visited with patient and spouse today; she is eating breakfast including cream of wheat, yogurt, peaches and a blueberry muffin and taking very small bites  When spouse would encourage patient to take another bite, she gets frustrated and states \"we aren't at the races!\"  Spouse reports that patient ate well yesterday, better than any other day so far. She is liking the Ensure and spouse agreeable to trying a full 8oz portion vs current 4oz portion " "      ANTHROPOMETRICS: updated  Height: 5' 4\"  Weight: 44.4 kg (97 lb 14.2 oz)   BMI: 16.79 kg/m^2  Weight Status: Underweight <18 kg/m^2  IBW: 54.5 kg   % IBW: 81%  Weight History: Potentially down 39 lbs in the past 3-4 weeks (28% body wt)  Vitals:    07/23/21 0600 07/24/21 0600 07/25/21 0610   Weight: 47.9 kg (105 lb 9.6 oz) 44.4 kg (97 lb 14.2 oz) 45.7 kg (100 lb 12 oz)     Wt Readings from Last 10 Encounters:   07/25/21 45.7 kg (100 lb 12 oz)   07/19/21 58.1 kg (128 lb)   07/12/21 60.3 kg (132 lb 15 oz)   07/02/21 61.7 kg (136 lb 1.6 oz)   06/08/21 53.7 kg (118 lb 6.4 oz)   06/03/21 57.2 kg (126 lb)   06/01/21 57.2 kg (126 lb)   05/28/21 57.2 kg (126 lb)   05/14/21 55.6 kg (122 lb 9.6 oz)   10/09/19 59.7 kg (131 lb 9.6 oz)       ASSESSED NUTRITION NEEDS: updated  Dosing Weight 44.4 kg (97 lb 14.2 oz) - 7/24, lowest this admission  Estimated Energy Needs: 6795-4194+ kcals (35-40+ Kcal/Kg)  Justification: repletion, underweight  Estimated Protein Needs: 67-90 grams protein (1.5-2 g pro/Kg)  Justification: repletion, post-op and hypercatabolism with acute illness      NEW FINDINGS:   Calorie Counts ordered to start recorded today (MD order)   Ethics consulted this AM  Per General Surgery - not a good candidate for G-tube  Per MD - noted plans for PICC and TPN  Per Palliative care - restorative goals; trial of PICC in IR    -- Unfortunetly PICC was pulled by patient overnight 7/27 - currently w/o IV access  Per SLP - \"pt appears to be at baseline with mild delay/oral dysphagia. Would continue to recommended soft diet (minced and moist IDDSI level 5) with thin liquids\"    Previous Goals:   Patient will meet at least 75% needs via parenteral nutrition until TF initiated   Evaluation: Not met    Previous Nutrition Diagnosis:   Inadequate protein-energy intake related to NPO with plans to start parenteral nutrition today as evidenced by meeting 0% needs   Evaluation: No change      CURRENT NUTRITION " DIAGNOSIS  Inadequate oral intake related to poor appetite, delirium, intermittent nausea as evidenced by intakes estimated </=25% nutrient needs over the past 1+ month    INTERVENTIONS  Recommendations / Nutrition Prescription  Increase Ensure from 4oz to 8oz w/ meals  Calorie counts per MD   Consider appetite stimulant    Implementation  Composition of Meals and Snacks and Medical Food Supplement    Goals  Patient will consume >50% meals and supplements TID vs nutrition support vs change to overall goals of care       MONITORING AND EVALUATION:  Progress towards goals will be monitored and evaluated per protocol and Practice Guidelines      Lexi Davidson RD, LD  Clinical Dietitian

## 2021-07-27 NOTE — IR NOTE
Interventional Radiology Intra-procedural Nursing Note    Patient Name: Melissa Quinteros  Medical Record Number: [id number]  Today's Date: July 26, 2021    Start Time: 1847  End of procedure time: 1855  Procedure: right upper extremity triple lumen picc placement  Report given to: Station 33  Time pt departs:  1900  : n/a    Other Notes:   Patient tolerated well. RUE 19 cm circumference just superior to insertion point.  5ml local lidocaine. SR throughout, no ectopy.  Patient taken back to room 321 in cart. Nobody available for assistance back to bed so left patient resting in cart in room with call light in hand. Cart locked with rails up.    Hilda Randolph, ALAN  Interventional Radiology

## 2021-07-27 NOTE — PROGRESS NOTES
[facility]  Palliative Care Daily Progress Note       Recommendations & Counseling       SW consult for hospice + facility planning    Per RN, having difficulty with large pills, will change antibiotics to liquid    Continue to encourage oral intake as able    I think reasonable to continue oral antibiotic course with hospice if she can tolerate swallowing     Continue up out of bed during the day, encourage regular mealtimes, day/night cycle as supportive care for delirium        Assessments          Melissa Quinteros is an 87yo F with recent R colectomy and small bowel resection finding T4bN0 colon cancer  6/14, with post-op course complicated by anastomotic leak, further resection, poor PO intake, and was briefly home with hospice, admitted for 2nd opinion and failure to thrive, eating very little PO.  Has had difficulty with delirium since first operation, and here has pulled out IV access and NG tube.  Had G tube attempted by IR and unable to place due to inability to insufflate the stomach, has been seen by colorectal surgery and gen surgery and thought not a window for safe placement.  Has pulled out IVs and piccs, due to mixed hypoactive and hyperactive delirium that's been ongoing since her surgery.      See below discussion, will plan for discharge with oral antibiotics and hospice in a facility.     Today, the patient was seen for:  Nausea  PO intolerance  Post-op complications  Goals of care    Prognosis, Goals, or Advance Care Planning was addressed today with: Yes.  Mood, coping, and/or meaning in the context of serious illness were addressed today: No.  Summary/Comments:            Interval History:     Chart review/discussion with unit or clinical team members:   Picc placed by IR yesterday, Melissa pulled out in confusion overnight    Per patient or family/caregivers today:  Melissa seen sitting up in chair sleeping with  Ilya at bedside - she slept throughout  conversation.  He says yesterday she ate well (most of her soft meals), was able to work with PT trying to stand at bedside, today has been too sleepy to do much, ate about half her oatmeal and peaches.     Discussed with Ilya schulz forward - he says doesn't think would be worth to try another IV line that she could pull out or have to be restrained, as her confusion has been waxing/waning but continuous since her surgeries.  He is grieving that if she is not able to eat more, that she likely will die with time.  He reflects on their difficult course at home due to several care needs, and would want to try to get her to a facility closer to home with hospice.  He has friends in the [place] area with familiarity with this that he'd like to reach out to find out where is best.     Key Palliative Symptoms:  # Pain severity the last 12 hours: none  # Dyspnea severity the last 12 hours: none  # Nausea severity the last 12 hours: none  # Anxiety severity the last 12 hours: none    Patient is on opioids: assessed and bowels ok/no needed changes to plan of care today.           Review of Systems:     Unable to obtain 2/2 mental status          Medications:     I have reviewed this patient's medication profile and medications during this hospitalization.    Noted meds:    Zofran PRN x0  Tylenol PRN x0           Physical Exam:   Vitals were reviewed  Temp: (!) 96.5  F (35.8  C) Temp src: Axillary BP: 99/50 Pulse: 80   Resp: 20 SpO2: 99 % O2 Device: None (Room air)      Constitutional: Sleeping sitting up in chair, thin in the face and frail-appearing, in no distress   Respiratory:  Normal respiratory effort on RA  Psych: Sleeping throughout conversation,  Skin: Warm, no rash             Data Reviewed:     Reviewed recent pertinent imaging, comments:   No new imaging    Reviewed recent labs, comments:   Cre 0.71, GFR 77  Alb 1.9        40 minutes unit time spent in reviewing record, patient examination and family  counseling, discussion with bedside RN, and documentation.  >50% spent in counseling re: goals of care, plan of care and coordinating care with team.     Candida Penaloza MD  Palliative Medicine  Pager [phone]

## 2021-07-27 NOTE — PROGRESS NOTES
[facility] WO Nurse Inpatient Wound Assessment   Reason for consultation: Abd wound (see previous note for ostomy wound assessment    Assessment    Abdominal wound due to Surgical Wound  Status: follow-up assessment. Wound continues with small  amount light yellow drainage, loose sutures in base, and a deep tunnel @ 11 o'clock.  Pt sleeping thru dressing change.  Continue  BID dressing changes.   Ostomy appliance to bedside bag remains intact with small watery brown drainage with chunks of undigested food particles    anxious to arrange for pt to be transported to a care facility near their home .Emotional support offered to  today.   Treatment Plan:     Establish reliable pouching system to prevent ileostomy leakage into wound, and waterproof outer dressing to prevent wound contamination from ileostomy output    Abdominal wound: BID and PRN  1. Cleanse wound with Vashe wound cleanser (#[id number]), and mop out any old drainage; dry the periwound skin  2.   Protect surrounding skin with Cavilon no-sting barrier film wipe as needed, ie if skin becoming irritated (do not have to use every dressing change)  3.   Cut approx 1 foot of kerlix and moisten with vashe  4.   Use a cotton tip applicator to gently fill depth and tunnel at 11 o'clock (use only 1 piece of gauze, to not lose any in tunnel)  5.   Cover with silicone foam dressing (ie Mepilex 4x4)    Wound Care Rationale Protect periwound skin, Provide selective debridement (autolysis) of nonviable tissue , Gently fill dead space to prevent abscess formation  and Decrease bacterial load.   Orders Updated  Per surgery, no surgical intervention @ this time. REC conservative dressing changes   WOC Nurse follow-up plan while in the hospital:weekly and prn until pouching plan stable   Nursing to notify the Provider(s) and re-consult the WOC Nurse if wound(s) deteriorates or new skin concern.    Patient History    According to provider  note(s):right hemicolectomy with small bowel resection on 06/14, anastomotic leak leading to transverse colectomy and anastomosis with diverting loop ileostomy on 06/22, and washout and closure of midline incision on 06/28.  Surgeon:  Del Pang MD, [facility]  Related diagnosis:  Malignant mass in the transverse colon, adenocarcinoma  MD note 7/19/2021: 86 year old female who presents with failure to thrive.      Data    Documented Allergies: Tramadol and Lovastatin     Recent Labs   Lab Test 07/22/21  0656 07/19/21  0918 07/09/21  0553 07/08/21  0712 05/28/21  1121 08/20/20  0000   ALBUMIN  --  2.7*  --   --   --   --    HGB 8.9* 11.0*   < >  --   --   --    INR  --   --   --  1.29*   < >  --    WBC 7.5 8.7   < >  --   --   --    A1C  --   --   --   --   --  5.6    < > = values in this interval not displayed.     I/O last 3 completed shifts:  In: 420 [P.O.:420]  Out: 1125 [Urine:200; Stool:925]    Orders Placed This Encounter      Combination Diet Minced and Moist Diet (level 5); Thin Liquids (level 0) (straws ok)    Containment of urine/stool: Indwelling catheter and Ileostomy pouch   Medical Devices:Oakley Catheter: PRESENT, indication: /GI/GYN Pelvic Procedure    Catheter securement Yes    Pressure Injury Risk Assessment (Stefano Scale):  Sensory Perception: 3-->slightly limited    Moisture: 4-->rarely moist   Activity: 2-->chairfast     Mobility: 2-->very limited   Nutrition: 1-->very poor   Friction and Shear: 2-->potential problem  Stefano Score: 14       Focused WOC Nurse Skin/Wound Exam:     Subjective/Objective: Patient has been in nursing home.  states he is with her every day and the pouch leaked daily for the last month. Patient has sever malnutrition   Wound History: right hemicolectomy with small bowel resection on 06/14, anastomotic leak leading to transverse colectomy and anastomosis with diverting loop ileostomy on 06/22, and washout and closure of midline incision  on 06/28.                                                                                [facility] photo: 7-22-21 open abd incision               Date of last photo 7/20/2021 Wound Location: Abd    Measurements (length x width x depth, in cm) 4.0  x 3  x  2 cm   Wound Base: 60 % red tissue and 40 % adherent yellow slough; loose sutures noted that is visible   Tunneling up to >12 cm at 11 o'clock  Undermining up to 2 cm from 6-12 o'clock  Palpation of the wound bed: firm   Periwound skin: intact, no erythema  Periwound Temperature: normal   Drainage: small   Description of drainage: today light yellow  Odor: none  Pain: pt slept thru dressing change   Local signs of infection: decrease to small yellow, no  periwound erythema, minimal pain to wound    Interventions    Visual inspection and assessment completed   Wound care completed  Pressure redistribution Support surface: Pulsate  Off-loading measures in bed: reposition side to side with use of Pillows  Off-loading measures for heels: Pillows under calves, heel lift boots ordered  Off-loading measures for chair: Chair cushion ordered  Orders updated; AVS completed. Supplies in pt room for discharge   Education provided: importance of repositioning and plan of care  Discussed plan of care with  and nurse    Holly TOVAR RN

## 2021-07-27 NOTE — PROVIDER NOTIFICATION
"Hospitaljessie Jacob paged at 2233 because patient pulled out PICC    \"Pt in 321 pulled out PICC line.     ALAN Shafer   Station 33 [phone]\"    Peng Jacob re-paged at 2329    \"Pt in 321-1 pulled out PICC. No IV access. Urine output 100 ml since noon.     ALAN Shafer [phone] Station 33\"      Cecil advised around 2345 to defer to morning staff -  the primary following hospitalist and palliative consult to see if a new PICC / PIV / vascular access is in the best interest of the patient and current plan of care. Cecil advised to not attempt any placements overnight.  "

## 2021-07-27 NOTE — PROGRESS NOTES
"SPIRITUAL HEALTH SERVICES Progress Note  [facility]    Visit with pt, her  and another family member (his daughter?).  Pt's  shared his belief that \"God has your hour and day written in a book, and when it's time to go, there's no negotiating!\"  He finds comfort and peace in this, and is now waiting to speak with a  about discharge plans.  Pt's  shared stories about growing up, noting that \"I'm not a big [employer]-goer, but I'm a good Protestant!\"  Pt requested prayer from me, which I provided.   team available per additional need or request for support.                                                                                                                                                   Hilary Rendon M.A.  Staff   Phone [phone]      "

## 2021-07-27 NOTE — PLAN OF CARE
Pt confused, cooperative this shift with spouse present at bedside. Will pursue with hospice care at discharge. Up in chair X2 with lift. Redness blanchable to coccyx- mepilex applied. Abdominal dressing changed X2. Ileostomy with large output. Encourage fluid intake when awake. Continue to monitor.

## 2021-07-27 NOTE — PROGRESS NOTES
She ate well today: sausage, Macedonian Toast, other items.   Eating was interrupted for a bath.   Nutrition is our top priority now, so I ask staff to consider not interrupting meals.   No role for tube feedings.

## 2021-07-27 NOTE — PROGRESS NOTES
"[facility]    Hospitalist Progress Note      Assessment & Plan   Melissa Quinteros is a 86 year old female with a past medical history of Colon cancer with recent resection and ongoing complications,  Hypertension who was admitted on 7/19/2021 with failure to thrive, nausea and inability to eat.    Failure to thrive  Weakness  Recent colon cancer resection 6/4/2021  Postoperative intra-abdominal abscess 7/5   Gram negative bacteremia 7/4  *Underwent extended R hemicolectomy and small bowel resection x 2 on 6/14. Taken back to OR 6/22 due to anastomotic leak and underwent ex lap, washout and diverting loop ileostomy.  *Readmitted 7/4/21 after prolonged hospitalization for colon cancer resection with draining wound. Found to have larger purulent fluid collection that was draining. Positive blood cultures for GNR. Fluid collection with drain placement 7/8. Treated with cipro/flagyl.  * Was discharged with plans for hospice but pt/ family felt not doing well with this. No pain but no appetite, only getting 2 glasses of water in a day, has \"overwhelming nausea\".  Getting normal ostomy outputs. Afebrile, vitals stable. Very frail. Ostomy looks ok, drain in place R abdomen, dressings c/d/i. Labs normal except low K. Lactic and WBC normal. Of note she did not complete a full course of cipro/flagyl as these were stopped upon entering hospice.   * CT abd 7/21 showed Right sided abscess nearly completely resolved.No fistula to bowel.  Here in the hospital this admission beginning 7/19 she has been seen by colorectal surgery, general surgery, ID, palliative care and nutrition. She has had most issues with nausea and ability to maintain any po.  Because of this plans were made to start tube feeds which have been unsuccessful on many attempts.  Initially a G-tube was planned but on 7/23 IR tried but was unable to insufflate the stomach and unable to place the G-tube.  NJ tube was placed instead and TF " started but patient subsequently pulled out the NJ.  General surgery consulted to consider surgical placement but did not feel they could be successful.  PICC line placed on 7/24 but patient eventually pulled out the PICC line. Palliative care met with the family and after discussion felt that it would not benefit the patient to attempt to place another PICC or NJ as with her dementia she continues to pull at lines/tubes.  On 7/27 the family elected to enroll the patient in hospice again and will try for hospice in a TCU.   -appreciate assistance from colorectal surgery, general surgery, ID and palliative  -continue with Cipro/flagyl for now, plan for 2-4 weeks depending on how she does  -SW consult for placement and hospice referral  -try to encourage po intake as much as possible  -all medications as solution if possible     Hypokalemia  Hypomagnesemia  Hypophosphatemia  K 2.8 on presentation   - replace K and Mg per protocol     Abnormal UA  UA on presentation with 14 WBC, >182 RBC's, few bacteria. urine culture likely contaminant- staph epidermis  -monitor     Acute on chronic blood loss anemia  S/p transfusion 7/5. hgb 11.0 on presentation (suspect in part due to hemoconcentration)  - monitor for now     Severe malnutrition  See section above on attempts at getting nutrition.  At this point going to enter into hospice  -encourage po intake to the extent possible     Dementia  Recent encephalopathy during previous hospitalizations  Dementia mentioned in previous notes but don't see prior to admission documentation of this. Has had some waxing/waning mental status and memory.   - monitor for now  - prn seroquel available if agitation    DVT Prophylaxis: Pneumatic Compression Devices  Code Status: No CPR- Do NOT Intubate    Disposition: Expected discharge possible by Wednesday on hospice if SNF arranged    Total time spent is 40 minutes with greater than 50% spent in counseling and coordination of care including  chart review, discussion with palliative care and SW as well as bedside assessment and discussion with patient and family as patient has dementia and unable to make own decisions about care.     Mark Verma      Interval History   Seen with  and daughter at bedside.  Discussed with palliative MD.  Patient feels ok today without any pain.  Was able to eat a little bit for lunch but still not eating great.  Decision was made to discharge patient with hospice.   notes that [facility] in [place] is their preferred discharge location.  No shortness of breath today.      -Data reviewed today: I reviewed all new labs and imaging results over the last 24 hours. I personally reviewed no images or EKG's today.    Physical Exam   Temp: (!) 96.5  F (35.8  C) Temp src: Axillary BP: 99/50 Pulse: 80   Resp: 20 SpO2: 99 % O2 Device: None (Room air)    Vitals:    07/23/21 0600 07/24/21 0600 07/25/21 0610   Weight: 47.9 kg (105 lb 9.6 oz) 44.4 kg (97 lb 14.2 oz) 45.7 kg (100 lb 12 oz)     Vital Signs with Ranges  Temp:  [96.5  F (35.8  C)-97.9  F (36.6  C)] 96.5  F (35.8  C)  Pulse:  [] 80  Resp:  [12-20] 20  BP: ()/(48-65) 99/50  SpO2:  [98 %-100 %] 99 %  I/O last 3 completed shifts:  In: 420 [P.O.:420]  Out: 1125 [Urine:200; Stool:925]    Constitutional: awake, alert, cooperative    Respiratory: Clear to auscultation bilaterally, no crackles or wheezing noted.  Good air exchange.     Cardiovascular: Regular rate and rhythm.  No murmur, rub or gallop noted.     GI: Positive bowel sounds, soft, non-distended, non-tender.  No masses or organomegaly noted.     Musculoskeletal: Full range of motion.  Tone is normal.  No acute abnormalities.     Neurologic: Awake, alert and oriented to name, place and time.  Cranial nerves II-XII are grossly intact.      Lymphatic: No edema noted    Skin: no rash    Medications     dextrose 5% and 0.45% NaCl + KCl 20 mEq/L 100 mL/hr at 07/25/21 0955        ciprofloxacin  500 mg Oral Q12H AMERICO     metroNIDAZOLE  500 mg Oral BID     pantoprazole  40 mg Oral QAM AC     sodium chloride (PF)  10 mL Irrigation Q8H     sodium chloride (PF)  3 mL Intracatheter Q8H       Data   Recent Labs   Lab 07/27/21  0003 07/26/21  1622 07/26/21  0613 07/25/21  0657 07/24/21  1903 07/24/21  0717 07/23/21  0705 07/22/21  1550 07/22/21  0656   WBC  --   --   --   --   --  8.3 6.1  --  7.5   HGB  --   --   --   --   --  8.8* 8.3*  --  8.9*   MCV  --   --   --   --   --  88 88  --  89   PLT  --   --   --   --   --  259 265  --  294   INR  --   --   --  1.33*  --   --   --   --   --    NA  --  140  --  136  --  135 137  --  137   POTASSIUM 3.8 3.3*  3.3*  --  3.0*  --  3.2* 3.5   < > 3.3*   CHLORIDE  --  107  --  105  --  103 105  --  104   CO2  --  30  --  30  --  29 29  --  27   BUN  --  9  --  8  --  10 12  --  14   CR  --  0.71  --  0.61  --  0.66 0.61  --  0.64   ANIONGAP  --  3  --  1*  --  3 3  --  6   DARIANA  --  8.8  --  7.9*  --  8.3* 8.4*  --  8.3*   GLC  --  132* 81 140*   < > 102* 107*  --  101*   ALBUMIN  --  1.9*  --  1.5*  --   --   --   --   --    PROTTOTAL  --  4.6*  --  4.0*  --   --   --   --   --    BILITOTAL  --  0.6  --  0.7  --   --   --   --   --    ALKPHOS  --  191*  --  183*  --   --   --   --   --    ALT  --  15  --  13  --   --   --   --   --    AST  --  19  --  18  --   --   --   --   --     < > = values in this interval not displayed.       Recent Results (from the past 24 hour(s))   IR PICC Placement > 5 Yrs of Age    Narrative    [facility]  LOCATION: [facility].  DATE: 7/26/2021    PROCEDURE: PERIPHERALLY INSERTED CENTRAL CATHETER (PICC) PLACEMENT    INTERVENTIONAL RADIOLOGIST: Shant Vega MD.    INDICATION: 86-year-old female with failure to thrive and an  intra-abdominal abscess. Triple-lumen PICC placement is requested.  This was unsuccessful at bedside.    CONSENT: The risks, benefits and alternatives of peripherally inserted  central catheter  placement were discussed with the patient  in detail.  All questions were answered. Informed consent was given to proceed  with the procedure.    MODERATE SEDATION: None.    CONTRAST: None.  ANTIBIOTICS: None.  ADDITIONAL MEDICATIONS: None.    FLUOROSCOPIC TIME: 0.3 minutes.  RADIATION DOSE: Air Kerma: 1 mGy.    COMPLICATIONS: No immediate complications.    STERILE BARRIER TECHNIQUE: Maximum sterile barrier technique was used.  Cutaneous antisepsis was performed at the operative site with  application of 2% chlorhexidine and large sterile drape. Prior to the  procedure, the  and assistant performed hand hygiene and wore  hat, mask, sterile gown, and sterile gloves during the entire  procedure.    PROCEDURE:     Using real-time ultrasound guidance, the right upper arm brachial vein  was punctured. A wire was manipulated centrally into the right atrium.      Over wire exchange was then made for a 5F, 36 cm triple lumen  peripherally inserted central catheter which was advanced under  fluoroscopic guidance until the tip was at the cavoatrial junction. A  fluoroscopic image was obtained.    FINDINGS:  Ultrasound shows an anechoic and compressible right upper arm brachial  vein. A permanent image was stored.      The completion fluoroscopic images show the catheter tip to lie near  the cavoatrial junction.      Impression    IMPRESSION:    1.  Peripherally inserted central catheter (PICC) placement, as  discussed above.  2.  The catheter position was verified fluoroscopically and this is  ready for use.    ADORE HINSON MD         SYSTEM ID:  [id number]

## 2021-07-27 NOTE — PLAN OF CARE
Patient is alert and oriented only to self. Patient pulled out their PICC early in my shift. MD notified of no IV access this shift, deferred to day shift following providers to reevaluate plan of care for line necessity. Patient having active delusions such as believing they are on a plane and someone stole their purse and having to fold her sweaters and blankets to bring with. Patient increasingly restless and agitated throughout night. Patient refused dressing change for coccyx. Patient pulled off abd dressing around 4am, even with the long arm sitter. Patient pulling at lines, taking off gown, and trying to get out of bed. Patient denies pain. Gave two doses of prn Seroquel overnight due to restlessness and agitation. Low urine output, MD notified. Since patient refused NJ tube and pulled out PICC, no way of replacing fluids to increase UO. Turned and repositioned every two hours. Sea Bands placed for nausea. Ethics consult placed.

## 2021-07-28 NOTE — DISCHARGE SUMMARY
[facility]    Discharge Summary  Hospitalist    Date of Admission:  7/19/2021  Date of Discharge:  7/28/2021  Discharging Provider: Mark Verma  Date of Service (when I saw the patient): 07/28/21      History of Present Illness  From admission H&P  Melissa Quinteros is a 86 year old female who presents with failure to thrive.  She was diagnosed with colon cancer and underwent colectomy in June of this year.  Course was complicated and she ultimately needed to have an ostomy placed.  She was discharged home then returned with with an intra-abdominal abscess.  She had gram-negative bacteremia.  During her second hospital stay it was ultimately decided that she should go on hospice.  Antibiotics were discontinued and she went home on hospice.  However, it sounds like she was not doing well at home and she had no appetite.  She reports that she can drink some water but anytime she brings any food or substance to her mouth she gets very nauseous.   apparently did not want her to be on hospice any more and he wanted a second opinion given her prognosis.  At the time my visit with her she denies chest pain or shortness of breath.  Denies any abdominal pain.  She denies nausea at this time.    Hospital Course   Melissa Quinteros was admitted on 7/19/2021.  The following problems were addressed during her hospitalization:    Failure to thrive  Weakness  Recent colon cancer resection 6/4/2021  Postoperative intra-abdominal abscess 7/5   Gram negative bacteremia 7/4  *Underwent extended R hemicolectomy and small bowel resection x 2 on 6/14. Taken back to OR 6/22 due to anastomotic leak and underwent ex lap, washout and diverting loop ileostomy.  *Readmitted 7/4/21 after prolonged hospitalization for colon cancer resection with draining wound. Found to have larger purulent fluid collection that was draining. Positive blood cultures for GNR. Fluid collection with drain placement 7/8. Treated  "with cipro/flagyl.  * Was discharged with plans for hospice but pt/ family felt not doing well with this. No pain but no appetite, only getting 2 glasses of water in a day, has \"overwhelming nausea\".  Getting normal ostomy outputs. Afebrile, vitals stable. Very frail. Ostomy looks ok, drain in place R abdomen, dressings c/d/i. Labs normal except low K. Lactic and WBC normal. Of note she did not complete a full course of cipro/flagyl as these were stopped upon entering hospice.   * CT abd 7/21 showed Right sided abscess nearly completely resolved.No fistula to bowel.  Here in the hospital this admission beginning 7/19 she has been seen by colorectal surgery, general surgery, ID, palliative care and nutrition. She has had most issues with nausea and ability to maintain any po.  Because of this plans were made to start tube feeds which have been unsuccessful on many attempts.  Initially a G-tube was planned but on 7/23 IR tried but was unable to insufflate the stomach and unable to place the G-tube.  NJ tube was placed instead and TF started but patient subsequently pulled out the NJ.  General surgery consulted to consider surgical placement but did not feel they could be successful.  PICC line placed on 7/24 but patient eventually pulled out the PICC line. Palliative care met with the family and after discussion felt that it would not benefit the patient to attempt to place another PICC or NJ as with her dementia she continues to pull at lines/tubes.  On 7/27 the family elected to enroll the patient in hospice again and will try for hospice in a TCU.   -appreciate assistance from colorectal surgery, general surgery, ID and palliative  -continue with Cipro/flagyl another 4 days to complete a 2 week course  -try to encourage po intake as much as possible  -all medications as solution if possible  -enroll in hospice at discharge - hospice meds ordered on discharge     Hypokalemia  Hypomagnesemia  Hypophosphatemia  K 2.8 " on presentation and replaced K and Mg per protocol     Abnormal UA  UA on presentation with 14 WBC, >182 RBC's, few bacteria. urine culture likely contaminant- staph epidermis     Acute on chronic blood loss anemia  S/p transfusion 7/5. hgb 11.0 on presentation (suspect in part due to hemoconcentration)     Severe malnutrition  See section above on attempts at getting nutrition.  At this point going to enter into hospice  -encourage po intake to the extent possible     Dementia  Recent encephalopathy during previous hospitalizations  Dementia mentioned in previous notes but don't see prior to admission documentation of this. Has had some waxing/waning mental status and memory.   - prn haldol available if agitation    Mark Verma, DO        Pending Results   Unresulted Labs Ordered in the Past 30 Days of this Admission     No orders found from 6/19/2021 to 7/20/2021.          Code Status   DNR / DNI       Primary Care Physician   DERRICK PASCUAL    General: lying in bed, appears comfortable  Cardiovascular: RRR  Pulmonary: CTAB  GI: +BS, soft, NT/ND  Lymphatics: no edema  Skin:  No rash    Discharge Disposition   Discharged to home on hospice  Condition at discharge: Stable    Consultations This Hospital Stay   WOUND OSTOMY CONTINENCE NURSE  IP CONSULT  PHYSICAL THERAPY ADULT IP CONSULT  NUTRITION SERVICES ADULT IP CONSULT  COLORECTAL SURGERY IP CONSULT  WOUND OSTOMY CONTINENCE NURSE  IP CONSULT  PALLIATIVE CARE ADULT IP CONSULT  SPEECH LANGUAGE PATH ADULT IP CONSULT  CARE MANAGEMENT / SOCIAL WORK IP CONSULT  NUTRITION SERVICES ADULT IP CONSULT  PHYSICAL THERAPY ADULT IP CONSULT  OCCUPATIONAL THERAPY ADULT IP CONSULT  NUTRITION SERVICES ADULT IP CONSULT  INFECTIOUS DISEASES IP CONSULT  PHARMACY IP CONSULT  VASCULAR ACCESS ADULT IP CONSULT  VASCULAR ACCESS ADULT IP CONSULT  PHARMACY IP CONSULT  PHARMACY/NUTRITION TO START AND MANAGE TPN  PHARMACY IP CONSULT  PHARMACY IP CONSULT  PHARMACY IP CONSULT  PHARMACY IP  CONSULT  SURGERY GENERAL IP CONSULT  PALLIATIVE CARE ADULT IP CONSULT  SPEECH LANGUAGE PATH ADULT IP CONSULT  ETHICS IP CONSULT  VASCULAR ACCESS ADULT IP CONSULT  SOCIAL WORK IP CONSULT    Time Spent on this Encounter   IMark DO, personally saw the patient today and spent greater than 30 minutes discharging this patient.    Discharge Orders      Reason for your hospital stay    Evaluated for ongoing abdominal issues and inability to eat following surgery.  Ultimately it was decided after attempts at feeding tubes and discussions with the medical team to re-enroll in hospice.     Follow-up and recommended labs and tests     Enroll in hospice on discharge     Activity    Your activity upon discharge: activity as tolerated     No CPR- Do NOT Intubate     Diet    Follow this diet upon discharge: Orders Placed This Encounter      Calorie Counts      Snacks/Supplements Adult: Ensure Enlive; With Meals      Combination Diet Minced and Moist Diet (level 5); Thin Liquids (level 0) (straws ok)     Discharge Medications   Current Discharge Medication List      START taking these medications    Details   acetaminophen (TYLENOL) 650 MG suppository Place 1 suppository (650 mg) rectally every 4 hours as needed for fever  Qty: 4 suppository, Refills: 11    Associated Diagnoses: Malignant neoplasm of transverse colon (H)      ciprofloxacin (CIPRO) 500 MG/5ML (10%) suspension Take 5 mLs (500 mg) by mouth every 12 hours for 4 days  Qty: 40 mL, Refills: 0    Associated Diagnoses: Malignant neoplasm of transverse colon (H)      haloperidol (HALDOL) 2 MG/ML (HIGH CONC) solution Take 0.25-0.5 mLs (0.5-1 mg) by mouth every 6 hours as needed for agitation or other (nausea)  Qty: 10 mL, Refills: 11    Associated Diagnoses: Malignant neoplasm of transverse colon (H)      HYDROmorphone, HIGH CONC, (DILAUDID) 10 mg/mL LIQD oral Take 0.1-0.2 mLs (1-2 mg) by mouth or place under tongue every 2 hours as needed for other (pain or  shortness of breath/dyspnea)  Qty: 90 mL, Refills: 0    Comments: Hospice patient. Dispense in quantities of 30 mL.  Associated Diagnoses: Malignant neoplasm of transverse colon (H)      LORazepam (ATIVAN) 2 MG/ML (HIGH CONC) solution Take 0.125-0.25 mLs (0.25-0.5 mg) by mouth or place under tongue every 4 hours as needed for anxiety (restlessness)  Qty: 30 mL, Refills: 5    Associated Diagnoses: Malignant neoplasm of transverse colon (H)      metroNIDAZOLE (FLAGYL) 50 mg/mL SUSP Take 10 mLs (500 mg) by mouth 2 times daily for 4 days  Qty: 80 mL, Refills: 0    Associated Diagnoses: Malignant neoplasm of transverse colon (H)         CONTINUE these medications which have NOT CHANGED    Details   acetaminophen (TYLENOL) 325 MG tablet Take 2 tablets (650 mg) by mouth every 4 hours as needed for other (For optimal non-opioid multimodal pain management to improve pain control.)  Qty: 90 tablet, Refills: 3    Associated Diagnoses: Post-operative state      clobetasol (TEMOVATE) 0.05 % external cream Apply topically 2 times daily  Qty: 45 g, Refills: 1    Associated Diagnoses: Other atopic dermatitis      doxepin (ZONALON) 5 % external cream Apply topically 3 times daily  Qty: 45 g, Refills: 1    Associated Diagnoses: Other atopic dermatitis      omeprazole (PRILOSEC) 20 MG CR capsule Take 1 capsule (20 mg) by mouth daily  Qty: 90 capsule, Refills: 3    Associated Diagnoses: Gastroesophageal reflux disease without esophagitis      triamcinolone (KENALOG) 0.1 % external ointment Apply topically 2 times daily as needed          STOP taking these medications       magnesium oxide (MAG-OX) 400 MG tablet Comments:   Reason for Stopping:         QUEtiapine (SEROQUEL) 25 MG tablet Comments:   Reason for Stopping:             Allergies   Allergies   Allergen Reactions     Tramadol Anxiety     Aggressive behaviors, delirium     Lovastatin      Other reaction(s): Arthralgia     Data   Most Recent 3 CBC's:  Recent Labs   Lab Test  07/24/21  0717 07/23/21  0705 07/22/21  0656   WBC 8.3 6.1 7.5   HGB 8.8* 8.3* 8.9*   MCV 88 88 89    265 294      Most Recent 3 BMP's:  Recent Labs   Lab Test 07/27/21  0003 07/26/21  1622 07/26/21  0613 07/25/21  0657 07/24/21  1903 07/24/21  0717   NA  --  140  --  136  --  135   POTASSIUM 3.8 3.3*  3.3*  --  3.0*  --  3.2*   CHLORIDE  --  107  --  105  --  103   CO2  --  30  --  30  --  29   BUN  --  9  --  8  --  10   CR  --  0.71  --  0.61  --  0.66   ANIONGAP  --  3  --  1*  --  3   DARIANA  --  8.8  --  7.9*  --  8.3*   GLC  --  132* 81 140*   < > 102*    < > = values in this interval not displayed.     Most Recent 2 LFT's:  Recent Labs   Lab Test 07/26/21  1622 07/25/21  0657   AST 19 18   ALT 15 13   ALKPHOS 191* 183*   BILITOTAL 0.6 0.7     Most Recent INR's and Anticoagulation Dosing History:  Anticoagulation Dose History     Recent Dosing and Labs Latest Ref Rng & Units 6/28/2021 7/2/2021 7/4/2021 7/6/2021 7/7/2021 7/8/2021 7/25/2021    INR 0.85 - 1.15 - - - - - - 1.33(H)    INR 0.86 - 1.14 1.06 2.02(H) 2.39(H) 2.53(H) 1.61(H) 1.29(H) -        Most Recent 3 Troponin's:No lab results found.  Most Recent Cholesterol Panel:  Recent Labs   Lab Test 07/24/21 2337 06/19/18  1010   CHOL  --  216*   LDL  --  134*   HDL  --  48   TRIG 70 168*     Most Recent 6 Bacteria Isolates From Any Culture (See EPIC Reports for Culture Details):  Recent Labs   Lab Test 07/08/21  1122 07/04/21 1949 07/04/21 1948 06/25/21  1313 05/28/21  1257   CULT Normal skin lux Anaerobic gram negative bacilli  No further identification or sensitivity done  *  1 OF 4 BOTTLES POSITIVE  Critical Value called to and read back by  JAYCE LENZ RN AT 2017 ON 07.05.2021, [facility]   No growth after 6 days Light growth  Enterococcus faecalis  * >100,000 colonies/mL  mixed urogenital lux       Most Recent TSH, T4 and A1c Labs:  Recent Labs   Lab Test 07/24/21  2337 08/20/20  0000   TSH  --  0.94   A1C 4.9 5.6     Results for orders placed or  performed during the hospital encounter of 07/19/21   CT Abdomen Pelvis w Contrast    Narrative    CT ABDOMEN AND PELVIS WITH CONTRAST 7/21/2021 3:30 PM    CLINICAL HISTORY: Abdominal abscess/infection suspected.    TECHNIQUE: CT scan of the abdomen and pelvis was performed following  injection of IV contrast. Pre and post injection of contrast into the  drain. Multiplanar reformats were obtained. Dose reduction techniques  were used.    CONTRAST: 62 mL Isovue-370 IV, and 25 mL of Omnipaque mixed with  saline was injected into the drain.    COMPARISON: 7/8/2021    FINDINGS:   LOWER CHEST: Moderate-sized bilateral pleural effusions with  underlying compressive atelectasis, similar to previous.    HEPATOBILIARY: Normal.    PANCREAS: Normal.    SPLEEN: Irregularity of the anterior spleen adjacent to the  perisplenic fluid collection, similar to previous.    ADRENAL GLANDS: Normal.    KIDNEYS/BLADDER: Normal.    BOWEL: Partial colectomy with ileocolic anastomosis as well as a left  lower quadrant ileostomy.    Thin fluid collection in the anterior peritoneal cavity has nearly  resolved, status post placement of percutaneous drainage catheter.  Minimal fluid remains inferior to the catheter. After injection of  contrast through the drain, contrast extends along the anterior margin  of the liver and into an open midline anterior abdominal wound. No  evidence of a fistula to bowel. A small perisplenic fluid collection  has decreased in size from previous, measuring 1.4 cm in thickness  compared to previous 2 cm. A small fluid collection in the pelvis  previously contained a surgical drain. This collection is also smaller  than previous measuring 1.7 cm in thickness.    PELVIC ORGANS: Hysterectomy. Oakley catheter in the bladder.    ADDITIONAL FINDINGS: None.    MUSCULOSKELETAL: Normal.      Impression    IMPRESSION:   1.  Right-sided abscess has nearly completely resolved, status post  drainage catheter placement.  2.   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 No fistula to bowel.  3.  Additional fluid collections in the pelvis and left upper quadrant  have decreased in size and are not large enough to place new drains  into.  4.  Moderate bilateral pleural effusions are similar to previous.    OBI SILVA MD         SYSTEM ID:  [id number]   IR Gastrostomy Tube Percutaneous Plcmnt    Narrative    PROCEDURE(S):  1. Nasogastric tube placement  2. Percutaneous gastrostomy tube placement      DATE OF PROCEDURE:  7/23/2021 10:30 AM    MODERATE SEDATION: Versed 0.5 mg IV; Fentanyl 25 mcg IV. During the  time out, immediately prior to the administration of medications, the  patient was reassessed for adequacy to receive conscious sedation.   Under physician supervision, Versed and fentanyl were administered for  moderate sedation. Pulse oximetry, heart rate and blood pressure were  continuously monitored by an independent trained observer. The  physician spent 49 minutes of face-to-face sedation time with the  patient.    ADDITIONAL MEDICATIONS: 4 mg Zofran IV, 1 mg glucagon IV    CONTRAST: 0 mL  REFERENCED AIR KERMA: 1.17 mGy  FLUOROSCOPY TIME: 0.6 minutes.     ESTIMATED BLOOD LOSS:  Minimal    COMPLICATIONS:  None    CLINICAL HISTORY/INDICATION: 86-year-old female with poor oral intake  and complicated surgical history. Requiring long-term nutrition.    PROCEDURES AND FINDINGS:  Following a discussion of the risks, benefits, indications, and  alternatives to treatment, informed consent was obtained. The patient  was brought to the interventional radiology suite and placed supine on  the table. A limited ultrasound of the abdomen was performed to  localize the liver, which was marked on the skin. A nasogastric  feeding tube was inserted with the tip in the stomach. The abdomen was  then prepped and draped in a routine sterile fashion.  A timeout was  performed per hospital  universal protocol policy to ensure correct  patient, site, and procedure to be performed.    Air was then insufflated into the stomach via the indwelling  nasogastric tube. Unfortunately even after 17 60 mL syringes full of  air were used to insufflate the stomach, the stomach could not be  distended as air rapidly went through the pylorus into the small  bowel. The procedure was terminated.     Throughout the procedure, the patient was monitored by a radiology  nurse for cardiac rhythm and oxygen saturation which remained stable.   The patient tolerated the procedure well and left interventional  radiology in stable condition.      Impression    IMPRESSION:  Percutaneous gastrostomy tube could not be placed due to inability to  insufflate the stomach.  Multiple syringes of air were insufflated  into the stomach without any distension of the stomach. The air  continued to rapidly go through the pylorus into the small bowel,  despite patient being given glucagon. Consider surgical gastrostomy  tube placement.     BRADEN DWYER DO         SYSTEM ID:  [id number]   IR PICC Placement > 5 Yrs of Age    Narrative    [facility]  LOCATION: [facility].  DATE: 7/26/2021    PROCEDURE: PERIPHERALLY INSERTED CENTRAL CATHETER (PICC) PLACEMENT    INTERVENTIONAL RADIOLOGIST: Shant Vega MD.    INDICATION: 86-year-old female with failure to thrive and an  intra-abdominal abscess. Triple-lumen PICC placement is requested.  This was unsuccessful at bedside.    CONSENT: The risks, benefits and alternatives of peripherally inserted  central catheter placement were discussed with the patient  in detail.  All questions were answered. Informed consent was given to proceed  with the procedure.    MODERATE SEDATION: None.    CONTRAST: None.  ANTIBIOTICS: None.  ADDITIONAL MEDICATIONS: None.    FLUOROSCOPIC TIME: 0.3 minutes.  RADIATION DOSE: Air Kerma: 1 mGy.    COMPLICATIONS: No immediate complications.    STERILE BARRIER  TECHNIQUE: Maximum sterile barrier technique was used.  Cutaneous antisepsis was performed at the operative site with  application of 2% chlorhexidine and large sterile drape. Prior to the  procedure, the  and assistant performed hand hygiene and wore  hat, mask, sterile gown, and sterile gloves during the entire  procedure.    PROCEDURE:     Using real-time ultrasound guidance, the right upper arm brachial vein  was punctured. A wire was manipulated centrally into the right atrium.      Over wire exchange was then made for a 5F, 36 cm triple lumen  peripherally inserted central catheter which was advanced under  fluoroscopic guidance until the tip was at the cavoatrial junction. A  fluoroscopic image was obtained.    FINDINGS:  Ultrasound shows an anechoic and compressible right upper arm brachial  vein. A permanent image was stored.      The completion fluoroscopic images show the catheter tip to lie near  the cavoatrial junction.      Impression    IMPRESSION:    1.  Peripherally inserted central catheter (PICC) placement, as  discussed above.  2.  The catheter position was verified fluoroscopically and this is  ready for use.    ADORE HINSON MD         SYSTEM ID:  [id number]

## 2021-07-28 NOTE — PROVIDER NOTIFICATION
MD Notification    Notified Person: MD    Notified Person Name: Dr. Noriega     Notification Date/Time: 7/28 @ 1100    Notification Interaction: paged    Purpose of Notification: Patient has a ride set up for discharge at 1500 today. Need discharge orders.    Orders Received: No call back, no orders placed    Comments:

## 2021-07-28 NOTE — PLAN OF CARE
Speech Language Therapy Discharge Summary    Reason for therapy discharge:    Change in medical status.    Progress towards therapy goal(s). See goals on Care Plan in [facility] electronic health record for goal details.  Change to hospice, family plans to feed pt any food pt requests    Therapy recommendation(s):    No further therapy is recommended.

## 2021-07-28 NOTE — PROGRESS NOTES
CALORIE COUNT      Approximate Oral Intake for:  (7/27)     Calories: 1090 cals  Protein: 57 gm pro        Estimated Needs:    Energy: 1785-8792 kcals (30-35 Kcal/Kg)   Protein:  grams protein (1.3-1.8 g pro/Kg)       Summary:   Diet: Minced/Moist (level 5), thin liquids  Ensure with meals    Note plan for hospice at discharge  No TF     Lucero Ash RD, LD  Clinical Dietitian - [facility]   Pager - [phone]

## 2021-07-28 NOTE — PLAN OF CARE
[id number]: Confused, forgetful, alert to self only. VSS on RA. Patient denies pain.Tele NSR with occasional PVCs. CMS intact. LS diminished. BS+, flatus+, liquid output through ileostomy. Abdominal wound dressing changed once this shift. Minimal PO intake. Denies  N/V. Oakley patent with adequate output. Turn and repo q2hrs.    Discharge education discussed with family and patient at bedside. Teach back method refused by family, relying on hospice for cares and medication management. Patient discharged via [facility] transport to home on hospice.

## 2021-07-28 NOTE — PROVIDER NOTIFICATION
MD Notification     Notified Person: MD     Notified Person Name: On call PA     Notification Date/Time: 7/28 @ 1200     Notification Interaction: Second page     Purpose of Notification: Patient has a ride set up for discharge at 1500 today. Need discharge orders.     Orders Received:     Comments:        Essential (primary) hypertension    Iron deficiency anemia due to chronic blood loss    Uterine leiomyoma, unspecified location

## 2021-07-28 NOTE — PROGRESS NOTES
PICC order received yesterday but not placed d/t current ethics/palliative consults. VAT will defer PICC placement as it appears pt will be discharging on hospice/comfort cares. Will f/u with bedside RN.

## 2021-07-28 NOTE — PLAN OF CARE
Occupational Therapy Discharge Summary    Reason for therapy discharge:    Discharged to transitional care facility. with hospice cares    Progress towards therapy goal(s). See goals on Care Plan in Epic electronic health record for goal details.  Goals not met.  Barriers to achieving goals:   limited tolerance for therapy and discharge from facility.    Therapy recommendation(s):    No further therapy is recommended. Pt will be discharging to TCU with hospice cares, limited tolerance for therapy limited by fatigue and decreased strength for functional activity tolerance.

## 2021-07-28 NOTE — PROVIDER NOTIFICATION
MD Notification    Notified Person: MD    Notified Person Name: Dr. Verma     Notification Date/Time: 7/28 @ 1870    Notification Interaction: Web page     Purpose of Notification: Patient has a ride set up to discharge at 1500 and needs discharge orders.    Orders Received:    Comments:

## 2021-07-28 NOTE — PROGRESS NOTES
Care Management Discharge Note    Discharge Date: 07/28/2021  Expected Time of Departure: 1500    Discharge Disposition: Transitional Care    Discharge Services: None    Discharge DME: None    Discharge Transportation: other (see comments) ([facility] transport)    Private pay costs discussed: transportation costs    PAS Confirmation Code:  NA  Patient/family educated on Medicare website which has current facility and service quality ratings: yes    Education Provided on the Discharge Plan:    Persons Notified of Discharge Plans: pt, pt's , pt's daughter, [facility] hospice, and [facility]   Patient/Family in Agreement with the Plan: yes    Handoff Referral Completed: Yes    Additional Information:  Call back from [facility] transport reporting they can transport pt at 1500. ALYSSA updated pt's  and Rosanne with [facility]. Rosanne can be reached at [phone]. ALYSSA paged provider for orders. ALYSSA completed, faxed, and placed PCS form on chart. ALYSSA received a call from [facility] transport regarding transport information. ALYSSA was informed that pt would be charged out-of-town mileage. Pt's cost of her STR transport is roughly estimated to be $6035.91. ALYSSA went to update pt's  but he said he would pay for the ride no matter what. Pt's  declined to know the rough cost of the STR transport. ALYSSA did inform pt's  they were filling out the PCS form. Call from Rosanne with [facility] reporting pt would officially enroll into hospice 7/29 at 9 a.m. ALYSSA paged provided for orders and scripts.    Orders and scripts completed. ALYSSA sent the orders to [facility] intake at [phone]. Rosanne would then fax the orders to the [place] team. ALYSSA inquired with Rosanne if the medications should be filled at [facility]. Rosanne requested the medications are filled at [facility]. ALYSSA updated RNCC and bedside nurse.     Call from discharge pharmacy reporting that the metronidavaeole cannot be prescribed  by our pharmacy. Our pharmacy is currently out. SW called Rosanne and she reports SW should ask pt's  what pharmacy they use for medication in [place]. SW went to pt's room and pt was awake. SW had yet to see pt awake. Pt's  and daughter were present. SW was informed that [facility] is the pharmacy of choice. RNCC paged the hospitalist [facility].             AUGUST Mcleod

## 2021-07-28 NOTE — PLAN OF CARE
1900h-0700h: Alert. Disoriented to time, situation & place. Tele: NSR w/ occasional PVC's. O2Sat 100% on RA. Diminished breath sound. Denies SOB. NPO. Combination Diet minced & moist diet level 5. Fluids offered from time to time. Oakley F16 in place, output is less and orange in color, hyperactive BS x4. Ileostomy pink & protruding, w/ yellow/brown liquid output. Patient dependent; Repositioned every 2h. Applied new dressing on midline abdomen. Changed Pedal pulses palpable, sensation intact and no edema on BLE. Patient slept at night w/ just 1 time episode of trying to get out of bed because of confusion.

## 2021-07-28 NOTE — PROGRESS NOTES
COLON & RECTAL SURGERY  PROGRESS NOTE    July 28, 2021    SUBJECTIVE:  Patient denies abdominal pain.  Tolerating diet.  After palliative team consult, appears patient will be discharging to facility on hospice/comfort cares.    OBJECTIVE:  Temp:  [96.5  F (35.8  C)-97.3  F (36.3  C)] 97.3  F (36.3  C)  Pulse:  [] 94  Resp:  [12-20] 18  BP: ()/(50-58) 95/54  SpO2:  [93 %-100 %] 97 %    Intake/Output Summary (Last 24 hours) at 7/28/2021 0821  Last data filed at 7/28/2021 0614  Gross per 24 hour   Intake 480 ml   Output 3300 ml   Net -2820 ml       GENERAL:  Awake, lying in bed.  HEAD: Nomocephalic atraumatic  SCLERA: anicteric  EXTREMITIES: warm and well perfused  ABDOMEN:  Soft, non tender, non-distended, no rebound or guarding, no peritoneal signs.  Stoma viable with gas in bag.  INCISION:  C/d/i, wound dressing in place.    LABS:  Lab Results   Component Value Date    WBC 8.3 07/24/2021    WBC 9.3 07/09/2021     Lab Results   Component Value Date    HGB 8.8 07/24/2021    HGB 8.9 07/09/2021     Lab Results   Component Value Date    HCT 27.2 07/24/2021    HCT 27.6 07/09/2021     Lab Results   Component Value Date     07/24/2021     07/09/2021     Last Basic Metabolic Panel:  Lab Results   Component Value Date     07/26/2021     07/09/2021      Lab Results   Component Value Date    POTASSIUM 3.8 07/27/2021    POTASSIUM 3.5 07/11/2021     Lab Results   Component Value Date    CHLORIDE 107 07/26/2021    CHLORIDE 111 07/09/2021     Lab Results   Component Value Date    DARIANA 8.8 07/26/2021    DARIANA 7.6 07/09/2021     Lab Results   Component Value Date    CO2 30 07/26/2021    CO2 22 07/09/2021     Lab Results   Component Value Date    BUN 9 07/26/2021    BUN 13 07/09/2021     Lab Results   Component Value Date    CR 0.71 07/26/2021    CR 0.55 07/09/2021     Lab Results   Component Value Date     07/26/2021    GLC 89 07/09/2021       ASSESSMENT/PLAN:  85 yo female with history of  right sided colon cancer s/p right colectomy and small bowel resection complicated by anastomotic leak, s/p redo ileocolic resection with DLI and abdominal washout and closure.  Patient now with failure to thrive and weakness.  Transferred for second opinion regarding prognosis and post op cares.  Unable to place G tube and patient has pulled out NJ and PICC line.  Unable to offer any further aggressive cares.     - Diet as tolerated  - Abx per hospitalist/palliative team  - Agree with discharge on hospice/comfort cares     Discussed with Dr. Mendoza      For questions/paging, please contact the CRS office at [phone].    Tiki Amor PA-C  [facility]  Phone: [phone]

## 2021-12-14 ENCOUNTER — TELEPHONE (OUTPATIENT)
Dept: CASE MANAGEMENT | Facility: CLINIC | Age: 86
End: 2021-12-14
Payer: MEDICARE

## 2021-12-14 NOTE — PROGRESS NOTES
ALYSSA  D: ALYSSA informed the following by fellow ALYSSA Ortiz:  Yesterday I picked up a voice mail message for you on our dept voice mail from a Artur Hernandez.    He said he was calling you on behalf of Ilya Quinteros whose spouse  and who you worked with back in August.    Artur is requesting a call at [phone]    ALYSSA called artur who reports Ilya Velazquez has received his transport bill for $5,000 from when Melissa was transported by EMS back to [place]. ALYSSA reviewed the notes from discharge date. ALYSSA had noted the follow:     Call back from [facility] transport reporting they can transport pt at 1500. ALYSSA updated pt's  and Rosanne with [facility]. Rosanne can be reached at [phone]. ALYSSA paged provider for orders. ALYSSA completed, faxed, and placed PCS form on chart. ALYSSA received a call from [facility] regarding transport information. ALYSSA was informed that pt would be charged out-of-town mileage. Pt's cost of her STR transport is roughly estimated to be $6035.91. ALYSSA went to update pt's  but he said he would pay for the ride no matter what. Pt's  declined to know the rough cost of the STR transport. ALYSSA did inform pt's  they were filling out the PCS form.     ALYSSA informed pt's  that day that the PCS form would assist with payment, which it looks like it did since the bill went from $6,035 to $5,000. When ALYSSA attempted to update Ilya on stretcher cost he refused to know the total estimated cost and  out-of-town mileage as noted above. Artur called in hopes that ALYSSA could do something to have Medicare pay the bill. Artur reports the only reason Melissa came to [facility] was for the ID provider. ALYSSA informed Artur that Medicare does not pay for transport and that is why ALYSSA attempted to inform Ilya of the cost. Ilya stating he would pay the cost no matter what, was him accepting responsibility for the bill. ALYSSA informed Artur of all this information. Artur reports Ilya was  overwhelmed in the moment. ALYSSA stated regardless Ilya accepted to pay the charges from the transport. Shahbaz reports he was on his way to see Ilya and would report this information. Shahbaz did inquire who the provider was that day because he states Ilya felt rushed to leave. ALYSSA reviewed the chart notes and informed Shahbaz pt was not rushed and the discharged had been planned. ALYSSA gave the name of Dr. Mark Verma who worked with pt day of discharge and the day prior.

## 2022-02-15 NOTE — DISCHARGE INSTRUCTIONS
Assessment of ostomy and needs for: diverting loop ileostomy Abd. Wound  Surgery date/procedure:  right hemicolectomy with small bowel resection on 06/14, anastomotic leak leading to transverse colectomy and anastomosis with diverting loop ileostomy on 06/22, and washout and closure of midline incision on 06/28.  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 Change barrier  1-2  times a week and as needed for leakage.    2. Empty or change change closed pouch  when 1/3 full of stool/gas.  3. Will not harm appliance to get wet during bathing.  Can blow dry (on cool setting) cloth tape and backing after bath or shower.  4. Initially eat 6 small meals/day. No long-term dietary restrictions related to colostomy.  5. Drink plenty of fluids.  6. Always carry an emergency pouch - can prepare it ahead of time (cut out opening and apply ring).   7. If concerned about odor use an odor eliminator (ie. Febreeze) prior to emptying/changing the appliance.  9. Activity: walk and ok to take steps       Supplies:  [employer]  2-pc system:  20 appliance changes / month  1.  Barrier: 70mm convex cut to fit with tape                          #67516   10/bx = 2bx/month  2.  Adapt ring                                                                           #7805     10/box = 2 box/month  3. Ultra clear, lock n roll, no filter                                             #08595   10/bx = 1bx/month  OR       Optional if lenin output: high output pouch                         #70419    10/bx = 1-2bx / month  4. 3M Cavilon no-sting skin barrier                                          #3342     25/box = 1 box /month   5.  Stoma powder                                                                    #7906    1 bottle/month  6.  Optional: Coloplast lubricating deodorant                         #25913  8oz  " bottle        Pouching procedure:  1.  Cut out opening in barrier following pattern.     2.  Remove plastic backing.   3.  Open ring, stretch and apply around the cut opening on sticky side of barrier.  Press into place.  Note:can 'build up' the ring to fill in any divots in peristomal skin.     4.  Fold back edge of paper that covers the tape to create a \"tab.\"  This assists with paper removal later on.  5.  Set prepared pouch aside.  6.  Remove old pouch from around stoma.  Discard.   7.  Cleanse around stoma with water only.  Dry well.     8.  Apply pouch:  Ensure skin completely dry.  Pull up on abdomen to create flat pouching surface.  Center stoma in barrier opening and press barrier firmly to skin.  9.  Pull on \"tabs\" to remove paper that covers the tape.  Press tape to skin.  Ok if there are wrinkles in the tape.    10. Snap on pouch of choice. High output if continues liquid output vs a drainable if  Stool is thicker/mushy consistency .   11.  Hold warm hand over stoma/barrier for a few minutes, to help pouch form a good secure bond with the skin.       Online ostomy resources: [employer] website, has good videos - https://www.Janalakshmi.Devshop/en/ostomycare/educationaltools   -See also [email or web address].[email or web address]; Ostomy.org      Follow-up as needed (after home care is done):     Glenn Parada Annika Christensson  CWOCNs (ostomy nurses)  Clinic room # 743 in [facility]  Office phone # [phone] (Mon - Fri) - call for any questions or concerns    Abdominal wound: BID and PRN  1. Cleanse wound with Vashe wound cleanser , and mop out any old drainage; dry the periwound skin  2.   Protect surrounding skin with Cavilon no-sting barrier film wipe as needed, ie if skin becoming irritated (do not have to use every dressing change)  3.   Cut approx 1 foot of kerlix and moisten with vashe  4.   Use a cotton tip applicator to gently fill depth and tunnel at 11 o'clock,  approx 5\" toward " - S/p 2/11 Abdomina xray (+) Ileus.  abd xray repeated 2/13 with improvement   Recommend bowel rest to patient, but she was adamant on needing nutrition, she wanted full diet but will only given liquid diet   - Encourage ambulation  - S/p opioids   - S/p Milk of magnesia   - Holding oxybutynin as may decrease colonic transit  - 2/15 Started Reglan per GI.  Pt refused enema interested in ambulating w/ PT   - C/w Miralax, Senna and Reglan   - GI-Dr. Peraza consulted, appreciated belly button  (use only 1 piece of gauze, to not lose any in tunnel)  5.   Cover with silicone foam dressing (ie Mepilex 4x4)

## 2023-09-25 NOTE — PROGRESS NOTES
[facility]    Medicine Progress Note - Hospitalist Service       Date of Admission:  6/14/2021    Assessment & Plan                             Melissa Quinteros is a 86 year old female who was admitted on 6/14/2021.  She underwent a fairly extensive surgery and is now POD #9 with follow up surgery yesterday due to anastomotic leak. She is confused but easily reorients and has had improvement in renal function.  She remains edematous but has no respiratory issues.     Principal Problem:    Malignant neoplasm of transverse colon (H)    Assessment:     On 6/14 Laparoscopic extended right colectomy, laparoscopic mobilization of splenic flexure, small bowel resection x 2, enterolysis for about 20 minutes, biopsy of mesenteric lymph node.     On 6/22 Exploratory laparotomy, resection of transverse colon with recreation of anastomosis between the ileum and descending colon diverting loop ileostomy.      Plan: Per Dr. Carvajal    Active Problems:    Essential hypertension    Assessment: Blood pressure is well controlled    Plan: Continue current meds    Acute kidney failure with tubular necrosis (H)    Assessment: Creatinine improved since yesterday, urine output 500ml overnight.  Still third spacing but cautiously optimistic for improvement.      Plan: Continue IVF at 150ml/hr.   Daily weights, recheck in AM.  Lasix if hypoxic and sounds wet.  Need to monitor nutritional status closely.    Hypomagnesemia    Assessment: had resolved.    Plan: Recheck    Altered mental status    Assessment: Improved from yesterday.    Plan: Monitor                 Diet: Snacks/Supplements Adult: Ensure Clear; With Meals  Snacks/Supplements Adult: Gelatein Plus; With Meals    DVT Prophylaxis: Heparin SQ  Oakley Catheter: PRESENT, indication: Strict 1-2 Hour I&O, /GI/GYN Pelvic Procedure  Central Lines: None  Code Status: Full Code      Disposition Plan   Expected discharge: 4 - 7 days, recommended to prior living arrangement  "once adequate pain management/ tolerating PO medications, antibiotic plan established and mental status at baseline and return of bowel function.     The patient's care was discussed with the Patient.    Diallo Frausto MD  Hospitalist Service  [facility]  Securely message with the Vocera Web Console (learn more here)  Text page via [facility] Paging/Directory      Risk Factors Present on Admission                ______________________________________________________________________    Interval History   Patient tells me that she feels \"okay\".  Does have some abdominal pain but says it is \"not too bad\".  She denies any shortness of breath or chest pain.  She knows she is in the hospital and reorients easily as to the events over the past week.    Nursing notes reviewed.  Patient with okay urine output, 500 mL overnight.  Did well overnight with some increased pain this morning, received Dilaudid.  Resting since.    Data reviewed today: I reviewed all medications, new labs and imaging results over the last 24 hours. I personally reviewed no images or EKG's today.    Physical Exam   Vital Signs: Temp: 96.4  F (35.8  C) Temp src: Temporal BP: 108/45 Pulse: 89   Resp: 10 SpO2: 97 % O2 Device: None (Room air) Oxygen Delivery: 1 LPM(decreased to RA)  Weight: 157 lbs 3.01 oz  Constitutional: awake, alert, cooperative, no apparent distress, and appears stated age  Respiratory: Clear to ascultation bilaterally.  No increased respiratory effort. No basilar crackles.  Cardiovascular: Regular rate and rhythm.  No murmurs rubs or gallops heard. 3+ pitting edema to mid shin.  GI: Abdomen Soft.  BS hypoactive but heard.  Musculoskeletal: No synovitis.  Generalized weakness  Neuropsychiatric: General: normal, calm and normal eye contact  Orientation: oriented to self, place, and with minimal reorientation, situation.      Data   Recent Labs   Lab 06/23/21  0525 06/22/21  0805 06/21/21  0610 06/20/21  0525   WBC " 8.9 12.9*  --  6.2   HGB 9.3* 9.9*  --  10.7*   MCV 85 87  --  86    196  --  218    132* 131* 128*   POTASSIUM 4.4 4.9 4.5 4.5   CHLORIDE 112* 106 104 99   CO2 14* 15* 16* 20*   BUN 56* 48* 47* 45*   CR 2.15* 2.64* 2.54* 2.66*   ANIONGAP 12 11 11 9   DARIANA 6.7* 6.9* 7.3* 8.0*   GLC 82 114* 50* 137*     Recent Results (from the past 24 hour(s))   CT Abdomen pelvis w/o contrast    Narrative    EXAMINATION: CT ABDOMEN PELVIS W/O CONTRAST, 6/22/2021 9:32 AM    TECHNIQUE:  Helical CT images from the lung bases through the  symphysis pubis were obtained  without IV contrast. Contrast dose:    COMPARISON: none    HISTORY: Abdominal distension; s/p ext right colectomy and small bowel  resection X 2 with destion and ileus.    FINDINGS:    There are moderate bilateral pleural effusions. There is compressive  atelectasis in both lower lungs. The heart is normal in size.    The liver is free of masses or biliary ductal enlargement. There is  some radiopaque material in the dependent portions of the gallbladder  most likely sludge.    The the spleen and pancreas appear normal.    The adrenal glands are normal.    The right and left kidneys are free of masses or hydronephrosis.    The periaortic lymph nodes are normal in caliber.    There is a large amount of free intraperitoneal air. A large amount of  ascites is noted. Surgical staple lines are seen in the abdomen. There  is mild gaseous distention of small bowel loops.    In the pelvis the bladder and rectum appear normal.    Advanced degenerative changes are present in the thoracic and lumbar  spine      Impression    IMPRESSION: Moderate bilateral pleural effusions    Large amount of ascites    A large amount of free intraperitoneal air is noted. This is more than  would be expected for a patient who underwent surgery a week ago.    CATHERINE CAMACHO MD   XR Abdomen Port 1 View    Narrative    PROCEDURE: XR ABDOMEN PORT 1 VIEWS 6/22/2021 1:48 PM    HISTORY:  foreign body    COMPARISONS: None.    TECHNIQUE: Single supine view.    FINDINGS: There is a left lower quadrant ostomy with surgical suture.  Catheter overlies the pelvis. Distal end of the nasogastric tube is  seen.    No definite foreign body is seen.    Degenerative changes seen in the spine and there is a left convex  scoliosis.         Impression    IMPRESSION: Extensive surgical changes. No definite foreign body.    PAULO LOPEZ MD      N/A

## 2023-10-17 NOTE — PROGRESS NOTES
GENERAL SURGERY PROGRESS NOTE  6/28/2021       Interval history:    got her to drink 4 cans of boost.      Physical Exam:   Vital signs are reviewed and there are no significant abnormalities.         Intake/Output Summary (Last 24 hours) at 6/28/2021 0731  Last data filed at 6/28/2021 0205  Gross per 24 hour   Intake 720 ml   Output 2660 ml   Net -1940 ml        General: sitting up in the chair, no apparent distress   HEENT: Mouth open, poor dentition  Lungs: No increased work of breathing, severely diminished breath sounds at the bases  CV: Regular rate and rhythm, no murmur  Abdomen: Soft, generalized mild discomfort to palpation discomfort with midline dressing change.  Midline dressing is clean and fascia appears intact. Minimal to no granulation tissue present.   REMI drain is draining clear, serosanguineous fluid.  Loop ileostomy in the left upper quadrant is edematous and pink with liquid stool and air in the bag.  MSK: Generalized edema in upper and lower extremities  Neuro: Alert not oriented to place or time    Recent Labs   Lab Test 06/28/21  0535 06/27/21  0250 08/30/16  1622 08/30/16  1622 08/20/16  0012   WBC 7.7 7.1   < >  --   --    RBC 3.17* 2.79*   < >  --   --    HGB 8.8* 7.9*   < > 14.8 14.4   HCT 26.5* 23.1*   < > 47.5 42.0   MCV 84 83   < > 91 90   MCH 27.8 28.3   < > 28.3 30.9   MCHC 33.2 34.2   < > 31.1* 34.2    191   < > 237 220   MPV  --   --   --  9.1 8.8    < > = values in this interval not displayed.       Recent Labs   Lab Test 06/28/21  0535 06/27/21  1800   POTASSIUM 3.2* 3.2*   CHLORIDE 117* 117*   BUN 42* 47*     Recent Labs   Lab Test 06/26/21  0445 06/25/21  0440 06/16/21  1424 06/16/21  1424 05/28/21  1219 05/28/21  1219   PROTEIN  --   --   --  Negative  --  Negative   BILITOTAL 2.0* 1.1*   < >  --    < >  --    AST 37 37   < >  --    < >  --    ALT 15 13   < >  --    < >  --     < > = values in this interval not displayed.           Assessment / Plan:   Melissa BENDER  SURVEY:    You may be receiving a survey from [employer] regarding your visit today. Please complete the survey to enable us to provide the highest quality of care to you and your family. If you cannot score us a very good on any question, please call the office to discuss how we could have made your experience a very good one. Thank you. Aldair is a 86 year old female who is POD #13 s/p extended right colectomy complicated by small bowel resection x 2 due to metastatic involvement of the small bowel x 3 and POD # 6 s/p anastomotic leak, transverse colectomy and anastomosis. Diverting loop ileostomy    - Delayed closure of wound today    - Appliance change today     - Calorie counts       Appreciate medical management per internal medicine      Del Pang MD on 6/28/2021 at 7:32 AM

## 2024-09-26 NOTE — PHARMACY-CONSULT NOTE
Pharmacy - Transfer Medication Reconciliation     The patient's transfer medication orders have been compared to the medication administration record and to the Prior to Admissions Medications list - any noted discrepancies were resolved with the MD.     Thank you. Pharmacy will continue to monitor.     Gilbert Sanchez [facility] ....................  6/22/2021   4:49 PM    
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     SCC (squamous cell carcinoma), face     Hallux valgus, acquired, right     Malignant neoplasm of transverse colon (H)     Acute kidney failure with tubular necrosis (H)     Hypomagnesemia     Altered mental status     Hyponatremia        Relevant Labs:  Recent Labs   Lab Test 06/22/21  0805 06/20/21  0525   WBC 12.9* 6.2   HGB 9.9* 10.7*    218        CrCl: 13.9 mL/min      Intake/Output Summary (Last 24 hours) at 6/22/2021 1017  Last data filed at 6/22/2021 0152  Gross per 24 hour   Intake 3851 ml   Output 200 ml   Net 3651 ml          Per Renal Dose Adjustment Protocol, will adjust:  Zosyn to 2.25 g IV Q8H for intra-abdominal infection      Will continue to follow and make adjustments accordingly. Thank You.    Delilah Collier [facility] ....................  6/22/2021   10:17 AM  
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     SCC (squamous cell carcinoma), face     Hallux valgus, acquired, right     Malignant neoplasm of transverse colon (H)     Acute kidney failure with tubular necrosis (H)     Hypomagnesemia     Altered mental status     Hyponatremia        Relevant Labs:  Recent Labs   Lab Test 06/24/21  0430 06/23/21  0525   WBC 9.2 8.9   HGB 8.7* 9.3*    221        CrCl: 22.2 mL/min      Intake/Output Summary (Last 24 hours) at 6/24/2021 0744  Last data filed at 6/24/2021 0558  Gross per 24 hour   Intake 5066.5 ml   Output 2060 ml   Net 3006.5 ml          Per Renal Dose Adjustment Protocol, will adjust:  Zosyn to 2.25 g IV Q6H for intra-abdominal indication.      Will continue to follow and make adjustments accordingly. Thank You.    Delilah Collier [facility] ....................  6/24/2021   7:44 AM  
Quality 130: Documentation Of Current Medications In The Medical Record: Current Medications Documented
Detail Level: Detailed

## (undated) DEVICE — STPL RELOAD REG TISSUE ECHELON 60 X 3.6MM BLUE GST60B

## (undated) DEVICE — ENDO TROCAR SLEEVE KII Z-THREADED 05X100MM CTS02

## (undated) DEVICE — SU VICRYL 2-0 CT-2 27" UND J269H

## (undated) DEVICE — TUBING SUCTION 10'X3/16" N510

## (undated) DEVICE — STPL RELOAD LINEAR CUT 75MM TCR75

## (undated) DEVICE — Device

## (undated) DEVICE — ESU LIGASURE LAPAROSCPC L-HOOK SEALER/DVDR 5MM-44CM LF5644

## (undated) DEVICE — ESU GROUND PAD ADULT W/CORD E7507

## (undated) DEVICE — DRSG MEDIPORE 2X2 3/4" 3562

## (undated) DEVICE — ENDO TRAP POLYP E-TRAP 00711099

## (undated) DEVICE — SU VICRYL 3-0 SH 27" J784G

## (undated) DEVICE — PACK LAPAROSCOPY LF SBA15LPFCA

## (undated) DEVICE — SU VICRYL 2-0 SH 27" UND J417H

## (undated) DEVICE — COVER LIGHT HANDLE LT-F02

## (undated) DEVICE — PACK MAJOR LAPAROTOMY LF SBA15MLFCA

## (undated) DEVICE — ESU LIGASURE IMPACT OPEN SEALER/DVDR CVD LG JAW LF4418

## (undated) DEVICE — SU MONOCRYL 4-0 PS-2 27" UND Y426H

## (undated) DEVICE — ENDO KIT COMPLIANCE DYKENDOCMPLY

## (undated) DEVICE — SOL ADH LIQUID BENZOIN SWAB 0.6ML C1544

## (undated) DEVICE — DRSG STERI STRIP 1/2X4" R1547

## (undated) DEVICE — VERRES NEEDLE 120MM DISPOSABLE 12/BX

## (undated) DEVICE — GOWN XLG/LONG ISOLATION MICROCOOL DISP 92353

## (undated) DEVICE — SUCTION TIP POOLE K770

## (undated) DEVICE — NDL COUNTER 20CT 31142493

## (undated) DEVICE — SPONGE LAP 18X18" 23250-400

## (undated) DEVICE — PACK BASIC SET UP STERILE 88178

## (undated) DEVICE — ESU HOLSTER PLASTIC DISP E2400

## (undated) DEVICE — SU VICRYL 3-0 CT-2 27" UND J232H

## (undated) DEVICE — GOWN SURG LARGE IMPERVIOUS MCOOL 92340

## (undated) DEVICE — STPL POWERED ECHELON LONG 60MM PLEE60A

## (undated) DEVICE — CATH TRAY FOLEY SURESTEP 16FR W/URINE MTR STATLK LF A303416A

## (undated) DEVICE — SU VICRYL 2-0 CT-2 27" J333H

## (undated) DEVICE — GLOVE BIOGEL INDICATOR 7.5 LF 41675

## (undated) DEVICE — SUCTION STRYKERFLOW II 250-070-500

## (undated) DEVICE — ENDO BRUSH CHANNEL MASTER CLEANING 2-4.2MM BW-412T

## (undated) DEVICE — PREP CHLORAPREP 26ML TINTED ORANGE  260815

## (undated) DEVICE — GLOVE BIOGEL 7.5 LATEX

## (undated) DEVICE — SOL WATER 1500ML

## (undated) DEVICE — TUBING INSUFFLATOR W/FILTER OLYMPUS WA95005A

## (undated) DEVICE — SUCTION MANIFOLD NEPTUNE 2 SYS 4 PORT 0702-020-000

## (undated) DEVICE — ESU PENCIL W/SMOKE EVAC CVPLP2000

## (undated) DEVICE — ENDO TROCAR FIRST ENTRY KII FIOS Z-THRD 12X100MM CTF73

## (undated) DEVICE — ENDO SNARE EXACTO COLD 9MM LOOP 2.4MMX230CM 00711115

## (undated) DEVICE — ENDO TROCAR SLEEVE KII 5X100MM CTR03

## (undated) DEVICE — SYR ENDO MARKER SPOT GI 5ML GIS-45

## (undated) DEVICE — SPONGE LAP 18X18" X8435

## (undated) DEVICE — GLOVE PROTEXIS POWDER FREE SMT 7.5  2D72PT75X

## (undated) DEVICE — TOWEL SURG 17INW X 26INL CTTN BLUE STRL 8324B

## (undated) DEVICE — ESU HOLDER LAP INST DISP PURPLE LONG 330MM H-PRO-330

## (undated) DEVICE — DRSG KERLIX 4 1/2"X4YDS ROLL 6715

## (undated) DEVICE — SLEEVE COMPRESSION SCD KNEE MED 74022

## (undated) DEVICE — NDL COUNTER 40CT  31142311

## (undated) DEVICE — GLOVE BIOGEL 8 LATEX

## (undated) DEVICE — BLADE CLIPPER 4406

## (undated) DEVICE — GOWN XLG XLONG DISP LF DYNJP2302P

## (undated) DEVICE — SU VICRYL 3-0 SH 27" UND J416H

## (undated) DEVICE — SU PDS II 0 CTX 60" Z990G

## (undated) DEVICE — DRAPE MAYO STAND 23X54 8337

## (undated) DEVICE — NDL COUNTER BLADE GUARD II 15/30 31142295

## (undated) DEVICE — ESU CORD MONOPOLAR 10'  E0510

## (undated) DEVICE — STPL LINEAR CUT 75MM TLC75

## (undated) DEVICE — SUTURE 2-0 VICRYL TIES J911T

## (undated) DEVICE — ENDO SCOPE WARMER DUAL LAP TM500D

## (undated) DEVICE — TRAY DRY SKIN PREP TRAY PREMIUM DYND70661

## (undated) DEVICE — ADH SKIN CLOSURE PREMIERPRO EXOFIN 1.0ML 3470

## (undated) DEVICE — TOWEL SURG BLUE STERILE DISP MDT2168204

## (undated) DEVICE — SPONGE RAY-TEC 4X4" 7317

## (undated) DEVICE — DRSG GAUZE 4X8"

## (undated) DEVICE — SUCTION TIP YANKAUER W/O VENT K86

## (undated) DEVICE — DRAIN JACKSON PRATT CHANNEL 15FR ROUND HUBLESS SIL JP-2228

## (undated) DEVICE — DRSG MEDIPORE 3 1/2X13 3/4" 3573

## (undated) RX ORDER — CIPROFLOXACIN 2 MG/ML
INJECTION, SOLUTION INTRAVENOUS
Status: DISPENSED
Start: 2021-01-01

## (undated) RX ORDER — MORPHINE SULFATE 2 MG/ML
INJECTION, SOLUTION INTRAMUSCULAR; INTRAVENOUS
Status: DISPENSED
Start: 2021-01-01

## (undated) RX ORDER — LIDOCAINE 50 MG/G
PATCH TOPICAL
Status: DISPENSED
Start: 2021-01-01

## (undated) RX ORDER — POTASSIUM CHLORIDE 7.45 MG/ML
INJECTION INTRAVENOUS
Status: DISPENSED
Start: 2021-01-01

## (undated) RX ORDER — MORPHINE SULFATE 0.5 MG/ML
INJECTION, SOLUTION EPIDURAL; INTRATHECAL; INTRAVENOUS
Status: DISPENSED
Start: 2021-01-01

## (undated) RX ORDER — DEXAMETHASONE SODIUM PHOSPHATE 4 MG/ML
INJECTION, SOLUTION INTRA-ARTICULAR; INTRALESIONAL; INTRAMUSCULAR; INTRAVENOUS; SOFT TISSUE
Status: DISPENSED
Start: 2021-01-01

## (undated) RX ORDER — ACETAMINOPHEN 10 MG/ML
INJECTION, SOLUTION INTRAVENOUS
Status: DISPENSED
Start: 2021-01-01

## (undated) RX ORDER — SODIUM CHLORIDE 9 MG/ML
INJECTION, SOLUTION INTRAVENOUS
Status: DISPENSED
Start: 2021-01-01

## (undated) RX ORDER — SODIUM CHLORIDE, SODIUM LACTATE, POTASSIUM CHLORIDE, CALCIUM CHLORIDE 600; 310; 30; 20 MG/100ML; MG/100ML; MG/100ML; MG/100ML
INJECTION, SOLUTION INTRAVENOUS
Status: DISPENSED
Start: 2021-01-01

## (undated) RX ORDER — CEFAZOLIN SODIUM 2 G/100ML
INJECTION, SOLUTION INTRAVENOUS
Status: DISPENSED
Start: 2021-01-01

## (undated) RX ORDER — DEXMEDETOMIDINE HYDROCHLORIDE 4 UG/ML
INJECTION, SOLUTION INTRAVENOUS
Status: DISPENSED
Start: 2021-01-01

## (undated) RX ORDER — FENTANYL CITRATE 50 UG/ML
INJECTION, SOLUTION INTRAMUSCULAR; INTRAVENOUS
Status: DISPENSED
Start: 2021-01-01

## (undated) RX ORDER — MAGNESIUM SULFATE HEPTAHYDRATE 40 MG/ML
INJECTION, SOLUTION INTRAVENOUS
Status: DISPENSED
Start: 2021-01-01

## (undated) RX ORDER — LIDOCAINE HYDROCHLORIDE 10 MG/ML
INJECTION, SOLUTION INFILTRATION; PERINEURAL
Status: DISPENSED
Start: 2021-01-01

## (undated) RX ORDER — PROPOFOL 10 MG/ML
INJECTION, EMULSION INTRAVENOUS
Status: DISPENSED
Start: 2021-01-01

## (undated) RX ORDER — FENTANYL CITRATE-0.9 % NACL/PF 10 MCG/ML
PLASTIC BAG, INJECTION (ML) INTRAVENOUS
Status: DISPENSED
Start: 2021-01-01

## (undated) RX ORDER — LIDOCAINE HYDROCHLORIDE 20 MG/ML
INJECTION, SOLUTION EPIDURAL; INFILTRATION; INTRACAUDAL; PERINEURAL
Status: DISPENSED
Start: 2021-01-01

## (undated) RX ORDER — ONDANSETRON 2 MG/ML
INJECTION INTRAMUSCULAR; INTRAVENOUS
Status: DISPENSED
Start: 2021-01-01

## (undated) RX ORDER — BUPIVACAINE HYDROCHLORIDE 5 MG/ML
INJECTION, SOLUTION EPIDURAL; INTRACAUDAL
Status: DISPENSED
Start: 2021-01-01

## (undated) RX ORDER — BUPIVACAINE HYDROCHLORIDE 2.5 MG/ML
INJECTION, SOLUTION EPIDURAL; INFILTRATION; INTRACAUDAL
Status: DISPENSED
Start: 2021-01-01

## (undated) RX ORDER — LIDOCAINE HYDROCHLORIDE AND EPINEPHRINE 10; 10 MG/ML; UG/ML
INJECTION, SOLUTION INFILTRATION; PERINEURAL
Status: DISPENSED
Start: 2021-01-01

## (undated) RX ORDER — ACETAMINOPHEN 325 MG/1
TABLET ORAL
Status: DISPENSED
Start: 2021-01-01

## (undated) RX ORDER — LIDOCAINE HYDROCHLORIDE 20 MG/ML
JELLY TOPICAL
Status: DISPENSED
Start: 2021-01-01

## (undated) RX ORDER — REGADENOSON 0.08 MG/ML
INJECTION, SOLUTION INTRAVENOUS
Status: DISPENSED
Start: 2018-09-05

## (undated) RX ORDER — HYDRALAZINE HYDROCHLORIDE 20 MG/ML
INJECTION INTRAMUSCULAR; INTRAVENOUS
Status: DISPENSED
Start: 2021-01-01